# Patient Record
Sex: FEMALE | Race: WHITE | NOT HISPANIC OR LATINO
[De-identification: names, ages, dates, MRNs, and addresses within clinical notes are randomized per-mention and may not be internally consistent; named-entity substitution may affect disease eponyms.]

---

## 2017-04-03 ENCOUNTER — APPOINTMENT (OUTPATIENT)
Dept: DERMATOLOGY | Facility: CLINIC | Age: 72
End: 2017-04-03

## 2017-04-18 ENCOUNTER — TRANSCRIPTION ENCOUNTER (OUTPATIENT)
Age: 72
End: 2017-04-18

## 2017-05-05 ENCOUNTER — APPOINTMENT (OUTPATIENT)
Age: 72
End: 2017-05-05

## 2017-05-05 VITALS
HEART RATE: 62 BPM | WEIGHT: 120 LBS | OXYGEN SATURATION: 98 % | HEIGHT: 65 IN | DIASTOLIC BLOOD PRESSURE: 88 MMHG | BODY MASS INDEX: 19.99 KG/M2 | SYSTOLIC BLOOD PRESSURE: 167 MMHG

## 2017-05-05 DIAGNOSIS — Z81.1 FAMILY HISTORY OF ALCOHOL ABUSE AND DEPENDENCE: ICD-10-CM

## 2017-05-19 ENCOUNTER — APPOINTMENT (OUTPATIENT)
Dept: INTERNAL MEDICINE | Facility: CLINIC | Age: 72
End: 2017-05-19

## 2017-05-19 VITALS
WEIGHT: 120 LBS | BODY MASS INDEX: 19.99 KG/M2 | HEIGHT: 65 IN | DIASTOLIC BLOOD PRESSURE: 80 MMHG | HEART RATE: 60 BPM | SYSTOLIC BLOOD PRESSURE: 130 MMHG

## 2017-05-19 DIAGNOSIS — Z23 ENCOUNTER FOR IMMUNIZATION: ICD-10-CM

## 2017-05-19 DIAGNOSIS — M25.50 PAIN IN UNSPECIFIED JOINT: ICD-10-CM

## 2017-05-19 LAB
ALBUMIN SERPL ELPH-MCNC: 4.4 G/DL
ALP BLD-CCNC: 124 U/L
ALT SERPL-CCNC: 31 U/L
ANION GAP SERPL CALC-SCNC: 15 MMOL/L
AST SERPL-CCNC: 46 U/L
BASOPHILS # BLD AUTO: 0.03 K/UL
BASOPHILS NFR BLD AUTO: 0.5 %
BILIRUB SERPL-MCNC: 0.6 MG/DL
BUN SERPL-MCNC: 16 MG/DL
CALCIUM SERPL-MCNC: 9.5 MG/DL
CHLORIDE SERPL-SCNC: 103 MMOL/L
CHOLEST SERPL-MCNC: 205 MG/DL
CHOLEST/HDLC SERPL: 2.1 RATIO
CO2 SERPL-SCNC: 27 MMOL/L
CREAT SERPL-MCNC: 1.02 MG/DL
DEPRECATED KAPPA LC FREE/LAMBDA SER: 1.75 RATIO
EOSINOPHIL # BLD AUTO: 0.14 K/UL
EOSINOPHIL NFR BLD AUTO: 2.4 %
ESTIMATED AVERAGE GLUCOSE: 111
FERRITIN SERPL-MCNC: 67 NG/ML
GGT SERPL-CCNC: 83 U/L
GLUCOSE SERPL-MCNC: 91 MG/DL
HAV IGG+IGM SER QL: REACTIVE
HBA1C MFR BLD HPLC: 5.5 %
HBV SURFACE AB SER QL: REACTIVE
HBV SURFACE AG SER QL: NONREACTIVE
HCT VFR BLD CALC: 43.1 %
HCV AB SER QL: NONREACTIVE
HCV S/CO RATIO: 0.13 S/CO
HDLC SERPL-MCNC: 99 MG/DL
HGB BLD-MCNC: 13.4 G/DL
IGA SER QL IEP: 235 MG/DL
IGG SER QL IEP: 748 MG/DL
IGM SER QL IEP: 126 MG/DL
IMM GRANULOCYTES NFR BLD AUTO: 0 %
INR PPP: 0.96 RATIO
IRON SATN MFR SERPL: 22 %
IRON SERPL-MCNC: 65 UG/DL
KAPPA LC CSF-MCNC: 1.69 MG/DL
KAPPA LC SERPL-MCNC: 2.95 MG/DL
LDLC SERPL CALC-MCNC: 93 MG/DL
LYMPHOCYTES # BLD AUTO: 1.15 K/UL
LYMPHOCYTES NFR BLD AUTO: 19.9 %
MAN DIFF?: NORMAL
MCHC RBC-ENTMCNC: 29.6 PG
MCHC RBC-ENTMCNC: 31.1 GM/DL
MCV RBC AUTO: 95.4 FL
MONOCYTES # BLD AUTO: 0.29 K/UL
MONOCYTES NFR BLD AUTO: 5 %
NEUTROPHILS # BLD AUTO: 4.16 K/UL
NEUTROPHILS NFR BLD AUTO: 72.2 %
PLATELET # BLD AUTO: 223 K/UL
POTASSIUM SERPL-SCNC: 4.9 MMOL/L
PROT SERPL-MCNC: 6.7 G/DL
PT BLD: 10.8 SEC
RBC # BLD: 4.52 M/UL
RBC # FLD: 13.7 %
SODIUM SERPL-SCNC: 145 MMOL/L
TIBC SERPL-MCNC: 295 UG/DL
TRIGL SERPL-MCNC: 67 MG/DL
TTG IGA SER IA-ACNC: 6.5 UNITS
TTG IGA SER-ACNC: NEGATIVE
TTG IGG SER IA-ACNC: <5 UNITS
TTG IGG SER IA-ACNC: NEGATIVE
UIBC SERPL-MCNC: 230 UG/DL
WBC # FLD AUTO: 5.77 K/UL

## 2017-05-21 ENCOUNTER — FORM ENCOUNTER (OUTPATIENT)
Age: 72
End: 2017-05-21

## 2017-05-22 ENCOUNTER — APPOINTMENT (OUTPATIENT)
Dept: ULTRASOUND IMAGING | Facility: CLINIC | Age: 72
End: 2017-05-22

## 2017-05-22 ENCOUNTER — OUTPATIENT (OUTPATIENT)
Dept: OUTPATIENT SERVICES | Facility: HOSPITAL | Age: 72
LOS: 1 days | End: 2017-05-22
Payer: MEDICARE

## 2017-05-22 DIAGNOSIS — Z00.8 ENCOUNTER FOR OTHER GENERAL EXAMINATION: ICD-10-CM

## 2017-05-22 LAB
ANA SER IF-ACNC: NEGATIVE
MITOCHONDRIA AB SER IF-ACNC: NORMAL
SMOOTH MUSCLE AB SER QL IF: NORMAL

## 2017-05-22 PROCEDURE — 76700 US EXAM ABDOM COMPLETE: CPT | Mod: 26

## 2017-05-25 ENCOUNTER — APPOINTMENT (OUTPATIENT)
Age: 72
End: 2017-05-25

## 2017-06-09 ENCOUNTER — APPOINTMENT (OUTPATIENT)
Dept: ORTHOPEDIC SURGERY | Facility: CLINIC | Age: 72
End: 2017-06-09

## 2017-06-09 VITALS
WEIGHT: 118 LBS | DIASTOLIC BLOOD PRESSURE: 77 MMHG | SYSTOLIC BLOOD PRESSURE: 148 MMHG | HEART RATE: 59 BPM | BODY MASS INDEX: 19.66 KG/M2 | HEIGHT: 65 IN

## 2017-06-09 DIAGNOSIS — Z98.890 OTHER SPECIFIED POSTPROCEDURAL STATES: ICD-10-CM

## 2017-06-09 DIAGNOSIS — M20.60 ACQUIRED DEFORMITIES OF TOE(S), UNSPECIFIED, UNSPECIFIED FOOT: ICD-10-CM

## 2017-07-21 ENCOUNTER — APPOINTMENT (OUTPATIENT)
Dept: ORTHOPEDIC SURGERY | Facility: CLINIC | Age: 72
End: 2017-07-21

## 2017-07-21 VITALS
HEART RATE: 54 BPM | SYSTOLIC BLOOD PRESSURE: 127 MMHG | HEIGHT: 65 IN | DIASTOLIC BLOOD PRESSURE: 67 MMHG | WEIGHT: 118 LBS | BODY MASS INDEX: 19.66 KG/M2

## 2017-07-21 DIAGNOSIS — M21.962 UNSPECIFIED ACQUIRED DEFORMITY OF LEFT LOWER LEG: ICD-10-CM

## 2017-08-25 ENCOUNTER — APPOINTMENT (OUTPATIENT)
Age: 72
End: 2017-08-25
Payer: MEDICARE

## 2017-08-25 VITALS
WEIGHT: 118 LBS | DIASTOLIC BLOOD PRESSURE: 83 MMHG | OXYGEN SATURATION: 100 % | BODY MASS INDEX: 19.66 KG/M2 | HEIGHT: 65 IN | SYSTOLIC BLOOD PRESSURE: 153 MMHG | HEART RATE: 58 BPM

## 2017-08-25 PROCEDURE — 99214 OFFICE O/P EST MOD 30 MIN: CPT

## 2017-08-26 LAB
CK SERPL-CCNC: 165 U/L
LDH SERPL-CCNC: 293 U/L

## 2017-08-29 LAB
ALDOLASE SERPL-CCNC: 6.7 U/L
ALP BLD-CCNC: 116 U/L
ALP BONE CFR SERPL: 29 %
ALP INTEST CFR SERPL: 9 %
ALP LIVER CFR SERPL: 61 %
ALP MACRO CFR SERPL: 0 %
ALP PLAC CFR SERPL: 0 %

## 2017-09-07 ENCOUNTER — FORM ENCOUNTER (OUTPATIENT)
Age: 72
End: 2017-09-07

## 2017-09-08 ENCOUNTER — APPOINTMENT (OUTPATIENT)
Dept: MRI IMAGING | Facility: CLINIC | Age: 72
End: 2017-09-08
Payer: MEDICARE

## 2017-09-08 ENCOUNTER — OUTPATIENT (OUTPATIENT)
Dept: OUTPATIENT SERVICES | Facility: HOSPITAL | Age: 72
LOS: 1 days | End: 2017-09-08
Payer: MEDICARE

## 2017-09-08 DIAGNOSIS — R74.8 ABNORMAL LEVELS OF OTHER SERUM ENZYMES: ICD-10-CM

## 2017-09-08 PROCEDURE — 82565 ASSAY OF CREATININE: CPT

## 2017-09-08 PROCEDURE — 74183 MRI ABD W/O CNTR FLWD CNTR: CPT

## 2017-09-08 PROCEDURE — A9585: CPT

## 2017-09-08 PROCEDURE — 74183 MRI ABD W/O CNTR FLWD CNTR: CPT | Mod: 26

## 2017-10-04 ENCOUNTER — APPOINTMENT (OUTPATIENT)
Dept: DERMATOLOGY | Facility: CLINIC | Age: 72
End: 2017-10-04

## 2017-11-02 ENCOUNTER — RESULT REVIEW (OUTPATIENT)
Age: 72
End: 2017-11-02

## 2017-11-03 ENCOUNTER — APPOINTMENT (OUTPATIENT)
Dept: DERMATOLOGY | Facility: CLINIC | Age: 72
End: 2017-11-03
Payer: MEDICARE

## 2017-11-03 PROCEDURE — 99214 OFFICE O/P EST MOD 30 MIN: CPT | Mod: 25

## 2017-11-03 PROCEDURE — 17003 DESTRUCT PREMALG LES 2-14: CPT

## 2017-11-03 PROCEDURE — 17000 DESTRUCT PREMALG LESION: CPT

## 2017-11-03 PROCEDURE — 11100 BX SKIN SUBCUTANEOUS&/MUCOUS MEMBRANE 1 LESION: CPT | Mod: 59

## 2018-01-16 ENCOUNTER — APPOINTMENT (OUTPATIENT)
Dept: INTERNAL MEDICINE | Facility: CLINIC | Age: 73
End: 2018-01-16
Payer: MEDICARE

## 2018-01-16 ENCOUNTER — NON-APPOINTMENT (OUTPATIENT)
Age: 73
End: 2018-01-16

## 2018-01-16 VITALS
HEART RATE: 64 BPM | WEIGHT: 118 LBS | DIASTOLIC BLOOD PRESSURE: 80 MMHG | OXYGEN SATURATION: 99 % | RESPIRATION RATE: 10 BRPM | SYSTOLIC BLOOD PRESSURE: 120 MMHG | BODY MASS INDEX: 19.66 KG/M2 | HEIGHT: 65 IN

## 2018-01-16 DIAGNOSIS — I34.1 NONRHEUMATIC MITRAL (VALVE) PROLAPSE: ICD-10-CM

## 2018-01-16 PROCEDURE — G0439: CPT

## 2018-01-16 PROCEDURE — 93000 ELECTROCARDIOGRAM COMPLETE: CPT

## 2018-01-16 PROCEDURE — 36415 COLL VENOUS BLD VENIPUNCTURE: CPT

## 2018-01-17 ENCOUNTER — RESULT REVIEW (OUTPATIENT)
Age: 73
End: 2018-01-17

## 2018-01-17 LAB
ALBUMIN SERPL ELPH-MCNC: 4.3 G/DL
ALP BLD-CCNC: 104 U/L
ALT SERPL-CCNC: 32 U/L
ANION GAP SERPL CALC-SCNC: 15 MMOL/L
APPEARANCE: CLEAR
AST SERPL-CCNC: 48 U/L
BACTERIA: NEGATIVE
BASOPHILS # BLD AUTO: 0.03 K/UL
BASOPHILS NFR BLD AUTO: 0.6 %
BILIRUB SERPL-MCNC: 0.5 MG/DL
BILIRUBIN URINE: NEGATIVE
BLOOD URINE: NEGATIVE
BUN SERPL-MCNC: 14 MG/DL
CALCIUM SERPL-MCNC: 9.9 MG/DL
CHLORIDE SERPL-SCNC: 100 MMOL/L
CHOLEST SERPL-MCNC: 248 MG/DL
CHOLEST/HDLC SERPL: 2.3 RATIO
CO2 SERPL-SCNC: 26 MMOL/L
COLOR: YELLOW
CREAT SERPL-MCNC: 0.97 MG/DL
EOSINOPHIL # BLD AUTO: 0.15 K/UL
EOSINOPHIL NFR BLD AUTO: 2.8 %
GLUCOSE QUALITATIVE U: NEGATIVE MG/DL
GLUCOSE SERPL-MCNC: 91 MG/DL
HCT VFR BLD CALC: 43.3 %
HDLC SERPL-MCNC: 107 MG/DL
HGB BLD-MCNC: 13.5 G/DL
HYALINE CASTS: 4 /LPF
IMM GRANULOCYTES NFR BLD AUTO: 0 %
KETONES URINE: NEGATIVE
LDLC SERPL CALC-MCNC: 128 MG/DL
LEUKOCYTE ESTERASE URINE: NEGATIVE
LYMPHOCYTES # BLD AUTO: 1.04 K/UL
LYMPHOCYTES NFR BLD AUTO: 19.2 %
MAN DIFF?: NORMAL
MCHC RBC-ENTMCNC: 29.7 PG
MCHC RBC-ENTMCNC: 31.2 GM/DL
MCV RBC AUTO: 95.4 FL
MICROSCOPIC-UA: NORMAL
MONOCYTES # BLD AUTO: 0.25 K/UL
MONOCYTES NFR BLD AUTO: 4.6 %
NEUTROPHILS # BLD AUTO: 3.96 K/UL
NEUTROPHILS NFR BLD AUTO: 72.8 %
NITRITE URINE: NEGATIVE
PH URINE: 6
PLATELET # BLD AUTO: 199 K/UL
POTASSIUM SERPL-SCNC: 4.3 MMOL/L
PROT SERPL-MCNC: 7.1 G/DL
PROTEIN URINE: NEGATIVE MG/DL
RBC # BLD: 4.54 M/UL
RBC # FLD: 13.3 %
RED BLOOD CELLS URINE: 3 /HPF
SODIUM SERPL-SCNC: 141 MMOL/L
SPECIFIC GRAVITY URINE: 1.01
SQUAMOUS EPITHELIAL CELLS: 2 /HPF
TRIGL SERPL-MCNC: 67 MG/DL
UROBILINOGEN URINE: NEGATIVE MG/DL
WBC # FLD AUTO: 5.43 K/UL
WHITE BLOOD CELLS URINE: 1 /HPF

## 2018-01-18 ENCOUNTER — APPOINTMENT (OUTPATIENT)
Dept: OBGYN | Facility: CLINIC | Age: 73
End: 2018-01-18
Payer: MEDICARE

## 2018-01-18 ENCOUNTER — RX RENEWAL (OUTPATIENT)
Age: 73
End: 2018-01-18

## 2018-01-18 VITALS
WEIGHT: 118 LBS | SYSTOLIC BLOOD PRESSURE: 110 MMHG | DIASTOLIC BLOOD PRESSURE: 80 MMHG | HEIGHT: 65 IN | BODY MASS INDEX: 19.66 KG/M2

## 2018-01-18 DIAGNOSIS — N63.10 UNSPECIFIED LUMP IN THE RIGHT BREAST, UNSPECIFIED QUADRANT: ICD-10-CM

## 2018-01-18 PROCEDURE — 99214 OFFICE O/P EST MOD 30 MIN: CPT

## 2018-01-21 ENCOUNTER — FORM ENCOUNTER (OUTPATIENT)
Age: 73
End: 2018-01-21

## 2018-01-22 ENCOUNTER — OUTPATIENT (OUTPATIENT)
Dept: OUTPATIENT SERVICES | Facility: HOSPITAL | Age: 73
LOS: 1 days | End: 2018-01-22
Payer: MEDICARE

## 2018-01-22 ENCOUNTER — APPOINTMENT (OUTPATIENT)
Dept: ULTRASOUND IMAGING | Facility: CLINIC | Age: 73
End: 2018-01-22
Payer: MEDICARE

## 2018-01-22 DIAGNOSIS — Z00.8 ENCOUNTER FOR OTHER GENERAL EXAMINATION: ICD-10-CM

## 2018-01-22 PROCEDURE — 93880 EXTRACRANIAL BILAT STUDY: CPT

## 2018-01-22 PROCEDURE — 93880 EXTRACRANIAL BILAT STUDY: CPT | Mod: 26

## 2018-01-23 ENCOUNTER — RESULT REVIEW (OUTPATIENT)
Age: 73
End: 2018-01-23

## 2018-01-26 ENCOUNTER — NON-APPOINTMENT (OUTPATIENT)
Age: 73
End: 2018-01-26

## 2018-01-26 ENCOUNTER — APPOINTMENT (OUTPATIENT)
Dept: CARDIOLOGY | Facility: CLINIC | Age: 73
End: 2018-01-26
Payer: MEDICARE

## 2018-01-26 VITALS
SYSTOLIC BLOOD PRESSURE: 120 MMHG | WEIGHT: 118 LBS | BODY MASS INDEX: 19.64 KG/M2 | OXYGEN SATURATION: 98 % | DIASTOLIC BLOOD PRESSURE: 80 MMHG | HEART RATE: 63 BPM

## 2018-01-26 PROCEDURE — 99214 OFFICE O/P EST MOD 30 MIN: CPT

## 2018-01-26 PROCEDURE — 93000 ELECTROCARDIOGRAM COMPLETE: CPT

## 2018-04-06 ENCOUNTER — APPOINTMENT (OUTPATIENT)
Dept: INTERNAL MEDICINE | Facility: CLINIC | Age: 73
End: 2018-04-06
Payer: MEDICARE

## 2018-04-06 VITALS
HEIGHT: 65 IN | SYSTOLIC BLOOD PRESSURE: 115 MMHG | HEART RATE: 59 BPM | WEIGHT: 118 LBS | DIASTOLIC BLOOD PRESSURE: 75 MMHG | BODY MASS INDEX: 19.66 KG/M2

## 2018-04-06 PROCEDURE — 36415 COLL VENOUS BLD VENIPUNCTURE: CPT

## 2018-04-06 PROCEDURE — 99214 OFFICE O/P EST MOD 30 MIN: CPT | Mod: 25

## 2018-04-09 ENCOUNTER — RESULT REVIEW (OUTPATIENT)
Age: 73
End: 2018-04-09

## 2018-04-09 LAB
ALBUMIN SERPL ELPH-MCNC: 4.4 G/DL
ALP BLD-CCNC: 106 U/L
ALT SERPL-CCNC: 30 U/L
ANION GAP SERPL CALC-SCNC: 15 MMOL/L
AST SERPL-CCNC: 48 U/L
BASOPHILS # BLD AUTO: 0.03 K/UL
BASOPHILS NFR BLD AUTO: 0.4 %
BILIRUB DIRECT SERPL-MCNC: 0.1 MG/DL
BILIRUB INDIRECT SERPL-MCNC: 0.4 MG/DL
BILIRUB SERPL-MCNC: 0.5 MG/DL
BUN SERPL-MCNC: 18 MG/DL
CALCIUM SERPL-MCNC: 10 MG/DL
CHLORIDE SERPL-SCNC: 100 MMOL/L
CHOLEST SERPL-MCNC: 192 MG/DL
CHOLEST/HDLC SERPL: 2 RATIO
CO2 SERPL-SCNC: 28 MMOL/L
CREAT SERPL-MCNC: 1.03 MG/DL
EOSINOPHIL # BLD AUTO: 0.11 K/UL
EOSINOPHIL NFR BLD AUTO: 1.5 %
GLUCOSE SERPL-MCNC: 87 MG/DL
HCT VFR BLD CALC: 46.1 %
HDLC SERPL-MCNC: 98 MG/DL
HGB BLD-MCNC: 14.5 G/DL
IMM GRANULOCYTES NFR BLD AUTO: 0.1 %
LDLC SERPL CALC-MCNC: 82 MG/DL
LYMPHOCYTES # BLD AUTO: 1.34 K/UL
LYMPHOCYTES NFR BLD AUTO: 18 %
MAN DIFF?: NORMAL
MCHC RBC-ENTMCNC: 31.3 PG
MCHC RBC-ENTMCNC: 31.5 GM/DL
MCV RBC AUTO: 99.6 FL
MONOCYTES # BLD AUTO: 0.35 K/UL
MONOCYTES NFR BLD AUTO: 4.7 %
NEUTROPHILS # BLD AUTO: 5.6 K/UL
NEUTROPHILS NFR BLD AUTO: 75.3 %
PLATELET # BLD AUTO: 214 K/UL
POTASSIUM SERPL-SCNC: 5.1 MMOL/L
PROT SERPL-MCNC: 6.8 G/DL
RBC # BLD: 4.63 M/UL
RBC # FLD: 13.2 %
SODIUM SERPL-SCNC: 143 MMOL/L
TRIGL SERPL-MCNC: 61 MG/DL
WBC # FLD AUTO: 7.44 K/UL

## 2018-04-10 ENCOUNTER — APPOINTMENT (OUTPATIENT)
Dept: OBGYN | Facility: CLINIC | Age: 73
End: 2018-04-10
Payer: MEDICARE

## 2018-04-10 VITALS
SYSTOLIC BLOOD PRESSURE: 122 MMHG | DIASTOLIC BLOOD PRESSURE: 72 MMHG | HEIGHT: 65 IN | WEIGHT: 122 LBS | BODY MASS INDEX: 20.33 KG/M2

## 2018-04-10 PROCEDURE — G0101: CPT

## 2018-04-16 LAB — CYTOLOGY CVX/VAG DOC THIN PREP: NORMAL

## 2018-04-24 ENCOUNTER — RX RENEWAL (OUTPATIENT)
Age: 73
End: 2018-04-24

## 2018-05-11 ENCOUNTER — RESULT REVIEW (OUTPATIENT)
Age: 73
End: 2018-05-11

## 2018-05-11 ENCOUNTER — APPOINTMENT (OUTPATIENT)
Dept: DERMATOLOGY | Facility: CLINIC | Age: 73
End: 2018-05-11
Payer: MEDICARE

## 2018-05-11 PROCEDURE — 99214 OFFICE O/P EST MOD 30 MIN: CPT | Mod: 25

## 2018-05-11 PROCEDURE — 11100 BX SKIN SUBCUTANEOUS&/MUCOUS MEMBRANE 1 LESION: CPT | Mod: 59

## 2018-05-11 PROCEDURE — 17261 DSTRJ MAL LES T/A/L .6-1.0CM: CPT | Mod: 59

## 2018-05-11 PROCEDURE — 17262 DSTRJ MAL LES T/A/L 1.1-2.0: CPT | Mod: 59

## 2018-05-11 PROCEDURE — 17000 DESTRUCT PREMALG LESION: CPT

## 2018-05-11 PROCEDURE — 17003 DESTRUCT PREMALG LES 2-14: CPT

## 2018-06-18 ENCOUNTER — APPOINTMENT (OUTPATIENT)
Dept: DERMATOLOGY | Facility: CLINIC | Age: 73
End: 2018-06-18
Payer: MEDICARE

## 2018-06-18 PROCEDURE — 17261 DSTRJ MAL LES T/A/L .6-1.0CM: CPT

## 2018-06-18 PROCEDURE — 10120 INC&RMVL FB SUBQ TISS SMPL: CPT

## 2018-06-18 PROCEDURE — 99213 OFFICE O/P EST LOW 20 MIN: CPT | Mod: 25

## 2018-07-31 ENCOUNTER — APPOINTMENT (OUTPATIENT)
Dept: CARDIOLOGY | Facility: CLINIC | Age: 73
End: 2018-07-31

## 2018-11-12 ENCOUNTER — APPOINTMENT (OUTPATIENT)
Dept: DERMATOLOGY | Facility: CLINIC | Age: 73
End: 2018-11-12
Payer: MEDICARE

## 2018-11-12 PROCEDURE — 99214 OFFICE O/P EST MOD 30 MIN: CPT | Mod: 25

## 2018-11-12 PROCEDURE — 17003 DESTRUCT PREMALG LES 2-14: CPT

## 2018-11-12 PROCEDURE — 17000 DESTRUCT PREMALG LESION: CPT

## 2018-11-13 ENCOUNTER — APPOINTMENT (OUTPATIENT)
Dept: CARDIOLOGY | Facility: CLINIC | Age: 73
End: 2018-11-13
Payer: MEDICARE

## 2018-11-13 PROCEDURE — 93306 TTE W/DOPPLER COMPLETE: CPT

## 2019-01-23 ENCOUNTER — APPOINTMENT (OUTPATIENT)
Dept: INTERNAL MEDICINE | Facility: CLINIC | Age: 74
End: 2019-01-23
Payer: MEDICARE

## 2019-01-23 VITALS
WEIGHT: 118 LBS | RESPIRATION RATE: 11 BRPM | HEIGHT: 65 IN | DIASTOLIC BLOOD PRESSURE: 70 MMHG | HEART RATE: 65 BPM | SYSTOLIC BLOOD PRESSURE: 110 MMHG | BODY MASS INDEX: 19.66 KG/M2

## 2019-01-23 DIAGNOSIS — M51.36 OTHER INTERVERTEBRAL DISC DEGENERATION, LUMBAR REGION: ICD-10-CM

## 2019-01-23 DIAGNOSIS — I73.00 RAYNAUD'S SYNDROME W/OUT GANGRENE: ICD-10-CM

## 2019-01-23 PROCEDURE — 36415 COLL VENOUS BLD VENIPUNCTURE: CPT

## 2019-01-23 PROCEDURE — G0439: CPT

## 2019-01-23 NOTE — ADDENDUM
[FreeTextEntry1] : Cholesterol continues elevated and patient agrees to retry a statin therefore start Crestor 5 mg every other day with coenzyme Q 10.

## 2019-01-23 NOTE — REVIEW OF SYSTEMS
[Negative] : Heme/Lymph [As Noted in HPI] : as noted in HPI [Joint Pain] : joint pain [Joint Stiffness] : joint stiffness

## 2019-01-23 NOTE — ASSESSMENT
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 Followup echo scheduled.\par \par History of carotid stenosis with carotid duplex one year ago showing plaque with no stenosis.\par \par Hyperlipidemia being treated with every other day Crestor\par Patient is to continue diet and exercise.\par \par Patient with increased liver functions status post evaluation with hepatologist with no etiology.\par Therefore to monitor.\par \par Influenza vaccine declined\par All other vaccines up to date.\par Shingrix discussed\par \par Colonoscopy May 2013 with followup in 5 years. Therefore referral again given\par GYN yearly with Dr. Kolb\par Mammography January 2018 and to schedule followup\par Bone density July 2016\par \par Followup in 6 months

## 2019-01-23 NOTE — COUNSELING
[Good understanding] : Patient has a good understanding of lifestyle changes and the steps needed to achieve self management goals [de-identified] : Patient to continue diet and exercise

## 2019-01-23 NOTE — HEALTH RISK ASSESSMENT
[No falls in past year] : Patient reported no falls in the past year [0] : 2) Feeling down, depressed, or hopeless: Not at all (0) [None] : None [With Significant Other] : lives with significant other [# of Members in Household ___] :  household currently consist of [unfilled] member(s) [Retired] : retired [High School] : high school [] :  [# Of Children ___] : has [unfilled] children [Sexually Active] : sexually active [Feels Safe at Home] : Feels safe at home [Fully functional (bathing, dressing, toileting, transferring, walking, feeding)] : Fully functional (bathing, dressing, toileting, transferring, walking, feeding) [Fully functional (using the telephone, shopping, preparing meals, housekeeping, doing laundry, using] : Fully functional and needs no help or supervision to perform IADLs (using the telephone, shopping, preparing meals, housekeeping, doing laundry, using transportation, managing medications and managing finances) [Smoke Detector] : smoke detector [Carbon Monoxide Detector] : carbon monoxide detector [Guns at Home] : guns at home [Seat Belt] :  uses seat belt [Sunscreen] : uses sunscreen [Discussed at today's visit] : Advance Directives Discussed at today's visit [Designated Healthcare Proxy] : Designated healthcare proxy [Name: ___] : Health Care Proxy's Name: [unfilled]  [Very Good] : ~his/her~  mood as very good [] : No [de-identified] : occ [[id number]] : 0 [Change in mental status noted] : No change in mental status noted [Reports changes in hearing] : Reports no changes in hearing [Reports changes in vision] : Reports no changes in vision [Reports changes in dental health] : Reports no changes in dental health [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

## 2019-01-23 NOTE — PHYSICAL EXAM
[General Appearance - Alert] : alert [General Appearance - In No Acute Distress] : in no acute distress [Sclera] : the sclera and conjunctiva were normal [PERRL With Normal Accommodation] : pupils were equal in size, round, and reactive to light [Extraocular Movements] : extraocular movements were intact [Outer Ear] : the ears and nose were normal in appearance [Oropharynx] : the oropharynx was normal [Neck Appearance] : the appearance of the neck was normal [Neck Cervical Mass (___cm)] : no neck mass was observed [Jugular Venous Distention Increased] : there was no jugular-venous distention [Thyroid Diffuse Enlargement] : the thyroid was not enlarged [Thyroid Nodule] : there were no palpable thyroid nodules [Respiration, Rhythm And Depth] : normal respiratory rhythm and effort [Exaggerated Use Of Accessory Muscles For Inspiration] : no accessory muscle use [Auscultation Breath Sounds / Voice Sounds] : lungs were clear to auscultation bilaterally [Chest Palpation] : palpation of the chest revealed no abnormalities [Heart Rate And Rhythm] : heart rate was normal and rhythm regular [Heart Sounds] : normal S1 and S2 [Heart Sounds Gallop] : no gallops [Heart Sounds Pericardial Friction Rub] : no pericardial rub [Systolic grade ___/6] : A grade [unfilled]/6 systolic murmur was heard. [Abdominal Aorta] : the abdominal aorta was normal [Arterial Pulses Femoral] : femoral pulses were normal without bruits [Full Pulse] : the pedal pulses are present [Edema] : there was no peripheral edema [Veins - Varicosity Changes] : there were no varicosital changes [Bowel Sounds] : normal bowel sounds [Abdomen Soft] : soft [Abdomen Tenderness] : non-tender [Abdomen Mass (___ Cm)] : no abdominal mass palpated [Cervical Lymph Nodes Enlarged Posterior Bilaterally] : posterior cervical [Cervical Lymph Nodes Enlarged Anterior Bilaterally] : anterior cervical [Supraclavicular Lymph Nodes Enlarged Bilaterally] : supraclavicular [Axillary Lymph Nodes Enlarged Bilaterally] : axillary [Femoral Lymph Nodes Enlarged Bilaterally] : femoral [Inguinal Lymph Nodes Enlarged Bilaterally] : inguinal [No Spinal Tenderness] : no spinal tenderness [Abnormal Walk] : normal gait [Nail Clubbing] : no clubbing  or cyanosis of the fingernails [Musculoskeletal - Swelling] : no joint swelling seen [Motor Tone] : muscle strength and tone were normal [Skin Color & Pigmentation] : normal skin color and pigmentation [Skin Turgor] : normal skin turgor [] : no rash [Cranial Nerves] : cranial nerves 2-12 were intact [No Focal Deficits] : no focal deficits [Oriented To Time, Place, And Person] : oriented to person, place, and time [Impaired Insight] : insight and judgment were intact [Affect] : the affect was normal [FreeTextEntry1] : Feet with arthritic changes and high arches. Arthritic changes base of both him

## 2019-01-24 ENCOUNTER — RX RENEWAL (OUTPATIENT)
Age: 74
End: 2019-01-24

## 2019-01-24 ENCOUNTER — TRANSCRIPTION ENCOUNTER (OUTPATIENT)
Age: 74
End: 2019-01-24

## 2019-01-24 LAB
25(OH)D3 SERPL-MCNC: 60.2 NG/ML
ALBUMIN SERPL ELPH-MCNC: 4.4 G/DL
ALP BLD-CCNC: 112 U/L
ALT SERPL-CCNC: 25 U/L
ANION GAP SERPL CALC-SCNC: 13 MMOL/L
APPEARANCE: CLEAR
AST SERPL-CCNC: 40 U/L
BACTERIA: NEGATIVE
BASOPHILS # BLD AUTO: 0.03 K/UL
BASOPHILS NFR BLD AUTO: 0.5 %
BILIRUB SERPL-MCNC: 0.6 MG/DL
BILIRUBIN URINE: NEGATIVE
BLOOD URINE: NEGATIVE
BUN SERPL-MCNC: 14 MG/DL
CALCIUM SERPL-MCNC: 9.8 MG/DL
CHLORIDE SERPL-SCNC: 101 MMOL/L
CHOLEST SERPL-MCNC: 196 MG/DL
CHOLEST/HDLC SERPL: 2.1 RATIO
CO2 SERPL-SCNC: 29 MMOL/L
COLOR: YELLOW
CREAT SERPL-MCNC: 0.88 MG/DL
EOSINOPHIL # BLD AUTO: 0.22 K/UL
EOSINOPHIL NFR BLD AUTO: 3.6 %
GLUCOSE QUALITATIVE U: NEGATIVE MG/DL
GLUCOSE SERPL-MCNC: 86 MG/DL
HCT VFR BLD CALC: 47.2 %
HDLC SERPL-MCNC: 95 MG/DL
HGB BLD-MCNC: 14.5 G/DL
HYALINE CASTS: 4 /LPF
IMM GRANULOCYTES NFR BLD AUTO: 0.2 %
KETONES URINE: ABNORMAL
LDLC SERPL CALC-MCNC: 89 MG/DL
LEUKOCYTE ESTERASE URINE: NEGATIVE
LYMPHOCYTES # BLD AUTO: 1.19 K/UL
LYMPHOCYTES NFR BLD AUTO: 19.3 %
MAN DIFF?: NORMAL
MCHC RBC-ENTMCNC: 30.1 PG
MCHC RBC-ENTMCNC: 30.7 GM/DL
MCV RBC AUTO: 97.9 FL
MICROSCOPIC-UA: NORMAL
MONOCYTES # BLD AUTO: 0.37 K/UL
MONOCYTES NFR BLD AUTO: 6 %
NEUTROPHILS # BLD AUTO: 4.34 K/UL
NEUTROPHILS NFR BLD AUTO: 70.4 %
NITRITE URINE: NEGATIVE
PH URINE: 8
PLATELET # BLD AUTO: 238 K/UL
POTASSIUM SERPL-SCNC: 4.7 MMOL/L
PROT SERPL-MCNC: 7.2 G/DL
PROTEIN URINE: NEGATIVE MG/DL
RBC # BLD: 4.82 M/UL
RBC # FLD: 13 %
RED BLOOD CELLS URINE: 4 /HPF
SODIUM SERPL-SCNC: 143 MMOL/L
SPECIFIC GRAVITY URINE: 1.02
SQUAMOUS EPITHELIAL CELLS: 2 /HPF
TRIGL SERPL-MCNC: 62 MG/DL
UROBILINOGEN URINE: NEGATIVE MG/DL
WBC # FLD AUTO: 6.16 K/UL
WHITE BLOOD CELLS URINE: 2 /HPF

## 2019-02-15 ENCOUNTER — APPOINTMENT (OUTPATIENT)
Dept: CARDIOLOGY | Facility: CLINIC | Age: 74
End: 2019-02-15

## 2019-03-01 ENCOUNTER — LABORATORY RESULT (OUTPATIENT)
Age: 74
End: 2019-03-01

## 2019-03-01 ENCOUNTER — APPOINTMENT (OUTPATIENT)
Dept: INTERNAL MEDICINE | Facility: CLINIC | Age: 74
End: 2019-03-01
Payer: MEDICARE

## 2019-03-01 ENCOUNTER — RESULT CHARGE (OUTPATIENT)
Age: 74
End: 2019-03-01

## 2019-03-01 VITALS
TEMPERATURE: 98.5 F | DIASTOLIC BLOOD PRESSURE: 75 MMHG | HEIGHT: 65 IN | HEART RATE: 69 BPM | WEIGHT: 118 LBS | BODY MASS INDEX: 19.66 KG/M2 | SYSTOLIC BLOOD PRESSURE: 120 MMHG

## 2019-03-01 DIAGNOSIS — Z92.29 PERSONAL HISTORY OF OTHER DRUG THERAPY: ICD-10-CM

## 2019-03-01 LAB
FLUAV SPEC QL CULT: NEGATIVE
FLUBV AG SPEC QL IA: NEGATIVE

## 2019-03-01 PROCEDURE — 36415 COLL VENOUS BLD VENIPUNCTURE: CPT

## 2019-03-01 PROCEDURE — 99214 OFFICE O/P EST MOD 30 MIN: CPT | Mod: 25

## 2019-03-01 PROCEDURE — 87804 INFLUENZA ASSAY W/OPTIC: CPT | Mod: QW

## 2019-03-01 NOTE — PHYSICAL EXAM
[No Acute Distress] : no acute distress [Normal Outer Ear/Nose] : the outer ears and nose were normal in appearance [Normal Oropharynx] : the oropharynx was normal [Normal TMs] : both tympanic membranes were normal [Normal Nasal Mucosa] : the nasal mucosa was normal [Supple] : supple [No Respiratory Distress] : no respiratory distress  [Clear to Auscultation] : lungs were clear to auscultation bilaterally [Normal Rate] : normal rate  [Regular Rhythm] : with a regular rhythm [Normal Affect] : the affect was normal [Normal Mood] : the mood was normal [Soft] : abdomen soft [Non Tender] : non-tender [Non-distended] : non-distended [No Rash] : no rash [No Focal Deficits] : no focal deficits

## 2019-03-06 ENCOUNTER — TRANSCRIPTION ENCOUNTER (OUTPATIENT)
Age: 74
End: 2019-03-06

## 2019-03-11 LAB
ANAPLASMA PHAGOCYTO IGM COMENT: NORMAL
ANAPLASMA PHAGOCYTO IGM COMMENT: NORMAL
ANAPLASMA PHAGOCYTOPHILIA IGG ANTIBODIES: NORMAL
ANAPLASMA PHAGOCYTOPHILIA IGM ANTIBODIES: NORMAL
APPEARANCE: CLEAR
B BURGDOR IGG+IGM SER QL IB: NORMAL
B MICROTI AB SER QL: NORMAL
BABESIA ANTIBODIES, IGG: NORMAL
BABESIA ANTIBODIES, IGM: NORMAL
BACTERIA BLD CULT: NORMAL
BACTERIA BLD CULT: NORMAL
BACTERIA UR CULT: NORMAL
BACTERIA: NEGATIVE
BASOPHILS # BLD AUTO: 0.06 K/UL
BASOPHILS NFR BLD AUTO: 0.8 %
BILIRUBIN URINE: NEGATIVE
BLOOD URINE: NORMAL
CALCIUM OXALATE CRYSTALS: ABNORMAL
COLOR: NORMAL
EHRLICIA CHAFFEENIS IGG ANTIBODIES: NORMAL
EHRLICIA CHAFFEENIS IGG COMMENT: NORMAL
EHRLICIA CHAFFEENIS IGG INTERP: NORMAL
EHRLICIA CHAFFEENIS IGM ANTIBODIES: NORMAL
EOSINOPHIL # BLD AUTO: 0.17 K/UL
EOSINOPHIL NFR BLD AUTO: 2.3 %
GLUCOSE QUALITATIVE U: NEGATIVE
HCT VFR BLD CALC: 42.3 %
HGB BLD-MCNC: 13.2 G/DL
HYALINE CASTS: 0 /LPF
IMM GRANULOCYTES NFR BLD AUTO: 0.1 %
KETONES URINE: NEGATIVE
LEUKOCYTE ESTERASE URINE: NEGATIVE
LYMPHOCYTES # BLD AUTO: 1.65 K/UL
LYMPHOCYTES NFR BLD AUTO: 22.6 %
MAN DIFF?: NORMAL
MCHC RBC-ENTMCNC: 29.9 PG
MCHC RBC-ENTMCNC: 31.2 GM/DL
MCV RBC AUTO: 95.9 FL
MICROSCOPIC-UA: NORMAL
MONOCYTES # BLD AUTO: 0.48 K/UL
MONOCYTES NFR BLD AUTO: 6.6 %
NEUTROPHILS # BLD AUTO: 4.94 K/UL
NEUTROPHILS NFR BLD AUTO: 67.6 %
NITRITE URINE: NEGATIVE
PH URINE: 5.5
PLATELET # BLD AUTO: 258 K/UL
PROTEIN URINE: NEGATIVE
R RICKETTSI IGG CSF-ACNC: NEGATIVE
R RICKETTSI IGM CSF-ACNC: 0.75 INDEX
RBC # BLD: 4.41 M/UL
RBC # FLD: 12.2 %
RED BLOOD CELLS URINE: 1 /HPF
SPECIFIC GRAVITY URINE: 1.01
SQUAMOUS EPITHELIAL CELLS: 1 /HPF
UROBILINOGEN URINE: NORMAL
WBC # FLD AUTO: 7.31 K/UL
WHITE BLOOD CELLS URINE: 2 /HPF

## 2019-03-11 NOTE — ASSESSMENT
[FreeTextEntry1] : Patient with episodes of questionable etiology therefore to pursue workup including labs, urine, blood cultures.May warrant images, infectious disease evaluation etc. pending progress and preliminary workup.Patient will report any new issues and return to the office as scheduled\par \par Dr. Alcantar was in office building while I evaluated this pt

## 2019-03-11 NOTE — ADDENDUM
[FreeTextEntry1] : Labs all good but CMP/TSH were unable to be performed therefore patient advised to come in for redraw is still symptomatic\par \par \par Patient called on 3/11 stating she feels fine and does not feel she needs to repeat blood work, symptoms resolved

## 2019-03-11 NOTE — HISTORY OF PRESENT ILLNESS
[FreeTextEntry8] : Patient presents complaining of 2 occasions of not feeling well. Patient states her first instance was 2/14 where she had a frontal headache, exhaustion and woke up sweating, did not check her temperature. Patient states after a couple of days she felt fine and did not think much of it, assumed it was viral. Patient states 2 days ago she started to not feel well again with sweating and exhaustion and checked her temperature and states it was 100.9, no significant headache this time. Patient states currently she feels okay other than residual exhaustion. Patient denies nausea/vomiting/urinary symptoms/sore throat/cough/chest pain/palpitations/dyspnea/abd pain, no rashes, no recent travel. Patient has had no sick contacts.

## 2019-03-21 ENCOUNTER — FORM ENCOUNTER (OUTPATIENT)
Age: 74
End: 2019-03-21

## 2019-03-22 ENCOUNTER — OUTPATIENT (OUTPATIENT)
Dept: OUTPATIENT SERVICES | Facility: HOSPITAL | Age: 74
LOS: 1 days | End: 2019-03-22
Payer: MEDICARE

## 2019-03-22 ENCOUNTER — APPOINTMENT (OUTPATIENT)
Dept: CT IMAGING | Facility: CLINIC | Age: 74
End: 2019-03-22
Payer: MEDICARE

## 2019-03-22 ENCOUNTER — NON-APPOINTMENT (OUTPATIENT)
Age: 74
End: 2019-03-22

## 2019-03-22 ENCOUNTER — APPOINTMENT (OUTPATIENT)
Dept: INTERNAL MEDICINE | Facility: CLINIC | Age: 74
End: 2019-03-22
Payer: MEDICARE

## 2019-03-22 VITALS
HEART RATE: 79 BPM | HEIGHT: 65 IN | WEIGHT: 120 LBS | DIASTOLIC BLOOD PRESSURE: 70 MMHG | SYSTOLIC BLOOD PRESSURE: 120 MMHG | TEMPERATURE: 97.2 F | BODY MASS INDEX: 19.99 KG/M2

## 2019-03-22 DIAGNOSIS — R50.9 FEVER, UNSPECIFIED: ICD-10-CM

## 2019-03-22 DIAGNOSIS — I49.8 OTHER SPECIFIED CARDIAC ARRHYTHMIAS: ICD-10-CM

## 2019-03-22 PROCEDURE — 71260 CT THORAX DX C+: CPT | Mod: 26

## 2019-03-22 PROCEDURE — 93000 ELECTROCARDIOGRAM COMPLETE: CPT

## 2019-03-22 PROCEDURE — 74177 CT ABD & PELVIS W/CONTRAST: CPT

## 2019-03-22 PROCEDURE — 74177 CT ABD & PELVIS W/CONTRAST: CPT | Mod: 26

## 2019-03-22 PROCEDURE — 36415 COLL VENOUS BLD VENIPUNCTURE: CPT

## 2019-03-22 PROCEDURE — 99214 OFFICE O/P EST MOD 30 MIN: CPT | Mod: 25

## 2019-03-22 PROCEDURE — 71260 CT THORAX DX C+: CPT

## 2019-03-25 PROBLEM — I49.8 VENTRICULAR TRIGEMINY: Status: ACTIVE | Noted: 2019-03-25

## 2019-03-26 ENCOUNTER — TRANSCRIPTION ENCOUNTER (OUTPATIENT)
Age: 74
End: 2019-03-26

## 2019-03-26 ENCOUNTER — OUTPATIENT (OUTPATIENT)
Dept: OUTPATIENT SERVICES | Facility: HOSPITAL | Age: 74
LOS: 1 days | End: 2019-03-26

## 2019-03-26 ENCOUNTER — APPOINTMENT (OUTPATIENT)
Dept: MRI IMAGING | Facility: CLINIC | Age: 74
End: 2019-03-26
Payer: MEDICARE

## 2019-03-26 DIAGNOSIS — R51 HEADACHE: ICD-10-CM

## 2019-03-26 LAB
ALBUMIN MFR SERPL ELPH: 57.5 %
ALBUMIN SERPL ELPH-MCNC: 4.2 G/DL
ALBUMIN SERPL-MCNC: 3.9 G/DL
ALBUMIN/GLOB SERPL: 1.4 RATIO
ALP BLD-CCNC: 132 U/L
ALPHA1 GLOB MFR SERPL ELPH: 5.7 %
ALPHA1 GLOB SERPL ELPH-MCNC: 0.4 G/DL
ALPHA2 GLOB MFR SERPL ELPH: 12.5 %
ALPHA2 GLOB SERPL ELPH-MCNC: 0.8 G/DL
ALT SERPL-CCNC: 24 U/L
ANION GAP SERPL CALC-SCNC: 14 MMOL/L
APPEARANCE: CLEAR
AST SERPL-CCNC: 36 U/L
B-GLOBULIN MFR SERPL ELPH: 11.5 %
B-GLOBULIN SERPL ELPH-MCNC: 0.8 G/DL
BACTERIA UR CULT: NORMAL
BACTERIA: NEGATIVE
BASOPHILS # BLD AUTO: 0.04 K/UL
BASOPHILS NFR BLD AUTO: 0.4 %
BILIRUB SERPL-MCNC: 0.9 MG/DL
BILIRUBIN URINE: NEGATIVE
BLOOD URINE: ABNORMAL
BUN SERPL-MCNC: 15 MG/DL
CALCIUM SERPL-MCNC: 9.6 MG/DL
CHLORIDE SERPL-SCNC: 99 MMOL/L
CO2 SERPL-SCNC: 24 MMOL/L
COLOR: YELLOW
CREAT SERPL-MCNC: 0.96 MG/DL
EOSINOPHIL # BLD AUTO: 0.08 K/UL
EOSINOPHIL NFR BLD AUTO: 0.7 %
GAMMA GLOB FLD ELPH-MCNC: 0.9 G/DL
GAMMA GLOB MFR SERPL ELPH: 12.8 %
GLUCOSE QUALITATIVE U: NEGATIVE
GLUCOSE SERPL-MCNC: 88 MG/DL
HCT VFR BLD CALC: 43.7 %
HGB BLD-MCNC: 13.7 G/DL
HYALINE CASTS: 0 /LPF
IMM GRANULOCYTES NFR BLD AUTO: 0.3 %
INTERPRETATION SERPL IEP-IMP: NORMAL
KETONES URINE: ABNORMAL
LEUKOCYTE ESTERASE URINE: ABNORMAL
LYMPHOCYTES # BLD AUTO: 1.44 K/UL
LYMPHOCYTES NFR BLD AUTO: 12.9 %
MAN DIFF?: NORMAL
MCHC RBC-ENTMCNC: 30.1 PG
MCHC RBC-ENTMCNC: 31.4 GM/DL
MCV RBC AUTO: 96 FL
MICROSCOPIC-UA: NORMAL
MONOCYTES # BLD AUTO: 0.65 K/UL
MONOCYTES NFR BLD AUTO: 5.8 %
NEUTROPHILS # BLD AUTO: 8.93 K/UL
NEUTROPHILS NFR BLD AUTO: 79.9 %
NITRITE URINE: NEGATIVE
PH URINE: 5.5
PLATELET # BLD AUTO: 205 K/UL
POTASSIUM SERPL-SCNC: 4.2 MMOL/L
PROT SERPL-MCNC: 6.7 G/DL
PROTEIN URINE: NEGATIVE
RBC # BLD: 4.55 M/UL
RBC # FLD: 12.9 %
RED BLOOD CELLS URINE: 10 /HPF
SODIUM SERPL-SCNC: 137 MMOL/L
SPECIFIC GRAVITY URINE: 1.02
SQUAMOUS EPITHELIAL CELLS: 4 /HPF
TSH SERPL-ACNC: 2.51 UIU/ML
UROBILINOGEN URINE: NORMAL
WBC # FLD AUTO: 11.17 K/UL
WHITE BLOOD CELLS URINE: 4 /HPF

## 2019-03-26 PROCEDURE — 70553 MRI BRAIN STEM W/O & W/DYE: CPT | Mod: 26

## 2019-03-27 ENCOUNTER — RESULT REVIEW (OUTPATIENT)
Age: 74
End: 2019-03-27

## 2019-03-27 NOTE — ASSESSMENT
[FreeTextEntry1] : Labs sent out. CT of the chest, abdomen, pelvis ordered to rule out malignancy, brain MRI also ordered. Patient referred to infectious disease and cardiology. Further management will be based on imaging/labs/ specialist evaluation. Patient will report any new issues and go to the ED if needed. Patient will return to the office as scheduled for regular followup\par \par \par Dr. Alcantar Was present in office building while I examined patient

## 2019-03-27 NOTE — ADDENDUM
[FreeTextEntry1] : Labs show\par -UA with rbc status repeat UA\par -WBC 11.1 with alkaline phosphatase of 132 in the setting of FUO-imaging with infectious disease evaluation as planned\par \par CT of the chest/abdomen/pelvis shows\par -3 mm left lower lobe lung nodule for which CT in one year is recommended\par -Atherosclerosis in the aorta and coronary arteries\par -Small pericardial effusion\par -Small amount of fluid in the pelvis\par \par MRI of the brain shows\par -Stable minor microvascular ischemic change\par \par \par Plan\par -CT chest in one year\par -Cardiology evaluation\par -Infectious disease followup as planned

## 2019-03-27 NOTE — HISTORY OF PRESENT ILLNESS
[FreeTextEntry8] : Patient was seen on 3/1 complaining of..\par \par Patient presents complaining of 2 occasions of not feeling well. Patient states her first instance was 2/14 where she had a frontal headache, exhaustion and woke up sweating, did not check her temperature. Patient states after a couple of days she felt fine and did not think much of it, assumed it was viral. Patient states 2 days ago she started to not feel well again with sweating and exhaustion and checked her temperature and states it was 100.9, no significant headache this time. Patient states currently she feels okay other than residual exhaustion. Patient denies nausea/vomiting/urinary symptoms/sore throat/cough/chest pain/palpitations/dyspnea/abd pain, no rashes, no recent travel. Patient has had no sick contacts.\par \par **Patient states her fever did resolve but now she is having issues again. Patient had flu test done along with blood work including blood cultures/tickborne illnesses which was normal, CMP/TSH were unable to be done but patient did not feel the need to come in for redraw as she was feeling well. Patient presents with now third episode of fever, last night was 101. Patient has associated headache/weakness. Patient also reports 2 days ago she got a bad cramp in her leg and jumped out of bed due to discomfort and she woke up on the floor therefore had syncopal event which she feels was secondary to the pain. Patient denies cough/vision changes/sinus congestion/urine issues/fever/chest pain/palpitations/dyspnea, no travel.Patient feels as though hers gave her fevers come in cycles, gets a fever last about a week then resolves for one week and then recurs. Patient has noticed no exacerbating or causative issue

## 2019-03-27 NOTE — PHYSICAL EXAM
[No Acute Distress] : no acute distress [Normal Sclera/Conjunctiva] : normal sclera/conjunctiva [PERRL] : pupils equal round and reactive to light [EOMI] : extraocular movements intact [Normal Outer Ear/Nose] : the outer ears and nose were normal in appearance [Normal Oropharynx] : the oropharynx was normal [Normal TMs] : both tympanic membranes were normal [Normal Nasal Mucosa] : the nasal mucosa was normal [Supple] : supple [No Respiratory Distress] : no respiratory distress  [Clear to Auscultation] : lungs were clear to auscultation bilaterally [Normal Rate] : normal rate  [Regular Rhythm] : with a regular rhythm [Soft] : abdomen soft [Non Tender] : non-tender [Non-distended] : non-distended [Normal Bowel Sounds] : normal bowel sounds [No Rash] : no rash [Normal Gait] : normal gait [No Focal Deficits] : no focal deficits [Normal Affect] : the affect was normal [Normal Mood] : the mood was normal [de-identified] : no meningeal signs

## 2019-03-28 LAB — BACTERIA BLD CULT: NORMAL

## 2019-04-02 ENCOUNTER — APPOINTMENT (OUTPATIENT)
Dept: INTERNAL MEDICINE | Facility: CLINIC | Age: 74
End: 2019-04-02
Payer: MEDICARE

## 2019-04-02 VITALS
TEMPERATURE: 97.3 F | SYSTOLIC BLOOD PRESSURE: 131 MMHG | OXYGEN SATURATION: 97 % | HEIGHT: 65 IN | DIASTOLIC BLOOD PRESSURE: 76 MMHG | HEART RATE: 70 BPM | BODY MASS INDEX: 19.99 KG/M2 | WEIGHT: 120 LBS | RESPIRATION RATE: 71 BRPM

## 2019-04-02 PROCEDURE — 99204 OFFICE O/P NEW MOD 45 MIN: CPT

## 2019-04-02 NOTE — CONSULT LETTER
[Dear  ___] : Dear  [unfilled], [Consult Letter:] : I had the pleasure of evaluating your patient, [unfilled]. [( Thank you for referring [unfilled] for consultation for _____ )] : Thank you for referring [unfilled] for consultation for [unfilled]

## 2019-04-02 NOTE — PHYSICAL EXAM
[General Appearance - Alert] : alert [General Appearance - In No Acute Distress] : in no acute distress [General Appearance - Well Nourished] : well nourished [General Appearance - Well-Appearing] : healthy appearing [Sclera] : the sclera and conjunctiva were normal [PERRL With Normal Accommodation] : pupils were equal in size, round, reactive to light [Extraocular Movements] : extraocular movements were intact [Outer Ear] : the ears and nose were normal in appearance [Hearing Threshold Finger Rub Not [place]] : hearing was normal [Examination Of The Oral Cavity] : the lips and gums were normal [Oropharynx] : the oropharynx was normal with no thrush [Neck Appearance] : the appearance of the neck was normal [Respiration, Rhythm And Depth] : normal respiratory rhythm and effort [Exaggerated Use Of Accessory Muscles For Inspiration] : no accessory muscle use [Auscultation Breath Sounds / Voice Sounds] : lungs were clear to auscultation bilaterally [Heart Rate And Rhythm] : heart rate was normal and rhythm regular [Heart Sounds] : normal S1 and S2 [Full Pulse] : the pedal pulses are present [Edema] : there was no peripheral edema [Bowel Sounds] : normal bowel sounds [Abdomen Soft] : soft [Costovertebral Angle Tenderness] : no CVA tenderness [Cervical Lymph Nodes Enlarged Posterior Bilaterally] : posterior cervical [Cervical Lymph Nodes Enlarged Anterior Bilaterally] : anterior cervical [Supraclavicular Lymph Nodes Enlarged Bilaterally] : supraclavicular [Musculoskeletal - Swelling] : no joint swelling [Range of Motion to Joints] : range of motion to joints [Motor Tone] : muscle strength and tone were normal [Skin Color & Pigmentation] : normal skin color and pigmentation [] : no rash [Skin Lesions] : no skin lesions [Motor Exam] : the motor exam was normal [No Focal Deficits] : no focal deficits [Oriented To Time, Place, And Person] : oriented to person, place, and time [Affect] : the affect was normal

## 2019-04-02 NOTE — HISTORY OF PRESENT ILLNESS
[FreeTextEntry1] : 73-year-old female here for infectious diseases evaluation of fever of unknown origin. He started having fever ranging from 100.4-102 Fahrenheit since February 14, 2019. Patient reports she was also feeling associated symptoms of fatigue. She reports no sick contacts. No recent travel. She does not have any pets. Her children do have dogs which she is not usually around. She has seen her primary care physician and has had multiple laboratory exams and a CT scan of the chest and pelvis which is unrevealing. She has known elevation in alkaline phosphatase and has been worked up by hepatology in 2017. She is here for further evaluation of fever of unknown origin.\par She reports the past one week she has had no episodes of fatigue or fevers. She is hopeful that this stretch of fevers has resolved. [Sexually Active] : The patient is not sexually active

## 2019-04-02 NOTE — ASSESSMENT
[FreeTextEntry1] : 23-year-old female here for evaluation of fever of unknown origin.\par \par Fever of unknown origin\par Leukocytosis\par \par Recommendations:\par We will check CBC, CMP, CPK, HIV, percussed level, QuantiFERON, ESR, syphilis screen\par She we have blood cultures which are negative on multiple occasions\par Hepatitis serology is also negative \par Already had CT scan of the chest abdomen and pelvis which is negative for infection.\par Next radiology exam will be nuclear medicine WBC tagged cell scan if she has persistent symptoms\par \par Follow up in 6 weeks\par \par Counseling included lab results, differential diagnosis, treatment options, risks and benefits, lifestyle changes, current condition, medications and dose adjustments.\par The patient was interactive attentive and asked questions and verbalized understanding.\par

## 2019-04-04 LAB
BASOPHILS # BLD AUTO: 0.05 K/UL
BASOPHILS NFR BLD AUTO: 0.7 %
CK SERPL-CCNC: 127 U/L
CRP SERPL-MCNC: 0.28 MG/DL
EOSINOPHIL # BLD AUTO: 0.1 K/UL
EOSINOPHIL NFR BLD AUTO: 1.5 %
ERYTHROCYTE [SEDIMENTATION RATE] IN BLOOD BY WESTERGREN METHOD: 25 MM/HR
HCT VFR BLD CALC: 40.8 %
HGB BLD-MCNC: 12.8 G/DL
HIV1+2 AB SPEC QL IA.RAPID: NONREACTIVE
IMM GRANULOCYTES NFR BLD AUTO: 0.1 %
LYMPHOCYTES # BLD AUTO: 1.31 K/UL
LYMPHOCYTES NFR BLD AUTO: 19.6 %
MAN DIFF?: NORMAL
MCHC RBC-ENTMCNC: 30 PG
MCHC RBC-ENTMCNC: 31.4 GM/DL
MCV RBC AUTO: 95.8 FL
MONOCYTES # BLD AUTO: 0.33 K/UL
MONOCYTES NFR BLD AUTO: 4.9 %
NEUTROPHILS # BLD AUTO: 4.89 K/UL
NEUTROPHILS NFR BLD AUTO: 73.2 %
PLATELET # BLD AUTO: 238 K/UL
PROCALCITONIN SERPL-MCNC: 0.02 NG/ML
RBC # BLD: 4.26 M/UL
RBC # FLD: 12.7 %
T PALLIDUM AB SER QL IA: NEGATIVE
WBC # FLD AUTO: 6.69 K/UL

## 2019-04-08 ENCOUNTER — RESULT REVIEW (OUTPATIENT)
Age: 74
End: 2019-04-08

## 2019-04-08 ENCOUNTER — APPOINTMENT (OUTPATIENT)
Dept: DERMATOLOGY | Facility: CLINIC | Age: 74
End: 2019-04-08
Payer: MEDICARE

## 2019-04-08 LAB
M TB IFN-G BLD-IMP: NEGATIVE
QUANTIFERON TB PLUS MITOGEN MINUS NIL: 8.64 IU/ML
QUANTIFERON TB PLUS NIL: 0.01 IU/ML
QUANTIFERON TB PLUS TB1 MINUS NIL: 0.01 IU/ML
QUANTIFERON TB PLUS TB2 MINUS NIL: 0 IU/ML

## 2019-04-08 PROCEDURE — 17110 DESTRUCTION B9 LES UP TO 14: CPT

## 2019-04-08 PROCEDURE — 17000 DESTRUCT PREMALG LESION: CPT | Mod: 59

## 2019-04-08 PROCEDURE — 99213 OFFICE O/P EST LOW 20 MIN: CPT | Mod: 25

## 2019-05-14 ENCOUNTER — APPOINTMENT (OUTPATIENT)
Dept: INTERNAL MEDICINE | Facility: CLINIC | Age: 74
End: 2019-05-14

## 2019-07-01 ENCOUNTER — RX RENEWAL (OUTPATIENT)
Age: 74
End: 2019-07-01

## 2019-07-08 ENCOUNTER — APPOINTMENT (OUTPATIENT)
Dept: INTERNAL MEDICINE | Facility: CLINIC | Age: 74
End: 2019-07-08
Payer: MEDICARE

## 2019-07-08 VITALS
HEIGHT: 65 IN | DIASTOLIC BLOOD PRESSURE: 80 MMHG | OXYGEN SATURATION: 97 % | SYSTOLIC BLOOD PRESSURE: 120 MMHG | BODY MASS INDEX: 19.49 KG/M2 | HEART RATE: 70 BPM | WEIGHT: 117 LBS

## 2019-07-08 DIAGNOSIS — Z87.898 PERSONAL HISTORY OF OTHER SPECIFIED CONDITIONS: ICD-10-CM

## 2019-07-08 PROCEDURE — 36415 COLL VENOUS BLD VENIPUNCTURE: CPT

## 2019-07-08 PROCEDURE — 99213 OFFICE O/P EST LOW 20 MIN: CPT | Mod: 25

## 2019-07-09 ENCOUNTER — TRANSCRIPTION ENCOUNTER (OUTPATIENT)
Age: 74
End: 2019-07-09

## 2019-07-09 ENCOUNTER — RESULT REVIEW (OUTPATIENT)
Age: 74
End: 2019-07-09

## 2019-07-09 LAB
ALBUMIN SERPL ELPH-MCNC: 4.5 G/DL
ALP BLD-CCNC: 118 U/L
ALT SERPL-CCNC: 30 U/L
ANION GAP SERPL CALC-SCNC: 13 MMOL/L
AST SERPL-CCNC: 47 U/L
BASOPHILS # BLD AUTO: 0.06 K/UL
BASOPHILS NFR BLD AUTO: 0.9 %
BILIRUB SERPL-MCNC: 0.6 MG/DL
BUN SERPL-MCNC: 16 MG/DL
CALCIUM SERPL-MCNC: 9.4 MG/DL
CHLORIDE SERPL-SCNC: 98 MMOL/L
CHOLEST SERPL-MCNC: 186 MG/DL
CHOLEST/HDLC SERPL: 1.9 RATIO
CO2 SERPL-SCNC: 25 MMOL/L
CREAT SERPL-MCNC: 0.89 MG/DL
EOSINOPHIL # BLD AUTO: 0.2 K/UL
EOSINOPHIL NFR BLD AUTO: 2.9 %
GLUCOSE SERPL-MCNC: 90 MG/DL
HCT VFR BLD CALC: 44 %
HDLC SERPL-MCNC: 96 MG/DL
HGB BLD-MCNC: 13.6 G/DL
IMM GRANULOCYTES NFR BLD AUTO: 0.1 %
INSECT SPEC: NORMAL
LDLC SERPL CALC-MCNC: 78 MG/DL
LYMPHOCYTES # BLD AUTO: 1.1 K/UL
LYMPHOCYTES NFR BLD AUTO: 16.2 %
MAN DIFF?: NORMAL
MCHC RBC-ENTMCNC: 30.2 PG
MCHC RBC-ENTMCNC: 30.9 GM/DL
MCV RBC AUTO: 97.8 FL
MONOCYTES # BLD AUTO: 0.42 K/UL
MONOCYTES NFR BLD AUTO: 6.2 %
NEUTROPHILS # BLD AUTO: 4.99 K/UL
NEUTROPHILS NFR BLD AUTO: 73.7 %
PLATELET # BLD AUTO: 207 K/UL
POTASSIUM SERPL-SCNC: 4.2 MMOL/L
PROT SERPL-MCNC: 6.8 G/DL
RBC # BLD: 4.5 M/UL
RBC # FLD: 12.9 %
SODIUM SERPL-SCNC: 136 MMOL/L
TRIGL SERPL-MCNC: 61 MG/DL
WBC # FLD AUTO: 6.78 K/UL

## 2019-07-09 NOTE — PHYSICAL EXAM
[No Acute Distress] : no acute distress [No Respiratory Distress] : no respiratory distress  [Normal Rate] : normal rate  [Clear to Auscultation] : lungs were clear to auscultation bilaterally [Regular Rhythm] : with a regular rhythm [Normal Affect] : the affect was normal [Normal Mood] : the mood was normal [de-identified] : +tick bite site left mid abd, NO ECM (pin sized)

## 2019-07-09 NOTE — ASSESSMENT
[FreeTextEntry1] : Labs sent out and further recommendations will be made based on lab results. Patient advised to continue present medications with diet/exercise and specialists followup. Patient  will return to the office for CPE in jan\par \par Dr. Alcantar was present in office building while I examined pt\par \par \par Shingrix d/w pt/declines Pneumovax booster\par Colonoscopy was 3/13 with follow up in 5 years-Dr. Dhillon-declines so FIT test given\par Mammogram is 2/2019\par Bone density was 7/16-Rx given for followup\par Carotid sonogram done on 1/18 showed no stenosis\par Specialists include\par 1. Hepatologist-Dr. Catherine prn\par 2. GYN-Dr. Perez\par 3.Derm-Dr. Padron\par 4. Orthopedic prn-Dr. López\par 5.cardio-Dr. Banerjee\par 6.ID-Dr. Su-no need for followup\par CT lung screen=N/A\par CT chest 3/2019-followup in one year\par Declines HIV/hepatitis C screening\par

## 2019-07-09 NOTE — HISTORY OF PRESENT ILLNESS
[de-identified] : Patient presents for followup on carotid atherosclerosis/hyperlipidemia and medication check. Patient is currently on aspirin for carotid atherosclerosis, on Crestor hyperlipidemia and feels well , currently fasting, NO SE with crestor\par -Patient reports tick bite 2 weeks ago from abdomen area-would like to send out for tick ID, no rash\par -Patient was seen previously for FUO status post ID evaluation without diagnosis-issue resolved without recurrence or sequelae [FreeTextEntry1] : On carotid atherosclerosis/hyperlipidemia

## 2019-07-15 ENCOUNTER — APPOINTMENT (OUTPATIENT)
Dept: INTERNAL MEDICINE | Facility: CLINIC | Age: 74
End: 2019-07-15
Payer: MEDICARE

## 2019-07-15 VITALS
TEMPERATURE: 98.3 F | BODY MASS INDEX: 19.49 KG/M2 | HEIGHT: 65 IN | SYSTOLIC BLOOD PRESSURE: 120 MMHG | DIASTOLIC BLOOD PRESSURE: 80 MMHG | WEIGHT: 117 LBS | HEART RATE: 69 BPM

## 2019-07-15 PROCEDURE — 99213 OFFICE O/P EST LOW 20 MIN: CPT

## 2019-07-16 ENCOUNTER — APPOINTMENT (OUTPATIENT)
Dept: INTERNAL MEDICINE | Facility: CLINIC | Age: 74
End: 2019-07-16
Payer: MEDICARE

## 2019-07-16 VITALS
HEART RATE: 68 BPM | SYSTOLIC BLOOD PRESSURE: 116 MMHG | DIASTOLIC BLOOD PRESSURE: 71 MMHG | WEIGHT: 117 LBS | BODY MASS INDEX: 19.49 KG/M2 | TEMPERATURE: 97.6 F | RESPIRATION RATE: 16 BRPM | HEIGHT: 65 IN

## 2019-07-16 PROCEDURE — 36415 COLL VENOUS BLD VENIPUNCTURE: CPT

## 2019-07-16 PROCEDURE — 99214 OFFICE O/P EST MOD 30 MIN: CPT | Mod: 25

## 2019-07-18 LAB
ALBUMIN SERPL ELPH-MCNC: 4.6 G/DL
ALP BLD-CCNC: 221 U/L
ALT SERPL-CCNC: 39 U/L
APPEARANCE: CLEAR
AST SERPL-CCNC: 57 U/L
BACTERIA UR CULT: NORMAL
BACTERIA: NEGATIVE
BASOPHILS # BLD AUTO: 0.04 K/UL
BASOPHILS NFR BLD AUTO: 0.9 %
BILIRUB DIRECT SERPL-MCNC: 0.1 MG/DL
BILIRUB INDIRECT SERPL-MCNC: 0.2 MG/DL
BILIRUB SERPL-MCNC: 0.3 MG/DL
BILIRUBIN URINE: NEGATIVE
BLOOD URINE: NORMAL
COLOR: YELLOW
CRP SERPL-MCNC: 0.64 MG/DL
EOSINOPHIL # BLD AUTO: 0.07 K/UL
EOSINOPHIL NFR BLD AUTO: 1.6 %
ERYTHROCYTE [SEDIMENTATION RATE] IN BLOOD BY WESTERGREN METHOD: 20 MM/HR
GLUCOSE QUALITATIVE U: NEGATIVE
HCT VFR BLD CALC: 44.5 %
HGB BLD-MCNC: 14.1 G/DL
HYALINE CASTS: 1 /LPF
IMM GRANULOCYTES NFR BLD AUTO: 0.4 %
KETONES URINE: NEGATIVE
LEUKOCYTE ESTERASE URINE: NEGATIVE
LYMPHOCYTES # BLD AUTO: 1.86 K/UL
LYMPHOCYTES NFR BLD AUTO: 41.6 %
MAN DIFF?: NORMAL
MCHC RBC-ENTMCNC: 30.2 PG
MCHC RBC-ENTMCNC: 31.7 GM/DL
MCV RBC AUTO: 95.3 FL
MICROSCOPIC-UA: NORMAL
MONOCYTES # BLD AUTO: 0.44 K/UL
MONOCYTES NFR BLD AUTO: 9.8 %
NEUTROPHILS # BLD AUTO: 2.04 K/UL
NEUTROPHILS NFR BLD AUTO: 45.7 %
NITRITE URINE: NEGATIVE
PH URINE: 5.5
PLATELET # BLD AUTO: 171 K/UL
PROCALCITONIN SERPL-MCNC: 0.13 NG/ML
PROT SERPL-MCNC: 7.1 G/DL
PROTEIN URINE: NEGATIVE
RBC # BLD: 4.67 M/UL
RBC # FLD: 12.8 %
RED BLOOD CELLS URINE: 2 /HPF
SPECIFIC GRAVITY URINE: 1.02
SQUAMOUS EPITHELIAL CELLS: 1 /HPF
UROBILINOGEN URINE: NORMAL
WBC # FLD AUTO: 4.47 K/UL
WHITE BLOOD CELLS URINE: 1 /HPF

## 2019-07-19 ENCOUNTER — APPOINTMENT (OUTPATIENT)
Dept: CARDIOLOGY | Facility: CLINIC | Age: 74
End: 2019-07-19
Payer: MEDICARE

## 2019-07-19 ENCOUNTER — NON-APPOINTMENT (OUTPATIENT)
Age: 74
End: 2019-07-19

## 2019-07-19 VITALS
DIASTOLIC BLOOD PRESSURE: 80 MMHG | WEIGHT: 116 LBS | SYSTOLIC BLOOD PRESSURE: 131 MMHG | BODY MASS INDEX: 19.33 KG/M2 | HEIGHT: 65 IN | HEART RATE: 69 BPM | OXYGEN SATURATION: 97 %

## 2019-07-19 PROCEDURE — 99214 OFFICE O/P EST MOD 30 MIN: CPT

## 2019-07-19 PROCEDURE — 93000 ELECTROCARDIOGRAM COMPLETE: CPT

## 2019-07-24 LAB — BACTERIA BLD CULT: NORMAL

## 2019-07-25 ENCOUNTER — FORM ENCOUNTER (OUTPATIENT)
Age: 74
End: 2019-07-25

## 2019-07-26 ENCOUNTER — TRANSCRIPTION ENCOUNTER (OUTPATIENT)
Age: 74
End: 2019-07-26

## 2019-07-26 ENCOUNTER — OUTPATIENT (OUTPATIENT)
Dept: OUTPATIENT SERVICES | Facility: HOSPITAL | Age: 74
LOS: 1 days | End: 2019-07-26
Payer: MEDICARE

## 2019-07-26 VITALS
WEIGHT: 113.98 LBS | HEART RATE: 52 BPM | DIASTOLIC BLOOD PRESSURE: 86 MMHG | RESPIRATION RATE: 16 BRPM | OXYGEN SATURATION: 98 % | SYSTOLIC BLOOD PRESSURE: 176 MMHG | HEIGHT: 62 IN | TEMPERATURE: 98 F

## 2019-07-26 DIAGNOSIS — R50.9 FEVER, UNSPECIFIED: ICD-10-CM

## 2019-07-26 LAB
INR BLD: 1.02 RATIO — SIGNIFICANT CHANGE UP (ref 0.88–1.16)
PROTHROM AB SERPL-ACNC: 11.7 SEC — SIGNIFICANT CHANGE UP (ref 10–12.9)

## 2019-07-26 PROCEDURE — 93320 DOPPLER ECHO COMPLETE: CPT

## 2019-07-26 PROCEDURE — 93320 DOPPLER ECHO COMPLETE: CPT | Mod: 26

## 2019-07-26 PROCEDURE — 93010 ELECTROCARDIOGRAM REPORT: CPT

## 2019-07-26 PROCEDURE — 93325 DOPPLER ECHO COLOR FLOW MAPG: CPT | Mod: 26

## 2019-07-26 PROCEDURE — 93325 DOPPLER ECHO COLOR FLOW MAPG: CPT

## 2019-07-26 PROCEDURE — 93005 ELECTROCARDIOGRAM TRACING: CPT

## 2019-07-26 PROCEDURE — 85610 PROTHROMBIN TIME: CPT

## 2019-07-26 PROCEDURE — 93312 ECHO TRANSESOPHAGEAL: CPT

## 2019-07-26 PROCEDURE — 93312 ECHO TRANSESOPHAGEAL: CPT | Mod: 26

## 2019-07-26 PROCEDURE — 36415 COLL VENOUS BLD VENIPUNCTURE: CPT

## 2019-07-26 NOTE — H&P PST ADULT - ASSESSMENT
Female with FUO for AVEL This is a 75 yo female  with FUO for AVEL     Plan  NPO after midnight  attain consent   iv placement

## 2019-07-26 NOTE — H&P PST ADULT - HISTORY OF PRESENT ILLNESS
This is a female who presents with  FUO since 3/19 . She has had an extensive work up with ID and lab tests have been negative thus far . Today she presents for a AVEL with Dr Townsend. This is a 74 year old  female who presents with  FUO since 3/19 . She has had an extensive work up with ID and lab tests have been negative thus far . Today she presents for a AVEL with Dr Townsend.   She has no complaints of dyspnea, palpitation and has been fever free since late June .

## 2019-07-26 NOTE — DISCHARGE NOTE PROVIDER - CARE PROVIDER_API CALL
Bhavesh Townsend)  Cardiovascular Disease  [address] [facility] [address], [place] [address]  Phone: [phone]  Fax: [phone]  Follow Up Time:

## 2019-07-26 NOTE — DISCHARGE NOTE PROVIDER - HOSPITAL COURSE
for elective AVEL which was performed without acute complication. The patient was observed per post procedure protocol and was discharged home once all criteria were met, with a plan for outpatient follow up. No vegetation noted for FUO source.

## 2019-07-26 NOTE — DISCHARGE NOTE PROVIDER - NSDCCPTREATMENT_GEN_ALL_CORE_FT
PRINCIPAL PROCEDURE  Procedure: Transesophageal echocardiography with contrast  Findings and Treatment:

## 2019-07-26 NOTE — DISCHARGE NOTE NURSING/CASE MANAGEMENT/SOCIAL WORK - NSDCDPATPORTLINK_GEN_ALL_CORE
You can access the [facility] Patient Portal, offered by [facility], by registering with the following website: [email or web address]

## 2019-07-29 ENCOUNTER — FORM ENCOUNTER (OUTPATIENT)
Age: 74
End: 2019-07-29

## 2019-07-29 ENCOUNTER — OUTPATIENT (OUTPATIENT)
Dept: OUTPATIENT SERVICES | Facility: HOSPITAL | Age: 74
LOS: 1 days | End: 2019-07-29
Payer: MEDICARE

## 2019-07-29 DIAGNOSIS — R50.9 FEVER, UNSPECIFIED: ICD-10-CM

## 2019-07-29 NOTE — PHYSICAL EXAM
[No Acute Distress] : no acute distress [Normal Sclera/Conjunctiva] : normal sclera/conjunctiva [PERRL] : pupils equal round and reactive to light [EOMI] : extraocular movements intact [Normal Outer Ear/Nose] : the outer ears and nose were normal in appearance [Normal Oropharynx] : the oropharynx was normal [Normal TMs] : both tympanic membranes were normal [Normal Nasal Mucosa] : the nasal mucosa was normal [No Lymphadenopathy] : no lymphadenopathy [No Respiratory Distress] : no respiratory distress  [Clear to Auscultation] : lungs were clear to auscultation bilaterally [Normal Rate] : normal rate  [Regular Rhythm] : with a regular rhythm [No Edema] : there was no peripheral edema [Normal Appearance] : normal in appearance [No Masses] : no palpable masses [No Nipple Discharge] : no nipple discharge [Soft] : abdomen soft [Non Tender] : non-tender [Normal Bowel Sounds] : normal bowel sounds [No CVA Tenderness] : no CVA  tenderness [No Focal Deficits] : no focal deficits [Normal Gait] : normal gait [Normal Affect] : the affect was normal [Normal Mood] : the mood was normal [de-identified] : +Palpable lymph node left axilla, soft and mobile, size of gumball, no erythema/warmth or discharge

## 2019-07-29 NOTE — ADDENDUM
[FreeTextEntry1] : Patient called on 7/29 stating she is doing workup with infectious disease, now afebrile and lymph node resolved

## 2019-07-29 NOTE — ASSESSMENT
[FreeTextEntry1] : Clinical exam done and as above,previous workup including ID evaluation has been normal. Discussed biopsy of left axillary lymph node but we will get the opinion of infectious disease tomorrow and further recommendations will be made after they evaluate patient. Patient will call with any issues and return here has scheduled for regular followup\par \par Dr. Alcantar was present in office building while I examined pt

## 2019-07-29 NOTE — HISTORY OF PRESENT ILLNESS
[FreeTextEntry8] : See prior chart notes as patient has had ongoing issue of questionable significance. The patient was initially seen on 3/1 secondary to nighttime fever with associated fatigue and dull headache. Patient had lots of blood work done including blood cultures and CT of the chest/abdomen and pelvis showing no etiology. Patient also saw infectious disease on 4/2 who also did further workup and found no etiology. Patient was doing better up until 4 days ago where she started with 103 fever at night, range has been from 102-103 but only occurs at night. Patient states she has no other associated symptoms other than again fatigue and dull headache. Patient states the only new thing is that she could feel a lymph node in the left axilla that is slightly sore. Patient called infectious disease and she is scheduled to see them tomorrow but figured she would come in for evaluation today.

## 2019-07-30 ENCOUNTER — RESULT REVIEW (OUTPATIENT)
Age: 74
End: 2019-07-30

## 2019-07-30 ENCOUNTER — TRANSCRIPTION ENCOUNTER (OUTPATIENT)
Age: 74
End: 2019-07-30

## 2019-07-30 LAB — HEMOCCULT STL QL IA: NEGATIVE

## 2019-07-30 PROCEDURE — 78802 RP LOCLZJ TUM WHBDY 1 D IMG: CPT

## 2019-07-30 PROCEDURE — 78103 BONE MARROW IMAGING MULT: CPT | Mod: 26

## 2019-07-30 PROCEDURE — A9570: CPT

## 2019-07-30 PROCEDURE — 78806: CPT | Mod: 26

## 2019-07-30 PROCEDURE — 78103 BONE MARROW IMAGING MULT: CPT

## 2019-07-30 PROCEDURE — A9541: CPT

## 2019-10-22 ENCOUNTER — RESULT REVIEW (OUTPATIENT)
Age: 74
End: 2019-10-22

## 2019-10-23 ENCOUNTER — APPOINTMENT (OUTPATIENT)
Dept: DERMATOLOGY | Facility: CLINIC | Age: 74
End: 2019-10-23
Payer: MEDICARE

## 2019-10-23 PROBLEM — E78.5 HYPERLIPIDEMIA, UNSPECIFIED: Chronic | Status: ACTIVE | Noted: 2019-07-26

## 2019-10-23 PROBLEM — M19.90 UNSPECIFIED OSTEOARTHRITIS, UNSPECIFIED SITE: Chronic | Status: ACTIVE | Noted: 2019-07-26

## 2019-10-23 PROBLEM — R50.9 FEVER, UNSPECIFIED: Chronic | Status: ACTIVE | Noted: 2019-07-26

## 2019-10-23 PROCEDURE — 17262 DSTRJ MAL LES T/A/L 1.1-2.0: CPT

## 2019-10-23 PROCEDURE — 17003 DESTRUCT PREMALG LES 2-14: CPT

## 2019-10-23 PROCEDURE — 17000 DESTRUCT PREMALG LESION: CPT | Mod: 59

## 2019-10-23 PROCEDURE — 99214 OFFICE O/P EST MOD 30 MIN: CPT | Mod: 25

## 2020-04-02 ENCOUNTER — APPOINTMENT (OUTPATIENT)
Dept: INTERNAL MEDICINE | Facility: CLINIC | Age: 75
End: 2020-04-02

## 2020-04-22 ENCOUNTER — APPOINTMENT (OUTPATIENT)
Dept: DERMATOLOGY | Facility: CLINIC | Age: 75
End: 2020-04-22

## 2020-05-05 ENCOUNTER — RESULT CHARGE (OUTPATIENT)
Age: 75
End: 2020-05-05

## 2020-05-05 ENCOUNTER — APPOINTMENT (OUTPATIENT)
Dept: INTERNAL MEDICINE | Facility: CLINIC | Age: 75
End: 2020-05-05
Payer: MEDICARE

## 2020-05-05 VITALS — HEART RATE: 66 BPM | DIASTOLIC BLOOD PRESSURE: 80 MMHG | SYSTOLIC BLOOD PRESSURE: 125 MMHG

## 2020-05-05 DIAGNOSIS — R91.1 SOLITARY PULMONARY NODULE: ICD-10-CM

## 2020-05-05 LAB
BILIRUB UR QL STRIP: NEGATIVE
CLARITY UR: NORMAL
COLLECTION METHOD: NORMAL
GLUCOSE UR-MCNC: NEGATIVE
HCG UR QL: 0.2 EU/DL
HGB UR QL STRIP.AUTO: NORMAL
KETONES UR-MCNC: NEGATIVE
LEUKOCYTE ESTERASE UR QL STRIP: NORMAL
NITRITE UR QL STRIP: NEGATIVE
PH UR STRIP: 5.5
PROT UR STRIP-MCNC: NORMAL
SP GR UR STRIP: 1010

## 2020-05-05 PROCEDURE — 36415 COLL VENOUS BLD VENIPUNCTURE: CPT

## 2020-05-05 PROCEDURE — 99214 OFFICE O/P EST MOD 30 MIN: CPT | Mod: 25

## 2020-05-05 PROCEDURE — 81002 URINALYSIS NONAUTO W/O SCOPE: CPT

## 2020-05-07 ENCOUNTER — APPOINTMENT (OUTPATIENT)
Dept: INTERNAL MEDICINE | Facility: CLINIC | Age: 75
End: 2020-05-07

## 2020-05-09 LAB
ALBUMIN SERPL ELPH-MCNC: 4.4 G/DL
ALP BLD-CCNC: 120 U/L
ALT SERPL-CCNC: 38 U/L
ANION GAP SERPL CALC-SCNC: 15 MMOL/L
AST SERPL-CCNC: 52 U/L
BACTERIA UR CULT: ABNORMAL
BASOPHILS # BLD AUTO: 0.06 K/UL
BASOPHILS NFR BLD AUTO: 0.6 %
BILIRUB SERPL-MCNC: 0.5 MG/DL
BUN SERPL-MCNC: 20 MG/DL
CALCIUM SERPL-MCNC: 9.6 MG/DL
CHLORIDE SERPL-SCNC: 101 MMOL/L
CHOLEST SERPL-MCNC: 193 MG/DL
CHOLEST/HDLC SERPL: 2.1 RATIO
CO2 SERPL-SCNC: 25 MMOL/L
CREAT SERPL-MCNC: 0.94 MG/DL
EOSINOPHIL # BLD AUTO: 0.12 K/UL
EOSINOPHIL NFR BLD AUTO: 1.3 %
GLUCOSE SERPL-MCNC: 91 MG/DL
HCT VFR BLD CALC: 42.2 %
HDLC SERPL-MCNC: 93 MG/DL
HGB BLD-MCNC: 13.2 G/DL
IMM GRANULOCYTES NFR BLD AUTO: 0.7 %
LDLC SERPL CALC-MCNC: 80 MG/DL
LYMPHOCYTES # BLD AUTO: 1.42 K/UL
LYMPHOCYTES NFR BLD AUTO: 15.2 %
MAN DIFF?: NORMAL
MCHC RBC-ENTMCNC: 30.7 PG
MCHC RBC-ENTMCNC: 31.3 GM/DL
MCV RBC AUTO: 98.1 FL
MONOCYTES # BLD AUTO: 0.51 K/UL
MONOCYTES NFR BLD AUTO: 5.4 %
NEUTROPHILS # BLD AUTO: 7.19 K/UL
NEUTROPHILS NFR BLD AUTO: 76.8 %
PLATELET # BLD AUTO: 205 K/UL
POTASSIUM SERPL-SCNC: 4.4 MMOL/L
PROT SERPL-MCNC: 6.6 G/DL
RBC # BLD: 4.3 M/UL
RBC # FLD: 13.3 %
SODIUM SERPL-SCNC: 141 MMOL/L
TRIGL SERPL-MCNC: 101 MG/DL
WBC # FLD AUTO: 9.37 K/UL

## 2020-05-09 NOTE — PHYSICAL EXAM
[No Acute Distress] : no acute distress [No Respiratory Distress] : no respiratory distress  [Normal Rate] : normal rate  [Clear to Auscultation] : lungs were clear to auscultation bilaterally [Regular Rhythm] : with a regular rhythm [Soft] : abdomen soft [No CVA Tenderness] : no CVA  tenderness [Non Tender] : non-tender [Normal Affect] : the affect was normal [Normal Mood] : the mood was normal [de-identified] : +irregular skin lesion top of right hand

## 2020-05-09 NOTE — HISTORY OF PRESENT ILLNESS
[FreeTextEntry1] : On carotid atherosclerosis/hyperlipidemia [de-identified] : Patient presents for followup on carotid atherosclerosis/hyperlipidemia and medication check. Patient is currently on aspirin for carotid atherosclerosis, on Crestor hyperlipidemia and feels well , currently NOT fasting, NO SE with crestor\par -Patient complaining of dysuria/hematuria with bladder pressure that started today. Patient denies back pain/fever/vaginal discharge. Patient has history of UTI and states it feels the same\par -Patient also complaining of right hand lesion that cropped up one month ago, history of skin cancer

## 2020-05-09 NOTE — ASSESSMENT
[FreeTextEntry1] : Labs/urine sent out and further recommendations will be made based on lab results.Patient started on Macrobid 100 mg one b.i.d. #14. Dermatology referral given. Patient advised to continue present medications with diet/exercise and specialists followup. Patient  will return to the office for CPE in 3-4months\par \par Dr. Alcantar was present in office building while I examined pt\par \par \par Shingrix d/w pt/declines Pneumovax booster\par Colonoscopy was 3/13 with follow up in 5 years-Dr. Dhillon-declines so FIT test 7/2019=negative\par Mammogram is 2/2019-Rx given for followup\par Bone density was 7/16-Rx given for followup\par Carotid sonogram done on 1/18 showed no stenosis\par Specialists include\par 1. Hepatologist-Dr. Catherine prn\par 2. GYN-Dr. Perez\par 3.Derm-Dr. Padron\par 4. Orthopedic prn-Dr. López\par 5.cardio-Dr. Banerjee\par 6.ID-Dr. Su-no need for followup\par CT lung screen=N/A\par CT chest 3/2019-followup in one year-Rx given for followup\par Declines HIV/hepatitis C screening\par

## 2020-05-18 ENCOUNTER — APPOINTMENT (OUTPATIENT)
Dept: CT IMAGING | Facility: CLINIC | Age: 75
End: 2020-05-18

## 2020-05-27 ENCOUNTER — TRANSCRIPTION ENCOUNTER (OUTPATIENT)
Age: 75
End: 2020-05-27

## 2020-05-27 LAB
APPEARANCE: CLEAR
BACTERIA: NEGATIVE
BILIRUBIN URINE: NEGATIVE
BLOOD URINE: NEGATIVE
COLOR: YELLOW
GLUCOSE QUALITATIVE U: NEGATIVE
HYALINE CASTS: 0 /LPF
KETONES URINE: NEGATIVE
LEUKOCYTE ESTERASE URINE: NEGATIVE
MICROSCOPIC-UA: NORMAL
NITRITE URINE: NEGATIVE
PH URINE: 7
PROTEIN URINE: NORMAL
RED BLOOD CELLS URINE: 3 /HPF
SPECIFIC GRAVITY URINE: 1.02
SQUAMOUS EPITHELIAL CELLS: 1 /HPF
UROBILINOGEN URINE: NORMAL
WHITE BLOOD CELLS URINE: 1 /HPF

## 2020-05-28 LAB — BACTERIA UR CULT: NORMAL

## 2020-06-17 ENCOUNTER — APPOINTMENT (OUTPATIENT)
Dept: DERMATOLOGY | Facility: CLINIC | Age: 75
End: 2020-06-17

## 2020-07-16 ENCOUNTER — TRANSCRIPTION ENCOUNTER (OUTPATIENT)
Age: 75
End: 2020-07-16

## 2020-08-05 ENCOUNTER — EMERGENCY (EMERGENCY)
Facility: HOSPITAL | Age: 75
LOS: 1 days | Discharge: HOME | End: 2020-08-05
Attending: EMERGENCY MEDICINE
Payer: MEDICARE

## 2020-08-05 VITALS
SYSTOLIC BLOOD PRESSURE: 94 MMHG | DIASTOLIC BLOOD PRESSURE: 63 MMHG | HEART RATE: 67 BPM | RESPIRATION RATE: 18 BRPM | HEIGHT: 65 IN | OXYGEN SATURATION: 97 % | WEIGHT: 115.08 LBS | TEMPERATURE: 98 F

## 2020-08-05 LAB
ALBUMIN SERPL ELPH-MCNC: 4.4 G/DL — SIGNIFICANT CHANGE UP (ref 3.3–5.2)
ALP SERPL-CCNC: 114 U/L — SIGNIFICANT CHANGE UP (ref 40–120)
ALT FLD-CCNC: 34 U/L — HIGH
ANION GAP SERPL CALC-SCNC: 15 MMOL/L — SIGNIFICANT CHANGE UP (ref 5–17)
APTT BLD: 26.8 SEC — LOW (ref 27.5–35.5)
AST SERPL-CCNC: 50 U/L — HIGH
BASOPHILS # BLD AUTO: 0.05 K/UL — SIGNIFICANT CHANGE UP (ref 0–0.2)
BASOPHILS NFR BLD AUTO: 0.6 % — SIGNIFICANT CHANGE UP (ref 0–2)
BILIRUB SERPL-MCNC: 0.6 MG/DL — SIGNIFICANT CHANGE UP (ref 0.4–2)
BUN SERPL-MCNC: 23 MG/DL — HIGH (ref 8–20)
CALCIUM SERPL-MCNC: 9.7 MG/DL — SIGNIFICANT CHANGE UP (ref 8.6–10.2)
CHLORIDE SERPL-SCNC: 96 MMOL/L — LOW (ref 98–107)
CO2 SERPL-SCNC: 26 MMOL/L — SIGNIFICANT CHANGE UP (ref 22–29)
CREAT SERPL-MCNC: 1.01 MG/DL — SIGNIFICANT CHANGE UP (ref 0.5–1.3)
D DIMER BLD IA.RAPID-MCNC: 738 NG/ML DDU — HIGH
EOSINOPHIL # BLD AUTO: 0.1 K/UL — SIGNIFICANT CHANGE UP (ref 0–0.5)
EOSINOPHIL NFR BLD AUTO: 1.2 % — SIGNIFICANT CHANGE UP (ref 0–6)
GLUCOSE SERPL-MCNC: 110 MG/DL — HIGH (ref 70–99)
HCT VFR BLD CALC: 42.1 % — SIGNIFICANT CHANGE UP (ref 34.5–45)
HGB BLD-MCNC: 13.7 G/DL — SIGNIFICANT CHANGE UP (ref 11.5–15.5)
IMM GRANULOCYTES NFR BLD AUTO: 0.2 % — SIGNIFICANT CHANGE UP (ref 0–1.5)
INR BLD: 1.05 RATIO — SIGNIFICANT CHANGE UP (ref 0.88–1.16)
LYMPHOCYTES # BLD AUTO: 1.36 K/UL — SIGNIFICANT CHANGE UP (ref 1–3.3)
LYMPHOCYTES # BLD AUTO: 16 % — SIGNIFICANT CHANGE UP (ref 13–44)
MAGNESIUM SERPL-MCNC: 2 MG/DL — SIGNIFICANT CHANGE UP (ref 1.6–2.6)
MCHC RBC-ENTMCNC: 31.1 PG — SIGNIFICANT CHANGE UP (ref 27–34)
MCHC RBC-ENTMCNC: 32.5 GM/DL — SIGNIFICANT CHANGE UP (ref 32–36)
MCV RBC AUTO: 95.5 FL — SIGNIFICANT CHANGE UP (ref 80–100)
MONOCYTES # BLD AUTO: 0.52 K/UL — SIGNIFICANT CHANGE UP (ref 0–0.9)
MONOCYTES NFR BLD AUTO: 6.1 % — SIGNIFICANT CHANGE UP (ref 2–14)
NEUTROPHILS # BLD AUTO: 6.45 K/UL — SIGNIFICANT CHANGE UP (ref 1.8–7.4)
NEUTROPHILS NFR BLD AUTO: 75.9 % — SIGNIFICANT CHANGE UP (ref 43–77)
PHOSPHATE SERPL-MCNC: 3.1 MG/DL — SIGNIFICANT CHANGE UP (ref 2.4–4.7)
PLATELET # BLD AUTO: 189 K/UL — SIGNIFICANT CHANGE UP (ref 150–400)
POTASSIUM SERPL-MCNC: 3.9 MMOL/L — SIGNIFICANT CHANGE UP (ref 3.5–5.3)
POTASSIUM SERPL-SCNC: 3.9 MMOL/L — SIGNIFICANT CHANGE UP (ref 3.5–5.3)
PROT SERPL-MCNC: 6.9 G/DL — SIGNIFICANT CHANGE UP (ref 6.6–8.7)
PROTHROM AB SERPL-ACNC: 12.1 SEC — SIGNIFICANT CHANGE UP (ref 10.6–13.6)
RBC # BLD: 4.41 M/UL — SIGNIFICANT CHANGE UP (ref 3.8–5.2)
RBC # FLD: 12.4 % — SIGNIFICANT CHANGE UP (ref 10.3–14.5)
SODIUM SERPL-SCNC: 137 MMOL/L — SIGNIFICANT CHANGE UP (ref 135–145)
TROPONIN T SERPL-MCNC: <0.01 NG/ML — SIGNIFICANT CHANGE UP (ref 0–0.06)
WBC # BLD: 8.5 K/UL — SIGNIFICANT CHANGE UP (ref 3.8–10.5)
WBC # FLD AUTO: 8.5 K/UL — SIGNIFICANT CHANGE UP (ref 3.8–10.5)

## 2020-08-05 PROCEDURE — 99284 EMERGENCY DEPT VISIT MOD MDM: CPT | Mod: 25

## 2020-08-05 PROCEDURE — 71045 X-RAY EXAM CHEST 1 VIEW: CPT

## 2020-08-05 PROCEDURE — 73080 X-RAY EXAM OF ELBOW: CPT | Mod: 26,RT

## 2020-08-05 PROCEDURE — 73030 X-RAY EXAM OF SHOULDER: CPT | Mod: 26,RT

## 2020-08-05 PROCEDURE — 85027 COMPLETE CBC AUTOMATED: CPT

## 2020-08-05 PROCEDURE — 73080 X-RAY EXAM OF ELBOW: CPT

## 2020-08-05 PROCEDURE — 73110 X-RAY EXAM OF WRIST: CPT | Mod: 26,RT

## 2020-08-05 PROCEDURE — 84484 ASSAY OF TROPONIN QUANT: CPT

## 2020-08-05 PROCEDURE — 84100 ASSAY OF PHOSPHORUS: CPT

## 2020-08-05 PROCEDURE — 73110 X-RAY EXAM OF WRIST: CPT

## 2020-08-05 PROCEDURE — 36415 COLL VENOUS BLD VENIPUNCTURE: CPT

## 2020-08-05 PROCEDURE — 85379 FIBRIN DEGRADATION QUANT: CPT

## 2020-08-05 PROCEDURE — 80053 COMPREHEN METABOLIC PANEL: CPT

## 2020-08-05 PROCEDURE — 73030 X-RAY EXAM OF SHOULDER: CPT

## 2020-08-05 PROCEDURE — 71045 X-RAY EXAM CHEST 1 VIEW: CPT | Mod: 26

## 2020-08-05 PROCEDURE — 71275 CT ANGIOGRAPHY CHEST: CPT

## 2020-08-05 PROCEDURE — 85730 THROMBOPLASTIN TIME PARTIAL: CPT

## 2020-08-05 PROCEDURE — 99285 EMERGENCY DEPT VISIT HI MDM: CPT | Mod: GC

## 2020-08-05 PROCEDURE — 99283 EMERGENCY DEPT VISIT LOW MDM: CPT

## 2020-08-05 PROCEDURE — 85610 PROTHROMBIN TIME: CPT

## 2020-08-05 PROCEDURE — 93010 ELECTROCARDIOGRAM REPORT: CPT

## 2020-08-05 PROCEDURE — 71275 CT ANGIOGRAPHY CHEST: CPT | Mod: 26

## 2020-08-05 PROCEDURE — 83735 ASSAY OF MAGNESIUM: CPT

## 2020-08-05 PROCEDURE — 93005 ELECTROCARDIOGRAM TRACING: CPT

## 2020-08-05 RX ORDER — TETANUS TOXOID, REDUCED DIPHTHERIA TOXOID AND ACELLULAR PERTUSSIS VACCINE, ADSORBED 5; 2.5; 8; 8; 2.5 [IU]/.5ML; [IU]/.5ML; UG/.5ML; UG/.5ML; UG/.5ML
0.5 SUSPENSION INTRAMUSCULAR ONCE
Refills: 0 | Status: DISCONTINUED | OUTPATIENT
Start: 2020-08-05 | End: 2020-08-10

## 2020-08-05 RX ORDER — ACETAMINOPHEN 500 MG
650 TABLET ORAL ONCE
Refills: 0 | Status: COMPLETED | OUTPATIENT
Start: 2020-08-05 | End: 2020-08-05

## 2020-08-05 RX ORDER — OXYCODONE HYDROCHLORIDE 5 MG/1
5 TABLET ORAL ONCE
Refills: 0 | Status: DISCONTINUED | OUTPATIENT
Start: 2020-08-05 | End: 2020-08-05

## 2020-08-05 RX ADMIN — Medication 650 MILLIGRAM(S): at 11:35

## 2020-08-05 RX ADMIN — OXYCODONE HYDROCHLORIDE 5 MILLIGRAM(S): 5 TABLET ORAL at 11:35

## 2020-08-05 NOTE — ED PROVIDER NOTE - ATTENDING CONTRIBUTION TO CARE
Ayse: I performed a face to face bedside interview with patient regarding history of present illness, review of symptoms and past medical history. I completed an independent physical exam.  I have discussed patient's plan of care with resident.   I agree with note as stated above including HISTORY OF PRESENT ILLNESS, HIV, PAST MEDICAL/SURGICAL/FAMILY/SOCIAL HISTORY, ALLERGIES AND HOME MEDICATIONS, REVIEW OF SYSTEMS, PHYSICAL EXAM, MEDICAL DECISION MAKING and any PROGRESS NOTES during the time I functioned as the attending physician for this patient unless otherwise noted. My brief assessment is as follows: 75F /o HLD presents s/p fall. Pain to right shoulder and elbow. ALso with difficulty taking deep breaths. Denies CP, palpitations, SOB, head trauma, vomiting, LOC. Noted to have frequent PACs and PVCs on monitor.   Gen: Well appearing in NAD  Head: NC/AT  Neck: trachea midline  Resp:  No distress, CTAB  CV: no chest wall ttp. Regular rhythm with intermittent ectopic beats  Ext: no deformities, small skin tear R elbow. FROM elbow. Normal pulse. +ttp R elbow.   Neuro:  A&O appears non focal  Skin:  Warm and dry as visualized  Psych:  Normal affect and mood  Pt seen by [facility], has close follow up scheduled. D-dimer positive but negative CTPA. Felt better after pain meds, ambulating well, ready for dc.

## 2020-08-05 NOTE — ED ADULT TRIAGE NOTE - CHIEF COMPLAINT QUOTE
pt states she was walking and tripped over a wire, falling down on right shoulder & elbow,  A&Ox3, resp wnl c/o pain on ROM, + abrasion to elbow

## 2020-08-05 NOTE — ED PROVIDER NOTE - CROS ED ROS STATEMENT
Obstetrical Discharge Summary     Name: Barbara Lao MRN: [id number]  SSN: [id number]    YOB: 1995  Age: 24 y.o. Sex: female      Allergies: Review of patient's allergies indicates no known allergies. Admit Date: 2018    Discharge Date: 2018     Admitting Physician: Conor Cnodon MD     Attending Physician:  Elo Gonzalez MD     * Admission Diagnoses: labor; Abdominal pain during pregnancy in third trimester;Pr*    * Discharge Diagnoses:   Information for the patient's :  Carlos Palomo [[id number]]   Delivery of a 8 lb 8.2 oz (3.86 kg) male infant via Vaginal, Spontaneous Delivery on 2018 at 4:30 PM  by . Apgars were 9 and 9.        Additional Diagnoses:   Hospital Problems as of 2018  Date Reviewed: 2018          Codes Class Noted - Resolved POA    Abdominal pain during pregnancy in third trimester ICD-10-CM: O26.893, R10.9  ICD-9-CM: 646.83, 789.00  2018 - Present Unknown        Normal labor ICD-10-CM: O80, Z37.9  ICD-9-CM: 835  2018 - Present Unknown         PROM w/onset labor within 24 hours rupture in 3rd trimester ICD-10-CM: O42.013  ICD-9-CM: 658.13  2018 - Present Unknown             Lab Results   Component Value Date/Time    ABO/Rh(D) A NEGATIVE 2018 09:18 PM    GrBStrep, External negative 2018      Immunization History   Administered Date(s) Administered    Rho(D) Immune Globulin - IM 2018    Tdap 2018       * Procedures: vag delivery        [place]  Depression Scale  I have been able to laugh and see the funny side of things: As much as I always could  I have looked forward with enjoyment to things: As much as I ever did  I have blamed myself unnecessarily when things went wrong: No, never  I have been anxious or worried for no good reason: Hardly ever  I have felt scared or panicky for no very good reason: No, not at all  Things have been getting on top of me: No, I have been coping as well as ever  I have been so unhappy that I have had difficulty sleeping: No, not at all  I have felt sad or miserable: No, not at all  I have been so unhappy that I have been crying: No, never  The thought of harming myself has occurred to me: Never  Total Score: 1    * Discharge Condition: good    * Hospital Course: Normal hospital course following the delivery. * Disposition: Home    Discharge Medications:   Current Discharge Medication List      START taking these medications    Details   ibuprofen (MOTRIN) 800 mg tablet Take 1 Tab by mouth every eight (8) hours. Qty: 90 Tab, Refills: 0         CONTINUE these medications which have NOT CHANGED    Details   levothyroxine (SYNTHROID) 150 mcg tablet Take 1 tablet daily. Be sure to take 4 hours away from any other medication and on an empty stomach. Qty: 30 Tab, Refills: 6      PNV#16-Iron Fum & PS-FA-OM-3 35-1-200 mg cap Take  by mouth. fluconazole (DIFLUCAN) 150 mg tablet Take 1 Tab by mouth daily. Qty: 30 Tab, Refills: 2             * Follow-up Care/Patient Instructions:   Activity: No sex for 6 weeks and No driving while on analgesics  Diet: Regular Diet  Wound Care: As directed    Follow-up Information     Follow up With Details Comments Contact Info    None   None ([phone]) Patient stated that they have no PCP             Signed By:  Bernhard Lesch, MD     August 4, 2018 all other ROS negative except as per HPI

## 2020-08-05 NOTE — CONSULT NOTE ADULT - SUBJECTIVE AND OBJECTIVE BOX
[facility]                                                               Office:  [address] [address]                                                              Telephone: [phone]. 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 Gomez, presents to the ER today after mechanical fall to R-shoulder while jogging/running, EKG found with sinus rhythm and telemetry with frequent ectopic beats (PACs/PVCs), cardiology consulted for evaluation.    Patient denies palpitations or chest discomfort, denies any dizziness or syncope. She wears a Apple Watch and noted alert about her fall, but her friend was with her so did not need to activate emergency services. She denies any rhythm alert from watch. Drinks 1 cup of coffee daily, she jogs/power walk for 6 miles daily, but today did not realize a down wire hidden below the leaves led to her fall. She stopped taking ASA after were told without carotid stenosis, currently on low dose rosuvastatin every 3 days, denies any recurrence of fever since last year.       REVIEW OF SYMPTOMS:     CONSTITUTIONAL: No fever, weight loss, or fatigue  ENMT:  No difficulty hearing, tinnitus, vertigo; No sinus or throat pain  NECK: No pain or stiffness  CARDIOVASCULAR: as per HPI  RESPIRATORY: No Dyspnea on exertion, Shortness of breath, cough, wheezing  : No dysuria, no hematuria   GI: No dark color stool, no melena, no diarrhea, no constipation, no abdominal pain   NEURO: No headache, no dizziness, no slurred speech   MUSCULOSKELETAL: R-shoulder pain  PSYCH: No agitation, no anxiety.    ALL OTHER REVIEW OF SYSTEMS ARE NEGATIVE.      PREVIOUS DIAGNOSTIC TESTING    ECHO FINDINGS:  < from: AVEL Echo Doppler (19 @ 11:04) >  Summary:   1. Technically good study.   2. Normal global left ventricular systolic function.   3. Left ventricular ejection fraction,by visual estimation, is 55 to   60%.   4. Normal right ventricular size and function.   5. The left atrium is normal in size.   6. Mild mitral valve regurgitation.   7. The aortic valve is trileaflet. There is 0.8 cm subaortic membrane   present without significant hemodynamic compromise to LVOT flow. Mild   central jet aortic valve regurgitation is present.   8. Small patent foramen ovale, with predominantly left to right shunting   across the atrial septum.   9. No left atrial appendage thrombus.  10. There is no evidence of pericardial effusion.  11. There is no identifiable cardiac cause for patient's FUO.    < end of copied text >      STRESS FINDINGS:    CATHETERIZATION FINDINGS:         ALLERGIES: Allergies    No Known Allergies      PAST MEDICAL HISTORY  HLD  Carotid atherosclerosis  Subaortic membrane with mild AI      PAST SURGICAL HISTORY  Hand and bunion surgery       FAMILY HISTORY:  Family history of  : Mother, Father  Family history of congestive heart failure (V17.49) (Z82.49) : Father  Family history of diabetes mellitus (V18.0) (Z83.3) : Mother  Family history of gout (V18.19) (Z82.69) : Brother  Family history of hypertension (V17.49) (Z82.49) : Mother, Father  Family history of myocardial infarction (V17.3) (Z82.49) : Mother  Family history of supraventricular tachycardia (V17.49) (Z82.49) : Brother  Family history of Leaky heart valve : Brother  No pertinent family history : Child      SOCIAL HISTORY:    CIGARETTES:   former smoker  ALCOHOL: social alcohol  DRUGS: none      CURRENT MEDICATIONS:  MEDICATIONS  (STANDING):  diphtheria/tetanus/pertussis (acellular) Vaccine (ADAcel) 0.5 milliLiter(s) IntraMuscular once    MEDICATIONS  (PRN):         HOME MEDICATIONS:  reviewed in outpatient EMR      Vital Signs Last 24 Hrs  T(C): 36.4 (20 @ 09:57), Max: 36.4 (20 @ 09:57)  T(F): 97.6 (20 @ 09:57), Max: 97.6 (20 @ 09:57)  HR: 67 (20 @ 09:57) (67 - 67)  BP: 94/63 (20 @ 09:57) (94/63 - 94/63)  BP(mean): --  RR: 18 (20 @ 09:57) (18 - 18)  SpO2: 97% (08-05-20 @ 09:57) (97% - 97%)  I&O's Summary    PHYSICAL EXAM:    GENERAL: NAD, well-groomed, well-developed, tan, thin  HEAD:  NC/AT  EYES: EOMI, PERRLA, no scleral icterus  HEENT: Moist mucous membranes  NECK: Supple, No JVD  CNS:  Alert & Oriented X3, no focal deficit  LUNG: Clear to auscultation bilaterally; No rales, rhonchi, wheezing, or rubs  HEART: S1, S2, RRR; No murmurs, rubs, or gallops  ABDOMEN: +BS, ST/ND/NT  GENITOURINARY- Voiding, Bladder not distended  EXTREMITIES:  2+ Peripheral Pulses, No clubbing, cyanosis, or edema  MUSCULOSKELTAL- limited ROM due to pain R-hand, +skin laceration over R-elbow       Labs:                         13.7   8.50  )-----------( 189      ( 05 Aug 2020 11:32 )             42.1     08-    137  |  96<L>  |  23.0<H>  ----------------------------<  110<H>  3.9   |  26.0  |  1.01    Ca    9.7      05 Aug 2020 11:32  Phos  3.1     08-05  Mg     2.0     08-    TPro  6.9  /  Alb  4.4  /  TBili  0.6  /  DBili  x   /  AST  50<H>  /  ALT  34<H>  /  AlkPhos  114  08-05     05 Aug 2020 11:32 Troponin <0.01 ng/mL / Creatine Kinase x     /  CKMB x     / CPK Mass Assay % x          TELEMETRY: Sinus with frequent PACs, few PVCs   ECG:  Sinus rhythm with PACs, normal axis, no ST-T changes    < from: Xray Chest 1 View-PORTABLE IMMEDIATE (20 @ 12:01) >  Heart is magnified by technique.    Increased lung volume is consistent with COPD.    No infiltrates or effusions are evident.    Only structures are grossly intact.    Chest is similar to 2012.    IMPRESSION: COPD again noted.    < end of copied text >    < from: Xray Elbow AP + Lateral + Oblique, Right (20 @ 11:58) >  Right shoulder. 3 views.    There is a mild right mid thoracic curve. There is mild degeneration around the greater tuberosity. Bones are demineralized. No fractures are seen.    Right elbow. 4 views.    There is no sense of elbow effusion. There are slight degenerative findings. No fractures are seen.    Right wrist. 3 views.    There is significant degeneration around the base of the first metacarpal.    No fractures are seen.    IMPRESSION: No acute finding.    < end of copied text >

## 2020-08-05 NOTE — ED PROVIDER NOTE - OBJECTIVE STATEMENT
Pt is a 76 y/o F w/PMHx HLD presents c/p fall.  Pt states she was out for a 6 mile run, and at the end she tripped over a wire and fell to her right shoulder and elbow.  She denies hitting head, denies LOC.  States sine the fall she has had difficulty with inspiration.  Pt denies headache, fever, chest pain, rib pain, abdominal pain.  Endorses shortness of breath, right shoulder and right elbow pain.  Cardiologist: Dr. Gomez, [place]

## 2020-08-05 NOTE — ED STATDOCS - OBJECTIVE STATEMENT
75 year old female  PMHx HTN, dyslipidemia, prediabetes c/o shoulder and elbow pain s/p mechanical fall one hour PTA; patient admits to having shortness of breath since this morning and since the fall having trouble talking due to shortness of breath; will send to main ED for further management

## 2020-08-05 NOTE — ED PROVIDER NOTE - PATIENT PORTAL LINK FT
You can access the FollowMyHealth Patient Portal offered by [facility] by registering at the following website: [email or web address]. By joining [employer]’s FollowMyHealth portal, you will also be able to view your health information using other applications (apps) compatible with our system. You can access the FollowMyHealth Patient Portal offered by [facility] by registering at the following website: [email or web address]. By joining [employer]’s FollowMyHealth portal, you will also be able to view your health information using other applications (apps) compatible with our system.

## 2020-08-05 NOTE — ED PROVIDER NOTE - PROGRESS NOTE DETAILS
Pt reassessed, pt feeling better at this time, vss, pt able to walk, talk and vocalized plan of action. Discussed in depth and explained to pt in depth the next steps that need to be taking including proper follow up with PCP or specialists. All incidental findings were discussed with pt as well. Pt verbalized their concerns and all questions were answered. Pt understands dispo and wants discharge. Given good instructions when to return to ED and importance of f/u. Discussed pt w/cardiology, will f/u in office at scheduled appointment

## 2020-08-05 NOTE — CONSULT NOTE ADULT - ASSESSMENT
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 Gomez, presents to the ER today after mechanical fall to R-shoulder while jogging/running, EKG found with sinus rhythm and telemetry with frequent ectopic beats (PACs/PVCs), cardiology consulted for evaluation.    Asymptomatic PACs and few PVCs, electrolytes within normal, EKG and telemetry without Afib, outpatient EKG also without ecotropic beats, suspect incidental findings.  D-dimer was somehow checked and mildly elevated, she's now pending for CTPA.     1). Asymptomatic premature beats- no inpatient cardiac testing required for now; can continue to follow up outpatient with Dr. Gomez, can consider elective Holter/Event monitor to assess for burden.     2). Mechanical fall, R-shoulder/elbow pain- no fracture on CXR, pain control.     Stable from cardiac standpoint for discharge with outpatient cardiology follow up.       Forest Erickson DO, [facility]  Faculty Non-Invasive Cardiologist  [phone]

## 2020-08-05 NOTE — ED PROVIDER NOTE - NSFOLLOWUPINSTRUCTIONS_ED_ALL_ED_FT
-- Please follow up with your doctor(s) within the next 3 days, but seek medical attention sooner if your symptoms persist or worsen.  Please call tomorrow for an appointment.  If you cannot follow-up with your primary doctor please return to the ED for any urgent issues.    -- You were given a copy of your labs and imaging.  Please go over these with your doctor(s).     -- If you have any worsening of symptoms or any other concerns please see your doctor or return to the nearest emergency room immediately.    -- Please continue taking your home medications as directed.  Do not use alcohol when taking any medication (especially antibiotics, tylenol or other pain medication) unless you check with the doctor or pharmacist.    **************************************************************************************************************

## 2020-08-05 NOTE — ED ADULT NURSE NOTE - OBJECTIVE STATEMENT
pt stated she was outside walking and tripped over a down wire, and landed on her right shoulder and right elbow and has an 8/10 pain.  she denied chest pain breaths are even and  unlabored skin is warm and dry

## 2020-08-10 ENCOUNTER — APPOINTMENT (OUTPATIENT)
Dept: INTERNAL MEDICINE | Facility: CLINIC | Age: 75
End: 2020-08-10
Payer: MEDICARE

## 2020-08-10 VITALS
HEIGHT: 65 IN | TEMPERATURE: 97.8 F | HEART RATE: 82 BPM | WEIGHT: 114 LBS | BODY MASS INDEX: 18.99 KG/M2 | OXYGEN SATURATION: 98 % | DIASTOLIC BLOOD PRESSURE: 80 MMHG | SYSTOLIC BLOOD PRESSURE: 120 MMHG | RESPIRATION RATE: 12 BRPM

## 2020-08-10 DIAGNOSIS — G45.3 AMAUROSIS FUGAX: ICD-10-CM

## 2020-08-10 PROCEDURE — 99214 OFFICE O/P EST MOD 30 MIN: CPT

## 2020-08-10 RX ORDER — NITROFURANTOIN (MONOHYDRATE/MACROCRYSTALS) 25; 75 MG/1; MG/1
100 CAPSULE ORAL TWICE DAILY
Qty: 14 | Refills: 0 | Status: DISCONTINUED | COMMUNITY
Start: 2020-05-05 | End: 2020-08-10

## 2020-08-10 NOTE — ASSESSMENT
[FreeTextEntry1] : Labs done in ED so labs not sent today.MRI of the brain, carotid sonogram ordered and ophthalmology referral given. MRI of the right shoulder ordered and the patient is scheduled to see orthopedics tomorrow. Patient to followup with cardiology post ED as scheduled. Further management pending imaging and specialist evaluation. Patient will call with any new issues and return to the office as scheduled for regular followup\par \par Dr. Alcantar was present in office building while I examined patient\par

## 2020-08-10 NOTE — HISTORY OF PRESENT ILLNESS
[FreeTextEntry8] : Pt presents complaining of\par \par #1. Status post [facility] ER on 8/5/20 secondary to mechanical fall with injury to right shoulder. Patient had x-ray of the right shoulder, wrist and elbow showing no fracture. Patient states she continues to have right shoulder discomfort and is limited with movement especially washing hair and putting on her bra. Patient is scheduled to see orthopedics tomorrow. In ED patient  had EKG showing normal sinus rhythm with frequent ectopic beats which has been an issue for patient in the past, asymptomatic. Cardio consult was gotten and CTA  showed no PE, it was done for elevated d-dimer. Patient has followup outpatient appointment with cardiology scheduled\par \par #2. Patient also states that in the middle of June she was going crazy packing to move from her home and a saw fell on her head. Patient states that afternoon her left eye felt weird and she felt like she could not see right and then a week later felt as though a curtain was coming down and she was looking through a spider web, episode lasted 45 minutes and resolved. Patient states now her vision is fine and has had no issue and feels she is seeing normally but continues with on and off headache. Patient has no nausea/vomiting. Patient continues on Crestor, aspirin was stopped by cardiology

## 2020-08-10 NOTE — PHYSICAL EXAM
[No Acute Distress] : no acute distress [No Respiratory Distress] : no respiratory distress  [Clear to Auscultation] : lungs were clear to auscultation bilaterally [Normal Rate] : normal rate  [Normal Affect] : the affect was normal [Regular Rhythm] : with a regular rhythm [Normal Mood] : the mood was normal [Normal Sclera/Conjunctiva] : normal sclera/conjunctiva [EOMI] : extraocular movements intact [PERRL] : pupils equal round and reactive to light [Normal Outer Ear/Nose] : the outer ears and nose were normal in appearance [Normal TMs] : both tympanic membranes were normal [Normal Oropharynx] : the oropharynx was normal [Normal Nasal Mucosa] : the nasal mucosa was normal [Supple] : supple [No Focal Deficits] : no focal deficits [de-identified] : Right shoulder with decreased range of motion, limited ability to lift overhead,+ impingement noted

## 2020-08-22 ENCOUNTER — APPOINTMENT (OUTPATIENT)
Dept: MRI IMAGING | Facility: CLINIC | Age: 75
End: 2020-08-22

## 2020-08-22 ENCOUNTER — APPOINTMENT (OUTPATIENT)
Dept: ULTRASOUND IMAGING | Facility: CLINIC | Age: 75
End: 2020-08-22

## 2020-09-02 ENCOUNTER — APPOINTMENT (OUTPATIENT)
Dept: ULTRASOUND IMAGING | Facility: CLINIC | Age: 75
End: 2020-09-02
Payer: MEDICARE

## 2020-09-02 ENCOUNTER — APPOINTMENT (OUTPATIENT)
Dept: MRI IMAGING | Facility: CLINIC | Age: 75
End: 2020-09-02
Payer: MEDICARE

## 2020-09-02 ENCOUNTER — OUTPATIENT (OUTPATIENT)
Dept: OUTPATIENT SERVICES | Facility: HOSPITAL | Age: 75
LOS: 1 days | End: 2020-09-02
Payer: MEDICARE

## 2020-09-02 DIAGNOSIS — G45.3 AMAUROSIS FUGAX: ICD-10-CM

## 2020-09-02 DIAGNOSIS — S09.90XA UNSPECIFIED INJURY OF HEAD, INITIAL ENCOUNTER: ICD-10-CM

## 2020-09-02 DIAGNOSIS — Z00.00 ENCOUNTER FOR GENERAL ADULT MEDICAL EXAMINATION WITHOUT ABNORMAL FINDINGS: ICD-10-CM

## 2020-09-02 DIAGNOSIS — H53.9 UNSPECIFIED VISUAL DISTURBANCE: ICD-10-CM

## 2020-09-02 DIAGNOSIS — R51 HEADACHE: ICD-10-CM

## 2020-09-02 PROCEDURE — 70553 MRI BRAIN STEM W/O & W/DYE: CPT | Mod: 26

## 2020-09-02 PROCEDURE — 93880 EXTRACRANIAL BILAT STUDY: CPT | Mod: 26

## 2020-09-02 PROCEDURE — 93880 EXTRACRANIAL BILAT STUDY: CPT

## 2020-09-02 PROCEDURE — 70553 MRI BRAIN STEM W/O & W/DYE: CPT

## 2020-09-02 PROCEDURE — A9585: CPT

## 2020-09-03 DIAGNOSIS — R90.89 OTHER ABNORMAL FINDINGS ON DIAGNOSTIC IMAGING OF CENTRAL NERVOUS SYSTEM: ICD-10-CM

## 2020-09-15 ENCOUNTER — OUTPATIENT (OUTPATIENT)
Dept: OUTPATIENT SERVICES | Facility: HOSPITAL | Age: 75
LOS: 1 days | End: 2020-09-15
Payer: MEDICARE

## 2020-09-15 ENCOUNTER — APPOINTMENT (OUTPATIENT)
Dept: MRI IMAGING | Facility: CLINIC | Age: 75
End: 2020-09-15
Payer: MEDICARE

## 2020-09-15 DIAGNOSIS — R51 HEADACHE: ICD-10-CM

## 2020-09-15 DIAGNOSIS — G45.3 AMAUROSIS FUGAX: ICD-10-CM

## 2020-09-15 DIAGNOSIS — S09.90XA UNSPECIFIED INJURY OF HEAD, INITIAL ENCOUNTER: ICD-10-CM

## 2020-09-15 DIAGNOSIS — H53.9 UNSPECIFIED VISUAL DISTURBANCE: ICD-10-CM

## 2020-09-15 DIAGNOSIS — M25.511 PAIN IN RIGHT SHOULDER: ICD-10-CM

## 2020-09-15 PROCEDURE — A9585: CPT

## 2020-09-15 PROCEDURE — 70553 MRI BRAIN STEM W/O & W/DYE: CPT | Mod: 26

## 2020-09-15 PROCEDURE — 70553 MRI BRAIN STEM W/O & W/DYE: CPT

## 2020-09-16 ENCOUNTER — TRANSCRIPTION ENCOUNTER (OUTPATIENT)
Age: 75
End: 2020-09-16

## 2020-09-16 ENCOUNTER — OUTPATIENT (OUTPATIENT)
Dept: OUTPATIENT SERVICES | Facility: HOSPITAL | Age: 75
LOS: 1 days | End: 2020-09-16
Payer: MEDICARE

## 2020-09-16 ENCOUNTER — APPOINTMENT (OUTPATIENT)
Dept: MRI IMAGING | Facility: CLINIC | Age: 75
End: 2020-09-16
Payer: MEDICARE

## 2020-09-16 DIAGNOSIS — M25.511 PAIN IN RIGHT SHOULDER: ICD-10-CM

## 2020-09-16 DIAGNOSIS — Z00.00 ENCOUNTER FOR GENERAL ADULT MEDICAL EXAMINATION WITHOUT ABNORMAL FINDINGS: ICD-10-CM

## 2020-09-16 PROCEDURE — 73221 MRI JOINT UPR EXTREM W/O DYE: CPT

## 2020-09-16 PROCEDURE — 73221 MRI JOINT UPR EXTREM W/O DYE: CPT | Mod: 26,RT

## 2020-09-17 DIAGNOSIS — Z91.81 HISTORY OF FALLING: ICD-10-CM

## 2020-09-17 DIAGNOSIS — Z86.39 PERSONAL HISTORY OF OTHER ENDOCRINE, NUTRITIONAL AND METABOLIC DISEASE: ICD-10-CM

## 2020-09-17 DIAGNOSIS — Z87.39 PERSONAL HISTORY OF OTHER DISEASES OF THE MUSCULOSKELETAL SYSTEM AND CONNECTIVE TISSUE: ICD-10-CM

## 2020-09-18 ENCOUNTER — APPOINTMENT (OUTPATIENT)
Dept: CARDIOLOGY | Facility: CLINIC | Age: 75
End: 2020-09-18
Payer: MEDICARE

## 2020-09-18 ENCOUNTER — NON-APPOINTMENT (OUTPATIENT)
Age: 75
End: 2020-09-18

## 2020-09-18 VITALS
BODY MASS INDEX: 19.16 KG/M2 | TEMPERATURE: 98 F | DIASTOLIC BLOOD PRESSURE: 64 MMHG | OXYGEN SATURATION: 98 % | SYSTOLIC BLOOD PRESSURE: 128 MMHG | WEIGHT: 115 LBS | HEART RATE: 70 BPM | HEIGHT: 65 IN

## 2020-09-18 PROCEDURE — 99214 OFFICE O/P EST MOD 30 MIN: CPT

## 2020-09-18 PROCEDURE — 93000 ELECTROCARDIOGRAM COMPLETE: CPT

## 2020-09-18 RX ORDER — KRILL/OM-3/DHA/EPA/PHOSPHO/AST 1000-230MG
81 CAPSULE ORAL
Refills: 0 | Status: DISCONTINUED | COMMUNITY
End: 2020-09-18

## 2020-10-22 NOTE — PHYSICAL EXAM
[No Acute Distress] : no acute distress [Well-Appearing] : well-appearing [Normal Sclera/Conjunctiva] : normal sclera/conjunctiva [No JVD] : no jugular venous distention [No Respiratory Distress] : no respiratory distress  [No Accessory Muscle Use] : no accessory muscle use [Clear to Auscultation] : lungs were clear to auscultation bilaterally [Normal Rate] : normal rate  [Regular Rhythm] : with a regular rhythm [No Carotid Bruits] : no carotid bruits [No Edema] : there was no peripheral edema [Soft] : abdomen soft [Non Tender] : non-tender [Non-distended] : non-distended [No Masses] : no abdominal mass palpated [No HSM] : no HSM [Normal Gait] : normal gait [Normal Affect] : the affect was normal

## 2020-10-23 ENCOUNTER — APPOINTMENT (OUTPATIENT)
Dept: INTERNAL MEDICINE | Facility: CLINIC | Age: 75
End: 2020-10-23
Payer: MEDICARE

## 2020-10-23 VITALS
DIASTOLIC BLOOD PRESSURE: 70 MMHG | SYSTOLIC BLOOD PRESSURE: 118 MMHG | HEIGHT: 65 IN | WEIGHT: 113 LBS | TEMPERATURE: 97.3 F | BODY MASS INDEX: 18.83 KG/M2 | HEART RATE: 72 BPM | RESPIRATION RATE: 12 BRPM

## 2020-10-23 VITALS
DIASTOLIC BLOOD PRESSURE: 70 MMHG | OXYGEN SATURATION: 98 % | HEIGHT: 65 IN | TEMPERATURE: 97.3 F | BODY MASS INDEX: 18.83 KG/M2 | HEART RATE: 71 BPM | WEIGHT: 113 LBS | SYSTOLIC BLOOD PRESSURE: 118 MMHG

## 2020-10-23 DIAGNOSIS — Z87.898 PERSONAL HISTORY OF OTHER SPECIFIED CONDITIONS: ICD-10-CM

## 2020-10-23 DIAGNOSIS — H53.9 UNSPECIFIED VISUAL DISTURBANCE: ICD-10-CM

## 2020-10-23 DIAGNOSIS — Z87.828 PERSONAL HISTORY OF OTHER (HEALED) PHYSICAL INJURY AND TRAUMA: ICD-10-CM

## 2020-10-23 DIAGNOSIS — L98.9 DISORDER OF THE SKIN AND SUBCUTANEOUS TISSUE, UNSPECIFIED: ICD-10-CM
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
## 2020-10-23 DIAGNOSIS — I49.1 ATRIAL PREMATURE DEPOLARIZATION: ICD-10-CM

## 2020-10-23 DIAGNOSIS — L84 CORNS AND CALLOSITIES: ICD-10-CM

## 2020-10-23 DIAGNOSIS — I34.0 NONRHEUMATIC MITRAL (VALVE) INSUFFICIENCY: ICD-10-CM

## 2020-10-23 PROCEDURE — 99214 OFFICE O/P EST MOD 30 MIN: CPT

## 2020-10-23 NOTE — RESULTS/DATA
[] : results reviewed [de-identified] : WNL [de-identified] : WNL [de-identified] : WNL [de-identified] : NSR, PACs, PVC

## 2020-10-23 NOTE — HISTORY OF PRESENT ILLNESS
[No Pertinent Cardiac History] : no history of aortic stenosis, atrial fibrillation, coronary artery disease, recent myocardial infarction, or implantable device/pacemaker [No Pertinent Pulmonary History] : no history of asthma, COPD, sleep apnea, or smoking [No Adverse Anesthesia Reaction] : no adverse anesthesia reaction in self or family member [Chronic Anticoagulation] : no chronic anticoagulation [Chronic Kidney Disease] : no chronic kidney disease [Diabetes] : no diabetes [(Patient denies any chest pain, claudication, dyspnea on exertion, orthopnea, palpitations or syncope)] : Patient denies any chest pain, claudication, dyspnea on exertion, orthopnea, palpitations or syncope [Good (7-10 METs)] : Good (7-10 METs) [FreeTextEntry1] : Right shoulder reverse replacement [FreeTextEntry2] : November 4, 2020 [FreeTextEntry3] : Dr Bergman [FreeTextEntry4] : 75-year-old female presents for medical evaluation prior to right shoulder reverse replacement.\par \par Medical history includes cardiac arrhythmia with many APCs, mild mitral regurgitation and moderate aortic insufficiency, left carotid stenosis to a moderate degree and hyperlipidemia.\par \par No CAD, COPD, hepatorenal, hematological or diabetes history.\par \par The patient generally feels well without chest pain, palpitations, shortness of breath or edema. [FreeTextEntry7] : CARDIOLOGY 10/2/2020:  Holter monitor results discussed with Suzi including >01092 APCs.  She denies palpitations or an awareness of skipped beats so will not use AV zaheer blockers at this time.  She was advised to stay hydrated and reduce caffeine intake.  In addition, she was evaluated by Dr. Lux Bergman and is scheduled for shoulder surgery on 1/4/2020 at [facility].  Suzi had a cardiac CTA in 2016 with LAD calcification but no obstructing disease.  She walks 6 miles daily with no ischemic symptoms including chest pain, dyspnea or palpitations.  Thus, there is no cardiac contraindication to surgery as planned.

## 2020-11-15 ENCOUNTER — RESULT REVIEW (OUTPATIENT)
Age: 75
End: 2020-11-15

## 2020-11-16 ENCOUNTER — APPOINTMENT (OUTPATIENT)
Dept: DERMATOLOGY | Facility: CLINIC | Age: 75
End: 2020-11-16
Payer: MEDICARE

## 2020-11-16 PROCEDURE — 11102 TANGNTL BX SKIN SINGLE LES: CPT

## 2020-11-16 PROCEDURE — 17000 DESTRUCT PREMALG LESION: CPT | Mod: 59

## 2020-11-16 PROCEDURE — 99214 OFFICE O/P EST MOD 30 MIN: CPT | Mod: 25

## 2021-03-02 ENCOUNTER — OUTPATIENT (OUTPATIENT)
Dept: OUTPATIENT SERVICES | Facility: HOSPITAL | Age: 76
LOS: 1 days | End: 2021-03-02
Payer: MEDICARE

## 2021-03-02 ENCOUNTER — RESULT REVIEW (OUTPATIENT)
Age: 76
End: 2021-03-02

## 2021-03-02 ENCOUNTER — APPOINTMENT (OUTPATIENT)
Dept: RADIOLOGY | Facility: CLINIC | Age: 76
End: 2021-03-02
Payer: MEDICARE

## 2021-03-02 DIAGNOSIS — Z00.8 ENCOUNTER FOR OTHER GENERAL EXAMINATION: ICD-10-CM

## 2021-03-02 PROCEDURE — 77080 DXA BONE DENSITY AXIAL: CPT | Mod: 26

## 2021-03-02 PROCEDURE — 77080 DXA BONE DENSITY AXIAL: CPT

## 2021-03-22 ENCOUNTER — APPOINTMENT (OUTPATIENT)
Dept: OBGYN | Facility: CLINIC | Age: 76
End: 2021-03-22
Payer: MEDICARE

## 2021-03-22 VITALS
DIASTOLIC BLOOD PRESSURE: 80 MMHG | HEIGHT: 65 IN | SYSTOLIC BLOOD PRESSURE: 114 MMHG | WEIGHT: 116 LBS | BODY MASS INDEX: 19.33 KG/M2

## 2021-03-22 PROCEDURE — 99397 PER PM REEVAL EST PAT 65+ YR: CPT | Mod: GY

## 2021-03-22 PROCEDURE — G0101: CPT

## 2021-03-22 NOTE — DISCUSSION/SUMMARY
[FreeTextEntry1] : annual exam. pap done\par ref mammo, dexa. reviewed dexa w patient, osteopenic, dw pt kirk, vit d, exercise.
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
## 2021-03-22 NOTE — PHYSICAL EXAM
[Appropriately responsive] : appropriately responsive [Alert] : alert [No Acute Distress] : no acute distress [No Lymphadenopathy] : no lymphadenopathy [Regular Rate Rhythm] : regular rate rhythm [No Murmurs] : no murmurs [Clear to Auscultation B/L] : clear to auscultation bilaterally [Soft] : soft [Non-tender] : non-tender [Non-distended] : non-distended [No HSM] : No HSM [No Lesions] : no lesions [No Mass] : no mass [Oriented x3] : oriented x3 [Examination Of The Breasts] : a normal appearance [No Masses] : no breast masses were palpable [Labia Majora] : normal [Labia Minora] : normal [Normal] : normal [Uterine Adnexae] : normal [No Tenderness] : no tenderness [Nl Sphincter Tone] : normal sphincter tone [FreeTextEntry9] : guaiac neg

## 2021-04-05 LAB — CYTOLOGY CVX/VAG DOC THIN PREP: ABNORMAL

## 2021-05-10 ENCOUNTER — RESULT REVIEW (OUTPATIENT)
Age: 76
End: 2021-05-10

## 2021-05-10 ENCOUNTER — APPOINTMENT (OUTPATIENT)
Dept: DERMATOLOGY | Facility: CLINIC | Age: 76
End: 2021-05-10
Payer: MEDICARE

## 2021-05-10 PROCEDURE — 99213 OFFICE O/P EST LOW 20 MIN: CPT | Mod: 25

## 2021-05-10 PROCEDURE — 17000 DESTRUCT PREMALG LESION: CPT | Mod: 59

## 2021-05-10 PROCEDURE — 11102 TANGNTL BX SKIN SINGLE LES: CPT

## 2021-06-23 ENCOUNTER — EMERGENCY (EMERGENCY)
Facility: HOSPITAL | Age: 76
LOS: 1 days | Discharge: HOME | End: 2021-06-23
Attending: EMERGENCY MEDICINE
Payer: MEDICARE

## 2021-06-23 ENCOUNTER — TRANSCRIPTION ENCOUNTER (OUTPATIENT)
Age: 76
End: 2021-06-23

## 2021-06-23 VITALS
TEMPERATURE: 98 F | RESPIRATION RATE: 16 BRPM | SYSTOLIC BLOOD PRESSURE: 132 MMHG | OXYGEN SATURATION: 98 % | HEART RATE: 78 BPM | DIASTOLIC BLOOD PRESSURE: 65 MMHG

## 2021-06-23 VITALS
WEIGHT: 111.99 LBS | DIASTOLIC BLOOD PRESSURE: 85 MMHG | OXYGEN SATURATION: 94 % | HEIGHT: 65 IN | HEART RATE: 94 BPM | SYSTOLIC BLOOD PRESSURE: 167 MMHG | RESPIRATION RATE: 20 BRPM

## 2021-06-23 LAB
ALBUMIN SERPL ELPH-MCNC: 3.9 G/DL — SIGNIFICANT CHANGE UP (ref 3.3–5.2)
ALP SERPL-CCNC: 140 U/L — HIGH (ref 40–120)
ALT FLD-CCNC: 51 U/L — HIGH
ANION GAP SERPL CALC-SCNC: 12 MMOL/L — SIGNIFICANT CHANGE UP (ref 5–17)
APPEARANCE UR: CLEAR — SIGNIFICANT CHANGE UP
AST SERPL-CCNC: 78 U/L — HIGH
BACTERIA # UR AUTO: ABNORMAL
BASOPHILS # BLD AUTO: 0 K/UL — SIGNIFICANT CHANGE UP (ref 0–0.2)
BASOPHILS NFR BLD AUTO: 0 % — SIGNIFICANT CHANGE UP (ref 0–2)
BILIRUB SERPL-MCNC: 0.7 MG/DL — SIGNIFICANT CHANGE UP (ref 0.4–2)
BILIRUB UR-MCNC: NEGATIVE — SIGNIFICANT CHANGE UP
BUN SERPL-MCNC: 16.7 MG/DL — SIGNIFICANT CHANGE UP (ref 8–20)
CALCIUM SERPL-MCNC: 9.1 MG/DL — SIGNIFICANT CHANGE UP (ref 8.6–10.2)
CHLORIDE SERPL-SCNC: 101 MMOL/L — SIGNIFICANT CHANGE UP (ref 98–107)
CO2 SERPL-SCNC: 25 MMOL/L — SIGNIFICANT CHANGE UP (ref 22–29)
COLOR SPEC: YELLOW — SIGNIFICANT CHANGE UP
CREAT SERPL-MCNC: 0.68 MG/DL — SIGNIFICANT CHANGE UP (ref 0.5–1.3)
CRP SERPL-MCNC: 66 MG/L — HIGH
D DIMER BLD IA.RAPID-MCNC: 219 NG/ML DDU — SIGNIFICANT CHANGE UP
DIFF PNL FLD: ABNORMAL
EOSINOPHIL # BLD AUTO: 0 K/UL — SIGNIFICANT CHANGE UP (ref 0–0.5)
EOSINOPHIL NFR BLD AUTO: 0 % — SIGNIFICANT CHANGE UP (ref 0–6)
EPI CELLS # UR: SIGNIFICANT CHANGE UP
ERYTHROCYTE [SEDIMENTATION RATE] IN BLOOD: 40 MM/HR — HIGH (ref 0–20)
GLUCOSE SERPL-MCNC: 119 MG/DL — HIGH (ref 70–99)
GLUCOSE UR QL: NEGATIVE MG/DL — SIGNIFICANT CHANGE UP
HCT VFR BLD CALC: 40.4 % — SIGNIFICANT CHANGE UP (ref 34.5–45)
HGB BLD-MCNC: 13.2 G/DL — SIGNIFICANT CHANGE UP (ref 11.5–15.5)
KETONES UR-MCNC: ABNORMAL
LEUKOCYTE ESTERASE UR-ACNC: ABNORMAL
LIDOCAIN IGE QN: 23 U/L — SIGNIFICANT CHANGE UP (ref 22–51)
LYMPHOCYTES # BLD AUTO: 0.12 K/UL — LOW (ref 1–3.3)
LYMPHOCYTES # BLD AUTO: 0.9 % — LOW (ref 13–44)
MANUAL SMEAR VERIFICATION: SIGNIFICANT CHANGE UP
MCHC RBC-ENTMCNC: 30.4 PG — SIGNIFICANT CHANGE UP (ref 27–34)
MCHC RBC-ENTMCNC: 32.7 GM/DL — SIGNIFICANT CHANGE UP (ref 32–36)
MCV RBC AUTO: 93.1 FL — SIGNIFICANT CHANGE UP (ref 80–100)
MONOCYTES # BLD AUTO: 0.9 K/UL — SIGNIFICANT CHANGE UP (ref 0–0.9)
MONOCYTES NFR BLD AUTO: 6.9 % — SIGNIFICANT CHANGE UP (ref 2–14)
NEUTROPHILS # BLD AUTO: 12 K/UL — HIGH (ref 1.8–7.4)
NEUTROPHILS NFR BLD AUTO: 92.2 % — HIGH (ref 43–77)
NITRITE UR-MCNC: NEGATIVE — SIGNIFICANT CHANGE UP
NT-PROBNP SERPL-SCNC: 1506 PG/ML — HIGH (ref 0–300)
PH UR: 6 — SIGNIFICANT CHANGE UP (ref 5–8)
PLAT MORPH BLD: NORMAL — SIGNIFICANT CHANGE UP
PLATELET # BLD AUTO: 197 K/UL — SIGNIFICANT CHANGE UP (ref 150–400)
POTASSIUM SERPL-MCNC: 3.6 MMOL/L — SIGNIFICANT CHANGE UP (ref 3.5–5.3)
POTASSIUM SERPL-SCNC: 3.6 MMOL/L — SIGNIFICANT CHANGE UP (ref 3.5–5.3)
PROT SERPL-MCNC: 6.9 G/DL — SIGNIFICANT CHANGE UP (ref 6.6–8.7)
PROT UR-MCNC: 30 MG/DL
RBC # BLD: 4.34 M/UL — SIGNIFICANT CHANGE UP (ref 3.8–5.2)
RBC # FLD: 12.2 % — SIGNIFICANT CHANGE UP (ref 10.3–14.5)
RBC BLD AUTO: NORMAL — SIGNIFICANT CHANGE UP
RBC CASTS # UR COMP ASSIST: ABNORMAL /HPF (ref 0–4)
SODIUM SERPL-SCNC: 138 MMOL/L — SIGNIFICANT CHANGE UP (ref 135–145)
SP GR SPEC: 1.02 — SIGNIFICANT CHANGE UP (ref 1.01–1.02)
TROPONIN T SERPL-MCNC: <0.01 NG/ML — SIGNIFICANT CHANGE UP (ref 0–0.06)
UROBILINOGEN FLD QL: NEGATIVE MG/DL — SIGNIFICANT CHANGE UP
WBC # BLD: 13.01 K/UL — HIGH (ref 3.8–10.5)
WBC # FLD AUTO: 13.01 K/UL — HIGH (ref 3.8–10.5)
WBC UR QL: SIGNIFICANT CHANGE UP

## 2021-06-23 PROCEDURE — 87040 BLOOD CULTURE FOR BACTERIA: CPT

## 2021-06-23 PROCEDURE — 36415 COLL VENOUS BLD VENIPUNCTURE: CPT

## 2021-06-23 PROCEDURE — 87086 URINE CULTURE/COLONY COUNT: CPT

## 2021-06-23 PROCEDURE — 83880 ASSAY OF NATRIURETIC PEPTIDE: CPT

## 2021-06-23 PROCEDURE — 85652 RBC SED RATE AUTOMATED: CPT

## 2021-06-23 PROCEDURE — 84484 ASSAY OF TROPONIN QUANT: CPT

## 2021-06-23 PROCEDURE — 81001 URINALYSIS AUTO W/SCOPE: CPT

## 2021-06-23 PROCEDURE — 85379 FIBRIN DEGRADATION QUANT: CPT

## 2021-06-23 PROCEDURE — 85025 COMPLETE CBC W/AUTO DIFF WBC: CPT

## 2021-06-23 PROCEDURE — C8929: CPT

## 2021-06-23 PROCEDURE — 71250 CT THORAX DX C-: CPT | Mod: 26,MG

## 2021-06-23 PROCEDURE — 86140 C-REACTIVE PROTEIN: CPT

## 2021-06-23 PROCEDURE — 93005 ELECTROCARDIOGRAM TRACING: CPT

## 2021-06-23 PROCEDURE — 71045 X-RAY EXAM CHEST 1 VIEW: CPT | Mod: 26

## 2021-06-23 PROCEDURE — 71045 X-RAY EXAM CHEST 1 VIEW: CPT

## 2021-06-23 PROCEDURE — 99285 EMERGENCY DEPT VISIT HI MDM: CPT

## 2021-06-23 PROCEDURE — 99284 EMERGENCY DEPT VISIT MOD MDM: CPT | Mod: 25

## 2021-06-23 PROCEDURE — 83690 ASSAY OF LIPASE: CPT

## 2021-06-23 PROCEDURE — 99284 EMERGENCY DEPT VISIT MOD MDM: CPT

## 2021-06-23 PROCEDURE — 80053 COMPREHEN METABOLIC PANEL: CPT

## 2021-06-23 PROCEDURE — 93010 ELECTROCARDIOGRAM REPORT: CPT

## 2021-06-23 PROCEDURE — G1004: CPT

## 2021-06-23 PROCEDURE — 93306 TTE W/DOPPLER COMPLETE: CPT | Mod: 26

## 2021-06-23 PROCEDURE — 71250 CT THORAX DX C-: CPT

## 2021-06-23 RX ORDER — ACETAMINOPHEN 500 MG
650 TABLET ORAL ONCE
Refills: 0 | Status: DISCONTINUED | OUTPATIENT
Start: 2021-06-23 | End: 2021-06-23

## 2021-06-23 RX ORDER — ASPIRIN/CALCIUM CARB/MAGNESIUM 324 MG
650 TABLET ORAL ONCE
Refills: 0 | Status: COMPLETED | OUTPATIENT
Start: 2021-06-23 | End: 2021-06-23

## 2021-06-23 RX ORDER — ASPIRIN/CALCIUM CARB/MAGNESIUM 324 MG
650 TABLET ORAL EVERY 8 HOURS
Refills: 0 | Status: DISCONTINUED | OUTPATIENT
Start: 2021-06-23 | End: 2021-06-27

## 2021-06-23 RX ORDER — COLCHICINE 0.6 MG
0.6 TABLET ORAL
Refills: 0 | Status: DISCONTINUED | OUTPATIENT
Start: 2021-06-23 | End: 2021-06-27

## 2021-06-23 RX ORDER — COLCHICINE 0.6 MG
0.6 TABLET ORAL ONCE
Refills: 0 | Status: COMPLETED | OUTPATIENT
Start: 2021-06-23 | End: 2021-06-23

## 2021-06-23 RX ORDER — OMEPRAZOLE 10 MG/1
1 CAPSULE, DELAYED RELEASE ORAL
Qty: 30 | Refills: 0
Start: 2021-06-23 | End: 2021-07-22

## 2021-06-23 RX ORDER — COLCHICINE 0.6 MG
1 TABLET ORAL
Qty: 28 | Refills: 0
Start: 2021-06-23 | End: 2021-07-06

## 2021-06-23 RX ORDER — IBUPROFEN 200 MG
600 TABLET ORAL ONCE
Refills: 0 | Status: DISCONTINUED | OUTPATIENT
Start: 2021-06-23 | End: 2021-06-23

## 2021-06-23 RX ORDER — ASPIRIN/CALCIUM CARB/MAGNESIUM 324 MG
1 TABLET ORAL
Qty: 21 | Refills: 0
Start: 2021-06-23 | End: 2021-06-29

## 2021-06-23 RX ORDER — IBUPROFEN 200 MG
400 TABLET ORAL ONCE
Refills: 0 | Status: DISCONTINUED | OUTPATIENT
Start: 2021-06-23 | End: 2021-06-23

## 2021-06-23 RX ORDER — PANTOPRAZOLE SODIUM 20 MG/1
40 TABLET, DELAYED RELEASE ORAL
Refills: 0 | Status: DISCONTINUED | OUTPATIENT
Start: 2021-06-23 | End: 2021-06-27

## 2021-06-23 RX ADMIN — Medication 650 MILLIGRAM(S): at 14:07

## 2021-06-23 RX ADMIN — Medication 0.6 MILLIGRAM(S): at 14:07

## 2021-06-23 NOTE — ED PROVIDER NOTE - CLINICAL SUMMARY MEDICAL DECISION MAKING FREE TEXT BOX
2 days positional/pleuritic pain, reports fever to 101 last night. hx fever unkonwn origin. possible shae/myocarditis, vs pna, low risk PE. labs, dimer, xr, declining pain meds, reassess.

## 2021-06-23 NOTE — CONSULT NOTE ADULT - ASSESSMENT
A/P: 75 y/o F with a PMHx of subaortic membrane with mild AI, HLD, and carotid stenosis who presented to the ER with complaints of chest pain. Patient states that for the last 2 days she has been experiencing severe chest pain, worse at night when laying down, worse with deep breaths, radiated to her left arm, and improved with sitting up. Patient also noted difficulty breathing with the symptoms. Patient states that last night she also had a fever of 101. Patient states that the chest pain was better during the day, and she was able to walk 6 miles yesterday without any symptoms. Patient also notes some abdominal discomfort and GI issues since receiving the Moderna vaccine in March. Patient denies any syncope, near syncope, headache, dizziness, chills, weakness, or vision changes.   D-dimer 219  Troponin negative x 1   BNP 1506    Chest Pain  - Symptoms concerning for pericarditis.   - Check ESR and CRP.   - Continue to trend troponin.   - Echo with no acute changes.   - CT chest to rule out any underlying pathology.   - Repeat EKG in the AM.   - Will trial a dose of colchicine and aspirin, re-evaluate symptoms.   - Monitor on telemetry overnight.   - If CT with no acute findings will start colchicine 0.6mg PO BID and aspirin 650mg PO TID.   - Start Protonix 40mg PO qday.    Subaortic Membrane  - Stable.   - No significant LVOT obstruction.   - Continue to monitor.     HLD  - Continue rosuvastatin.     Assessment and recommendations are final when note is signed by the attending.

## 2021-06-23 NOTE — ED PROVIDER NOTE - OBJECTIVE STATEMENT
77 y/o female hx hld c/o 2 days chest pain and pressure. Left side/epigastric sometimes radiates to left arm, worse when laying flat/at night, improved when sitting up. pleuritic.  No sob. Had fever to 101 last night that quickly broke per pt. Has hx of fever of unknown origin x3 ~1-2 years ago. No hx dvt/pe. No recent illness prior to this. States has felt a little off since moderna 2nd shot in march. No known cardiac disease though seen for frequent pvc in 8/2020. Took 4 baby aspirin PTA.    ROS: No fever/chills. No eye pain/changes in vision, No ear pain/sore throat/dysphagia,  No SOB/cough/. No abdominal pain, N/V/D, no black/bloody bm. No dysuria/frequency/discharge, No headache. No Dizziness.    No rashes or breaks in skin. No numbness/tingling/weakness.

## 2021-06-23 NOTE — ED PROVIDER NOTE - PROGRESS NOTE DETAILS
Dimitrios: pt with elevated inflammatory markers, dimer neg, with unchanged echo and small pleural/pericardial effusion (reported as unchanged on echo). HD stable. cards recommending obs and treatment, explained that to pt, pt does not want to stay. understands risks of leaving including worsening condition and potential death. Pt states she feels ok and wants to f/u as outpt. will f/u with cards. will take meds and return precautions. med instrucitons and precuations given.

## 2021-06-23 NOTE — ED PROVIDER NOTE - PATIENT PORTAL LINK FT
You can access the FollowMyHealth Patient Portal offered by [facility] by registering at the following website: [email or web address]. By joining [facility]’s FollowMyHealth portal, you will also be able to view your health information using other applications (apps) compatible with our system.

## 2021-06-23 NOTE — ED ADULT NURSE NOTE - NSIMPLEMENTINTERV_GEN_ALL_ED
Implemented All Universal Safety Interventions:  [place] to call system. Call bell, personal items and telephone within reach. Instruct patient to call for assistance. Room bathroom lighting operational. Non-slip footwear when patient is off stretcher. Physically safe environment: no spills, clutter or unnecessary equipment. Stretcher in lowest position, wheels locked, appropriate side rails in place.

## 2021-06-23 NOTE — ED ADULT NURSE NOTE - OBJECTIVE STATEMENT
subjective fevers, chest pain and shortness of breath that started back in February after receiving first covid vaccine. pt reports that symptoms never resolved and have only increased in severity. pt reports her pmd sent her in to r/o myocarditis. a and o x3. breathing even and unlabored. nsr on cm. pt has no other complaints. at this time. will continue to monitor.

## 2021-06-23 NOTE — ED ADULT TRIAGE NOTE - CHIEF COMPLAINT QUOTE
pt c/o chest pressure x 2 nights. pt reports pain subsides when pt sits up, and is worse when laying down.

## 2021-06-23 NOTE — CONSULT NOTE ADULT - ATTENDING COMMENTS
76F history fever of unknown origin with workup incidentally found with subaortic membrane and mild aortic regurgitation and known PACs/PVCs now p/w fever 101 last night and midsternal chest pressure x2 days at rest, still ambulating >5 miles daily, EKG no evidence of pericarditis but symptoms worsen with laying down and improved sitting forward, questionable clinical pericarditis, TTE with small pericardial effusion, will treat empirically for pericarditis with colchicine 0.6mg BID and ASA 650mg TID, follow up ESR/CRP, place in observation unit overnight if recurrent fever, blood/urine culture given leukocytosis  -elevated pBNP but no clinical HF  -check CT chest noncontrast to rule out chest wall etiology          Forest Erickson DO, [facility]  Faculty Non-Invasive Cardiologist  [phone]

## 2021-06-23 NOTE — CONSULT NOTE ADULT - SUBJECTIVE AND OBJECTIVE BOX
[facility]                                                               Office:  [address] [place] [address]                                                              Telephone: [phone]. Fax:[phone]                                                                        CARDIOLOGY CONSULTATION NOTE                                                                                             Consult requested by:  Dr. Plunkett  Reason for Consultation: Chest Pain  Primary Cardiologist: Dr. Gomez  PCP: Dr. Alcantar  History obtained by: Patient and medical record   obtained: No    Covid Status: Unknown    Chief complaint:    Patient is a 76y old  Female who presents with a chief complaint of chest pain.      HPI: 75 y/o F with a PMHx of subaortic membrane with mild AI, HLD, and carotid stenosis who presented to the ER with complaints of chest pain. Patient states that for the last 2 days she has been experiencing severe chest pain, worse at night when laying down, worse with deep breaths, radiated to her left arm, and improved with sitting up. Patient also noted difficulty breathing with the symptoms. Patient states that last night she also had a fever of 101. Patient states that the chest pain was better during the day, and she was able to walk 6 miles yesterday without any symptoms. Patient also notes some abdominal discomfort and GI issues since receiving the Moderna vaccine in March. Patient denies any syncope, near syncope, headache, dizziness, chills, weakness, or vision changes.     REVIEW OF SYMPTOMS:     CONSTITUTIONAL: No fever, weight loss, or fatigue  ENMT:  No difficulty hearing, tinnitus, vertigo; No sinus or throat pain  NECK: No pain or stiffness  CARDIOVASCULAR: AS PER HPI  RESPIRATORY: AS PER HPI  : No dysuria, no hematuria   GI: No dark color stool, no melena, no diarrhea, no constipation, no abdominal pain   NEURO: No headache, no dizziness, no slurred speech   MUSCULOSKELETAL: No joint pain or swelling; No muscle, back, or extremity pain  PSYCH: No agitation, no anxiety.    ALL OTHER REVIEW OF SYSTEMS ARE NEGATIVE.      PREVIOUS DIAGNOSTIC TESTING  ECHO FINDINGS:  < from: TTE Echo Complete w/ Contrast w/ Doppler (21 @ 12:32) >  PHYSICIAN INTERPRETATION:  Left Ventricle: The left ventricular internal cavity size is normal.  Global LV systolic function was normal. Left ventricular ejection fraction, by visual estimation, is 55 to 60%. Spectral Doppler shows impaired relaxation pattern of left ventricular myocardial filling (Grade I diastolic dysfunction).  Right Ventricle: Normalright ventricular size and function.  Left Atrium: Mildly enlarged left atrium.  Right Atrium: Normal right atrial size.  Pericardium: A small pericardial effusion is present. The pericardial effusion is globally located around the entire heart.  Mitral Valve: Mild thickening of the anterior and posterior mitral valve leaflets. There is mild mitral annular calcification. Mild mitral valve regurgitation is seen.  Tricuspid Valve: Trivial tricuspid regurgitation is visualized.  Aortic Valve: The aortic valve is trileaflet. Sclerotic aortic valve with normal opening. Mild to moderate aortic valve regurgitation is seen.  Pulmonic Valve: Structurally normal pulmonic valve, with normal leaflet excursion. Trace pulmonic valve regurgitation.  Aorta: The aortic root and ascending aorta are structurally normal, with no evidence of dilitation.  Pulmonary Artery: The main pulmonary artery is normal in size.  Venous: The inferior vena cava was normal sized, with respiratory size variation greater than50%.  In comparison to the previous echocardiogram(s): Prior examinations are available and were reviewed for comparison purposes. Compared to AVEL study dated 19, no significant changes are seen.      Summary:   1. Left ventricular ejection fraction, by visual estimation, is 55 to 60%.   2. Normal global left ventricular systolic function.   3. Spectral Doppler shows impaired relaxation pattern of left ventricular myocardial filling (Grade I diastolic dysfunction).   4. Mildly enlarged left atrium.   5. Normal right atrial size.   6. Mild mitral annular calcification.   7. Mild mitral valve regurgitation.   8. Mild thickening of the anterior and posterior mitral valve leaflets.   9. Sclerotic aortic valve with normal opening.  10. Partial subaortic membran measuring 0.6 cm is visualized. No evidence of LVOT obstruction or compromised flow.  11. Mild to moderate aortic regurgitation.  12. Small pericardial effusion.  13. Compared to AVEL study dated 19, no significant changes are seen.    JACQUI So Electronically signed on 2021 at 1:25:20 PM    < end of copied text >      ALLERGIES: Allergies    sulfa drugs (Hives)    Intolerances      PAST MEDICAL HISTORY  HLD (hyperlipidemia)    Osteoarthritis    FUO (fever of unknown origin)        PAST SURGICAL HISTORY  No significant past surgical history        FAMILY HISTORY:  Family history of  : Mother, Father  Family history of congestive heart failure (V17.49) (Z82.49) : Father  Family history of diabetes mellitus (V18.0) (Z83.3) : Mother  Family history of gout (V18.19) (Z82.69) : Brother  Family history of hypertension (V17.49) (Z82.49) : Mother, Father  Family history of myocardial infarction (V17.3) (Z82.49) : Mother  Family history of supraventricular tachycardia (V17.49) (Z82.49) : Brother  Family history of Leaky heart valve : Brother  No pertinent family history : Child      SOCIAL HISTORY:    CIGARETTES:   former smoker  ALCOHOL: social alcohol  DRUGS: none        CURRENT MEDICATIONS:     aspirin  colchicine        HOME MEDICATIONS:      Vital Signs Last 24 Hrs  T(C): 37.2 (2021 11:27), Max: 37.4 (2021 09:58)  T(F): 99 (2021 11:27), Max: 99.3 (2021 09:58)  HR: 81 (2021 11:27) (81 - 94)  BP: 136/60 (2021 11:27) (136/60 - 167/85)  RR: 18 (2021 11:27) (18 - 20)  SpO2: 96% (2021 11:27) (94% - 96%)      PHYSICAL EXAM:  Constitutional: Comfortable . No acute distress.   HEENT: Atraumatic and normocephalic , neck is supple . no JVD. No carotid bruit. PEERL   CNS: A&Ox3. No focal deficits. EOMI. Cranial nerves II-IX are intact.   Lymph Nodes: Cervical : Not palpable.  Respiratory: CTAB  Cardiovascular: S1S2 RRR. No rubs or gallop. II/VI diastolic murmur RUSB  Gastrointestinal: Soft non-tender and non distended . +Bowel sounds. negative Conner's sign.  Extremities: No edema.   Psychiatric: Calm . no agitation.  Skin: No skin rash/ulcers visualized to face, hands or feet.    Intake and output:     LABS:                        13.2   13.01 )-----------( 197      ( 2021 10:45 )             40.4     06    138  |  101  |  16.7  ----------------------------<  119<H>  3.6   |  25.0  |  0.68    Ca    9.1      2021 10:45    TPro  6.9  /  Alb  3.9  /  TBili  0.7  /  DBili  x   /  AST  78<H>  /  ALT  51<H>  /  AlkPhos  140<H>      CARDIAC MARKERS ( 2021 10:45 )  x     / <0.01 ng/mL / x     / x     / x        ;p-BNP=Serum Pro-Brain Natriuretic Peptide: 1506 pg/mL ( @ 10:45)          INTERPRETATION OF TELEMETRY: Reviewed by me. SR  ECG: Reviewed by me. NSR, PVCs, NSST/T wave abnormalities     RADIOLOGY & ADDITIONAL STUDIES:    X-ray:  reviewed by me.   < from: Xray Chest 1 View- PORTABLE-Urgent (21 @ 10:47) >   EXAM:  XR CHEST PORTABLE URGENT 1V                          PROCEDURE DATE:  2021          INTERPRETATION:  Clinical history: 76-year-old female, chest pain.    Two views of the chest are compared to 2012 and demonstrate a normal cardiacsilhouette and normal pulmonary vasculature with no consolidation, effusion, gross adenopathy, pneumothorax or acute osseous finding.    Mild, balanced thoracic scoliosis is noted.    Interval right total shoulder arthroplasty.    IMPRESSION:  No acute parenchymal findings and no change    < end of copied text >    CT scan:     MRI:

## 2021-06-24 ENCOUNTER — NON-APPOINTMENT (OUTPATIENT)
Age: 76
End: 2021-06-24

## 2021-06-25 LAB
CULTURE RESULTS: SIGNIFICANT CHANGE UP
SPECIMEN SOURCE: SIGNIFICANT CHANGE UP

## 2021-06-28 ENCOUNTER — APPOINTMENT (OUTPATIENT)
Dept: CARDIOLOGY | Facility: CLINIC | Age: 76
End: 2021-06-28
Payer: MEDICARE

## 2021-06-28 ENCOUNTER — NON-APPOINTMENT (OUTPATIENT)
Age: 76
End: 2021-06-28

## 2021-06-28 VITALS
HEART RATE: 78 BPM | TEMPERATURE: 97.8 F | SYSTOLIC BLOOD PRESSURE: 154 MMHG | DIASTOLIC BLOOD PRESSURE: 84 MMHG | WEIGHT: 114 LBS | BODY MASS INDEX: 18.97 KG/M2 | OXYGEN SATURATION: 97 %

## 2021-06-28 VITALS — SYSTOLIC BLOOD PRESSURE: 154 MMHG | DIASTOLIC BLOOD PRESSURE: 78 MMHG

## 2021-06-28 LAB
CULTURE RESULTS: SIGNIFICANT CHANGE UP
CULTURE RESULTS: SIGNIFICANT CHANGE UP
SPECIMEN SOURCE: SIGNIFICANT CHANGE UP
SPECIMEN SOURCE: SIGNIFICANT CHANGE UP

## 2021-06-28 PROCEDURE — 93000 ELECTROCARDIOGRAM COMPLETE: CPT

## 2021-06-28 PROCEDURE — 99214 OFFICE O/P EST MOD 30 MIN: CPT | Mod: 25

## 2021-06-28 RX ORDER — CLOTRIMAZOLE 10 MG/G
1 CREAM TOPICAL
Qty: 1 | Refills: 1 | Status: DISCONTINUED | COMMUNITY
Start: 2021-05-24 | End: 2021-06-28

## 2021-07-06 ENCOUNTER — INPATIENT (INPATIENT)
Facility: HOSPITAL | Age: 76
LOS: 2 days | Discharge: HOME | DRG: 315 | End: 2021-07-09
Attending: FAMILY MEDICINE | Admitting: HOSPITALIST
Payer: MEDICARE

## 2021-07-06 ENCOUNTER — APPOINTMENT (OUTPATIENT)
Dept: INTERNAL MEDICINE | Facility: CLINIC | Age: 76
End: 2021-07-06
Payer: MEDICARE

## 2021-07-06 ENCOUNTER — NON-APPOINTMENT (OUTPATIENT)
Age: 76
End: 2021-07-06

## 2021-07-06 VITALS
HEART RATE: 125 BPM | DIASTOLIC BLOOD PRESSURE: 70 MMHG | TEMPERATURE: 98.2 F | RESPIRATION RATE: 13 BRPM | SYSTOLIC BLOOD PRESSURE: 110 MMHG

## 2021-07-06 VITALS
DIASTOLIC BLOOD PRESSURE: 72 MMHG | OXYGEN SATURATION: 97 % | WEIGHT: 114.42 LBS | HEIGHT: 65 IN | RESPIRATION RATE: 20 BRPM | SYSTOLIC BLOOD PRESSURE: 128 MMHG | TEMPERATURE: 99 F | HEART RATE: 115 BPM

## 2021-07-06 DIAGNOSIS — Z98.890 OTHER SPECIFIED POSTPROCEDURAL STATES: Chronic | ICD-10-CM

## 2021-07-06 DIAGNOSIS — I48.91 UNSPECIFIED ATRIAL FIBRILLATION: ICD-10-CM

## 2021-07-06 LAB
ALBUMIN SERPL ELPH-MCNC: 3.6 G/DL — SIGNIFICANT CHANGE UP (ref 3.3–5.2)
ALP SERPL-CCNC: 491 U/L — HIGH (ref 40–120)
ALT FLD-CCNC: 48 U/L — HIGH
ANION GAP SERPL CALC-SCNC: 14 MMOL/L — SIGNIFICANT CHANGE UP (ref 5–17)
APTT BLD: 32.6 SEC — SIGNIFICANT CHANGE UP (ref 27.5–35.5)
AST SERPL-CCNC: 64 U/L — HIGH
BASOPHILS # BLD AUTO: 0.02 K/UL — SIGNIFICANT CHANGE UP (ref 0–0.2)
BASOPHILS NFR BLD AUTO: 0.2 % — SIGNIFICANT CHANGE UP (ref 0–2)
BILIRUB SERPL-MCNC: 0.4 MG/DL — SIGNIFICANT CHANGE UP (ref 0.4–2)
BUN SERPL-MCNC: 19.1 MG/DL — SIGNIFICANT CHANGE UP (ref 8–20)
CALCIUM SERPL-MCNC: 9.2 MG/DL — SIGNIFICANT CHANGE UP (ref 8.6–10.2)
CHLORIDE SERPL-SCNC: 97 MMOL/L — LOW (ref 98–107)
CO2 SERPL-SCNC: 25 MMOL/L — SIGNIFICANT CHANGE UP (ref 22–29)
CREAT SERPL-MCNC: 0.86 MG/DL — SIGNIFICANT CHANGE UP (ref 0.5–1.3)
CRP SERPL-MCNC: 85 MG/L — HIGH
EOSINOPHIL # BLD AUTO: 0.02 K/UL — SIGNIFICANT CHANGE UP (ref 0–0.5)
EOSINOPHIL NFR BLD AUTO: 0.2 % — SIGNIFICANT CHANGE UP (ref 0–6)
ERYTHROCYTE [SEDIMENTATION RATE] IN BLOOD: 50 MM/HR — HIGH (ref 0–20)
GLUCOSE SERPL-MCNC: 91 MG/DL — SIGNIFICANT CHANGE UP (ref 70–99)
HCT VFR BLD CALC: 41.3 % — SIGNIFICANT CHANGE UP (ref 34.5–45)
HGB BLD-MCNC: 13.2 G/DL — SIGNIFICANT CHANGE UP (ref 11.5–15.5)
IMM GRANULOCYTES NFR BLD AUTO: 0.3 % — SIGNIFICANT CHANGE UP (ref 0–1.5)
INR BLD: 1.25 RATIO — HIGH (ref 0.88–1.16)
LYMPHOCYTES # BLD AUTO: 0.99 K/UL — LOW (ref 1–3.3)
LYMPHOCYTES # BLD AUTO: 10.3 % — LOW (ref 13–44)
MAGNESIUM SERPL-MCNC: 1.9 MG/DL — SIGNIFICANT CHANGE UP (ref 1.6–2.6)
MCHC RBC-ENTMCNC: 29.9 PG — SIGNIFICANT CHANGE UP (ref 27–34)
MCHC RBC-ENTMCNC: 32 GM/DL — SIGNIFICANT CHANGE UP (ref 32–36)
MCV RBC AUTO: 93.4 FL — SIGNIFICANT CHANGE UP (ref 80–100)
MONOCYTES # BLD AUTO: 0.55 K/UL — SIGNIFICANT CHANGE UP (ref 0–0.9)
MONOCYTES NFR BLD AUTO: 5.7 % — SIGNIFICANT CHANGE UP (ref 2–14)
NEUTROPHILS # BLD AUTO: 8.02 K/UL — HIGH (ref 1.8–7.4)
NEUTROPHILS NFR BLD AUTO: 83.3 % — HIGH (ref 43–77)
NT-PROBNP SERPL-SCNC: 1665 PG/ML — HIGH (ref 0–300)
PHOSPHATE SERPL-MCNC: 3.1 MG/DL — SIGNIFICANT CHANGE UP (ref 2.4–4.7)
PLATELET # BLD AUTO: 387 K/UL — SIGNIFICANT CHANGE UP (ref 150–400)
POTASSIUM SERPL-MCNC: 3.8 MMOL/L — SIGNIFICANT CHANGE UP (ref 3.5–5.3)
POTASSIUM SERPL-SCNC: 3.8 MMOL/L — SIGNIFICANT CHANGE UP (ref 3.5–5.3)
PROT SERPL-MCNC: 7.3 G/DL — SIGNIFICANT CHANGE UP (ref 6.6–8.7)
PROTHROM AB SERPL-ACNC: 14.4 SEC — HIGH (ref 10.6–13.6)
RBC # BLD: 4.42 M/UL — SIGNIFICANT CHANGE UP (ref 3.8–5.2)
RBC # FLD: 12 % — SIGNIFICANT CHANGE UP (ref 10.3–14.5)
SARS-COV-2 RNA SPEC QL NAA+PROBE: SIGNIFICANT CHANGE UP
SODIUM SERPL-SCNC: 136 MMOL/L — SIGNIFICANT CHANGE UP (ref 135–145)
TROPONIN T SERPL-MCNC: <0.01 NG/ML — SIGNIFICANT CHANGE UP (ref 0–0.06)
TSH SERPL-MCNC: 2.75 UIU/ML — SIGNIFICANT CHANGE UP (ref 0.27–4.2)
WBC # BLD: 9.63 K/UL — SIGNIFICANT CHANGE UP (ref 3.8–10.5)
WBC # FLD AUTO: 9.63 K/UL — SIGNIFICANT CHANGE UP (ref 3.8–10.5)

## 2021-07-06 PROCEDURE — 99222 1ST HOSP IP/OBS MODERATE 55: CPT

## 2021-07-06 PROCEDURE — 93000 ELECTROCARDIOGRAM COMPLETE: CPT

## 2021-07-06 PROCEDURE — 99223 1ST HOSP IP/OBS HIGH 75: CPT

## 2021-07-06 PROCEDURE — 99214 OFFICE O/P EST MOD 30 MIN: CPT | Mod: 25

## 2021-07-06 PROCEDURE — 99291 CRITICAL CARE FIRST HOUR: CPT

## 2021-07-06 PROCEDURE — 99497 ADVNCD CARE PLAN 30 MIN: CPT | Mod: 25

## 2021-07-06 PROCEDURE — 71045 X-RAY EXAM CHEST 1 VIEW: CPT | Mod: 26

## 2021-07-06 RX ORDER — ATORVASTATIN CALCIUM 80 MG/1
20 TABLET, FILM COATED ORAL
Refills: 0 | Status: DISCONTINUED | OUTPATIENT
Start: 2021-07-06 | End: 2021-07-09

## 2021-07-06 RX ORDER — ASPIRIN/CALCIUM CARB/MAGNESIUM 324 MG
650 TABLET ORAL EVERY 8 HOURS
Refills: 0 | Status: DISCONTINUED | OUTPATIENT
Start: 2021-07-06 | End: 2021-07-07

## 2021-07-06 RX ORDER — METOPROLOL TARTRATE 50 MG
25 TABLET ORAL THREE TIMES A DAY
Refills: 0 | Status: DISCONTINUED | OUTPATIENT
Start: 2021-07-06 | End: 2021-07-07

## 2021-07-06 RX ORDER — METOPROLOL TARTRATE 50 MG
50 TABLET ORAL ONCE
Refills: 0 | Status: COMPLETED | OUTPATIENT
Start: 2021-07-06 | End: 2021-07-06

## 2021-07-06 RX ORDER — MAGNESIUM SULFATE 500 MG/ML
2 VIAL (ML) INJECTION ONCE
Refills: 0 | Status: COMPLETED | OUTPATIENT
Start: 2021-07-06 | End: 2021-07-06

## 2021-07-06 RX ORDER — ENOXAPARIN SODIUM 100 MG/ML
50 INJECTION SUBCUTANEOUS ONCE
Refills: 0 | Status: COMPLETED | OUTPATIENT
Start: 2021-07-06 | End: 2021-07-06

## 2021-07-06 RX ORDER — ROSUVASTATIN CALCIUM 5 MG/1
1 TABLET ORAL
Qty: 0 | Refills: 0 | DISCHARGE

## 2021-07-06 RX ORDER — PANTOPRAZOLE SODIUM 20 MG/1
40 TABLET, DELAYED RELEASE ORAL
Refills: 0 | Status: DISCONTINUED | OUTPATIENT
Start: 2021-07-06 | End: 2021-07-09

## 2021-07-06 RX ORDER — ASPIRIN/CALCIUM CARB/MAGNESIUM 324 MG
0 TABLET ORAL
Qty: 0 | Refills: 0 | DISCHARGE

## 2021-07-06 RX ORDER — LACTOBACILLUS ACIDOPHILUS 100MM CELL
1 CAPSULE ORAL DAILY
Refills: 0 | Status: DISCONTINUED | OUTPATIENT
Start: 2021-07-06 | End: 2021-07-09

## 2021-07-06 RX ORDER — METOPROLOL TARTRATE 50 MG
5 TABLET ORAL ONCE
Refills: 0 | Status: DISCONTINUED | OUTPATIENT
Start: 2021-07-06 | End: 2021-07-06

## 2021-07-06 RX ORDER — METOPROLOL TARTRATE 50 MG
5 TABLET ORAL ONCE
Refills: 0 | Status: COMPLETED | OUTPATIENT
Start: 2021-07-06 | End: 2021-07-06

## 2021-07-06 RX ORDER — COLCHICINE 0.6 MG
0.6 TABLET ORAL
Refills: 0 | Status: DISCONTINUED | OUTPATIENT
Start: 2021-07-06 | End: 2021-07-09

## 2021-07-06 RX ORDER — METOPROLOL TARTRATE 50 MG
50 TABLET ORAL DAILY
Refills: 0 | Status: DISCONTINUED | OUTPATIENT
Start: 2021-07-06 | End: 2021-07-06

## 2021-07-06 RX ADMIN — Medication 0.6 MILLIGRAM(S): at 22:36

## 2021-07-06 RX ADMIN — Medication 25 MILLIGRAM(S): at 21:34

## 2021-07-06 RX ADMIN — Medication 50 MILLIGRAM(S): at 16:09

## 2021-07-06 RX ADMIN — Medication 650 MILLIGRAM(S): at 21:34

## 2021-07-06 RX ADMIN — ENOXAPARIN SODIUM 50 MILLIGRAM(S): 100 INJECTION SUBCUTANEOUS at 21:30

## 2021-07-06 RX ADMIN — Medication 5 MILLIGRAM(S): at 14:40

## 2021-07-06 RX ADMIN — Medication 50 GRAM(S): at 21:34

## 2021-07-06 NOTE — H&P ADULT - NSICDXFAMILYHX_GEN_ALL_CORE_FT
FAMILY HISTORY:  Father  Still living? Unknown  FH: heart disease, Age at diagnosis: Age Unknown    Mother  Still living? No  FH: heart disease, Age at diagnosis: Age Unknown

## 2021-07-06 NOTE — ED PROVIDER NOTE - CLINICAL SUMMARY MEDICAL DECISION MAKING FREE TEXT BOX
75 yo with recent pericarditis diagnosis, in AFIB with RVR. Will obtain cardiac labs, start w 5 mg IV Metoprolol. Send tick disease panel. Monitor and reassess. pt with new onset rapid afib   no sob , no chest pain   b blocker   admit to medicine

## 2021-07-06 NOTE — ED PROVIDER NOTE - OBJECTIVE STATEMENT
75 yo female history HLD, recent diagnosis of viral pericarditis presents in AFIB with RVR. The patient states that she has experienced viral symptoms over the past several weeks. The patient was diagnosed with pericarditis during her previous visit on 6/23/21. The patient states that today, she went to her PCP where she was found to be in AFIB with RVR. Patient denies chest pain and SOB at this time. Reports some mild pleuritic chest pain w deep inspiration. Last fever was yesterday pm. Reports dry cough, denies sputum production. She denies sore throat, abd pain, n/v/d, dysuria, hematuria, LOC, focal weakness/numbness/tingling in extremities.

## 2021-07-06 NOTE — ASSESSMENT
[FreeTextEntry1] : Pt send directly to ED to eval,  to drive her, declined ambulance. F/U post hospital, referral, EKG copy given and ED aware pt is being sent to eval.\par \par \par Dr. Alcantar was present in office building while I examined patient\par

## 2021-07-06 NOTE — CONSULT NOTE ADULT - SUBJECTIVE AND OBJECTIVE BOX
[place] [facility]                                                               Office:  [address] [address]                                                              Telephone: [phone]. Fax:[phone]                                                                        CARDIOLOGY CONSULTATION NOTE                                                                                             Consult requested by:    Reason for Consultation:   History obtained by: Patient and medical record   obtained: No  Cardiologist:    Chief complaint:    Patient is a 76y old  Female who presents with a chief complaint of       HPI:        REVIEW OF SYMPTOMS:     CONSTITUTIONAL: No fever, no weight loss, no fatigue, no weight gain   ENMT:  No difficulty hearing, tinnitus, vertigo; No sinus or throat pain  NECK: No pain or stiffness  CARDIOVASCULAR: No chest pain, no dyspnea, no syncope, no palpitations, no dizziness, no Orthopnea, no Paroxsymal nocturnal dyspnea  RESPIRATORY: No Dyspnea on exertion, no Shortness of breath, no cough, no wheezing  : No dysuria, no hematuria   GI: No dark color stool, no melena, no diarrhea, no constipation, no abdominal pain   NEURO: No headache, no dizziness, no slurred speech   MUSCULOSKELETAL: No joint pain or swelling; No muscle, back, or extremity pain  PSYCH: No agitation, no anxiety.    ALL OTHER REVIEW OF SYSTEMS ARE NEGATIVE.      PREVIOUS DIAGNOSTIC TESTING  ECHO FINDINGS:      STRESS FINDINGS:      CATHETERIZATION FINDINGS:         ALLERGIES: Allergies    sulfa drugs (Hives)    Intolerances          PAST MEDICAL HISTORY  HLD (hyperlipidemia)    Osteoarthritis    FUO (fever of unknown origin)        PAST SURGICAL HISTORY  No significant past surgical history        FAMILY HISTORY:      SOCIAL HISTORY:  Denies smoking/alcohol/drugs  CIGARETTES:     ALCOHOL:  DRUGS:      CURRENT MEDICATIONS:  metoprolol tartrate 50 milliGRAM(s) Oral Once           HOME MEDICATIONS:      Vital Signs Last 24 Hrs  T(C): 37.1 (06 Jul 2021 13:45), Max: 37.1 (06 Jul 2021 13:45)  T(F): 98.8 (06 Jul 2021 13:45), Max: 98.8 (06 Jul 2021 13:45)  HR: 98 (06 Jul 2021 14:49) (98 - 115)  BP: 139/65 (06 Jul 2021 14:49) (128/72 - 139/65)  BP(mean): --  RR: 22 (06 Jul 2021 14:49) (20 - 22)  SpO2: 99% (06 Jul 2021 14:49) (97% - 99%)      PHYSICAL EXAM:  Constitutional: Comfortable . No acute distress.   HEENT: Atraumatic and normocephalic , neck is supple . no JVD. No carotid bruit. PEERL   CNS: A&Ox3. No focal deficits. EOMI. Cranial nerves II-IX are intact.   Respiratory: CTAB, ulabored   Cardiovascular: S1S2 RRR. No murmur/rubs or gallop.  Gastrointestinal: Soft non-tender and non distended . +Bowel sounds. negative Conner's sign.  Extremities: No edema.   Psychiatric: Calm . no agitation.  Skin: No skin rash/ulcers visualized to face, hands or feet.    Intake and output:     LABS:                        13.2   9.63  )-----------( 387      ( 06 Jul 2021 14:39 )             41.3     07-06    136  |  97<L>  |  19.1  ----------------------------<  91  3.8   |  25.0  |  0.86    Ca    9.2      06 Jul 2021 14:39  Phos  3.1     07-06  Mg     1.9     07-06    TPro  7.3  /  Alb  3.6  /  TBili  0.4  /  DBili  x   /  AST  64<H>  /  ALT  48<H>  /  AlkPhos  491<H>  07-06    CARDIAC MARKERS ( 06 Jul 2021 14:39 )  x     / <0.01 ng/mL / x     / x     / x        ;p-BNP=Serum Pro-Brain Natriuretic Peptide: 1665 pg/mL (07-06 @ 14:39)    PT/INR - ( 06 Jul 2021 14:39 )   PT: 14.4 sec;   INR: 1.25 ratio         PTT - ( 06 Jul 2021 14:39 )  PTT:32.6 sec      INTERPRETATION OF TELEMETRY: Reviewed by me.   ECG: Reviewed by me.     RADIOLOGY & ADDITIONAL STUDIES:    X-ray:  reviewed by me.   CT scan:   MRI:                                                                          [place] [facility]                                                               Office:  [address] [address]                                                              Telephone: [phone]. Fax:[phone]                                                                        CARDIOLOGY CONSULTATION NOTE                                                                                             Reason for Consultation: Afib  History obtained by: Patient and medical record   obtained: No  Cardiologist: Zahida    Chief complaint:    Patient is a 76y old  Female who presents with a chief complaint of Afib      HPI: 75 y/o F with PMH Transient global amnesia, Aortic insufficiency, HLD, pericarditis (6/23/21) presents to ED with c/o Afib. Pt states feeling weakness, fatigue past few days with associated fevers last two days, saw PCP today, noted to be in Afib, no known history. Denies chest pains, shortness of breath. Hospitalized end of june for pericarditis, small pericardial effusion tx with colchicine and asa 650mg. Tele Afib/flutter RVR, high [id number], variable flutter waves. Given lopressor 5mg IVP and 50mg PO.       REVIEW OF SYMPTOMS:   CONSTITUTIONAL: + fever, no weight loss, + fatigue, no weight gain   ENMT:  No difficulty hearing, tinnitus, vertigo; No sinus or throat pain  NECK: No pain or stiffness  CARDIOVASCULAR: No chest pain, no dyspnea, no syncope, no palpitations, no dizziness, no Orthopnea, no Paroxsymal nocturnal dyspnea  RESPIRATORY: No Dyspnea on exertion, no Shortness of breath, no cough, no wheezing  : No dysuria, no hematuria   GI: No dark color stool, no melena, no diarrhea, no constipation, no abdominal pain   NEURO: No headache, no dizziness, no slurred speech   MUSCULOSKELETAL: No joint pain or swelling; No muscle, back, or extremity pain  PSYCH: No agitation, no anxiety.    ALL OTHER REVIEW OF SYSTEMS ARE NEGATIVE.      PREVIOUS DIAGNOSTIC TESTING  ECHO FINDINGS:  < from: TTE Echo Complete w/ Contrast w/ Doppler (06.23.21 @ 12:32) >  Summary:   1. Left ventricular ejection fraction, by visual estimation, is 55 to 60%.   2. Normal global left ventricular systolic function.   3. Spectral Doppler shows impaired relaxation pattern of left ventricular myocardial filling (Grade I diastolic dysfunction).   4. Mildly enlarged left atrium.   5. Normal right atrial size.   6. Mild mitral annular calcification.   7. Mild mitral valve regurgitation.   8. Mild thickening of the anterior and posterior mitral valve leaflets.   9. Sclerotic aortic valve with normal opening.  10. Partial subaortic membran measuring 0.6 cm is visualized. No evidence of LVOT obstruction or compromised flow.  11. Mild to moderate aortic regurgitation.  12. Small pericardial effusion.  13. Compared to AVEL study dated 7/26/19, no significant changes are seen.    JACQUI So Electronically signed on 6/23/2021 at 1:25:20 PM    < end of copied text >    < from: AVEL Echo Doppler (07.26.19 @ 11:04) >    Summary:   1. Technically good study.   2. Normal global left ventricular systolic function.   3. Left ventricular ejection fraction,by visual estimation, is 55 to   60%.   4. Normal right ventricular size and function.   5. The left atrium is normal in size.   6. Mild mitral valve regurgitation.   7. The aortic valve is trileaflet. There is 0.8 cm subaortic membrane   present without significant hemodynamic compromise to LVOT flow. Mild   central jet aortic valve regurgitation is present.   8. Small patent foramen ovale, with predominantly left to right shunting   across the atrial septum.   9. No left atrial appendage thrombus.  10. There is no evidence of pericardial effusion.  11. There is no identifiable cardiac cause for patient's FUO.    MD Lauri Electronically signed on 7/26/2019 at 12:09:22 PM    < end of copied text >      ALLERGIES: Allergies  sulfa drugs (Hives)  Intolerances      PAST MEDICAL HISTORY  HLD (hyperlipidemia)  Osteoarthritis  FUO (fever of unknown origin)      PAST SURGICAL HISTORY  No significant past surgical history      FAMILY HISTORY:    SOCIAL HISTORY:  Denies smoking/alcohol/drugs    CURRENT MEDICATIONS:  metoprolol tartrate 50 milliGRAM(s) Oral Onc      HOME MEDICATIONS:    omprezle  colchcine     Vital Signs Last 24 Hrs  T(C): 37.1 (06 Jul 2021 13:45), Max: 37.1 (06 Jul 2021 13:45)  T(F): 98.8 (06 Jul 2021 13:45), Max: 98.8 (06 Jul 2021 13:45)  HR: 98 (06 Jul 2021 14:49) (98 - 115)  BP: 139/65 (06 Jul 2021 14:49) (128/72 - 139/65)  RR: 22 (06 Jul 2021 14:49) (20 - 22)  SpO2: 99% (06 Jul 2021 14:49) (97% - 99%)      PHYSICAL EXAM:  Constitutional: Comfortable . No acute distress.   HEENT: Atraumatic and normocephalic , neck is supple . no JVD.   CNS: A&Ox3. No focal deficits. EOMI.  Respiratory: CTAB, unlabored   Cardiovascular: S1S2 RRR. No murmur/rubs or gallop.  Gastrointestinal: Soft non-tender and non distended . +Bowel sounds.   Extremities: No edema.   Psychiatric: Calm . no agitation.  Skin: No skin rash/ulcers visualized to face, hands or feet.      LABS:                        13.2   9.63  )-----------( 387      ( 06 Jul 2021 14:39 )             41.3     07-06    136  |  97<L>  |  19.1  ----------------------------<  91  3.8   |  25.0  |  0.86    Ca    9.2      06 Jul 2021 14:39  Phos  3.1     07-06  Mg     1.9     07-06    TPro  7.3  /  Alb  3.6  /  TBili  0.4  /  DBili  x   /  AST  64<H>  /  ALT  48<H>  /  AlkPhos  491<H>  07-06    CARDIAC MARKERS ( 06 Jul 2021 14:39 )  x     / <0.01 ng/mL / x     / x     / x        ;p-BNP=Serum Pro-Brain Natriuretic Peptide: 1665 pg/mL (07-06 @ 14:39)    PT/INR - ( 06 Jul 2021 14:39 )   PT: 14.4 sec;   INR: 1.25 ratio         PTT - ( 06 Jul 2021 14:39 )  PTT:32.6 sec      INTERPRETATION OF TELEMETRY: Reviewed by me.   ECG: Reviewed by me.     RADIOLOGY & ADDITIONAL STUDIES:    X-ray:  reviewed by me.   CT scan:   MRI:

## 2021-07-06 NOTE — CONSULT NOTE ADULT - ASSESSMENT
full note to follow    77 y/o F with PMH Transient global amnesia, Aortic insufficiency, HLD, pericarditis (6/23/21) presents to ED with c/o Afib. Pt states feeling weakness, fatigue past few days with associated fevers last two days, saw PCP today, noted to be in Afib, no known history. Denies chest pains, shortness of breath. Hospitalized end of june for pericarditis, small pericardial effusion tx with colchicine and asa 650mg. Tele Afib/flutter RVR, high [id number], variable flutter waves. Given lopressor 5mg IVP and 50mg PO.     Newly diagnosed Atrial fibrillation  - EKG 6/28- SR poor R wave progression, neg T v 1 v2  - EKG today, Afib RVR NSST T abnl, PVC  - Lopressor 5mg IV and 50mg pO given in ED  - CHAVAS (age, F) 2 - ?TIA in past 3  - weight based lovenox for now will transition to DOAC prior to DC  - AVEL 2019- small PFO with L to R shunting   - TTE 6/23 EF 50-55%, DDI, mod AR, small pericardial effusion   - cont BB for rate control, if ineffective can try Cardizem IVP  - limited TTE to evulate pericardial effusion/pericarditis  - send ESR, CRP  - will plan for AVEL/DCCV tomorrow if pt doesn't self convert  - cont telemetry monitoring    Percarditis  - blood cultures sent for fever  - cont asa, colchicine, omeprazole         75 y/o F with PMH Transient global amnesia, Aortic insufficiency, HLD, pericarditis (6/23/21) presents to ED with c/o Afib. Pt states feeling weakness, fatigue past few days with associated fevers last two days, saw PCP today, noted to be in Afib, no known history. Denies chest pains, shortness of breath. Hospitalized end of june for pericarditis, small pericardial effusion tx with colchicine and asa 650mg. Tele Afib/flutter RVR, high [id number], variable flutter waves. Given lopressor 5mg IVP and 50mg PO.     Newly diagnosed Atrial fibrillation  - EKG 6/28- SR poor R wave progression, neg T v 1 v2  - EKG today, Afib RVR NSST T abnl, PVC  - Lopressor 5mg IV and 50mg pO given in ED  - [facility] ?[facility] in past 3  - weight based lovenox for now will transition to DOAC prior to DC  - AVEL 2019- small PFO with L to R shunting   - TTE 6/23 EF 50-55%, DDI, mod AR, small pericardial effusion   - cont lopressor 25 mg PO q6 hours  - limited TTE to evulate pericardial effusion/pericarditis  - send ESR, CRP, TSH  - NPO after MN  - will plan for AVEL/DCCV tomorrow if pt doesn't self convert  - cont telemetry monitoring    Percarditis  - blood cultures sent for fever  - cont asa, colchicine, omeprazole  - no steroids at this time         75 y/o F with PMH Transient global amnesia, Aortic insufficiency, HLD, pericarditis (6/23/21) presents to ED with c/o Afib. Pt states feeling weakness, fatigue past few days with associated fevers last two days, saw PCP today, noted to be in Afib, no known history. Denies chest pains, shortness of breath. Hospitalized end of june for pericarditis, small pericardial effusion tx with colchicine and asa 650mg. Tele Afib/flutter RVR, high [id number], variable flutter waves. Given lopressor 5mg IVP and 50mg PO.     Newly diagnosed Atrial fibrillation  - EKG 6/28- SR poor R wave progression, neg T v 1 v2  - EKG today, Afib RVR NSST T abnl, PVC  - Lopressor 5mg IV and 50mg pO given in ED  - CHAVAS (age, F) 2 - ?TIA in past 3  - weight based lovenox for now will transition to DOAC prior to DC  - AVEL 2019- small PFO with L to R shunting   - TTE 6/23 EF 50-55%, DDI, mod AR, small pericardial effusion   - cont lopressor 25 mg PO q6 hours  - limited TTE to evulate pericardial effusion/pericarditis  - send ESR, CRP, TSH  - NPO after MN  - will plan for AVEL/DCCV tomorrow if pt doesn't self convert  - cont telemetry monitoring    Percarditis  - blood cultures sent for fever  - cont asa, colchicine, omeprazole  - no steroids

## 2021-07-06 NOTE — ED PROVIDER NOTE - PHYSICAL EXAMINATION
Const: Awake, alert and oriented. In no acute distress. Well appearing.  HEENT: NC/AT. Moist mucous membranes.  Eyes: No scleral icterus. EOMI.  Neck:. Soft and supple. Full ROM without pain.  Cardiac: Irregularly irregular. +S1/S2. Peripheral pulses 2+ and symmetric. No LE edema.  Resp: Speaking in full sentences. No evidence of respiratory distress. No wheezes, rales or rhonchi.  Abd: Soft, non-tender, non-distended. Normal bowel sounds in all 4 quadrants. No guarding or rebound.  Back: Spine midline and non-tender. No CVAT.  Skin: No rashes, abrasions or lacerations.  Lymph: No cervical lymphadenopathy.  Neuro: Awake, alert & oriented x 3. Moves all extremities symmetrically.

## 2021-07-06 NOTE — ED PROVIDER NOTE - ATTENDING CONTRIBUTION TO CARE
rapid afib no sob , c/o pericarditis   respond to metoprolol    admit to medicine pt with new onset rapid afib   no sob , no chest pain   b blocker   admit to medicine

## 2021-07-06 NOTE — H&P ADULT - CONVERSATION DETAILS
advanced care planning discussed with patient and daughter at bedside. pt does have a DNR at home however after discussing prognosis and goals of care, pt and daughter would like her to stay FULL CODE for now with  and family to act as surrogate should she become unable to make own decisions.

## 2021-07-06 NOTE — H&P ADULT - NSICDXPASTMEDICALHX_GEN_ALL_CORE_FT
PAST MEDICAL HISTORY:  FUO (fever of unknown origin)     H/O pericarditis     HLD (hyperlipidemia)     Osteoarthritis     TIA (transient ischemic attack)

## 2021-07-06 NOTE — H&P ADULT - NEGATIVE NEUROLOGICAL SYMPTOMS
no transient paralysis/no weakness/no syncope/no vertigo/no loss of sensation/no difficulty walking/no headache/no confusion

## 2021-07-06 NOTE — HISTORY OF PRESENT ILLNESS
[FreeTextEntry8] : Patient presents complaining of fevers since the weekend. Patient states temperature has ranged from 100-101. Patient has not taken any OTC medication. Patient had fever of unknown origin 7/2019 with negative ID workup and no recurrence of issue till recently. Patient states she is eating and drinking okay but her appetite is decreased. Patient has weakness. Patient continues with chest pain that is better compared to when she was in the hospital. Patient has noticed no rash/urinary symptoms. Patient states her stomach feels off but she has no localized pain. Pt was just in [facility] ED on 6/23/21 secondary to chest pain with associated fever. Labs/EKG/echo/CT chest was done and cardiology evaluation was gotten. Patient was diagnosed with pericarditis and treated with colchicine/aspirin. Patient has since seen cardiology on 6/28/21 and patient was advised to continue aspirin/colchicine and followup in 2 weeks with CRP/ESR to be done prior to followup appointment.

## 2021-07-06 NOTE — H&P ADULT - RS GEN PE MLT RESP DETAILS PC
respirations non-labored/no rhonchi/no wheezes/diminished breath sounds, L/diminished breath sounds, R

## 2021-07-06 NOTE — H&P ADULT - HISTORY OF PRESENT ILLNESS
Pt is a 77 yo female with a pmh/o rotator cuff repair with fracture of right shoulder, HLD, pericarditis, TIA 9 yrs ago, who presents to ED after being on treatment with ASA & colchicine for two weeks, sent from PMD office, due to fatigue, lightheadedness, and return of fevers over the weekend. Pt states her fevers had stopped on the treatment for pericarditis however had fever of just under 101 last night. Pt today noted to be in Afib in office and arrived with HR to 144's to ED. Pt treated with IV metoprolol and given PO. Pt evaluated by cardiology and being kept NPO in case of AVEL/DCCV in AM. Pt given one dose lovenox today for full AC and treatment for pericarditis continued per cardio recs.

## 2021-07-06 NOTE — PHYSICAL EXAM
[Normal Outer Ear/Nose] : the outer ears and nose were normal in appearance [Normal Oropharynx] : the oropharynx was normal [Normal TMs] : both tympanic membranes were normal [Normal Nasal Mucosa] : the nasal mucosa was normal [Supple] : supple [No Respiratory Distress] : no respiratory distress  [Clear to Auscultation] : lungs were clear to auscultation bilaterally [Normal Affect] : the affect was normal [Normal Mood] : the mood was normal [Normal Sclera/Conjunctiva] : normal sclera/conjunctiva [PERRL] : pupils equal round and reactive to light [EOMI] : extraocular movements intact [No Edema] : there was no peripheral edema [Soft] : abdomen soft [Non Tender] : non-tender [Normal Bowel Sounds] : normal bowel sounds [No Rash] : no rash [No Focal Deficits] : no focal deficits [Ill-Appearing] : ill-appearing [de-identified] : +irreg with increased rate

## 2021-07-06 NOTE — H&P ADULT - ASSESSMENT
Pt is a 75 yo female with pmh/o TIA, HLD, pericarditis currently on treatment, admitted due to :    #New onset afib RVR  -admit to telemetry  -cardiology consult appreciated, recs implemented-> one dose lovenox for full AC today, NPO after midnight  -cont metoprolol bid  -on ASA 650mg TID, continued per recs for pericarditis, monitor closely for s/s bleeding  -for AVEL in AM, pending clinical course may have DCCV in AM  -bed rest, advance when able   -lopressor 25mg TID per recs, pt also with noted elevated bp while in ED  -f/u ESR, CRP, TSH  -Trend troponins    #Pericarditis with new onset effusion  #Elevated BnP  -no s/s respiratory distress  -no chest pain  -no indication for diuresis at this time however monitor effusion, consider repeat CXR  -TTE performed in ED however limited per cardio note  -cont ASA, colchicine, PPI per recs  -holding steroids per recs    #Elevated AlkPhos  #Transaminitis  -chronic  -pt to continue f/u with hepatologist  -pt told no acute issues or liver disease present  -monitor coags    #HLD  -cont statin  -DASH/TLC diet

## 2021-07-07 LAB
ALBUMIN SERPL ELPH-MCNC: 3 G/DL — LOW (ref 3.3–5.2)
ALP SERPL-CCNC: 474 U/L — HIGH (ref 40–120)
ALT FLD-CCNC: 40 U/L — HIGH
ANION GAP SERPL CALC-SCNC: 13 MMOL/L — SIGNIFICANT CHANGE UP (ref 5–17)
APTT BLD: 33.2 SEC — SIGNIFICANT CHANGE UP (ref 27.5–35.5)
AST SERPL-CCNC: 47 U/L — HIGH
BILIRUB SERPL-MCNC: 0.3 MG/DL — LOW (ref 0.4–2)
BUN SERPL-MCNC: 18.4 MG/DL — SIGNIFICANT CHANGE UP (ref 8–20)
CALCIUM SERPL-MCNC: 8.7 MG/DL — SIGNIFICANT CHANGE UP (ref 8.6–10.2)
CHLORIDE SERPL-SCNC: 100 MMOL/L — SIGNIFICANT CHANGE UP (ref 98–107)
CO2 SERPL-SCNC: 23 MMOL/L — SIGNIFICANT CHANGE UP (ref 22–29)
COVID-19 SPIKE DOMAIN AB INTERP: POSITIVE
COVID-19 SPIKE DOMAIN ANTIBODY RESULT: >250 U/ML — HIGH
CREAT SERPL-MCNC: 0.71 MG/DL — SIGNIFICANT CHANGE UP (ref 0.5–1.3)
GLUCOSE SERPL-MCNC: 85 MG/DL — SIGNIFICANT CHANGE UP (ref 70–99)
HCT VFR BLD CALC: 36.5 % — SIGNIFICANT CHANGE UP (ref 34.5–45)
HGB BLD-MCNC: 11.9 G/DL — SIGNIFICANT CHANGE UP (ref 11.5–15.5)
INR BLD: 1.29 RATIO — HIGH (ref 0.88–1.16)
MAGNESIUM SERPL-MCNC: 2.3 MG/DL — SIGNIFICANT CHANGE UP (ref 1.6–2.6)
MCHC RBC-ENTMCNC: 30.3 PG — SIGNIFICANT CHANGE UP (ref 27–34)
MCHC RBC-ENTMCNC: 32.6 GM/DL — SIGNIFICANT CHANGE UP (ref 32–36)
MCV RBC AUTO: 92.9 FL — SIGNIFICANT CHANGE UP (ref 80–100)
PHOSPHATE SERPL-MCNC: 2.9 MG/DL — SIGNIFICANT CHANGE UP (ref 2.4–4.7)
PLATELET # BLD AUTO: 363 K/UL — SIGNIFICANT CHANGE UP (ref 150–400)
POTASSIUM SERPL-MCNC: 4.1 MMOL/L — SIGNIFICANT CHANGE UP (ref 3.5–5.3)
POTASSIUM SERPL-SCNC: 4.1 MMOL/L — SIGNIFICANT CHANGE UP (ref 3.5–5.3)
PROT SERPL-MCNC: 6.6 G/DL — SIGNIFICANT CHANGE UP (ref 6.6–8.7)
PROTHROM AB SERPL-ACNC: 14.8 SEC — HIGH (ref 10.6–13.6)
RBC # BLD: 3.93 M/UL — SIGNIFICANT CHANGE UP (ref 3.8–5.2)
RBC # FLD: 12 % — SIGNIFICANT CHANGE UP (ref 10.3–14.5)
SARS-COV-2 IGG+IGM SERPL QL IA: >250 U/ML — HIGH
SARS-COV-2 IGG+IGM SERPL QL IA: POSITIVE
SODIUM SERPL-SCNC: 136 MMOL/L — SIGNIFICANT CHANGE UP (ref 135–145)
TSH SERPL-MCNC: 3.38 UIU/ML — SIGNIFICANT CHANGE UP (ref 0.27–4.2)
WBC # BLD: 8.28 K/UL — SIGNIFICANT CHANGE UP (ref 3.8–10.5)
WBC # FLD AUTO: 8.28 K/UL — SIGNIFICANT CHANGE UP (ref 3.8–10.5)

## 2021-07-07 PROCEDURE — 99223 1ST HOSP IP/OBS HIGH 75: CPT

## 2021-07-07 PROCEDURE — 99232 SBSQ HOSP IP/OBS MODERATE 35: CPT

## 2021-07-07 PROCEDURE — 99233 SBSQ HOSP IP/OBS HIGH 50: CPT

## 2021-07-07 RX ORDER — METOPROLOL TARTRATE 50 MG
50 TABLET ORAL
Refills: 0 | Status: DISCONTINUED | OUTPATIENT
Start: 2021-07-07 | End: 2021-07-07

## 2021-07-07 RX ORDER — APIXABAN 2.5 MG/1
5 TABLET, FILM COATED ORAL
Refills: 0 | Status: DISCONTINUED | OUTPATIENT
Start: 2021-07-07 | End: 2021-07-07

## 2021-07-07 RX ORDER — METOPROLOL TARTRATE 50 MG
25 TABLET ORAL
Refills: 0 | Status: DISCONTINUED | OUTPATIENT
Start: 2021-07-07 | End: 2021-07-08

## 2021-07-07 RX ORDER — ASPIRIN/CALCIUM CARB/MAGNESIUM 324 MG
650 TABLET ORAL
Refills: 0 | Status: DISCONTINUED | OUTPATIENT
Start: 2021-07-07 | End: 2021-07-09

## 2021-07-07 RX ADMIN — Medication 650 MILLIGRAM(S): at 17:21

## 2021-07-07 RX ADMIN — PANTOPRAZOLE SODIUM 40 MILLIGRAM(S): 20 TABLET, DELAYED RELEASE ORAL at 05:43

## 2021-07-07 RX ADMIN — Medication 0.6 MILLIGRAM(S): at 17:21

## 2021-07-07 RX ADMIN — Medication 650 MILLIGRAM(S): at 22:04

## 2021-07-07 RX ADMIN — Medication 0.6 MILLIGRAM(S): at 05:41

## 2021-07-07 RX ADMIN — Medication 25 MILLIGRAM(S): at 05:41

## 2021-07-07 RX ADMIN — Medication 25 MILLIGRAM(S): at 17:21

## 2021-07-07 RX ADMIN — Medication 650 MILLIGRAM(S): at 05:41

## 2021-07-07 NOTE — PROGRESS NOTE ADULT - SUBJECTIVE AND OBJECTIVE BOX
[place] [facility]                                                               Office: [address] [address]                                                              Telephone: [phone]. Fax:[phone]                                                                             PROGRESS NOTE  Reason for follow up: New onset Afib RVR  Overnight: No new events.   Update:      Subjective:     	  Vitals:  T(C): 36.4 (07-07-21 @ 11:44), Max: 37.5 (07-06-21 @ 19:20)  HR: 94 (07-07-21 @ 11:44) (76 - 115)  BP: 105/70 (07-07-21 @ 11:44) (103/71 - 161/90)  RR: 18 (07-07-21 @ 11:44) (16 - 22)  SpO2: 95% (07-07-21 @ 11:44) (95% - 99%)    I&O's Summary    Weight (kg): 51.9 (07-06 @ 13:45)      PHYSICAL EXAM:  Appearance: Comfortable. No acute distress  HEENT:  Head and neck: Atraumatic. Normocephalic.  Normal oral mucosa, PERRL, Neck is supple. No JVD, No carotid bruit.   Neurologic: A & O x 3, no focal deficits. EOMI.  Lymphatic: No cervical lymphadenopathy  Cardiovascular: Normal S1 S2, No murmur, rubs/gallops. No JVD, No edema  Respiratory: Lungs clear to auscultation  Gastrointestinal:  Soft, Non-tender, + BS  Lower Extremities: No edema  Psychiatry: Patient is calm. No agitation. Mood & affect appropriate  Skin: No rashes/ ecchymoses/cyanosis/ulcers visualized on the face, hands or feet.      CURRENT MEDICATIONS:  metoprolol tartrate 25 milliGRAM(s) Oral three times a day  aspirin  pantoprazole    Tablet  atorvastatin  colchicine  calcium carbonate 1250 mG  + Vitamin D (OsCal 500 + D)      DIAGNOSTIC TESTING:    [ ] Echocardiogram: < from: TTE Echo Limited or F/U (07.06.21 @ 17:08) >  Summary:   1. Left ventricular ejection fraction, by visual estimation,is 55 to 60%.   2. Technically adequate study.   3. Normal global left ventricular systolic function.   4. The mitral in-flow pattern reveals no discernable A-wave, therefore no comment on diastolic function can be made.   5. Small to moderate-sized pericardial effusion.   6. Mild mitral annular calcification.   7. Mild mitral valve regurgitation.   8. Mild thickening of the anterior and posterior mitral valve leaflets.   9. Mild tricuspid regurgitation.  10. Mild to moderate aortic regurgitation.  11. Sclerotic aortic valve with normal opening.    OTHER: 	      LABS:	 	  CARDIAC MARKERS ( 06 Jul 2021 21:01 )  x     / <0.01 ng/mL / x     / x     / x      p-BNP 06 Jul 2021 21:01: x    , CARDIAC MARKERS ( 06 Jul 2021 18:51 )  x     / <0.01 ng/mL / x     / x     / x      p-BNP 06 Jul 2021 18:51: x    , CARDIAC MARKERS ( 06 Jul 2021 14:39 )  x     / <0.01 ng/mL / x     / x     / x      p-BNP 06 Jul 2021 14:39: 1665 pg/mL                          11.9   8.28  )-----------( 363      ( 07 Jul 2021 07:16 )             36.5     07-07    136  |  100  |  18.4  ----------------------------<  85  4.1   |  23.0  |  0.71    Ca    8.7      07 Jul 2021 07:16  Phos  2.9     07-07  Mg     2.3     07-07    TPro  6.6  /  Alb  3.0<L>  /  TBili  0.3<L>  /  DBili  x   /  AST  47<H>  /  ALT  40<H>  /  AlkPhos  474<H>  07-07    proBNP: Serum Pro-Brain Natriuretic Peptide: 1665 pg/mL (07-06 @ 14:39)       TSH: Thyroid Stimulating Hormone, Serum: 3.38 uIU/mL  Thyroid Stimulating Hormone, Serum: 2.75 uIU/mL        TELEMETRY:     	                                                                      [place] [facility]                                                       [facility]                                                               Office: [address] [address]                                                              Telephone: [phone]. Fax:[phone]                                                                             PROGRESS NOTE  Reason for follow up: New onset Afib RVR  Overnight: No new events.   Update:  7/7: Pt denies chest pain, palpitations, SOB. Tele- A-fib HR @ 80-100s, minimal episodes of non-sustained episodes of RVR. TSH-WNL,ESR/CRP-50 (from 40 on 6/23)/ 85 (from 66 on 6/23), Start Lopressor 50mg BID.  AVEL/DCCV cancelled because of concern for unsuccessful conversion of arrythmia in presence of pericarditis. EP consult requested for recommendations. Will discuss with attending plan for AC.         Subjective: No new events    	  Vitals:  T(C): 36.4 (07-07-21 @ 11:44), Max: 37.5 (07-06-21 @ 19:20)  HR: 94 (07-07-21 @ 11:44) (76 - 115)  BP: 105/70 (07-07-21 @ 11:44) (103/71 - 161/90)  RR: 18 (07-07-21 @ 11:44) (16 - 22)  SpO2: 95% (07-07-21 @ 11:44) (95% - 99%)    I&O's Summary    Weight (kg): 51.9 (07-06 @ 13:45)      PHYSICAL EXAM:  Appearance: Comfortable. No acute distress  HEENT:  Head and neck: Atraumatic. Normocephalic.  Normal oral mucosa, PERRL, Neck is supple. No JVD, No carotid bruit.   Neurologic: A & O x 3, no focal deficits. EOMI.  Lymphatic: No cervical lymphadenopathy  Cardiovascular: Normal S1 S2, No murmur, rubs/gallops. No JVD, No edema, tender on palpation   Respiratory: Lungs clear to auscultation  Gastrointestinal:  Soft, Non-tender, + BS  Lower Extremities: No edema  Psychiatry: Patient is calm. No agitation. Mood & affect appropriate  Skin: No rashes/ ecchymoses/cyanosis/ulcers visualized on the face, hands or feet      CURRENT MEDICATIONS:  metoprolol tartrate 25 milliGRAM(s) Oral three times a day  aspirin  pantoprazole    Tablet  atorvastatin  colchicine  calcium carbonate 1250 mG  + Vitamin D (OsCal 500 + D)      DIAGNOSTIC TESTING:    [ ] Echocardiogram: < from: TTE Echo Limited or F/U (07.06.21 @ 17:08) >  Summary:   1. Left ventricular ejection fraction, by visual estimation,is 55 to 60%.   2. Technically adequate study.   3. Normal global left ventricular systolic function.   4. The mitral in-flow pattern reveals no discernable A-wave, therefore no comment on diastolic function can be made.   5. Small to moderate-sized pericardial effusion.   6. Mild mitral annular calcification.   7. Mild mitral valve regurgitation.   8. Mild thickening of the anterior and posterior mitral valve leaflets.   9. Mild tricuspid regurgitation.  10. Mild to moderate aortic regurgitation.  11. Sclerotic aortic valve with normal opening.    OTHER:     Xray:< from: Xray Chest 1 View-PORTABLE IMMEDIATE (Xray Chest 1 View-PORTABLE IMMEDIATE .) (07.06.21 @ 15:24) >  IMPRESSION: There is a new mild left base effusion.    LABS:	 	  CARDIAC MARKERS ( 06 Jul 2021 21:01 )  x     / <0.01 ng/mL / x     / x     / x      p-BNP 06 Jul 2021 21:01: x    , CARDIAC MARKERS ( 06 Jul 2021 18:51 )  x     / <0.01 ng/mL / x     / x     / x      p-BNP 06 Jul 2021 18:51: x    , CARDIAC MARKERS ( 06 Jul 2021 14:39 )  x     / <0.01 ng/mL / x     / x     / x      p-BNP 06 Jul 2021 14:39: 1665 pg/mL                          11.9   8.28  )-----------( 363      ( 07 Jul 2021 07:16 )             36.5     07-07    136  |  100  |  18.4  ----------------------------<  85  4.1   |  23.0  |  0.71    Ca    8.7      07 Jul 2021 07:16  Phos  2.9     07-07  Mg     2.3     07-07    TPro  6.6  /  Alb  3.0<L>  /  TBili  0.3<L>  /  DBili  x   /  AST  47<H>  /  ALT  40<H>  /  AlkPhos  474<H>  07-07    proBNP: Serum Pro-Brain Natriuretic Peptide: 1665 pg/mL (07-06 @ 14:39)       TSH: Thyroid Stimulating Hormone, Serum: 3.38 uIU/mL  Thyroid Stimulating Hormone, Serum: 2.75 uIU/mL        TELEMETRY:  A-fib HR @ 80-100s, minimal episodes of non-sustained episodes of RVR     	                                                                      [place] [facility]                                                       [facility]                                                               Office: [address] [address]                                                              Telephone: [phone]. Fax:[phone]                                                                             PROGRESS NOTE  Reason for follow up: New onset Afib RVR  Overnight: No new events.   Update:  7/7: Pt denies chest pain, palpitations, SOB. Tele- A-fib HR @ 80-100s, minimal episodes of non-sustained episodes of RVR. TSH-WNL,ESR/CRP-50 (from 40 on 6/23)/ 85 (from 66 on 6/23), Start Lopressor 50mg BID.  AVEL/DCCV cancelled because of concern for unsuccessful conversion of arrythmia in presence of pericarditis. EP consult requested for recommendations. Start Eliquis 5mg BID. DC ASA to prevent possible bleeding.         Subjective: No new events    	  Vitals:  T(C): 36.4 (07-07-21 @ 11:44), Max: 37.5 (07-06-21 @ 19:20)  HR: 94 (07-07-21 @ 11:44) (76 - 115)  BP: 105/70 (07-07-21 @ 11:44) (103/71 - 161/90)  RR: 18 (07-07-21 @ 11:44) (16 - 22)  SpO2: 95% (07-07-21 @ 11:44) (95% - 99%)    I&O's Summary    Weight (kg): 51.9 (07-06 @ 13:45)      PHYSICAL EXAM:  Appearance: Comfortable. No acute distress  HEENT:  Head and neck: Atraumatic. Normocephalic.  Normal oral mucosa, PERRL, Neck is supple. No JVD, No carotid bruit.   Neurologic: A & O x 3, no focal deficits. EOMI.  Lymphatic: No cervical lymphadenopathy  Cardiovascular: Normal S1 S2, No murmur, rubs/gallops. No JVD, No edema, tender on palpation   Respiratory: Lungs clear to auscultation  Gastrointestinal:  Soft, Non-tender, + BS  Lower Extremities: No edema  Psychiatry: Patient is calm. No agitation. Mood & affect appropriate  Skin: No rashes/ ecchymoses/cyanosis/ulcers visualized on the face, hands or feet      CURRENT MEDICATIONS:  metoprolol tartrate 25 milliGRAM(s) Oral three times a day  aspirin  pantoprazole    Tablet  atorvastatin  colchicine  calcium carbonate 1250 mG  + Vitamin D (OsCal 500 + D)      DIAGNOSTIC TESTING:    [ ] Echocardiogram: < from: TTE Echo Limited or F/U (07.06.21 @ 17:08) >  Summary:   1. Left ventricular ejection fraction, by visual estimation,is 55 to 60%.   2. Technically adequate study.   3. Normal global left ventricular systolic function.   4. The mitral in-flow pattern reveals no discernable A-wave, therefore no comment on diastolic function can be made.   5. Small to moderate-sized pericardial effusion.   6. Mild mitral annular calcification.   7. Mild mitral valve regurgitation.   8. Mild thickening of the anterior and posterior mitral valve leaflets.   9. Mild tricuspid regurgitation.  10. Mild to moderate aortic regurgitation.  11. Sclerotic aortic valve with normal opening.    OTHER:     Xray:< from: Xray Chest 1 View-PORTABLE IMMEDIATE (Xray Chest 1 View-PORTABLE IMMEDIATE .) (07.06.21 @ 15:24) >  IMPRESSION: There is a new mild left base effusion.    LABS:	 	  CARDIAC MARKERS ( 06 Jul 2021 21:01 )  x     / <0.01 ng/mL / x     / x     / x      p-BNP 06 Jul 2021 21:01: x    , CARDIAC MARKERS ( 06 Jul 2021 18:51 )  x     / <0.01 ng/mL / x     / x     / x      p-BNP 06 Jul 2021 18:51: x    , CARDIAC MARKERS ( 06 Jul 2021 14:39 )  x     / <0.01 ng/mL / x     / x     / x      p-BNP 06 Jul 2021 14:39: 1665 pg/mL                          11.9   8.28  )-----------( 363      ( 07 Jul 2021 07:16 )             36.5     07-07    136  |  100  |  18.4  ----------------------------<  85  4.1   |  23.0  |  0.71    Ca    8.7      07 Jul 2021 07:16  Phos  2.9     07-07  Mg     2.3     07-07    TPro  6.6  /  Alb  3.0<L>  /  TBili  0.3<L>  /  DBili  x   /  AST  47<H>  /  ALT  40<H>  /  AlkPhos  474<H>  07-07    proBNP: Serum Pro-Brain Natriuretic Peptide: 1665 pg/mL (07-06 @ 14:39)       TSH: Thyroid Stimulating Hormone, Serum: 3.38 uIU/mL  Thyroid Stimulating Hormone, Serum: 2.75 uIU/mL        TELEMETRY:  A-fib HR @ 80-100s, minimal episodes of non-sustained episodes of RVR

## 2021-07-07 NOTE — PATIENT PROFILE ADULT - NSASFALLATTEMPTOOB_GEN_A_NUR
Patient is scheduled for consult apt 6. 3.2019 and a hold spot on the schedule for possible aflutter ablation on July 1, 2019
Patient previously had maze procedure by Dr Carmen Jha.  Now pt complains of a flutter, patient has seen Dr Brittnee Villalba in past and wants an appt to see him again to see about ablation
no

## 2021-07-07 NOTE — PROGRESS NOTE ADULT - SUBJECTIVE AND OBJECTIVE BOX
Patient is a 76y old  Female who presents with a chief complaint of New onset Afib RVR (06 Jul 2021 18:02)    Pt seen and exam.chart reviewed. pt is new to me. Pt denies any cp,sob,palpitation . Afib on tele rvr 100-103.      REVIEW OF SYSTEMS: All systems are reviewed and found to be negative except above    MEDICATIONS  (STANDING):  aspirin 650 milliGRAM(s) Oral every 8 hours  atorvastatin 20 milliGRAM(s) Oral <User Schedule>  calcium carbonate 1250 mG  + Vitamin D (OsCal 500 + D) 1 Tablet(s) Oral daily  colchicine 0.6 milliGRAM(s) Oral two times a day  lactobacillus acidophilus 1 Tablet(s) Oral daily  metoprolol tartrate 25 milliGRAM(s) Oral three times a day  pantoprazole    Tablet 40 milliGRAM(s) Oral before breakfast    MEDICATIONS  (PRN):      CAPILLARY BLOOD GLUCOSE        I&O's Summary      PHYSICAL EXAM:  Vital Signs Last 24 Hrs  T(C): 36.6 (07 Jul 2021 08:16), Max: 37.5 (06 Jul 2021 19:20)  T(F): 97.9 (07 Jul 2021 08:16), Max: 99.5 (06 Jul 2021 19:20)  HR: 102 (07 Jul 2021 08:16) (76 - 115)  BP: 103/71 (07 Jul 2021 08:16) (103/71 - 161/90)  BP(mean): --  RR: 18 (07 Jul 2021 08:16) (16 - 22)  SpO2: 95% (07 Jul 2021 08:16) (95% - 99%)    CONSTITUTIONAL: NAD,  EYES: PERRLA; conjunctiva and sclera clear  ENMT: Moist oral mucosa,   RESPIRATORY: Normal respiratory effort; lungs are clear to auscultation bilaterally  CARDIOVASCULAR:IRR normal S1 and S2, no murmur   EXTS: No lower extremity edema; Peripheral pulses are 2+ bilaterally  ABDOMEN: Nontender to palpation, normoactive bowel sounds, no rebound/guarding;   MUSCLOSKELETAL:    no joint swelling or tenderness to palpation  PSYCH: affect appropriate  NEUROLOGY: A+O to person, place, and time;  no gross  deficits;       LABS:                        11.9   8.28  )-----------( 363      ( 07 Jul 2021 07:16 )             36.5     07-07    136  |  100  |  18.4  ----------------------------<  85  4.1   |  23.0  |  0.71    Ca    8.7      07 Jul 2021 07:16  Phos  2.9     07-07  Mg     2.3     07-07    TPro  6.6  /  Alb  3.0<L>  /  TBili  0.3<L>  /  DBili  x   /  AST  47<H>  /  ALT  40<H>  /  AlkPhos  474<H>  07-07    PT/INR - ( 07 Jul 2021 07:16 )   PT: 14.8 sec;   INR: 1.29 ratio         PTT - ( 07 Jul 2021 07:16 )  PTT:33.2 sec  CARDIAC MARKERS ( 06 Jul 2021 21:01 )  x     / <0.01 ng/mL / x     / x     / x      CARDIAC MARKERS ( 06 Jul 2021 18:51 )  x     / <0.01 ng/mL / x     / x     / x      CARDIAC MARKERS ( 06 Jul 2021 14:39 )  x     / <0.01 ng/mL / x     / x     / x                RADIOLOGY & ADDITIONAL TESTS:  Results Reviewed:   Imaging Personally Reviewed:  Electrocardiogram Personally Reviewed:    COORDINATION OF CARE:  Care Discussed with Consultants/Other Providers [Y/N]:  Prior or Outpatient Records Reviewed [Y/N]:

## 2021-07-07 NOTE — CONSULT NOTE ADULT - SUBJECTIVE AND OBJECTIVE BOX
[facility]   Deptartment of Electrophysiology  [address] [place] [address]    (p) [phone]     (f) [phone]                     Consult Note                                                                                                                                          Consult requested by: Dr. Lora   Primary Cardiologist: Dr. Gomez  PCP: Dr. Alcantar   Reason for Consultation: new onset AF   History obtained by: patient and medical record    obtained: n/a     HPI:  76 year old female with history of hyperlipidemia, carotid artherosclerosis and remote history of transient global amnesia who presented to [facility] on 6/23/21 with c/o fever and chest pain for 2 days, found to have acute viral pericarditis. She was started on high dose aspirin and colchicine and was discharged home with close outpatient follow up. She states initially she felt better and fever subsided but over the past two days developed overwhelming fatigue and near syncope with recurrent fever. She was seen by her PMD Dr. Alcantar on 7/6/21 and found to have new onset atrial fibrillation with RVR, referred to [facility] ED for further evaluation. Repeat TTE today revealed small pericardial effusion, increased in size from prior TTE on 6/23. EP consult requested for management of atrial fibrillation.     PAST MEDICAL & SURGICAL HISTORY:  HLD (hyperlipidemia)  Osteoarthritis  FUO (fever of unknown origin)  H/O pericarditis  H/O rotator cuff surgery    REVIEW OF SYSTEMS:  CONSTITUTIONAL: + fever, + recent weight loss, + fatigue   EYES: No eye pain, visual disturbances, or discharge  ENMT:  No difficulty hearing, tinnitus, vertigo; No sinus or throat pain  NECK: No pain or stiffness  RESPIRATORY: No cough, wheezing, chills or hemoptysis; No shortness of breath  CARDIOVASCULAR: No chest pain, palpitations, dizziness, or leg swelling  GASTROINTESTINAL: + abdominal discomfort denies n/v, blood in stool   GENITOURINARY: No dysuria, frequency, hematuria, or incontinence  NEUROLOGICAL: No headaches, memory loss, loss of strength, numbness, or tremors  SKIN: No itching, burning, rashes, or lesions   MUSCULOSKELETAL: No joint pain or swelling; No muscle, back, or extremity pain  PSYCHIATRIC: No depression, anxiety, mood swings, or difficulty sleeping  HEME/LYMPH: No easy bruising, or bleeding gums  ALLERGY AND IMMUNOLOGIC: No hives or eczema    MEDICATIONS  (STANDING):  aspirin 650 milliGRAM(s) Oral <User Schedule>  atorvastatin 20 milliGRAM(s) Oral <User Schedule>  calcium carbonate 1250 mG  + Vitamin D (OsCal 500 + D) 1 Tablet(s) Oral daily  colchicine 0.6 milliGRAM(s) Oral two times a day  lactobacillus acidophilus 1 Tablet(s) Oral daily  metoprolol tartrate 25 milliGRAM(s) Oral two times a day  pantoprazole    Tablet 40 milliGRAM(s) Oral before breakfast    Allergies:  sulfa drugs (Hives)    SOCIAL HISTORY: lives w/spouse, active lifestyle, former smoker, social ETOH     FAMILY HISTORY:  FH: heart disease (Father, Mother)  both had CAD, HTN, mother had MI, father had CHF    Vital Signs Last 24 Hrs  T(C): 37.2 (07 Jul 2021 15:44), Max: 37.5 (06 Jul 2021 19:20)  T(F): 99 (07 Jul 2021 15:44), Max: 99.5 (06 Jul 2021 19:20)  HR: 108 (07 Jul 2021 15:44) (76 - 108)  BP: 128/84 (07 Jul 2021 15:44) (103/71 - 128/84)  RR: 18 (07 Jul 2021 15:44) (18 - 20)  SpO2: 96% (07 Jul 2021 15:44) (95% - 98%)    Physical Exam:  Constitutional: AAOx3, NAD  Neck: supple, No JVD  Cardiovascular: +S1S2 RRR, no murmurs, rubs, gallops   Pulmonary: CTA b/l, unlabored, no wheezes, rales, rhonchi  Abdomen: +BS, soft NTND  Extremities: no edema b/l, +distal pulses b/l  Neuro: non focal, speech clear, LAWSON x 4    LABS:                        11.9   8.28  )-----------( 363      ( 07 Jul 2021 07:16 )             36.5   07-07    136  |  100  |  18.4  ----------------------------<  85  4.1   |  23.0  |  0.71    Ca    8.7      07 Jul 2021 07:16  Phos  2.9     07-07  Mg     2.3     07-07    TPro  6.6  /  Alb  3.0<L>  /  TBili  0.3<L>  /  DBili  x   /  AST  47<H>  /  ALT  40<H>  /  AlkPhos  474<H>  07-07  LIVER FUNCTIONS - ( 07 Jul 2021 07:16 )  Alb: 3.0 g/dL / Pro: 6.6 g/dL / ALK PHOS: 474 U/L / ALT: 40 U/L / AST: 47 U/L / GGT: x           PT/INR - ( 07 Jul 2021 07:16 )   PT: 14.8 sec;   INR: 1.29 ratio       PTT - ( 07 Jul 2021 07:16 )  PTT:33.2 secCARDIAC MARKERS ( 06 Jul 2021 21:01 )  x     / <0.01 ng/mL / x     / x     / x      CARDIAC MARKERS ( 06 Jul 2021 18:51 )  x     / <0.01 ng/mL / x     / x     / x      CARDIAC MARKERS ( 06 Jul 2021 14:39 )  x     / <0.01 ng/mL / x     / x     / x        TTE: 7/6/21: Summary:   1. Left ventricular ejection fraction, by visual estimation,is 55 to 60%.   2. Technically adequate study.   3. Normal global left ventricular systolic function.   4. The mitral in-flow pattern reveals no discernable A-wave, therefore no comment on diastolic function can be made.   5. Small to moderate-sized pericardial effusion.   6. Mild mitral annular calcification.   7. Mild mitral valve regurgitation.   8. Mild thickening of the anterior and posterior mitral valve leaflets.   9. Mild tricuspid regurgitation.  10. Mild to moderate aortic regurgitation.  11. Sclerotic aortic valve with normal opening.  Huseyin Feliciano MD Electronically signed on 7/7/2021 at 8:22:20 AM    TTE:6/23/21: Summary:   1. Left ventricular ejection fraction, by visual estimation, is 55 to 60%.   2. Normal global left ventricular systolic function.   3. Spectral Doppler shows impaired relaxation pattern of left ventricular myocardial filling (Grade I diastolic dysfunction).   4. Mildly enlarged left atrium.   5. Normal right atrial size.   6. Mild mitral annular calcification.   7. Mild mitral valve regurgitation.   8. Mild thickening of the anterior and posterior mitral valve leaflets.   9. Sclerotic aortic valve with normal opening.  10. Partial subaortic membran measuring 0.6 cm is visualized. No evidence of LVOT obstruction or compromised flow.  11. Mild to moderate aortic regurgitation.  12. Small pericardial effusion.  13. Compared to AVEL study dated 7/26/19, no significant changes are seen.  Shena Souza M.D. Electronically signed on 6/23/2021 at 1:25:20 PM    TELE: atrial fibrillation, ventricular rates 90s-low 100's, occasional PVCs   EKG 7/6/21: AF w/ bpm     Assessment/Recommendations:   76 year old female with history of hyperlipidemia, carotid atherosclerosis and remote history of transient global amnesia who presented to [facility] on 6/23/21 with c/o fever and chest pain for 2 days, found to have acute viral pericarditis. She was started on high dose aspirin and colchicine and was discharged home with close outpatient follow up. She states initially she felt better and fever subsided but over the past two days developed overwhelming fatigue and near syncope with recurrent fever. She was seen by her PMD Dr. Alcantar on 7/6/21 and found to have new onset atrial fibrillation with RVR, referred to [facility] ED for further evaluation. Repeat TTE today revealed small pericardial effusion, increased in size from prior TTE on 6/23. EP consult requested for management of atrial fibrillation.     # new onset atrial fibrillation in the setting of pericarditis   - CHADSVASc = 3 (age, gender)   - rate control for now as pt has a risk of worsening pericardial effusion w/use of oral anticoagulation   - lopressor 25mg BID, will monitor rates on telemetry and titrate up slowly as needed     # viral pericarditis w/pericardial effusion   - CRP increased to 85 (up from 66 on 6/23)   - repeat TTE 7/8 to reassess effusion, especially in light of patient receiving one dose of lovenox 7/6 PM   - increase aspirin 650mg to Q 6 hrs   - continue colchicine 0.6mg BID, as tolerated. May decrease dose to 0.3mg BID if GI upset/diarrhea  - continue Protonix 40mg daily for PPI     Seen and discussed w/Dr. Garcia.                [facility] [place]   Deptartment of Electrophysiology  [address] [place] [address]    (p) [phone]     (f) [phone]                     Consult Note                                                                                                                                          Consult requested by: Dr. Lora   Primary Cardiologist: Dr. Gomez  PCP: Dr. Alcantar   Reason for Consultation: new onset AF   History obtained by: patient and medical record    obtained: n/a     HPI:  76 year old female with history of hyperlipidemia, carotid artherosclerosis and remote history of transient global amnesia who presented to [facility] on 6/23/21 with c/o fever and chest pain for 2 days, found to have acute viral pericarditis. She was started on high dose aspirin and colchicine and was discharged home with close outpatient follow up. She states initially she felt better and fever subsided but over the past two days developed overwhelming fatigue and near syncope with recurrent fever. She was seen by her PMD Dr. Alcantar on 7/6/21 and found to have new onset atrial fibrillation with RVR, referred to [facility] ED for further evaluation. Repeat TTE today revealed small pericardial effusion, increased in size from prior TTE on 6/23. EP consult requested for management of atrial fibrillation.     PAST MEDICAL & SURGICAL HISTORY:  HLD (hyperlipidemia)  Osteoarthritis  FUO (fever of unknown origin)  H/O pericarditis   6/2021  H/O rotator cuff surgery    REVIEW OF SYSTEMS:  CONSTITUTIONAL: + fever, + recent weight loss, + fatigue   EYES: No eye pain, visual disturbances, or discharge  ENMT:  No difficulty hearing, tinnitus, vertigo; No sinus or throat pain  NECK: No pain or stiffness  RESPIRATORY: No cough, wheezing, chills or hemoptysis; No shortness of breath  CARDIOVASCULAR: No chest pain, palpitations, dizziness, or leg swelling  GASTROINTESTINAL: + abdominal discomfort denies n/v, blood in stool   GENITOURINARY: No dysuria, frequency, hematuria, or incontinence  NEUROLOGICAL: No headaches, memory loss, loss of strength, numbness, or tremors  SKIN: No itching, burning, rashes, or lesions   MUSCULOSKELETAL: No joint pain or swelling; No muscle, back, or extremity pain  PSYCHIATRIC: No depression, anxiety, mood swings, or difficulty sleeping  HEME/LYMPH: No easy bruising, or bleeding gums  ALLERGY AND IMMUNOLOGIC: No hives or eczema    MEDICATIONS  (STANDING):  aspirin 650 milliGRAM(s) Oral <User Schedule>  atorvastatin 20 milliGRAM(s) Oral <User Schedule>  calcium carbonate 1250 mG  + Vitamin D (OsCal 500 + D) 1 Tablet(s) Oral daily  colchicine 0.6 milliGRAM(s) Oral two times a day  lactobacillus acidophilus 1 Tablet(s) Oral daily  metoprolol tartrate 25 milliGRAM(s) Oral two times a day  pantoprazole    Tablet 40 milliGRAM(s) Oral before breakfast    Allergies:  sulfa drugs (Hives)    SOCIAL HISTORY: lives w/spouse, active lifestyle, former smoker, social ETOH     FAMILY HISTORY:  FH: heart disease (Father, Mother)  both had CAD, HTN, mother had MI, father had CHF    Vital Signs Last 24 Hrs  T(C): 37.2 (07 Jul 2021 15:44), Max: 37.5 (06 Jul 2021 19:20)  T(F): 99 (07 Jul 2021 15:44), Max: 99.5 (06 Jul 2021 19:20)  HR: 108 (07 Jul 2021 15:44) (76 - 108)  BP: 128/84 (07 Jul 2021 15:44) (103/71 - 128/84)  RR: 18 (07 Jul 2021 15:44) (18 - 20)  SpO2: 96% (07 Jul 2021 15:44) (95% - 98%)    Physical Exam:  Constitutional: AAOx3, NAD  Neck: supple, No JVD  Cardiovascular: +S1S2 RRR, no murmurs, rubs, gallops   Pulmonary: CTA b/l, unlabored, no wheezes, rales, rhonchi  Abdomen: +BS, soft NTND  Extremities: no edema b/l, +distal pulses b/l  Neuro: non focal, speech clear, LAWSON x 4    LABS:                        11.9   8.28  )-----------( 363      ( 07 Jul 2021 07:16 )             36.5   07-07    136  |  100  |  18.4  ----------------------------<  85  4.1   |  23.0  |  0.71    Ca    8.7      07 Jul 2021 07:16  Phos  2.9     07-07  Mg     2.3     07-07    TPro  6.6  /  Alb  3.0<L>  /  TBili  0.3<L>  /  DBili  x   /  AST  47<H>  /  ALT  40<H>  /  AlkPhos  474<H>  07-07  LIVER FUNCTIONS - ( 07 Jul 2021 07:16 )  Alb: 3.0 g/dL / Pro: 6.6 g/dL / ALK PHOS: 474 U/L / ALT: 40 U/L / AST: 47 U/L / GGT: x           PT/INR - ( 07 Jul 2021 07:16 )   PT: 14.8 sec;   INR: 1.29 ratio       PTT - ( 07 Jul 2021 07:16 )  PTT:33.2 secCARDIAC MARKERS ( 06 Jul 2021 21:01 )  x     / <0.01 ng/mL / x     / x     / x      CARDIAC MARKERS ( 06 Jul 2021 18:51 )  x     / <0.01 ng/mL / x     / x     / x      CARDIAC MARKERS ( 06 Jul 2021 14:39 )  x     / <0.01 ng/mL / x     / x     / x        TTE: 7/6/21: Summary:   1. Left ventricular ejection fraction, by visual estimation,is 55 to 60%.   2. Technically adequate study.   3. Normal global left ventricular systolic function.   4. The mitral in-flow pattern reveals no discernable A-wave, therefore no comment on diastolic function can be made.   5. Small to moderate-sized pericardial effusion.   6. Mild mitral annular calcification.   7. Mild mitral valve regurgitation.   8. Mild thickening of the anterior and posterior mitral valve leaflets.   9. Mild tricuspid regurgitation.  10. Mild to moderate aortic regurgitation.  11. Sclerotic aortic valve with normal opening.  Huseyin Feliciano MD Electronically signed on 7/7/2021 at 8:22:20 AM    TTE:6/23/21: Summary:   1. Left ventricular ejection fraction, by visual estimation, is 55 to 60%.   2. Normal global left ventricular systolic function.   3. Spectral Doppler shows impaired relaxation pattern of left ventricular myocardial filling (Grade I diastolic dysfunction).   4. Mildly enlarged left atrium.   5. Normal right atrial size.   6. Mild mitral annular calcification.   7. Mild mitral valve regurgitation.   8. Mild thickening of the anterior and posterior mitral valve leaflets.   9. Sclerotic aortic valve with normal opening.  10. Partial subaortic membran measuring 0.6 cm is visualized. No evidence of LVOT obstruction or compromised flow.  11. Mild to moderate aortic regurgitation.  12. Small pericardial effusion.  13. Compared to AVEL study dated 7/26/19, no significant changes are seen.  Shena Souza M.D. Electronically signed on 6/23/2021 at 1:25:20 PM    TELE: atrial fibrillation, ventricular rates 90s-low 100's, occasional PVCs   EKG 7/6/21: AF w/ bpm     Assessment/Recommendations:   76 year old female with history of hyperlipidemia, carotid atherosclerosis and remote history of transient global amnesia who presented to [facility] on 6/23/21 with c/o fever and chest pain for 2 days, found to have acute viral pericarditis. She was started on high dose aspirin and colchicine and was discharged home with close outpatient follow up. She states initially she felt better and fever subsided but over the past two days developed overwhelming fatigue and near syncope with recurrent fever. She was seen by her PMD Dr. Alcantar on 7/6/21 and found to have new onset atrial fibrillation with RVR, referred to [facility] ED for further evaluation. Repeat TTE today revealed small pericardial effusion, increased in size from prior TTE on 6/23. EP consult requested for management of atrial fibrillation.     # new onset atrial fibrillation in the setting of pericarditis   - CHADSVASc = 3 (age, gender)   - rate control for now as pt has a risk of worsening pericardial effusion w/use of oral anticoagulation   - lopressor 25mg BID, will monitor rates on telemetry and titrate up slowly as needed     # viral pericarditis w/pericardial effusion   - CRP increased to 85 (up from 66 on 6/23)   - repeat TTE 7/8 to reassess effusion, especially in light of patient receiving one dose of lovenox 7/6 PM   - increase aspirin 650mg to Q 6 hrs   - continue colchicine 0.6mg BID, as tolerated. May decrease dose to 0.3mg BID if GI upset/diarrhea  - continue Protonix 40mg daily for PPI     Seen and discussed w/Dr. Garcia.

## 2021-07-07 NOTE — PROGRESS NOTE ADULT - ASSESSMENT
Pt is a 77 yo female with pmh/o TIA, HLD, pericarditis currently on treatment, admitted due to :    #New onset afib RVR  - telemetry  -cardiology consult appreciated, recs implemented-> one dose lovenox for full AC ed, further defer to cardiology.  -cont bb bid  -on ASA 650mg TID, continued per recs for pericarditis, monitor closely for s/s bleeding  -plan to  AVEL /Cardioversion today  -mild eveleavted  ESR, CRP, nl TSH  -    #Pericarditis with new onset effusion  #Elevated BnP  -no s/s respiratory distress  -no chest pain  -no indication for diuresis at this time however monitor effusion, consider repeat CXR  -TTE performed in ED however limited per cardio note  -cont ASA, colchicine, PPI per recs  -holding steroids per recs    #Elevated AlkPhos  #Transaminitis  -chronic  -pt to continue f/u with hepatologist  -pt told no acute issues or liver disease present  -monitor coags    #HLD  -cont statin  -DASH/TLC diet     care of plan dw pt in detailed.  scd's for now

## 2021-07-07 NOTE — PROGRESS NOTE ADULT - ASSESSMENT
77 y/o F with PMH Transient global amnesia, Aortic insufficiency, HLD, pericarditis (6/23/21) presents to ED with c/o Afib. Pt states feeling weakness, fatigue past few days with associated fevers last two days, saw PCP today, noted to be in Afib, no known history. Denies chest pains, shortness of breath. Hospitalized end of june for pericarditis, small pericardial effusion tx with colchicine and asa 650mg. Tele Afib/flutter RVR, high [id number], variable flutter waves. Given lopressor 5mg IVP and 50mg PO.     7/7: Pt denies chest pain, palpitations, SOB. Tele- A-fib HR @ 80-100s, minimal episodes of non-sustained episodes of RVR. TSH-WNL,ESR/CRP-50 (from 40 on 6/23)/ 85 (from 66 on 6/23), Start Lopressor 50mg BID.  AVEL/DCCV cancelled because of concern for unsuccessful conversion of arrythmia in presence of pericarditis. EP consult requested for recommendations. Will discuss with attending plan for AC.           Newly diagnosed Atrial fibrillation  -Tele- A-fib HR @ 80-100s, minimal episodes of non-sustained episodes of RVR   -CHADSVASC score 2 (age +1, gender-Female +1)   -Hx ?TIA in past 3  -AVEL 2019- small PFO with L to R shunting   -TTE 6/23 EF 50-55%, DDI, mod AR, small pericardial effusion   -TTE 7/6- EF 55-60%, normal global LV sys. fx., small to moderate sized pericardial effusion, mild mitral annular calcification, mild mitral valve regurgitation, mild to moderate AR   -Recent diagnosis of pericarditis    -TSH-WNL  -ESR/CRP-50 (from 40 on 6/23)/ 85 (from 66 on 6/23)   -Start Lopressor 50mg BID   -AVEL/DCCV cancelled because of concern for unsuccessful conversion of arrythmia in presence of pericarditis   -EP consult requested for recommendations  -Will discuss with attending plan for AC      Percarditis  - Blood cultures sent for fever  - Cont asa, colchicine, omeprazole           77 y/o F with PMH Transient global amnesia, Aortic insufficiency, HLD, pericarditis (6/23/21) presents to ED with c/o Afib. Pt states feeling weakness, fatigue past few days with associated fevers last two days, saw PCP today, noted to be in Afib, no known history. Denies chest pains, shortness of breath. Hospitalized end of june for pericarditis, small pericardial effusion tx with colchicine and asa 650mg. Tele Afib/flutter RVR, high [id number], variable flutter waves. Given lopressor 5mg IVP and 50mg PO.     7/7: Pt denies chest pain, palpitations, SOB. Tele- A-fib HR @ 80-100s, minimal episodes of non-sustained episodes of RVR. TSH-WNL,ESR/CRP-50 (from 40 on 6/23)/ 85 (from 66 on 6/23), Start Lopressor 50mg BID.  AVEL/DCCV cancelled because of concern for unsuccessful conversion of arrythmia in presence of pericarditis. EP consult requested for recommendations. Start Eliquis 5mg BID. DC ASA to prevent possible bleeding.           Newly diagnosed Atrial fibrillation  -Tele- A-fib HR @ 80-100s, minimal episodes of non-sustained episodes of RVR   -CHADSVASC score 2 (age +1, gender-Female +1)   -Hx ?TIA in past 3  -AVEL 2019- small PFO with L to R shunting   -TTE 6/23 EF 50-55%, DDI, mod AR, small pericardial effusion   -TTE 7/6- EF 55-60%, normal global LV sys. fx., small to moderate sized pericardial effusion, mild mitral annular calcification, mild mitral valve regurgitation, mild to moderate AR   -Recent diagnosis of pericarditis    -TSH-WNL  -ESR/CRP-50 (from 40 on 6/23)/ 85 (from 66 on 6/23)   -Start Lopressor 50mg BID   -AVEL/DCCV cancelled because of concern for unsuccessful conversion of arrythmia in presence of pericarditis   -EP consult requested for recommendations  -Start Eliquis 5mg BID  -DC ASA to prevent possible bleeding      Percarditis  - Blood cultures sent for fever  - Cont colchicine, omeprazole  -DC ASA to prevent possible bleeding           77 y/o F with PMH Transient global amnesia, Aortic insufficiency, HLD, pericarditis (6/23/21) presents to ED with c/o Afib. Pt states feeling weakness, fatigue past few days with associated fevers last two days, saw PCP today, noted to be in Afib, no known history. Denies chest pains, shortness of breath. Hospitalized end of june for pericarditis, small pericardial effusion tx with colchicine and asa 650mg. Tele Afib/flutter RVR, high [id number], variable flutter waves. Given lopressor 5mg IVP and 50mg PO.     7/7: Pt denies chest pain, palpitations, SOB. Tele- A-fib HR @ 80-100s, minimal episodes of non-sustained episodes of RVR. TSH-WNL,ESR/CRP-50 (from 40 on 6/23)/ 85 (from 66 on 6/23), Start Lopressor 50mg BID.  AVEL/DCCV cancelled because of concern for unsuccessful conversion of arrythmia in presence of pericarditis. EP consult requested for recommendations. Start Eliquis 5mg BID. DC ASA to prevent possible bleeding.           Newly diagnosed Atrial fibrillation  -Tele- A-fib HR @ 80-100s, minimal episodes of non-sustained episodes of RVR   -CHADSVASC score 2 (age +1, gender-Female +1)   -Hx ?TIA in past 3  -AVEL 2019- small PFO with L to R shunting   -TTE 6/23 EF 50-55%, DDI, mod AR, small pericardial effusion   -TTE 7/6- EF 55-60%, normal global LV sys. fx., small to moderate sized pericardial effusion, mild mitral annular calcification, mild mitral valve regurgitation, mild to moderate AR   -Recent diagnosis of pericarditis    -TSH-WNL  -ESR/CRP-50 (from 40 on 6/23)/ 85 (from 66 on 6/23)   -Start Lopressor 50mg BID   -AVEL/DCCV cancelled because of concern for unsuccessful conversion of arrythmia in presence of pericarditis   -EP consult requested for recommendations      Percarditis  - Blood cultures sent for fever  - Cont colchicine, omeprazole  -Continue ASA

## 2021-07-07 NOTE — CONSULT NOTE ADULT - ATTENDING COMMENTS
seen and examined, agree with above   Pt reported recurrent fever and some CP as well as rising ESR/CRP despite treatment for recent pericarditis. She also reported presyncope few days ago at home. Her TTE showed small, though enlarging pericardial effusion 9with dilated and non-collapsing IVC), likely due to pericarditis. She now has asymptomatic AFIB with RVR as well.    Given one dose of full dose Levonox and started on metoprolol (BB naive) WHICH was increased to 50 mg bid now.   Discussed rate and rhythm control as well as stroke prevention. In setting of acute and active pericarditis it will be hard to have sustainable rhythm control for the coming few weeks. Rhythm control will aslo require full dose AC (either PO or IV) which maybe risky given the active pericarditis and enlarging pericardial effusion. After discussing all the options, I recommend to go with the above plan    Keep her admitted on tele and repeat limited TTE tomorrow afternoon to r/a effusion  Will plan for rate control and follow up with me as an outpatient in a couple of weeks with [facility]  Once pericarditis is in remission and a repeat TTE shows small/stable effusion can plan AVEL- DCCV as an outpatient. Based on her status at that time, may even consider admission for IV heparin challenge to confirm her effusion does not worsen before DCCV    Above d/w pt, primary team and cardiology  will follow up
Patient seen and examined by me.  I have discussed my recommendation with the PA which are outlined above.  Will follow.

## 2021-07-08 ENCOUNTER — APPOINTMENT (OUTPATIENT)
Dept: DERMATOLOGY | Facility: CLINIC | Age: 76
End: 2021-07-08

## 2021-07-08 LAB
ANION GAP SERPL CALC-SCNC: 14 MMOL/L — SIGNIFICANT CHANGE UP (ref 5–17)
BUN SERPL-MCNC: 18.1 MG/DL — SIGNIFICANT CHANGE UP (ref 8–20)
CALCIUM SERPL-MCNC: 8.8 MG/DL — SIGNIFICANT CHANGE UP (ref 8.6–10.2)
CHLORIDE SERPL-SCNC: 102 MMOL/L — SIGNIFICANT CHANGE UP (ref 98–107)
CO2 SERPL-SCNC: 22 MMOL/L — SIGNIFICANT CHANGE UP (ref 22–29)
CREAT SERPL-MCNC: 0.74 MG/DL — SIGNIFICANT CHANGE UP (ref 0.5–1.3)
GLUCOSE SERPL-MCNC: 106 MG/DL — HIGH (ref 70–99)
MAGNESIUM SERPL-MCNC: 2.1 MG/DL — SIGNIFICANT CHANGE UP (ref 1.6–2.6)
POTASSIUM SERPL-MCNC: 4.1 MMOL/L — SIGNIFICANT CHANGE UP (ref 3.5–5.3)
POTASSIUM SERPL-SCNC: 4.1 MMOL/L — SIGNIFICANT CHANGE UP (ref 3.5–5.3)
SODIUM SERPL-SCNC: 138 MMOL/L — SIGNIFICANT CHANGE UP (ref 135–145)

## 2021-07-08 PROCEDURE — 99233 SBSQ HOSP IP/OBS HIGH 50: CPT

## 2021-07-08 RX ORDER — METOPROLOL TARTRATE 50 MG
25 TABLET ORAL EVERY 8 HOURS
Refills: 0 | Status: DISCONTINUED | OUTPATIENT
Start: 2021-07-08 | End: 2021-07-09

## 2021-07-08 RX ORDER — METOPROLOL TARTRATE 50 MG
5 TABLET ORAL EVERY 6 HOURS
Refills: 0 | Status: DISCONTINUED | OUTPATIENT
Start: 2021-07-08 | End: 2021-07-09

## 2021-07-08 RX ORDER — LANOLIN ALCOHOL/MO/W.PET/CERES
3 CREAM (GRAM) TOPICAL ONCE
Refills: 0 | Status: COMPLETED | OUTPATIENT
Start: 2021-07-08 | End: 2021-07-08

## 2021-07-08 RX ADMIN — Medication 650 MILLIGRAM(S): at 05:24

## 2021-07-08 RX ADMIN — Medication 5 MILLIGRAM(S): at 09:55

## 2021-07-08 RX ADMIN — Medication 3 MILLIGRAM(S): at 01:38

## 2021-07-08 RX ADMIN — Medication 0.6 MILLIGRAM(S): at 05:24

## 2021-07-08 RX ADMIN — Medication 3 MILLIGRAM(S): at 22:55

## 2021-07-08 RX ADMIN — Medication 0.6 MILLIGRAM(S): at 17:46

## 2021-07-08 RX ADMIN — Medication 1 TABLET(S): at 11:27

## 2021-07-08 RX ADMIN — Medication 25 MILLIGRAM(S): at 05:24

## 2021-07-08 RX ADMIN — Medication 650 MILLIGRAM(S): at 22:54

## 2021-07-08 RX ADMIN — Medication 650 MILLIGRAM(S): at 17:46

## 2021-07-08 RX ADMIN — Medication 650 MILLIGRAM(S): at 11:28

## 2021-07-08 RX ADMIN — Medication 1 TABLET(S): at 11:28

## 2021-07-08 RX ADMIN — PANTOPRAZOLE SODIUM 40 MILLIGRAM(S): 20 TABLET, DELAYED RELEASE ORAL at 05:24

## 2021-07-08 RX ADMIN — Medication 25 MILLIGRAM(S): at 20:17

## 2021-07-08 RX ADMIN — Medication 25 MILLIGRAM(S): at 11:28

## 2021-07-08 NOTE — PROGRESS NOTE ADULT - ASSESSMENT
Pt is a 75 yo female with pmh/o TIA, HLD, pericarditis currently on treatment, admitted due to :    #New onset afib RVR  - telemetry  -cardiology consult appreciated, recs implemented-> one dose lovenox for full AC ed, no AC per EP  -cont bb bid  -on ASA 650mg Q6HR , continued per recs for pericarditis, monitor closely for s/s bleeding  -AVEL/DCCV cancelled because of concern for unsuccessful conversion of arrythmia in presence of pericarditis.  - rate control for now as pt has a risk of worsening pericardial effusion w/use of oral anticoagulation   - lopressor 25mg BID, and titrate up slowly as needed     # viral pericarditis w/pericardial effusion   - CRP increased to 85 (up from 66 on 6/23)   - aS PER EP, repeat TTE 7/8 to reassess effusion, especially in light of patient receiving one dose of lovenox 7/6 PM   - increase aspirin 650mg to Q 6 hrs   - continue colchicine 0.6mg BID, as tolerated. May decrease dose to 0.3mg BID if GI upset/diarrhea  - continue Protonix 40mg daily for PPI       #Elevated AlkPhos  #Transaminitis  -chronic  -pt to continue f/u with hepatologist  -pt told no acute issues or liver disease present  -monitor coags    #HLD  -cont statin  -DASH/TLC diet     care of plan dw pt in detailed.  scd's for now

## 2021-07-08 NOTE — PROGRESS NOTE ADULT - ASSESSMENT
75 y/o F with PMH Transient global amnesia, Aortic insufficiency, HLD, pericarditis (6/23/21) presents to ED with c/o new onset Afib. Pt states feeling weakness, fatigue past few days with associated fevers last two days, saw PCP today, noted to be in Afib, no known history. Denies chest pains, shortness of breath. Hospitalized end of june for pericarditis, small pericardial effusion tx with colchicine and asa 650mg. Tele Afib/flutter RVR, high [id number], variable flutter waves. Given lopressor 5mg IVP and 50mg PO.     7/7: Pt denies chest pain, palpitations, SOB. Tele- A-fib HR @ 80-100s, minimal episodes of non-sustained episodes of RVR. TSH-WNL,ESR/CRP-50 (from 40 on 6/23)/ 85 (from 66 on 6/23), Start Lopressor 50mg BID.  AVEL/DCCV cancelled because of concern for unsuccessful conversion of arrythmia in presence of pericarditis. EP consult requested for recommendations. Start Eliquis 5mg BID. DC ASA to prevent possible bleeding.     7/8: Pt. reports feeling generally well with some weakness. Denies chest pain, palpitations, sob, dizziness/LH. Tele: Afib with RVR overnight HR upto 150s, PVCs. Current resting . Will increase BB to TID. Will remain off AC due to concern for worsening pericardial effusion. Will continue ASA only for now and follow-up outpatient to discuss eventual AVEL cardioversion per EP.     Pertinent Cardiac Studies:  -AVEL 2019- small PFO with L to R shunting   -TTE 6/23 EF 50-55%, DDI, mod AR, small pericardial effusion   -TTE 7/6- EF 55-60%, normal global LV sys. fx., small to moderate sized pericardial effusion, mild mitral annular calcification, mild mitral valve regurgitation, mild to moderate AR       Newly diagnosed Atrial fibrillation  -Tele- A-fib HR @ 112, minimal episodes of non-sustained RVR -150 overnight  -Increase Lopressor to 25mg TID  -EP following. AVEL/DCCV cancelled because of concern for unsuccessful conversion of arrythmia in presence of pericarditis. Will plan for outpatient follow-up.  -[id number] (age, gender). Continue ASA for now due to concern for worsening pericardial effusion with full AC.  -TSH-WNL  -ESR/CRP-50 (from 40 on 6/23)/ 85 (from 66 on 6/23)     Percarditis  - Blood cultures sent for fever  - Cont colchicine, omeprazole  - Continue ASA           77 y/o F with PMH Transient global amnesia, Aortic insufficiency, HLD, pericarditis (6/23/21) presents to ED with c/o new onset Afib. Pt states feeling weakness, fatigue past few days with associated fevers last two days, saw PCP today, noted to be in Afib, no known history. Denies chest pains, shortness of breath. Hospitalized end of june for pericarditis, small pericardial effusion tx with colchicine and asa 650mg. Tele Afib/flutter RVR, high [id number], variable flutter waves. Given lopressor 5mg IVP and 50mg PO.     7/7: Pt denies chest pain, palpitations, SOB. Tele- A-fib HR @ 80-100s, minimal episodes of non-sustained episodes of RVR. TSH-WNL,ESR/CRP-50 (from 40 on 6/23)/ 85 (from 66 on 6/23), Start Lopressor 50mg BID.  AVEL/DCCV cancelled because of concern for unsuccessful conversion of arrythmia in presence of pericarditis. EP consult requested for recommendations. Start Eliquis 5mg BID. DC ASA to prevent possible bleeding.     7/8: Pt. reports feeling generally well with some weakness. Denies chest pain, palpitations, sob, dizziness/LH. Tele: Afib with RVR overnight HR upto 150s, PVCs. Current resting . Will increase BB to TID. Will remain off AC due to concern for worsening pericardial effusion. Will continue ASA only for now and follow-up outpatient to discuss eventual AVEL cardioversion per EP.     Pertinent Cardiac Studies:  -AVEL 2019- small PFO with L to R shunting   -TTE 6/23 EF 50-55%, DDI, mod AR, small pericardial effusion   -TTE 7/6- EF 55-60%, normal global LV sys. fx., small to moderate sized pericardial effusion, mild mitral annular calcification, mild mitral valve regurgitation, mild to moderate AR       Newly diagnosed Atrial fibrillation in setting of Pericarditis  -Tele- A-fib HR @ 112, minimal episodes of non-sustained RVR -150 overnight  -Increase Lopressor to 25mg TID for HR control  -EP following. AVEL/DCCV cancelled due to concern for unsuccessful conversion in presence of pericarditis. Will plan for outpatient follow-up.  -[id number] 3 (age, gender). Continue ASA for now due to risk for worsening pericardial effusion with full AC.  -TSH-WNL  -ESR/CRP-50 (from 40 on 6/23)/ 85 (from 66 on 6/23)     ?Viral Percarditis/Effusion  - Blood cultures sent for h/o fever, negative to date with no recent fever documented  - Cont colchicine and ASA  - Echo today to reassess effusion

## 2021-07-08 NOTE — PROGRESS NOTE ADULT - SUBJECTIVE AND OBJECTIVE BOX
Patient is a 76y old  Female who presents with a chief complaint of New onset Afib RVR (07 Jul 2021 16:40)    C/O mild soreness of chest. Denies sob,palpitation,dizziness.    REVIEW OF SYSTEMS: All systems are reviewed and found to be negative except above    MEDICATIONS  (STANDING):  aspirin 650 milliGRAM(s) Oral <User Schedule>  atorvastatin 20 milliGRAM(s) Oral <User Schedule>  calcium carbonate 1250 mG  + Vitamin D (OsCal 500 + D) 1 Tablet(s) Oral daily  colchicine 0.6 milliGRAM(s) Oral two times a day  lactobacillus acidophilus 1 Tablet(s) Oral daily  metoprolol tartrate 25 milliGRAM(s) Oral every 8 hours  pantoprazole    Tablet 40 milliGRAM(s) Oral before breakfast    MEDICATIONS  (PRN):  metoprolol tartrate Injectable 5 milliGRAM(s) IV Push every 6 hours PRN for sustained AFib >120      CAPILLARY BLOOD GLUCOSE        I&O's Summary      PHYSICAL EXAM:  Vital Signs Last 24 Hrs  T(C): 37 (08 Jul 2021 05:11), Max: 37.2 (07 Jul 2021 15:44)  T(F): 98.6 (08 Jul 2021 05:11), Max: 99 (07 Jul 2021 15:44)  HR: 88 (08 Jul 2021 05:11) (84 - 108)  BP: 121/90 (08 Jul 2021 05:11) (105/70 - 128/84)  BP(mean): --  RR: 16 (08 Jul 2021 05:11) (16 - 18)  SpO2: 96% (08 Jul 2021 05:11) (95% - 98%)    CONSTITUTIONAL: NAD,  EYES: PERRLA; conjunctiva and sclera clear  ENMT: Moist oral mucosa,   RESPIRATORY: Normal respiratory effort; lungs are clear to auscultation bilaterally  CARDIOVASCULAR:IRR, normal S1 and S2, no murmur   EXTS: No lower extremity edema; Peripheral pulses are 2+ bilaterally  ABDOMEN: Nontender to palpation, normoactive bowel sounds, no rebound/guarding;   MUSCLOSKELETAL:  no joint swelling or tenderness to palpation  PSYCH: affect appropriate  NEUROLOGY: A+O to person, place, and time;  no gross  deficits;       LABS:                        11.9   8.28  )-----------( 363      ( 07 Jul 2021 07:16 )             36.5     07-08    138  |  102  |  18.1  ----------------------------<  106<H>  4.1   |  22.0  |  0.74    Ca    8.8      08 Jul 2021 08:47  Phos  2.9     07-07  Mg     2.1     07-08    TPro  6.6  /  Alb  3.0<L>  /  TBili  0.3<L>  /  DBili  x   /  AST  47<H>  /  ALT  40<H>  /  AlkPhos  474<H>  07-07    PT/INR - ( 07 Jul 2021 07:16 )   PT: 14.8 sec;   INR: 1.29 ratio         PTT - ( 07 Jul 2021 07:16 )  PTT:33.2 sec  CARDIAC MARKERS ( 06 Jul 2021 21:01 )  x     / <0.01 ng/mL / x     / x     / x      CARDIAC MARKERS ( 06 Jul 2021 18:51 )  x     / <0.01 ng/mL / x     / x     / x      CARDIAC MARKERS ( 06 Jul 2021 14:39 )  x     / <0.01 ng/mL / x     / x     / x                RADIOLOGY & ADDITIONAL TESTS:  Results Reviewed:   Imaging Personally Reviewed:  Electrocardiogram Personally Reviewed:    COORDINATION OF CARE:  Care Discussed with Consultants/Other Providers [Y/N]:  Prior or Outpatient Records Reviewed [Y/N]:

## 2021-07-08 NOTE — PROGRESS NOTE ADULT - SUBJECTIVE AND OBJECTIVE BOX
PROGRESS NOTE  Reason for follow up: Afib with RVR  COVID Status: Negative 7/6/21  Update: NAEO. Pt. denies CP, palpitations, sob, dizziness/LH.       Review of symptoms:   All review of systems negative unless indicated otherwise above.     Vitals:  T(C): 37 (07-08-21 @ 05:11), Max: 37.2 (07-07-21 @ 15:44)  HR: 88 (07-08-21 @ 05:11) (84 - 108)  BP: 121/90 (07-08-21 @ 05:11) (105/70 - 128/84)  RR: 16 (07-08-21 @ 05:11) (16 - 18)  SpO2: 96% (07-08-21 @ 05:11) (95% - 98%)  Wt(kg): --  I&O's Summary    Weight (kg): 51.9 (07-06 @ 13:45)    TELEMETRY: Atrial Fibrillation    PHYSICAL EXAM:  Appearance: Resting comfortably in bed, in no distress  Neurologic: A&Ox3, PERRL, Grossly non-focal with full strength in all four extremities  HEENT:  EOMs intact, sclera white	  Neck: Supple, no JVD  Cardiovascular: Irregularly irregular, Normal S1 S2, No murmur, No rub  Respiratory: Lungs clear to auscultation	  Psychiatry: Mood & affect appropriate  Gastrointestinal:  Soft, Non-tender, + BS	  Extremities: Normal range of motion, No clubbing, cyanosis or edema  Vascular: Peripheral pulses full and equal in all extremities, warm peripherally  Skin: No Erythema, No ecchymoses, No cyanosis, No rashes    CURRENT MEDICATIONS:  metoprolol tartrate 25 milliGRAM(s) Oral every 8 hours  metoprolol tartrate Injectable 5 milliGRAM(s) IV Push every 6 hours PRN    aspirin  pantoprazole    Tablet  atorvastatin  colchicine  calcium carbonate 1250 mG  + Vitamin D (OsCal 500 + D)      LABS:	 	  CARDIAC MARKERS ( 06 Jul 2021 21:01 )  x     / <0.01 ng/mL / x     / x     / x      p-BNP 06 Jul 2021 21:01: x    , CARDIAC MARKERS ( 06 Jul 2021 18:51 )  x     / <0.01 ng/mL / x     / x     / x      p-BNP 06 Jul 2021 18:51: x    , CARDIAC MARKERS ( 06 Jul 2021 14:39 )  x     / <0.01 ng/mL / x     / x     / x      p-BNP 06 Jul 2021 14:39: 1665 pg/mL                          11.9   8.28  )-----------( 363      ( 07 Jul 2021 07:16 )             36.5     07-08    138  |  102  |  18.1  ----------------------------<  106<H>  4.1   |  22.0  |  0.74    Ca    8.8      08 Jul 2021 08:47  Phos  2.9     07-07  Mg     2.1     07-08    TPro  6.6  /  Alb  3.0<L>  /  TBili  0.3<L>  /  DBili  x   /  AST  47<H>  /  ALT  40<H>  /  AlkPhos  474<H>  07-07    proBNP: Serum Pro-Brain Natriuretic Peptide: 1665 pg/mL (07-06 @ 14:39)    TSH: Thyroid Stimulating Hormone, Serum: 3.38 uIU/mL  Thyroid Stimulating Hormone, Serum: 2.75 uIU/mL    Culture - Blood (06.23.21 @ 14:00) Specimen Source: .Blood Blood-Peripheral   Culture Results:  No growth at 5 days.     < from: Xray Chest 1 View-PORTABLE IMMEDIATE (Xray Chest 1 View-PORTABLE IMMEDIATE .) (07.06.21 @ 15:24) >    INTERPRETATION:  AP chest on July 6, 2021 at 3:07 PM. Patient has chest pain.    Slight to and fro thoracic curve again noted.    Heart is magnified bytechnique.    Present film shows a mild left base effusion new since June 23.    Reverse right shoulder replacement remains.    IMPRESSION: There is a new mild left base effusion.    --- End of Report ---  PRISCILLA CANDELARIA MD; Attending Radiologist  This document has been electronically signed. Jul 6 2021  3:28PM      < from: TTE Echo Limited or F/U (07.06.21 @ 17:08) >  Summary:   1. Left ventricular ejection fraction, by visual estimation,is 55 to 60%.   2. Technically adequate study.   3. Normal global left ventricular systolic function.   4. The mitral in-flow pattern reveals no discernable A-wave, therefore no comment on diastolic function can be made.   5. Small to moderate-sized pericardial effusion.   6. Mild mitral annular calcification.   7. Mild mitral valve regurgitation.   8. Mild thickening of the anterior and posterior mitral valve leaflets.   9. Mild tricuspid regurgitation.  10. Mild to moderate aortic regurgitation.  11. Sclerotic aortic valve with normal opening.    MD Stef Electronically signed on 7/7/2021 at 8:22:20 AM  *** Final ***  < end of copied text >                                                                                                                         PROGRESS NOTE  Reason for follow up: Afib with RVR  COVID Status: Negative 7/6/21  Update: NAEO. Pt. denies CP, palpitations, sob, dizziness/LH.       Review of symptoms:   All review of systems negative unless indicated otherwise above.     Vitals:  T(C): 37 (07-08-21 @ 05:11), Max: 37.2 (07-07-21 @ 15:44)  HR: 88 (07-08-21 @ 05:11) (84 - 108)  BP: 121/90 (07-08-21 @ 05:11) (105/70 - 128/84)  RR: 16 (07-08-21 @ 05:11) (16 - 18)  SpO2: 96% (07-08-21 @ 05:11) (95% - 98%)  Wt(kg): --  I&O's Summary    Weight (kg): 51.9 (07-06 @ 13:45)    TELEMETRY: Atrial Fibrillation    PHYSICAL EXAM:  Appearance: Resting comfortably in bed, in no distress  Neurologic: A&Ox3, PERRL, Grossly non-focal with full strength in all four extremities  HEENT:  EOMs intact, sclera white	  Neck: Supple, no JVD  Cardiovascular: Irregularly irregular, Normal S1 S2, No murmur, No rub  Respiratory: Diminished at bases  Psychiatry: Mood & affect appropriate  Gastrointestinal:  Soft, Non-tender, + BS	  Extremities: Normal range of motion, No clubbing, cyanosis or edema  Vascular: Peripheral pulses full and equal in all extremities, warm peripherally  Skin: No Erythema, No ecchymoses, No cyanosis, No rashes    CURRENT MEDICATIONS:  metoprolol tartrate 25 milliGRAM(s) Oral every 8 hours  metoprolol tartrate Injectable 5 milliGRAM(s) IV Push every 6 hours PRN    aspirin  pantoprazole    Tablet  atorvastatin  colchicine  calcium carbonate 1250 mG  + Vitamin D (OsCal 500 + D)      LABS:	 	  CARDIAC MARKERS ( 06 Jul 2021 21:01 )  x     / <0.01 ng/mL / x     / x     / x      p-BNP 06 Jul 2021 21:01: x    , CARDIAC MARKERS ( 06 Jul 2021 18:51 )  x     / <0.01 ng/mL / x     / x     / x      p-BNP 06 Jul 2021 18:51: x    , CARDIAC MARKERS ( 06 Jul 2021 14:39 )  x     / <0.01 ng/mL / x     / x     / x      p-BNP 06 Jul 2021 14:39: 1665 pg/mL                          11.9   8.28  )-----------( 363      ( 07 Jul 2021 07:16 )             36.5     07-08    138  |  102  |  18.1  ----------------------------<  106<H>  4.1   |  22.0  |  0.74    Ca    8.8      08 Jul 2021 08:47  Phos  2.9     07-07  Mg     2.1     07-08    TPro  6.6  /  Alb  3.0<L>  /  TBili  0.3<L>  /  DBili  x   /  AST  47<H>  /  ALT  40<H>  /  AlkPhos  474<H>  07-07    proBNP: Serum Pro-Brain Natriuretic Peptide: 1665 pg/mL (07-06 @ 14:39)    TSH: Thyroid Stimulating Hormone, Serum: 3.38 uIU/mL  Thyroid Stimulating Hormone, Serum: 2.75 uIU/mL    Culture - Blood (06.23.21 @ 14:00) Specimen Source: .Blood Blood-Peripheral   Culture Results:  No growth at 5 days.     < from: Xray Chest 1 View-PORTABLE IMMEDIATE (Xray Chest 1 View-PORTABLE IMMEDIATE .) (07.06.21 @ 15:24) >    INTERPRETATION:  AP chest on July 6, 2021 at 3:07 PM. Patient has chest pain.    Slight to and fro thoracic curve again noted.    Heart is magnified bytechnique.    Present film shows a mild left base effusion new since June 23.    Reverse right shoulder replacement remains.    IMPRESSION: There is a new mild left base effusion.    --- End of Report ---  PRISCILLA CANDELARIA MD; Attending Radiologist  This document has been electronically signed. Jul 6 2021  3:28PM      < from: TTE Echo Limited or F/U (07.06.21 @ 17:08) >  Summary:   1. Left ventricular ejection fraction, by visual estimation,is 55 to 60%.   2. Technically adequate study.   3. Normal global left ventricular systolic function.   4. The mitral in-flow pattern reveals no discernable A-wave, therefore no comment on diastolic function can be made.   5. Small to moderate-sized pericardial effusion.   6. Mild mitral annular calcification.   7. Mild mitral valve regurgitation.   8. Mild thickening of the anterior and posterior mitral valve leaflets.   9. Mild tricuspid regurgitation.  10. Mild to moderate aortic regurgitation.  11. Sclerotic aortic valve with normal opening.    MD Stef Electronically signed on 7/7/2021 at 8:22:20 AM  *** Final ***  < end of copied text >

## 2021-07-08 NOTE — PROGRESS NOTE ADULT - SUBJECTIVE AND OBJECTIVE BOX
Pt feeling better this morning, still with chest heaviness on deep inspiration, but denies at rest.   She is hoping to get discharged by tomorrow morning to have dinner with her family for her grandsons college graduation.  One loose BM yesterday.    TELE x 24 hours: heart rates mildly elevated since 830 am ~100-120's, otherwise HR acceptable  <100bpm    MEDICATIONS  (STANDING):  aspirin 650 milliGRAM(s) Oral <User Schedule>  atorvastatin 20 milliGRAM(s) Oral <User Schedule>  calcium carbonate 1250 mG  + Vitamin D (OsCal 500 + D) 1 Tablet(s) Oral daily  colchicine 0.6 milliGRAM(s) Oral two times a day  lactobacillus acidophilus 1 Tablet(s) Oral daily  metoprolol tartrate 25 milliGRAM(s) Oral every 8 hours  pantoprazole    Tablet 40 milliGRAM(s) Oral before breakfast    MEDICATIONS  (PRN):  metoprolol tartrate Injectable 5 milliGRAM(s) IV Push every 6 hours PRN for sustained AFib >120    Vital Signs Last 24 Hrs  T(C): 37 (08 Jul 2021 05:11), Max: 37.2 (07 Jul 2021 15:44)  T(F): 98.6 (08 Jul 2021 05:11), Max: 99 (07 Jul 2021 15:44)  HR: 88 (08 Jul 2021 05:11) (84 - 108)  BP: 121/90 (08 Jul 2021 05:11) (105/70 - 128/84)  RR: 16 (08 Jul 2021 05:11) (16 - 18)  SpO2: 96% (08 Jul 2021 05:11) (95% - 98%)    Physical Exam:  Constitutional: NAD, AAOx3  Cardiovascular: +S1S2 irregularly irregular   Pulmonary: CTA b/l, unlabored  GI: soft NTND +BS  Extremities: no pedal edema, +distal pulses b/l  Neuro: non focal, LAWSON x4    LABS:                        11.9   8.28  )-----------( 363      ( 07 Jul 2021 07:16 )             36.5     07-08    138  |  102  |  18.1  ----------------------------<  106<H>  4.1   |  22.0  |  0.74    Ca    8.8      08 Jul 2021 08:47  Phos  2.9     07-07  Mg     2.1     07-08    TPro  6.6  /  Alb  3.0<L>  /  TBili  0.3<L>  /  DBili  x   /  AST  47<H>  /  ALT  40<H>  /  AlkPhos  474<H>  07-07    PT/INR - ( 07 Jul 2021 07:16 )   PT: 14.8 sec;   INR: 1.29 ratio  PTT - ( 07 Jul 2021 07:16 )  PTT:33.2 sec    Thyroid Stimulating Hormone, Serum (07.07.21 @ 07:16)    Thyroid Stimulating Hormone, Serum: 3.38 uIU/mL      RADIOLOGY & ADDITIONAL TESTS:  TTE 7/6/21: EF 55-60%, small- mod pericardial effusion, mild MAC, mild MR, mild TR,   TTE 6/23/21: EF 55-60%, grade I DD, mildly enlarged LA, mild MAC, mild MR, mild-mod AR, small pericardial effusion     A/P: 76 year old female with history of hyperlipidemia, carotid atherosclerosis and remote history of transient global amnesia who presented to [facility] on 6/23/21 with c/o fever and chest pain for 2 days, found to have acute viral pericarditis. She was started on high dose aspirin and colchicine and was discharged home with close outpatient follow up. She states initially she felt better and fever subsided but over the past two days developed overwhelming fatigue and near syncope with recurrent fever. She was seen by her PMD Dr. Alcantar on 7/6/21 and found to have new onset atrial fibrillation with RVR, referred to [facility] ED for further evaluation. Repeat TTE 7/6/21 revealed small but enlarging pericardial effusion, compared with prior TTE on 6/23/21. EP consult requested for management of atrial fibrillation.     1. new onset atrial fibrillation in the setting of pericarditis   - CHADSVASc = 3 (age, gender)   - rate control for now as pt has a risk of worsening pericardial effusion w/use of oral anticoagulation   - lopressor increased to 25mg po q 8hrs, titrate as tolerated  - repeat TTE today to r/o evolving pericardial effusion   - pending above will plan for close oupt follow in next 1-2 weeks, repeat TTE if no change in pericardial effusion, will plan for anticoagulation and AVEL/DCCV. Cardioversion deferred in setting of acute pericarditis with pericardial effusion, as unlikely to be successful.  Based on her status at that time, may even consider admission for IV heparin challenge to confirm her effusion does not worsen before DCCV    2. viral pericarditis w/pericardial effusion   - CRP increased to 85 (up from 66 on 6/23)   - repeat TTE 7/8 to reassess effusion, especially in light of patient receiving one dose of lovenox 7/6 PM   - increase aspirin 650mg to Q 6 hrs   - continue colchicine 0.6mg BID, as tolerated. May decrease dose to 0.3mg BID if GI upset/diarrhea  - continue Protonix 40mg daily for PPI     Seen and discussed w/Dr. Garcia.                                             A/P:                    Pt feeling better this morning, still with chest heaviness on deep inspiration, but denies at rest.   She is hoping to get discharged by tomorrow morning to have dinner with her family for her grandsons college graduation.  One loose BM yesterday.    TELE x 24 hours: heart rates mildly elevated since 830 am ~100-120's, otherwise HR acceptable  <100bpm    MEDICATIONS  (STANDING):  aspirin 650 milliGRAM(s) Oral <User Schedule>  atorvastatin 20 milliGRAM(s) Oral <User Schedule>  calcium carbonate 1250 mG  + Vitamin D (OsCal 500 + D) 1 Tablet(s) Oral daily  colchicine 0.6 milliGRAM(s) Oral two times a day  lactobacillus acidophilus 1 Tablet(s) Oral daily  metoprolol tartrate 25 milliGRAM(s) Oral every 8 hours  pantoprazole    Tablet 40 milliGRAM(s) Oral before breakfast    MEDICATIONS  (PRN):  metoprolol tartrate Injectable 5 milliGRAM(s) IV Push every 6 hours PRN for sustained AFib >120    Vital Signs Last 24 Hrs  T(C): 37 (08 Jul 2021 05:11), Max: 37.2 (07 Jul 2021 15:44)  T(F): 98.6 (08 Jul 2021 05:11), Max: 99 (07 Jul 2021 15:44)  HR: 88 (08 Jul 2021 05:11) (84 - 108)  BP: 121/90 (08 Jul 2021 05:11) (105/70 - 128/84)  RR: 16 (08 Jul 2021 05:11) (16 - 18)  SpO2: 96% (08 Jul 2021 05:11) (95% - 98%)    Physical Exam:  Constitutional: NAD, AAOx3  Cardiovascular: +S1S2 irregularly irregular   Pulmonary: CTA b/l, unlabored  GI: soft NTND +BS  Extremities: no pedal edema, +distal pulses b/l  Neuro: non focal, LAWSON x4    LABS:                        11.9   8.28  )-----------( 363      ( 07 Jul 2021 07:16 )             36.5     07-08    138  |  102  |  18.1  ----------------------------<  106<H>  4.1   |  22.0  |  0.74    Ca    8.8      08 Jul 2021 08:47  Phos  2.9     07-07  Mg     2.1     07-08    TPro  6.6  /  Alb  3.0<L>  /  TBili  0.3<L>  /  DBili  x   /  AST  47<H>  /  ALT  40<H>  /  AlkPhos  474<H>  07-07    PT/INR - ( 07 Jul 2021 07:16 )   PT: 14.8 sec;   INR: 1.29 ratio  PTT - ( 07 Jul 2021 07:16 )  PTT:33.2 sec    Thyroid Stimulating Hormone, Serum (07.07.21 @ 07:16)    Thyroid Stimulating Hormone, Serum: 3.38 uIU/mL      RADIOLOGY & ADDITIONAL TESTS:  TTE 7/6/21: EF 55-60%, small- mod pericardial effusion, mild MAC, mild MR, mild TR,   TTE 6/23/21: EF 55-60%, grade I DD, mildly enlarged LA, mild MAC, mild MR, mild-mod AR, small pericardial effusion     A/P: 76 year old female with history of hyperlipidemia, carotid atherosclerosis and remote history of transient global amnesia who presented to [facility] on 6/23/21 with c/o fever and chest pain for 2 days, found to have acute viral pericarditis. She was started on high dose aspirin and colchicine and was discharged home with close outpatient follow up. She states initially she felt better and fever subsided but over the past two days developed overwhelming fatigue and near syncope with recurrent fever. She was seen by her PMD Dr. Alcantar on 7/6/21 and found to have new onset atrial fibrillation with RVR, referred to [facility] ED for further evaluation. Repeat TTE 7/6/21 revealed small but enlarging pericardial effusion, compared with prior TTE on 6/23/21. EP consult requested for management of atrial fibrillation.     1. new onset atrial fibrillation in the setting of pericarditis   - CHADSVASc = 3 (age, gender)   - rate control for now as pt has a risk of worsening pericardial effusion w/use of oral anticoagulation   - lopressor increased to 25mg po q 8hrs, titrate as tolerated  - repeat TTE today to r/o evolving pericardial effusion   - pending above will plan for close oupt follow in next 1-2 weeks, repeat TTE if no change in pericardial effusion, will plan for anticoagulation and AVEL/DCCV. Cardioversion deferred in setting of acute pericarditis with pericardial effusion, as unlikely to be successful.  Based on her status at that time, may even consider admission for IV heparin challenge to confirm her effusion does not worsen before DCCV  - MCOT to be mailed to pt     2. viral pericarditis w/pericardial effusion   - CRP increased to 85 (up from 66 on 6/23)   - repeat TTE 7/8 to reassess effusion, especially in light of patient receiving one dose of lovenox 7/6 PM   - increase aspirin 650mg to Q 6 hrs   - continue colchicine 0.6mg BID, as tolerated. May decrease dose to 0.3mg BID if GI upset/diarrhea  - continue Protonix 40mg daily for PPI     Seen and discussed w/Dr. Garcia.

## 2021-07-09 ENCOUNTER — TRANSCRIPTION ENCOUNTER (OUTPATIENT)
Age: 76
End: 2021-07-09

## 2021-07-09 VITALS
TEMPERATURE: 98 F | RESPIRATION RATE: 18 BRPM | HEART RATE: 90 BPM | DIASTOLIC BLOOD PRESSURE: 74 MMHG | SYSTOLIC BLOOD PRESSURE: 106 MMHG | OXYGEN SATURATION: 95 %

## 2021-07-09 LAB
A PHAGOCYTOPH IGG TITR SER IF: SIGNIFICANT CHANGE UP TITER
ANION GAP SERPL CALC-SCNC: 12 MMOL/L — SIGNIFICANT CHANGE UP (ref 5–17)
B BURGDOR AB SER QL IA: NEGATIVE — SIGNIFICANT CHANGE UP
B MICROTI IGG TITR SER: SIGNIFICANT CHANGE UP TITER
BUN SERPL-MCNC: 18.3 MG/DL — SIGNIFICANT CHANGE UP (ref 8–20)
CALCIUM SERPL-MCNC: 8.7 MG/DL — SIGNIFICANT CHANGE UP (ref 8.6–10.2)
CHLORIDE SERPL-SCNC: 105 MMOL/L — SIGNIFICANT CHANGE UP (ref 98–107)
CO2 SERPL-SCNC: 24 MMOL/L — SIGNIFICANT CHANGE UP (ref 22–29)
CREAT SERPL-MCNC: 0.77 MG/DL — SIGNIFICANT CHANGE UP (ref 0.5–1.3)
CRP SERPL-MCNC: 49 MG/L — HIGH
E CHAFFEENSIS IGG TITR SER IF: SIGNIFICANT CHANGE UP TITER
ERYTHROCYTE [SEDIMENTATION RATE] IN BLOOD: 50 MM/HR — HIGH (ref 0–20)
GLUCOSE SERPL-MCNC: 97 MG/DL — SIGNIFICANT CHANGE UP (ref 70–99)
MAGNESIUM SERPL-MCNC: 2.1 MG/DL — SIGNIFICANT CHANGE UP (ref 1.6–2.6)
POTASSIUM SERPL-MCNC: 4.2 MMOL/L — SIGNIFICANT CHANGE UP (ref 3.5–5.3)
POTASSIUM SERPL-SCNC: 4.2 MMOL/L — SIGNIFICANT CHANGE UP (ref 3.5–5.3)
SODIUM SERPL-SCNC: 141 MMOL/L — SIGNIFICANT CHANGE UP (ref 135–145)

## 2021-07-09 PROCEDURE — 80048 BASIC METABOLIC PNL TOTAL CA: CPT

## 2021-07-09 PROCEDURE — 93005 ELECTROCARDIOGRAM TRACING: CPT

## 2021-07-09 PROCEDURE — 36415 COLL VENOUS BLD VENIPUNCTURE: CPT

## 2021-07-09 PROCEDURE — 86140 C-REACTIVE PROTEIN: CPT

## 2021-07-09 PROCEDURE — 71045 X-RAY EXAM CHEST 1 VIEW: CPT

## 2021-07-09 PROCEDURE — 83880 ASSAY OF NATRIURETIC PEPTIDE: CPT

## 2021-07-09 PROCEDURE — 85652 RBC SED RATE AUTOMATED: CPT

## 2021-07-09 PROCEDURE — 83735 ASSAY OF MAGNESIUM: CPT

## 2021-07-09 PROCEDURE — 99232 SBSQ HOSP IP/OBS MODERATE 35: CPT

## 2021-07-09 PROCEDURE — 84484 ASSAY OF TROPONIN QUANT: CPT

## 2021-07-09 PROCEDURE — 84100 ASSAY OF PHOSPHORUS: CPT

## 2021-07-09 PROCEDURE — 86618 LYME DISEASE ANTIBODY: CPT

## 2021-07-09 PROCEDURE — 99239 HOSP IP/OBS DSCHRG MGMT >30: CPT

## 2021-07-09 PROCEDURE — 84443 ASSAY THYROID STIM HORMONE: CPT

## 2021-07-09 PROCEDURE — 93308 TTE F-UP OR LMTD: CPT

## 2021-07-09 PROCEDURE — 85027 COMPLETE CBC AUTOMATED: CPT

## 2021-07-09 PROCEDURE — 85025 COMPLETE CBC W/AUTO DIFF WBC: CPT

## 2021-07-09 PROCEDURE — 85610 PROTHROMBIN TIME: CPT

## 2021-07-09 PROCEDURE — 86753 PROTOZOA ANTIBODY NOS: CPT

## 2021-07-09 PROCEDURE — 86769 SARS-COV-2 COVID-19 ANTIBODY: CPT

## 2021-07-09 PROCEDURE — 80053 COMPREHEN METABOLIC PANEL: CPT

## 2021-07-09 PROCEDURE — 87635 SARS-COV-2 COVID-19 AMP PRB: CPT

## 2021-07-09 PROCEDURE — 85730 THROMBOPLASTIN TIME PARTIAL: CPT

## 2021-07-09 PROCEDURE — 99285 EMERGENCY DEPT VISIT HI MDM: CPT

## 2021-07-09 PROCEDURE — 86666 EHRLICHIA ANTIBODY: CPT

## 2021-07-09 RX ORDER — ASPIRIN/CALCIUM CARB/MAGNESIUM 324 MG
1 TABLET ORAL
Qty: 56 | Refills: 0
Start: 2021-07-09 | End: 2021-07-22

## 2021-07-09 RX ORDER — ROSUVASTATIN CALCIUM 5 MG/1
1 TABLET ORAL
Qty: 0 | Refills: 0 | DISCHARGE

## 2021-07-09 RX ORDER — ROSUVASTATIN CALCIUM 5 MG/1
1 TABLET ORAL
Qty: 7 | Refills: 0
Start: 2021-07-09 | End: 2021-07-22

## 2021-07-09 RX ORDER — COLCHICINE 0.6 MG
1 TABLET ORAL
Qty: 28 | Refills: 0
Start: 2021-07-09 | End: 2021-07-22

## 2021-07-09 RX ORDER — OMEPRAZOLE 10 MG/1
1 CAPSULE, DELAYED RELEASE ORAL
Qty: 30 | Refills: 0
Start: 2021-07-09 | End: 2021-08-07

## 2021-07-09 RX ORDER — METOPROLOL TARTRATE 50 MG
3 TABLET ORAL
Qty: 90 | Refills: 0
Start: 2021-07-09 | End: 2021-08-07

## 2021-07-09 RX ADMIN — Medication 650 MILLIGRAM(S): at 05:26

## 2021-07-09 RX ADMIN — ATORVASTATIN CALCIUM 20 MILLIGRAM(S): 80 TABLET, FILM COATED ORAL at 11:44

## 2021-07-09 RX ADMIN — Medication 1 TABLET(S): at 11:44

## 2021-07-09 RX ADMIN — PANTOPRAZOLE SODIUM 40 MILLIGRAM(S): 20 TABLET, DELAYED RELEASE ORAL at 05:21

## 2021-07-09 RX ADMIN — Medication 25 MILLIGRAM(S): at 11:44

## 2021-07-09 RX ADMIN — Medication 0.6 MILLIGRAM(S): at 05:22

## 2021-07-09 RX ADMIN — Medication 650 MILLIGRAM(S): at 11:44

## 2021-07-09 RX ADMIN — Medication 25 MILLIGRAM(S): at 05:21

## 2021-07-09 NOTE — DISCHARGE NOTE PROVIDER - CARE PROVIDER_API CALL
Marcos Garcia)  Cardiac Electrophysiology; Cardiovascular Disease; Internal Medicine  [address] [place] [address]  Phone: [phone]  Fax: [phone]  Follow Up Time: 1 week    Sterling Alcantar)  Internal Medicine  [address] [facility] [place] [address]  Phone: [phone]  Fax: [phone]  Follow Up Time: 1 week

## 2021-07-09 NOTE — DISCHARGE NOTE PROVIDER - NSDCFUSCHEDAPPT_GEN_ALL_CORE_FT
LOUIS MEJIA ; 07/16/2021 ; [facility] [address] ; 10/01/2021 ; [facility] [address]

## 2021-07-09 NOTE — PROGRESS NOTE ADULT - ATTENDING COMMENTS
agree with above  d/w Dr. BLANKENSHIP, TTE showed no change in effusion size.  possibly home tomorrow if rate is better controlled and continued to feel well
seen and examined, agree with above   will consider AVEL-DCCV with inpatient heparin challenge and TTE in 2-4 weeks if pericarditis controlled and AF persisted
Patient seen by me today.  Planned AVEL and DCCV was cancelled given small to moderate size pericardial effusion.  Because of Pericardial effusion, - No anticoagulation for fear of making the effusion worse, discussed with EP, Eventually  when effusion resolves, then may consider Anticoagulation as outpatient.    Patient to continue ASA and Colchcine.    Patient to have a limited echo on 7/8/2021 to follow on pericardial effusion.    Will follow
-chest pain mostly resolved, denies dyspnea- on higher dose of ASA 650mg q6hrs and colchicine with PPI ppx- recheck ESR and CRP level.  -persistent Afib -130s this morning/overnight, uptitrated metoprolol 25mg q8rs and can use IVP 5mg PRN; not candidate for DCCV given will likely recur in setting of acute pericarditis  -no anticoagulation for now given risk for worsening pericardial effusion/tamponade, CHADSVasc 3 but daily stroke risk still relatively low, outpatient MCOT to determine need for long term AC once pericardititis resolve  -still small-moderate size pericardial effusion but also moderate size L-pleural effusion on repeat TTE  -continue telemetry monitoring, possible discharge home tomorrow if HR controlled, need repeat TTE in 1 week outpatient      Forest Erickson DO, [facility]  Faculty Non-Invasive Cardiologist  [phone]

## 2021-07-09 NOTE — DISCHARGE NOTE PROVIDER - HOSPITAL COURSE
Pt is a 75 yo female with a PMH of rotator cuff repair with fracture of right shoulder, HLD, pericarditis, TIA 9 yrs ago, who presents to ED after being on treatment with ASA & colchicine for two weeks, sent from PMD office, due to fatigue, lightheadedness, and return of fevers over the weekend. Pt noted to be in new onset Afib with RVR on arrival. TTE 07/08/2021 with LVEF 50-55%, G2DD, small pericardial effusion. Cardiology and EP on board. Pt was continued on tele monitoring, still in AF, however, rate-controlled. Pt initiated on metoprolol with benefit, will be DC'd on toprol XL. Course complicated by viral pericarditis with pericardial euffusion. Will be holding AC as it can worsen the effusion. AVEL/DCCV in patient was cancelled because of concern for unsuccessful conversion of arrythmia in presence of pericarditis. Pt will follow up with EP in 1 week for repeat TTE. DC on ASA and colchicine per cardiology.     All electrolyte abnormalities were monitored carefully and repleted as necessary during this hospitalization. At the time of discharge patient was hemodynamically stable and amenable to all terms of discharge. The patient has received verbal instructions from myself regarding discharge plans.     Length of Discharge: 45MIN    Vital Signs Last 24 Hrs  T(C): 36.7 (09 Jul 2021 10:26), Max: 36.8 (08 Jul 2021 11:07)  T(F): 98 (09 Jul 2021 10:26), Max: 98.2 (08 Jul 2021 11:07)  HR: 89 (09 Jul 2021 10:26) (89 - 92)  BP: 104/78 (09 Jul 2021 10:26) (95/67 - 119/74)  BP(mean): --  RR: 18 (09 Jul 2021 10:26) (18 - 18)  SpO2: 94% (09 Jul 2021 10:26) (92% - 97%)    PHYSICAL EXAM:  GENERAL: Pt lying in bed comfortably in NAD  HEAD:  Atraumatic   CHEST/LUNG: Clear to auscultation bilaterally; Unlabored respirations  HEART: Irregularly irregular   ABDOMEN: Bowel sounds present; Soft, Nontender, Nondistended  EXTREMITIES:  No LE edema   NERVOUS SYSTEM:  Alert & Oriented X3, speech clear. Answers questions appropriately  SKIN: Warm and dry      Pt is a 77 yo female with a PMH of rotator cuff repair with fracture of right shoulder, HLD, pericarditis, TIA 9 yrs ago, who presents to ED after being on treatment with ASA & colchicine for two weeks, sent from PMD office, due to fatigue, lightheadedness, and return of fevers over the weekend. Pt noted to be in new onset Afib with RVR on arrival. TTE 07/08/2021 with LVEF 50-55%, G2DD, small pericardial effusion. Cardiology and EP on board. Pt was continued on tele monitoring, still in AF, however, rate-controlled. Pt initiated on metoprolol with benefit, will be DC'd on toprol XL. Course complicated by viral pericarditis with pericardial euffusion. Will be holding AC as it can worsen the effusion. AVEL/DCCV in patient was cancelled because of concern for unsuccessful conversion of arrythmia in presence of pericarditis. Pt will follow up with EP in 1 week for repeat TTE. Will continue on ASA and colchicine per cardiology. Pt medically stable for DC at this time.     All electrolyte abnormalities were monitored carefully and repleted as necessary during this hospitalization. At the time of discharge patient was hemodynamically stable and amenable to all terms of discharge. The patient has received verbal instructions from myself regarding discharge plans.     Length of Discharge: 45MIN    Vital Signs Last 24 Hrs  T(C): 36.7 (09 Jul 2021 10:26), Max: 36.8 (08 Jul 2021 11:07)  T(F): 98 (09 Jul 2021 10:26), Max: 98.2 (08 Jul 2021 11:07)  HR: 89 (09 Jul 2021 10:26) (89 - 92)  BP: 104/78 (09 Jul 2021 10:26) (95/67 - 119/74)  BP(mean): --  RR: 18 (09 Jul 2021 10:26) (18 - 18)  SpO2: 94% (09 Jul 2021 10:26) (92% - 97%)    PHYSICAL EXAM:  GENERAL: Pt lying in bed comfortably in NAD  HEAD:  Atraumatic   CHEST/LUNG: Clear to auscultation bilaterally; Unlabored respirations  HEART: Irregularly irregular   ABDOMEN: Bowel sounds present; Soft, Nontender, Nondistended  EXTREMITIES:  No LE edema   NERVOUS SYSTEM:  Alert & Oriented X3, speech clear. Answers questions appropriately  SKIN: Warm and dry      Pt is a 77 yo female with a PMH of rotator cuff repair with fracture of right shoulder, HLD, pericarditis, TIA 9 yrs ago, who presents to ED after being on treatment with ASA & colchicine for two weeks, sent from PMD office, due to fatigue, lightheadedness, and return of fevers over the weekend. Pt noted to be in new onset Afib with RVR on arrival. TTE 07/08/2021 with LVEF 50-55%, G2DD, small pericardial effusion. Cardiology and EP on board. Pt was continued on tele monitoring, still in AF, however, rate-controlled. Pt initiated on metoprolol with benefit, will be DC'd on toprol XL. Course complicated by viral pericarditis with pericardial euffusion. Will be holding AC as it can worsen the effusion. AVEL/DCCV in patient was cancelled because of concern for unsuccessful conversion of arrythmia in presence of pericarditis. Pt will follow up with EP in 1 week for repeat TTE. Will continue on ASA and colchicine per cardiology. Pt medically stable for DC at this time.     All electrolyte abnormalities were monitored carefully and repleted as necessary during this hospitalization. At the time of discharge patient was hemodynamically stable and amenable to all terms of discharge. The patient has received verbal instructions from myself regarding discharge plans.     Length of Discharge: 45MIN    Vital Signs Last 24 Hrs  T(C): 36.7 (09 Jul 2021 10:26), Max: 36.8 (08 Jul 2021 11:07)  T(F): 98 (09 Jul 2021 10:26), Max: 98.2 (08 Jul 2021 11:07)  HR: 89 (09 Jul 2021 10:26) (89 - 92)  BP: 104/78 (09 Jul 2021 10:26) (95/67 - 119/74)  BP(mean): --  RR: 18 (09 Jul 2021 10:26) (18 - 18)  SpO2: 94% (09 Jul 2021 10:26) (92% - 97%)    PHYSICAL EXAM:  GENERAL: Pt lying in bed comfortably in NAD  HEAD:  Atraumatic   CHEST/LUNG: Clear to auscultation bilaterally; Unlabored respirations  HEART: Irregularly irregular, s1,s2 nl,no murmur.  ABDOMEN: Bowel sounds present; Soft, Nontender, Nondistended  EXTREMITIES:  No LE edema   NERVOUS SYSTEM:  Alert & Oriented X3, speech clear. Answers questions appropriately  SKIN: Warm and dry     addendum by   pt seen and exam.agreed with above  dw EP team and Dr Lora. Cleared for discharge. f/u cardiology out pt  care of plan dw pt. Agreed with above,  Time spent>35 mins

## 2021-07-09 NOTE — DISCHARGE NOTE PROVIDER - NSDCCPCAREPLAN_GEN_ALL_CORE_FT
PRINCIPAL DISCHARGE DIAGNOSIS  Diagnosis: Rapid atrial fibrillation  Assessment and Plan of Treatment: - Follow up with EP in 1 week   - Follow up with Cardiology in 1 week   - Start Toprol XL 75mg QD on discharge  - Continue with aspirin and colchicine as directed      SECONDARY DISCHARGE DIAGNOSES  Diagnosis: Pericarditis with effusion  Assessment and Plan of Treatment: - Follow up with EP in 1 week   - Follow up with Cardiology in 1 week  - Continue Protonix 40mg daily for PPI   - Repeat TTE outpatient in 1 week    Diagnosis: Transaminitis  Assessment and Plan of Treatment: - Follow up with hepatologist in 1 week  - Follow up with PCP in 1 week    Diagnosis: HLD (hyperlipidemia)  Assessment and Plan of Treatment: - Continue with statin  - Follow up with PCP in 1 week

## 2021-07-09 NOTE — DISCHARGE NOTE PROVIDER - PROVIDER TOKENS
PROVIDER:[TOKEN:[[id number]],FOLLOWUP:[1 week]],PROVIDER:[TOKEN:[[id number]],FOLLOWUP:[1 week]]

## 2021-07-09 NOTE — PROGRESS NOTE ADULT - REASON FOR ADMISSION
New onset Afib RVR
New onset Afib RVR/pericarditis
New onset Afib RVR
New onset Afib RVR

## 2021-07-09 NOTE — DISCHARGE NOTE PROVIDER - NSDCMRMEDTOKEN_GEN_ALL_CORE_FT
aspirin 650 mg oral tablet: 1 tab(s) orally every 6 hours   Calcium 600+D 600 mg-200 intl units oral tablet: orally once a day  colchicine 0.6 mg oral tablet: 1 tab(s) orally 2 times a day  omeprazole 40 mg oral delayed release capsule: 1 cap(s) orally once a day   Probiotic Formula oral capsule: 1 cap(s) orally once a day  rosuvastatin 5 mg oral tablet: 1 tab(s) orally every other day  Toprol-XL 25 mg oral tablet, extended release: 3 tab(s) orally once a day

## 2021-07-09 NOTE — PROGRESS NOTE ADULT - SUBJECTIVE AND OBJECTIVE BOX
No overnight events. Pt continues to feel better. Ambulating without difficulty. She denies chest pain, palpitation, lightheadedness/dizziness, near syncope or syncope.  Heart rates controlled at rest <100bpm, increased with ambulation to 120bpm. BP is tolerating increased lopressor.      TELE x last 24 hours: atrial fibrillation predominately 80-100bpm    MEDICATIONS  (STANDING):  aspirin 650 milliGRAM(s) Oral <User Schedule>  atorvastatin 20 milliGRAM(s) Oral <User Schedule>  calcium carbonate 1250 mG  + Vitamin D (OsCal 500 + D) 1 Tablet(s) Oral daily  colchicine 0.6 milliGRAM(s) Oral two times a day  lactobacillus acidophilus 1 Tablet(s) Oral daily  metoprolol tartrate 25 milliGRAM(s) Oral every 8 hours  pantoprazole    Tablet 40 milliGRAM(s) Oral before breakfast    MEDICATIONS  (PRN):  metoprolol tartrate Injectable 5 milliGRAM(s) IV Push every 6 hours PRN for sustained AFib >120    Vital Signs Last 24 Hrs  T(C): 36.7 (09 Jul 2021 10:26), Max: 36.8 (08 Jul 2021 11:07)  T(F): 98 (09 Jul 2021 10:26), Max: 98.2 (08 Jul 2021 11:07)  HR: 89 (09 Jul 2021 10:26) (89 - 92)  BP: 104/78 (09 Jul 2021 10:26) (95/67 - 119/74)  RR: 18 (09 Jul 2021 10:26) (18 - 18)  SpO2: 94% (09 Jul 2021 10:26) (92% - 97%)    Physical Exam:  Constitutional: NAD, AAOx3  Cardiovascular: +S1S2 irregularly irregular   Pulmonary: CTA b/l, unlabored  GI: soft NTND +BS  Extremities: no pedal edema, +distal pulses b/l  Neuro: non focal, LAWSON x4    LABS:    07-09    141  |  105  |  18.3  ----------------------------<  97  4.2   |  24.0  |  0.77    Ca    8.7      09 Jul 2021 06:52  Mg     2.1     07-09    Thyroid Stimulating Hormone, Serum (07.07.21 @ 07:16)    Thyroid Stimulating Hormone, Serum: 3.38 uIU/mL    RADIOLOGY & ADDITIONAL TESTS:  limited TTE 7/8/21: EF 50-55%, grade III DD, small pericardial effusion  TTE 7/6/21: EF 55-60%, small- mod pericardial effusion, mild MAC, mild MR, mild TR,   TTE 6/23/21: EF 55-60%, grade I DD, mildly enlarged LA, mild MAC, mild MR, mild-mod AR, small pericardial effusion       A/P: 76 year old female with history of hyperlipidemia, carotid atherosclerosis and remote history of transient global amnesia who presented to [facility] on 6/23/21 with c/o fever and chest pain for 2 days, found to have acute viral pericarditis. She was started on high dose aspirin and colchicine and was discharged home with close outpatient follow up. She states initially she felt better and fever subsided but over the past two days developed overwhelming fatigue and near syncope with recurrent fever. She was seen by her PMD Dr. Alcantar on 7/6/21 and found to have new onset atrial fibrillation with RVR, referred to [facility] ED for further evaluation. Repeat TTE 7/6/21 revealed small but enlarging pericardial effusion, compared with prior TTE on 6/23/21. EP consulted for management of atrial fibrillation.     1. new onset atrial fibrillation in the setting of pericarditis with small, hemodynamically stable pericardial effusion   - CHADSVASc = 3 (age, gender)   - rate control for now as pt has a risk of worsening pericardial effusion w/use of oral anticoagulation   - pt tolerating increased lopressor 25mg po q 8hrs >change to long acting Toprol 75mg on discharge   - pending above will plan for close oupt follow in next 1-2 weeks, repeat TTE if no change in pericardial effusion, will plan for anticoagulation and AVEL/DCCV. Cardioversion deferred in setting of acute pericarditis with pericardial effusion, as unlikely to be successful.  Based on her status at that time, may even consider admission for IV heparin challenge to confirm her effusion does not worsen before DCCV  - MCOT x 2 weeks to be mailed to pt from our office  - stable from EPS for discharge home today. Lengthy discussion with pt reviewing importance of medication compliance and follow up. Pt will call office with c/o SOB, ACOSTA, LE edema, lightheadedness/dizziness, syncope/near syncope. No driving until cleared by outpt cardiology. Increased hydration encouraged.    2. viral pericarditis w/ small, stable pericardial effusion   - CRP increased to 85 (up from 66 on 6/23)   - continue aspirin 650mg to Q 6 hrs   - continue colchicine 0.6mg BID, as tolerated. May decrease dose to 0.3mg BID if GI upset/diarrhea  - continue Protonix 40mg daily for PPI   - continued plan per General Cardiology team    Plan reviewed with pt and Medicine team  Seen and discussed w/Dr. Garcia.    No overnight events. Pt continues to feel better. Ambulating without difficulty. She denies chest pain, palpitation, lightheadedness/dizziness, near syncope or syncope.  Heart rates controlled at rest <100bpm, increased with ambulation to 120bpm. BP is tolerating increased lopressor.      TELE x last 24 hours: atrial fibrillation predominately 80-100bpm    MEDICATIONS  (STANDING):  aspirin 650 milliGRAM(s) Oral <User Schedule>  atorvastatin 20 milliGRAM(s) Oral <User Schedule>  calcium carbonate 1250 mG  + Vitamin D (OsCal 500 + D) 1 Tablet(s) Oral daily  colchicine 0.6 milliGRAM(s) Oral two times a day  lactobacillus acidophilus 1 Tablet(s) Oral daily  metoprolol tartrate 25 milliGRAM(s) Oral every 8 hours  pantoprazole    Tablet 40 milliGRAM(s) Oral before breakfast    MEDICATIONS  (PRN):  metoprolol tartrate Injectable 5 milliGRAM(s) IV Push every 6 hours PRN for sustained AFib >120    Vital Signs Last 24 Hrs  T(C): 36.7 (09 Jul 2021 10:26), Max: 36.8 (08 Jul 2021 11:07)  T(F): 98 (09 Jul 2021 10:26), Max: 98.2 (08 Jul 2021 11:07)  HR: 89 (09 Jul 2021 10:26) (89 - 92)  BP: 104/78 (09 Jul 2021 10:26) (95/67 - 119/74)  RR: 18 (09 Jul 2021 10:26) (18 - 18)  SpO2: 94% (09 Jul 2021 10:26) (92% - 97%)    Physical Exam:  Constitutional: NAD, AAOx3  Cardiovascular: +S1S2 irregularly irregular   Pulmonary: CTA b/l, unlabored  GI: soft NTND +BS  Extremities: no pedal edema, +distal pulses b/l  Neuro: non focal, LAWSON x4    LABS:    07-09    141  |  105  |  18.3  ----------------------------<  97  4.2   |  24.0  |  0.77    Ca    8.7      09 Jul 2021 06:52  Mg     2.1     07-09    Thyroid Stimulating Hormone, Serum (07.07.21 @ 07:16)    Thyroid Stimulating Hormone, Serum: 3.38 uIU/mL    RADIOLOGY & ADDITIONAL TESTS:  limited TTE 7/8/21: EF 50-55%, grade III DD, small pericardial effusion  TTE 7/6/21: EF 55-60%, small- mod pericardial effusion, mild MAC, mild MR, mild TR,   TTE 6/23/21: EF 55-60%, grade I DD, mildly enlarged LA, mild MAC, mild MR, mild-mod AR, small pericardial effusion       A/P: 76 year old female with history of hyperlipidemia, carotid atherosclerosis and remote history of transient global amnesia who presented to [facility] on 6/23/21 with c/o fever and chest pain for 2 days, found to have acute viral pericarditis. She was started on high dose aspirin and colchicine and was discharged home with close outpatient follow up. She states initially she felt better and fever subsided but over the past two days developed overwhelming fatigue and near syncope with recurrent fever. She was seen by her PMD Dr. Alcantar on 7/6/21 and found to have new onset atrial fibrillation with RVR, referred to [facility] ED for further evaluation. Repeat TTE 7/6/21 revealed small but enlarging pericardial effusion, compared with prior TTE on 6/23/21. EP consulted for management of atrial fibrillation.     1. new onset atrial fibrillation in the setting of pericarditis with small, hemodynamically stable pericardial effusion   - CHADSVASc = 3 (age, gender)   - rate control for now as pt has a risk of worsening pericardial effusion w/use of oral anticoagulation   - pt tolerating increased lopressor 25mg po q 8hrs >change to long acting Toprol 75mg on discharge   - pending above will plan for close oupt follow in next 1-2 weeks, repeat TTE if no change in pericardial effusion, will plan for anticoagulation and AVEL/DCCV. Cardioversion deferred in setting of acute pericarditis with pericardial effusion, as unlikely to be successful.  Based on her status at that time, may even consider admission for IV heparin challenge to confirm her effusion does not worsen before DCCV  - MCOT x 2 weeks to be mailed to pt from our office  - stable from EPS for discharge home today. Lengthy discussion with pt reviewing importance of medication compliance and follow up. Pt will call office with c/o SOB, ACOSTA, LE edema, lightheadedness/dizziness, syncope/near syncope. No driving until cleared by outpt cardiology. Increased hydration encouraged.    2. pericarditis w/ small, stable pericardial effusion   - CRP increased to 85 (up from 66 on 6/23)   - continue aspirin 650mg to Q 6 hrs   - continue colchicine 0.6mg BID, as tolerated. May decrease dose to 0.3mg BID if GI upset/diarrhea  - continue Protonix 40mg daily for PPI   - continued plan per General Cardiology team    Plan reviewed with pt and Medicine team  Seen and discussed w/Dr. Garcia.

## 2021-07-09 NOTE — DISCHARGE NOTE NURSING/CASE MANAGEMENT/SOCIAL WORK - PATIENT PORTAL LINK FT
You can access the FollowMyHealth Patient Portal offered by [facility] by registering at the following website: [email or web address]. By joining [facility]’s FollowMyHealth portal, you will also be able to view your health information using other applications (apps) compatible with our system.

## 2021-07-11 ENCOUNTER — NON-APPOINTMENT (OUTPATIENT)
Age: 76
End: 2021-07-11

## 2021-07-12 ENCOUNTER — NON-APPOINTMENT (OUTPATIENT)
Age: 76
End: 2021-07-12

## 2021-07-13 ENCOUNTER — NON-APPOINTMENT (OUTPATIENT)
Age: 76
End: 2021-07-13

## 2021-07-15 ENCOUNTER — NON-APPOINTMENT (OUTPATIENT)
Age: 76
End: 2021-07-15

## 2021-07-16 ENCOUNTER — APPOINTMENT (OUTPATIENT)
Dept: CARDIOLOGY | Facility: CLINIC | Age: 76
End: 2021-07-16
Payer: MEDICARE

## 2021-07-16 VITALS
BODY MASS INDEX: 18.64 KG/M2 | HEART RATE: 108 BPM | SYSTOLIC BLOOD PRESSURE: 98 MMHG | TEMPERATURE: 98.2 F | DIASTOLIC BLOOD PRESSURE: 68 MMHG | OXYGEN SATURATION: 97 % | WEIGHT: 112 LBS

## 2021-07-16 PROBLEM — G45.9 TRANSIENT CEREBRAL ISCHEMIC ATTACK, UNSPECIFIED: Chronic | Status: ACTIVE | Noted: 2021-07-06

## 2021-07-16 PROBLEM — Z86.79 PERSONAL HISTORY OF OTHER DISEASES OF THE CIRCULATORY SYSTEM: Chronic | Status: ACTIVE | Noted: 2021-07-06

## 2021-07-16 PROCEDURE — 93000 ELECTROCARDIOGRAM COMPLETE: CPT

## 2021-07-16 PROCEDURE — 99214 OFFICE O/P EST MOD 30 MIN: CPT

## 2021-07-16 RX ORDER — ASPIRIN 325 MG/1
325 TABLET, FILM COATED ORAL EVERY 6 HOURS
Refills: 0 | Status: DISCONTINUED | COMMUNITY
Start: 2021-06-28 | End: 2021-07-16

## 2021-07-16 RX ORDER — COLCHICINE 0.6 MG/1
0.6 CAPSULE ORAL TWICE DAILY
Refills: 0 | Status: DISCONTINUED | COMMUNITY
Start: 2021-06-28 | End: 2021-07-16

## 2021-07-19 ENCOUNTER — APPOINTMENT (OUTPATIENT)
Dept: CARDIOLOGY | Facility: CLINIC | Age: 76
End: 2021-07-19
Payer: MEDICARE

## 2021-07-19 PROCEDURE — 93308 TTE F-UP OR LMTD: CPT

## 2021-07-19 PROCEDURE — 93325 DOPPLER ECHO COLOR FLOW MAPG: CPT

## 2021-07-19 PROCEDURE — 93321 DOPPLER ECHO F-UP/LMTD STD: CPT

## 2021-07-21 ENCOUNTER — APPOINTMENT (OUTPATIENT)
Dept: DERMATOLOGY | Facility: CLINIC | Age: 76
End: 2021-07-21
Payer: MEDICARE

## 2021-07-21 PROCEDURE — 17261 DSTRJ MAL LES T/A/L .6-1.0CM: CPT

## 2021-07-23 ENCOUNTER — NON-APPOINTMENT (OUTPATIENT)
Age: 76
End: 2021-07-23

## 2021-07-23 LAB
ALBUMIN SERPL ELPH-MCNC: 3.7 G/DL
ALP BLD-CCNC: 401 U/L
ALT SERPL-CCNC: 30 U/L
ANION GAP SERPL CALC-SCNC: 12 MMOL/L
AST SERPL-CCNC: 32 U/L
BILIRUB SERPL-MCNC: 0.2 MG/DL
BUN SERPL-MCNC: 17 MG/DL
CALCIUM SERPL-MCNC: 9.8 MG/DL
CHLORIDE SERPL-SCNC: 96 MMOL/L
CO2 SERPL-SCNC: 28 MMOL/L
CREAT SERPL-MCNC: 0.9 MG/DL
GLUCOSE SERPL-MCNC: 114 MG/DL
POTASSIUM SERPL-SCNC: 5.3 MMOL/L
PROT SERPL-MCNC: 6.5 G/DL
SODIUM SERPL-SCNC: 136 MMOL/L

## 2021-07-26 ENCOUNTER — NON-APPOINTMENT (OUTPATIENT)
Age: 76
End: 2021-07-26

## 2021-07-26 ENCOUNTER — APPOINTMENT (OUTPATIENT)
Dept: ELECTROPHYSIOLOGY | Facility: CLINIC | Age: 76
End: 2021-07-26
Payer: MEDICARE

## 2021-07-26 VITALS
TEMPERATURE: 98 F | DIASTOLIC BLOOD PRESSURE: 74 MMHG | OXYGEN SATURATION: 95 % | HEIGHT: 65 IN | BODY MASS INDEX: 19.16 KG/M2 | SYSTOLIC BLOOD PRESSURE: 138 MMHG | WEIGHT: 115 LBS | HEART RATE: 82 BPM

## 2021-07-26 PROCEDURE — 93000 ELECTROCARDIOGRAM COMPLETE: CPT

## 2021-07-26 PROCEDURE — 99215 OFFICE O/P EST HI 40 MIN: CPT

## 2021-07-26 RX ORDER — AA/PROT/LYSINE/METHIO/VIT C/B6 50-12.5 MG
30 TABLET ORAL
Refills: 0 | Status: DISCONTINUED | COMMUNITY
Start: 2018-04-06 | End: 2021-07-26

## 2021-07-26 NOTE — PHYSICAL EXAM
[Well Developed] : well developed [No Acute Distress] : no acute distress [Normal S1, S2] : normal S1, S2 [No Murmur] : no murmur [Irregularly Irregular] : irregularly irregular [No Respiratory Distress] : no respiratory distress  [Normal Gait] : normal gait [No Edema] : no edema [Alert and Oriented] : alert and oriented

## 2021-07-28 NOTE — END OF VISIT
[Time Spent: ___ minutes] : I have spent [unfilled] minutes of time on the encounter. [FreeTextEntry3] : Seen and examined, agree with above. Case was discussed with Dr. Gomez over the phone.  All options were discussed with pt and  in the office. She wants some time to think about them and come back next week. She will be finishing the steroid treatment in 8 days so was hoping that she does the DCCV shortly after that. \par After the DCCV, will plan to perform a one week MCOT, and if no AF recurrence will consider an ILR after for long term monitoring as she remains at risk of AF recurrence given her age

## 2021-07-28 NOTE — DISCUSSION/SUMMARY
[FreeTextEntry1] : 76 year old female with history of hyperlipidemia, carotid atherosclerosis and remote history of transient global amnesia who presented to [facility] on 6/23/21 with c/o fever and chest pain for 2 days, found to have acute viral pericarditis. She was started on high dose aspirin and colchicine and was discharged home with close outpatient follow up. She states initially she felt better and fever subsided but over the past two days developed overwhelming fatigue and near syncope with recurrent fever. She was seen by her PMD Dr. Alcantar on 7/6/21 and found to have new onset atrial fibrillation with RVR, referred to [facility] ED for further evaluation. Repeat TTE today revealed small pericardial effusion, increased in size from prior TTE on 6/23. \par Seen by EP and started on rate control. Although [id number] 3, anticoagulation was held due to concern for worsening pericardial effusion if initiated. Was discharged on Toprol 75mg with plans for 2 week MCOT and outpatient follow-up. Was treated with colchicine and high dose ASA for pericarditis.  Remained in AF @ 112bpm at cardiology follow-up (7/16/21), with ongoing complaints of pleuritic chest discomfort and SOB.  Was intolerant to colchicine and had complaints of GI upset.  Dr. Gomez stopped colchicine and Aspirin.  Started 20 day Prednisone taper.  Also started Digoxin for increased rate control.  \par \par Significant symptomatic improvement since starting steroid regiment. Repeat TTE (7/19/21) showed decreased size of pericardial effusion, now trivial. Wearing Biotel MCOT.  Recent daily reports shows adequate HR control with average HR in the 70s since starting Digoxin. \par \par Given that pericarditis is now stabilized and improving, we discuss the role of AVEL/DCCV to restore sinus rhythm and decrease likelihood of developing chronic AF diagnosis.  Will need to be on a/c if CV preformed. We discussed several options including inpatient IV heparin challenge, vs outpatient Enoxaparin or Eliquis with close monitoring. The intravenous heparin option, while inconvenient, would be the safest course to ensure effusion does not worsen prior to DCCV.  If IV heparin is tolerated for 36 hours, would arrange CV and discharge after ~48 hours.\par If patient declines, outpatient Enoxaparin via SQ injection could be considered, however, she would need education on administering this medication.  Lastly, PO Eliquis, however this has a longest half life and not as easily reversible if bleeding develops.  Risks of each reviewed at length\par \par 1. New onset atrial fibrillation:  Currently appears triggered by acute pericarditis. Now improving with trivial pericardial effusion on steroids \par - CHADSVASc = 3 (age, gender)\par - Now rate controlled on Toprol 75mg and Digoxin. \par - Will consider options of a/c and CV as discussed above.  Patient needs time to consider all options.  \par - Will f/up next week to discuss further and arrange. \par \par 2. pericarditis w/ trivial pericardial effusion\par - Improving on steroids.  \par - Potential for symptomatic recurrence once steroids stopped was discussed\par - Monitor symptoms closely and continue cardiology f/up with Dr. Gomez\par \par Seen and d/w Dr. Garcia.\par Suzy RUTLEDGE\par

## 2021-07-28 NOTE — HISTORY OF PRESENT ILLNESS
[FreeTextEntry1] : 76 year old female with history of hyperlipidemia, carotid atherosclerosis and remote history of transient global amnesia who presented to [facility] on 6/23/21 with c/o fever and chest pain for 2 days, found to have acute viral pericarditis. She was started on high dose aspirin and colchicine and was discharged home with close outpatient follow up. She states initially she felt better and fever subsided but over the past two days developed overwhelming fatigue and near syncope with recurrent fever. She was seen by her PMD Dr. Alcantar on 7/6/21 and found to have new onset atrial fibrillation with RVR, referred to [facility] ED for further evaluation. Repeat TTE today revealed small pericardial effusion, increased in size from prior TTE on 6/23. \par Seen by EP and started on rate control. Although [id number] 3, anticoagulation was held due to concern for worsening pericardial effusion if initiated. Was discharged on Toprol 75mg with plans for 2 week MCOT and outpatient follow-up. Was treated with colchicine and high dose ASA for pericarditis.  Remained in AF @ 112bpm at cardiology follow-up (7/16/21), with ongoing complaints of pleuritic chest discomfort and SOB.  \par Was intolerant to colchicine and had complaints of GI upset.  Dr. Gomez stopped colchicine and Aspirin.  Started 20 day Prednisone taper.  Also started Digoxin for increased rate control.  \par \par Significant symptomatic improvement since started steroid regiment. Repeat TTE (7/19/21) showed decreased size of pericardial effusion, now trivial. Wearing Biotel MCOT.  Recently daily report shows adequate HR control with average HR in the 70s since starting Digoxin.

## 2021-07-30 ENCOUNTER — NON-APPOINTMENT (OUTPATIENT)
Age: 76
End: 2021-07-30

## 2021-07-30 LAB
CRP SERPL-MCNC: 14 MG/L
DIGOXIN SERPL-MCNC: 0.9 NG/ML
ERYTHROCYTE [SEDIMENTATION RATE] IN BLOOD BY WESTERGREN METHOD: 39 MM/HR
NT-PROBNP SERPL-MCNC: 2221 PG/ML

## 2021-08-02 ENCOUNTER — APPOINTMENT (OUTPATIENT)
Dept: ELECTROPHYSIOLOGY | Facility: CLINIC | Age: 76
End: 2021-08-02
Payer: MEDICARE

## 2021-08-02 ENCOUNTER — NON-APPOINTMENT (OUTPATIENT)
Age: 76
End: 2021-08-02

## 2021-08-02 VITALS
BODY MASS INDEX: 19.33 KG/M2 | DIASTOLIC BLOOD PRESSURE: 90 MMHG | SYSTOLIC BLOOD PRESSURE: 160 MMHG | TEMPERATURE: 98.4 F | OXYGEN SATURATION: 99 % | HEIGHT: 65 IN | WEIGHT: 116 LBS | HEART RATE: 97 BPM

## 2021-08-02 PROCEDURE — 93000 ELECTROCARDIOGRAM COMPLETE: CPT

## 2021-08-02 PROCEDURE — 99213 OFFICE O/P EST LOW 20 MIN: CPT

## 2021-08-02 NOTE — REASON FOR VISIT
[Symptom and Test Evaluation] : symptom and test evaluation [Other: ____] : [unfilled] [Family Member] : family member

## 2021-08-02 NOTE — REVIEW OF SYSTEMS
[Feeling Fatigued] : feeling fatigued [Weakness] : weakness [Fever] : no fever [Chills] : no chills [SOB] : no shortness of breath [Dyspnea on exertion] : not dyspnea during exertion [Chest Discomfort] : no chest discomfort [Leg Claudication] : no intermittent leg claudication [Palpitations] : no palpitations [Orthopnea] : no orthopnea [Syncope] : no syncope [Cough] : no cough [Nausea] : no nausea [Vomiting] : no vomiting [Joint Swelling] : no joint swelling [Joint Stiffness] : no joint stiffness [Rash] : no rash [Dizziness] : no dizziness [Easy Bleeding] : no tendency for easy bleeding [Easy Bruising] : no tendency for easy bruising

## 2021-08-02 NOTE — DISCUSSION/SUMMARY
[FreeTextEntry1] : 76 year old female with history of hyperlipidemia, carotid atherosclerosis and remote history of transient global amnesia who presented to [facility] on 6/23/21 with c/o fever and chest pain for 2 days, found to have acute viral pericarditis. She was started on high dose aspirin and colchicine and was discharged home with close outpatient follow up. She states initially she felt better and fever subsided but over the past two days developed overwhelming fatigue and near syncope with recurrent fever. She was seen by her PMD Dr. Alcantar on 7/6/21 and found to have new onset atrial fibrillation with RVR, referred to [facility] ED for further evaluation. Repeat TTE today revealed small pericardial effusion, increased in size from prior TTE on 6/23. \par Seen by EP and started on rate control. Although [id number] 3, anticoagulation was held due to concern for worsening pericardial effusion if initiated. Was discharged on Toprol 75mg with plans for 2 week MCOT and outpatient follow-up. Was treated with colchicine and high dose ASA for pericarditis.  Remained in AF @ 112bpm at cardiology follow-up (7/16/21), with ongoing complaints of pleuritic chest discomfort and SOB.  Was intolerant to colchicine and had complaints of GI upset.  Dr. Gomez stopped colchicine and Aspirin.  Started 20 day Prednisone taper.  Also started Digoxin for increased rate control.  \par \par Significant symptomatic improvement since starting steroid regiment. Repeat TTE (7/19/21) showed decreased size of pericardial effusion, now trivial. Wearing Biotel MCOT.  Recent daily reports shows adequate HR control with average HR in the 70s since starting Digoxin. \par \par Given that pericarditis is now stabilized and improving, we discussed the role of AVEL/DCCV to restore sinus rhythm and decrease likelihood of developing chronic AF diagnosis during the last visit. We discussed the different options of starting AC in preparation for AVEL-DCCV given her Hx of pericarditis and recent pericardial effusion (see my last note for details). She wanted to think about this and to come today for a follow up. Since then, she has been tapering the prednisone, last dose will be on 8/4/2021. She continues to c/o fatigue and low energy, but otherwise denies any CP, SOB or fever. Today, we discussed the options again and she was very clear that she would rather do the admission with inpatient IV heparin, TTE to r/o worsening pericardial effusion then AVEL-DCCV. I told her to anticipate a 48 hours admission. \par \par 1. New onset atrial fibrillation:  Currently appears triggered by acute pericarditis. Now improving with trivial pericardial effusion on steroid taper. Remains with unexplained fatigue and weakness which are likely due to AFib. \par - CHADSVASc = 3 (age, gender)\par - Now rate controlled on Toprol 75mg and Digoxin. \par - Will plan an admission for cardioversion. Will plan an lective admission in the morning with IV heparin for 36 hours then scheduled limited TTE to r/o increase in the pericardial effusion. If no change in the effusion size, can plan for a AVEL DCCV in the same day then to change her to [facility] and DC home later that day with expected hospital stay of 48- 72 hours\par - Advised to HOLD digoxin the day before the DCCV. If remained in sinus after DCCV then will stop digoxin and c/w metoprolol\par - Will do outpatient MCOT after the DCCV, then can consider ILR for longer term monitoring based on the short term results \par \par 2. pericarditis w/ trivial pericardial effusion\par - Improving on steroids.  \par - Potential for symptomatic recurrence once steroids stopped was discussed\par - Monitor symptoms closely and continue cardiology f/up with Dr. Gomez\par

## 2021-08-02 NOTE — HISTORY OF PRESENT ILLNESS
[FreeTextEntry1] : 76 year old female with history of hyperlipidemia, carotid atherosclerosis and remote history of transient global amnesia who presented to [facility] on 6/23/21 with c/o fever and chest pain for 2 days, found to have acute viral pericarditis. She was started on high dose aspirin and colchicine and was discharged home with close outpatient follow up. She states initially she felt better and fever subsided but over the past two days developed overwhelming fatigue and near syncope with recurrent fever. She was seen by her PMD Dr. Alcantar on 7/6/21 and found to have new onset atrial fibrillation with RVR, referred to [facility] ED for further evaluation. Repeat TTE today revealed small pericardial effusion, increased in size from prior TTE on 6/23. \par Seen by EP and started on rate control. Although [id number] 3, anticoagulation was held due to concern for worsening pericardial effusion if initiated. Was discharged on Toprol 75mg with plans for 2 week MCOT and outpatient follow-up. Was treated with colchicine and high dose ASA for pericarditis.  Remained in AF @ 112bpm at cardiology follow-up (7/16/21), with ongoing complaints of pleuritic chest discomfort and SOB.  \par Was intolerant to colchicine and had complaints of GI upset.  Dr. Gomez stopped colchicine and Aspirin.  Started 20 day Prednisone taper.  Also started Digoxin for increased rate control.  \par \par Significant symptomatic improvement since started steroid regiment. Repeat TTE (7/19/21) showed decreased size of pericardial effusion, now trivial. Wearing Biotel MCOT.  Recently daily report shows adequate HR control with average HR in the 70s since starting Digoxin. \par \par Above from last visit note on 7/26/21. At that time, we discussed the different options of starting AC in preparation for AVEL-DCCV given her Hx of pericarditis and recent pericardial effusion (see my last note for details). She wanted to think about this and to come today for a follow up. Since then, she has been tapering the prednisone, last dose will be on 8/4/2021. She continues to c/o fatigue and low energy, but otherwise denies any CP, SOB or fever. Today, we discussed the options again and she was very clear that she would rather do the admission with inpatient IV heparin, TTE to r/o worsening pericardial effusion then AVEL-DCCV. I told her to anticipate a 48 hours admission.

## 2021-08-02 NOTE — CARDIOLOGY SUMMARY
[de-identified] : EKG 8/2/2021: AFIB AT 88 BPM with PRWP and diffuse nonspecific T wave abnormality\par 7/26/21:  AF @ 66bpm, normal axis, nonspecific T wave abnormality

## 2021-08-04 ENCOUNTER — NON-APPOINTMENT (OUTPATIENT)
Age: 76
End: 2021-08-04

## 2021-08-04 ENCOUNTER — INPATIENT (INPATIENT)
Facility: HOSPITAL | Age: 76
LOS: 5 days | Discharge: HOME | DRG: 871 | End: 2021-08-10
Attending: INTERNAL MEDICINE | Admitting: INTERNAL MEDICINE
Payer: MEDICARE

## 2021-08-04 VITALS
OXYGEN SATURATION: 95 % | WEIGHT: 111.99 LBS | RESPIRATION RATE: 20 BRPM | HEIGHT: 65 IN | DIASTOLIC BLOOD PRESSURE: 83 MMHG | HEART RATE: 110 BPM | SYSTOLIC BLOOD PRESSURE: 148 MMHG | TEMPERATURE: 100 F

## 2021-08-04 DIAGNOSIS — Z98.890 OTHER SPECIFIED POSTPROCEDURAL STATES: Chronic | ICD-10-CM

## 2021-08-04 DIAGNOSIS — A41.9 SEPSIS, UNSPECIFIED ORGANISM: ICD-10-CM

## 2021-08-04 LAB
ALBUMIN SERPL ELPH-MCNC: 3.5 G/DL — SIGNIFICANT CHANGE UP (ref 3.3–5.2)
ALP SERPL-CCNC: 341 U/L — HIGH (ref 40–120)
ALT FLD-CCNC: 106 U/L — HIGH
ANION GAP SERPL CALC-SCNC: 12 MMOL/L — SIGNIFICANT CHANGE UP (ref 5–17)
APPEARANCE UR: CLEAR — SIGNIFICANT CHANGE UP
APTT BLD: 28.3 SEC — SIGNIFICANT CHANGE UP (ref 27.5–35.5)
AST SERPL-CCNC: 77 U/L — HIGH
BACTERIA # UR AUTO: ABNORMAL
BASOPHILS # BLD AUTO: 0.02 K/UL — SIGNIFICANT CHANGE UP (ref 0–0.2)
BASOPHILS NFR BLD AUTO: 0.1 % — SIGNIFICANT CHANGE UP (ref 0–2)
BILIRUB SERPL-MCNC: 1.1 MG/DL — SIGNIFICANT CHANGE UP (ref 0.4–2)
BILIRUB UR-MCNC: NEGATIVE — SIGNIFICANT CHANGE UP
BUN SERPL-MCNC: 14.5 MG/DL — SIGNIFICANT CHANGE UP (ref 8–20)
CALCIUM SERPL-MCNC: 9.1 MG/DL — SIGNIFICANT CHANGE UP (ref 8.6–10.2)
CHLORIDE SERPL-SCNC: 95 MMOL/L — LOW (ref 98–107)
CO2 SERPL-SCNC: 26 MMOL/L — SIGNIFICANT CHANGE UP (ref 22–29)
COLOR SPEC: YELLOW — SIGNIFICANT CHANGE UP
CREAT SERPL-MCNC: 0.77 MG/DL — SIGNIFICANT CHANGE UP (ref 0.5–1.3)
DIFF PNL FLD: ABNORMAL
EOSINOPHIL # BLD AUTO: 0.01 K/UL — SIGNIFICANT CHANGE UP (ref 0–0.5)
EOSINOPHIL NFR BLD AUTO: 0.1 % — SIGNIFICANT CHANGE UP (ref 0–6)
EPI CELLS # UR: SIGNIFICANT CHANGE UP
GLUCOSE SERPL-MCNC: 112 MG/DL — HIGH (ref 70–99)
GLUCOSE UR QL: NEGATIVE MG/DL — SIGNIFICANT CHANGE UP
HCT VFR BLD CALC: 40.3 % — SIGNIFICANT CHANGE UP (ref 34.5–45)
HGB BLD-MCNC: 12.8 G/DL — SIGNIFICANT CHANGE UP (ref 11.5–15.5)
IMM GRANULOCYTES NFR BLD AUTO: 0.5 % — SIGNIFICANT CHANGE UP (ref 0–1.5)
INR BLD: 1.22 RATIO — HIGH (ref 0.88–1.16)
KETONES UR-MCNC: NEGATIVE — SIGNIFICANT CHANGE UP
LACTATE BLDV-MCNC: 1.7 MMOL/L — SIGNIFICANT CHANGE UP (ref 0.5–2)
LEUKOCYTE ESTERASE UR-ACNC: ABNORMAL
LYMPHOCYTES # BLD AUTO: 1.11 K/UL — SIGNIFICANT CHANGE UP (ref 1–3.3)
LYMPHOCYTES # BLD AUTO: 6.3 % — LOW (ref 13–44)
MCHC RBC-ENTMCNC: 29.6 PG — SIGNIFICANT CHANGE UP (ref 27–34)
MCHC RBC-ENTMCNC: 31.8 GM/DL — LOW (ref 32–36)
MCV RBC AUTO: 93.1 FL — SIGNIFICANT CHANGE UP (ref 80–100)
MONOCYTES # BLD AUTO: 0.72 K/UL — SIGNIFICANT CHANGE UP (ref 0–0.9)
MONOCYTES NFR BLD AUTO: 4.1 % — SIGNIFICANT CHANGE UP (ref 2–14)
NEUTROPHILS # BLD AUTO: 15.56 K/UL — HIGH (ref 1.8–7.4)
NEUTROPHILS NFR BLD AUTO: 88.9 % — HIGH (ref 43–77)
NITRITE UR-MCNC: NEGATIVE — SIGNIFICANT CHANGE UP
PH UR: 7 — SIGNIFICANT CHANGE UP (ref 5–8)
PLATELET # BLD AUTO: 228 K/UL — SIGNIFICANT CHANGE UP (ref 150–400)
POTASSIUM SERPL-MCNC: 4.1 MMOL/L — SIGNIFICANT CHANGE UP (ref 3.5–5.3)
POTASSIUM SERPL-SCNC: 4.1 MMOL/L — SIGNIFICANT CHANGE UP (ref 3.5–5.3)
PROT SERPL-MCNC: 7 G/DL — SIGNIFICANT CHANGE UP (ref 6.6–8.7)
PROT UR-MCNC: 15 MG/DL
PROTHROM AB SERPL-ACNC: 14 SEC — HIGH (ref 10.6–13.6)
RAPID RVP RESULT: SIGNIFICANT CHANGE UP
RBC # BLD: 4.33 M/UL — SIGNIFICANT CHANGE UP (ref 3.8–5.2)
RBC # FLD: 14.6 % — HIGH (ref 10.3–14.5)
RBC CASTS # UR COMP ASSIST: ABNORMAL /HPF (ref 0–4)
SARS-COV-2 RNA SPEC QL NAA+PROBE: SIGNIFICANT CHANGE UP
SODIUM SERPL-SCNC: 132 MMOL/L — LOW (ref 135–145)
SP GR SPEC: 1 — LOW (ref 1.01–1.02)
TROPONIN T SERPL-MCNC: <0.01 NG/ML — SIGNIFICANT CHANGE UP (ref 0–0.06)
UROBILINOGEN FLD QL: NEGATIVE MG/DL — SIGNIFICANT CHANGE UP
WBC # BLD: 17.5 K/UL — HIGH (ref 3.8–10.5)
WBC # FLD AUTO: 17.5 K/UL — HIGH (ref 3.8–10.5)
WBC UR QL: SIGNIFICANT CHANGE UP

## 2021-08-04 PROCEDURE — 93010 ELECTROCARDIOGRAM REPORT: CPT

## 2021-08-04 PROCEDURE — 99223 1ST HOSP IP/OBS HIGH 75: CPT

## 2021-08-04 PROCEDURE — 99291 CRITICAL CARE FIRST HOUR: CPT | Mod: CS

## 2021-08-04 PROCEDURE — 71045 X-RAY EXAM CHEST 1 VIEW: CPT | Mod: 26

## 2021-08-04 PROCEDURE — 71250 CT THORAX DX C-: CPT | Mod: 26,MA

## 2021-08-04 RX ORDER — DIGOXIN 250 MCG
125 TABLET ORAL DAILY
Refills: 0 | Status: DISCONTINUED | OUTPATIENT
Start: 2021-08-04 | End: 2021-08-10

## 2021-08-04 RX ORDER — VANCOMYCIN HCL 1 G
750 VIAL (EA) INTRAVENOUS EVERY 12 HOURS
Refills: 0 | Status: DISCONTINUED | OUTPATIENT
Start: 2021-08-04 | End: 2021-08-05

## 2021-08-04 RX ORDER — PIPERACILLIN AND TAZOBACTAM 4; .5 G/20ML; G/20ML
3.38 INJECTION, POWDER, LYOPHILIZED, FOR SOLUTION INTRAVENOUS EVERY 8 HOURS
Refills: 0 | Status: DISCONTINUED | OUTPATIENT
Start: 2021-08-04 | End: 2021-08-10

## 2021-08-04 RX ORDER — SODIUM CHLORIDE 9 MG/ML
1700 INJECTION, SOLUTION INTRAVENOUS ONCE
Refills: 0 | Status: COMPLETED | OUTPATIENT
Start: 2021-08-04 | End: 2021-08-04

## 2021-08-04 RX ORDER — PANTOPRAZOLE SODIUM 20 MG/1
40 TABLET, DELAYED RELEASE ORAL
Refills: 0 | Status: DISCONTINUED | OUTPATIENT
Start: 2021-08-04 | End: 2021-08-10

## 2021-08-04 RX ORDER — METOPROLOL TARTRATE 50 MG
75 TABLET ORAL DAILY
Refills: 0 | Status: DISCONTINUED | OUTPATIENT
Start: 2021-08-04 | End: 2021-08-10

## 2021-08-04 RX ORDER — ACETAMINOPHEN 500 MG
975 TABLET ORAL ONCE
Refills: 0 | Status: COMPLETED | OUTPATIENT
Start: 2021-08-04 | End: 2021-08-04

## 2021-08-04 RX ORDER — PIPERACILLIN AND TAZOBACTAM 4; .5 G/20ML; G/20ML
3.38 INJECTION, POWDER, LYOPHILIZED, FOR SOLUTION INTRAVENOUS ONCE
Refills: 0 | Status: COMPLETED | OUTPATIENT
Start: 2021-08-04 | End: 2021-08-04

## 2021-08-04 RX ORDER — ATORVASTATIN CALCIUM 80 MG/1
20 TABLET, FILM COATED ORAL
Refills: 0 | Status: DISCONTINUED | OUTPATIENT
Start: 2021-08-04 | End: 2021-08-10

## 2021-08-04 RX ORDER — L.ACIDOPH/B.ANIMALIS/B.LONGUM 15B CELL
1 CAPSULE ORAL
Qty: 0 | Refills: 0 | DISCHARGE

## 2021-08-04 RX ORDER — IBUPROFEN 200 MG
600 TABLET ORAL ONCE
Refills: 0 | Status: COMPLETED | OUTPATIENT
Start: 2021-08-04 | End: 2021-08-04

## 2021-08-04 RX ORDER — VANCOMYCIN HCL 1 G
1000 VIAL (EA) INTRAVENOUS ONCE
Refills: 0 | Status: COMPLETED | OUTPATIENT
Start: 2021-08-04 | End: 2021-08-04

## 2021-08-04 RX ORDER — ACETAMINOPHEN 500 MG
650 TABLET ORAL EVERY 6 HOURS
Refills: 0 | Status: DISCONTINUED | OUTPATIENT
Start: 2021-08-04 | End: 2021-08-07

## 2021-08-04 RX ADMIN — Medication 600 MILLIGRAM(S): at 13:50

## 2021-08-04 RX ADMIN — SODIUM CHLORIDE 1700 MILLILITER(S): 9 INJECTION, SOLUTION INTRAVENOUS at 13:50

## 2021-08-04 RX ADMIN — ATORVASTATIN CALCIUM 20 MILLIGRAM(S): 80 TABLET, FILM COATED ORAL at 23:43

## 2021-08-04 RX ADMIN — PIPERACILLIN AND TAZOBACTAM 3.38 GRAM(S): 4; .5 INJECTION, POWDER, LYOPHILIZED, FOR SOLUTION INTRAVENOUS at 15:01

## 2021-08-04 RX ADMIN — Medication 975 MILLIGRAM(S): at 13:50

## 2021-08-04 RX ADMIN — Medication 250 MILLIGRAM(S): at 15:04

## 2021-08-04 RX ADMIN — PIPERACILLIN AND TAZOBACTAM 200 GRAM(S): 4; .5 INJECTION, POWDER, LYOPHILIZED, FOR SOLUTION INTRAVENOUS at 14:07

## 2021-08-04 NOTE — H&P ADULT - ASSESSMENT
76yoF hx remote hx of transient global amnesia, carotid artery atherosclerosis, HLD, recently diagnosed viral pericarditis (6/23/2021), complicated by new onset Afib with RVR requiring [facility] admission in 7/2021, not on AC due to known small pericardial effusion, who presents with fevers and cough    Sepsis due to suspected bacterial PNA with small parapneumonic effusion   -Febrile, tachycardia, leukocytosis, CT chest with small L-pleural effusion   -In ED received vanco and zosyn, will continue with vanco and zosyn given recent hospitalization and risk of MDR organisms  -Received 1.7L of LR in ED, lactate 1.7  -Will hold off on maintenance IVF given normotensive and normal lactate and recent TTE showing grade 3 diastolic dysfunction   -Blood cultures pending  -Tylenol PRN  -Attempt therapy with IV abx first, if worsening clinical status, recheck chest imaging and consider CT surgery evaluation given small partially loculated effusion     Hyponatremia  -Mild, likely hypovolemic hyponatremia, received IVF, repeat BMP in AM    Afib  -CHADsVASc 3, but AC not started due to concern for worsening pericardial effusion if initiated  -Metoprolol and digoxin resumed  -Check digoxin level     Hx viral pericarditis with small pericardial effusion  -Was previously on ASA, colchicine, both d/c’ed by cardiology due to GI upset  -S/p prednisone course, last dose on 8/4, completed dose prior to admission  -Pt persistent small pericardial effusion on CT    Hx HLD  -On rosuvastatin, interchange with atorvastatin     Hx GERD  -On omeprazole, interchange with pantoprazole    Prophylactic measure  -Lovenox

## 2021-08-04 NOTE — ED PROVIDER NOTE - PMH
FUO (fever of unknown origin)    H/O pericarditis    HLD (hyperlipidemia)    Osteoarthritis    TIA (transient ischemic attack)

## 2021-08-04 NOTE — H&P ADULT - TIME BILLING
chart review including outpatient records, patient interview.  Plan discussed with patient and ED RN.

## 2021-08-04 NOTE — H&P ADULT - NSICDXPASTMEDICALHX_GEN_ALL_CORE_FT
PAST MEDICAL HISTORY:  Afib     FUO (fever of unknown origin)     H/O pericarditis     HLD (hyperlipidemia)     Osteoarthritis     TIA (transient ischemic attack)

## 2021-08-04 NOTE — ED ADULT TRIAGE NOTE - BP NONINVASIVE DIASTOLIC (MM HG)
PATIENT HISTORY:  Maye Fernandez is a 20 year old female who presents to clinic with a chief complaint of a painful ball of the left foot.  The patient relates that the problem has been going on for several weeks and is getting worse.  The patient relates trying different shoes with little relief.   The patient was referred by Dr. Amezquita for consultation on the left foot.  Any previous notes and studies that pertain to the patient's condition were reviewed.    Pertinent medical, surgical and family history was reviewed in Epic chart.    Medications:   Current Outpatient Medications:      clindamycin (CLEOCIN T) 1 % external lotion, ADILENE EXT TO FACE BID, Disp: 60 mL, Rfl: 1     etonogestrel-ethinyl estradiol (NUVARING) 0.12-0.015 MG/24HR vaginal ring, , Disp: , Rfl:      fluticasone (FLONASE) 50 MCG/ACT nasal spray, , Disp: , Rfl:      nystatin (MYCOSTATIN) [id number] UNIT/ML suspension, Take 5 mLs (500,000 Units) by mouth 4 times daily Swish and spit OK, Disp: 60 mL, Rfl: 0     sulfacetamide sodium-sulfur 10-5 % EMUL, One application daily, Disp: , Rfl:      tretinoin (RETIN-A) 0.05 % external cream, , Disp: , Rfl:      VENTOLIN  (90 Base) MCG/ACT inhaler, INL 2 PFS PO Q 6 H PRF WHZ, Disp: , Rfl:      Allergies:  No Known Allergies    Vitals: LMP 02/21/2021 (Exact Date)   BMI= There is no height or weight on file to calculate BMI.    LOWER EXTREMITY PHYSICAL EXAM    Dermatologic: Skin is intact to left lower extremities without significant lesions, rash or abrasion.        Vascular: DP & PT pulses are intact & regular on the left.   CFT and skin temperature is normal to the left lower extremities.     Neurologic: Lower extremity sensation is intact to light touch.  No evidence of weakness in the left lower extremities.        Musculoskeletal: Patient is ambulatory without assistive device or brace.  No gross ankle deformity noted.  No foot or ankle joint effusion is noted.  Noted pain on palpation on the  plantar aspect of the metatarsophalangeal joint on the left foot.  No surrounding erythema noted.  No ecchymosis noted.    Diagnostics:  Radiographs were evaluated including weightbearing AP, lateral and medial oblique views of the left foot reveals  no cortical erosions or periosteal elevation.  All joint margins appear stable.  There is no apparent fracture or tumor formation noted.  There is no evidence of foreign body.  The images were reviewed with the patient explaining the findings.      ASSESSMENT / PLAN:     ICD-10-CM    1. Left foot pain  M79.672 XR Foot Left G/E 3 Views       I have explained to Maye about the conditions.  We discussed the underlying contributing factors of the condition as well as both conservative and surgical treatment options along with expected length of recovery.  At this time, the patient was instructed on icing, stretching, tissue massage and support.  The patient was fitted with a Dynaflex insert that will aid in offloading the tension forces to the soft tissues and prevent further inflammation.    The patient will return in four weeks for reevaluation if the symptoms do not resolve.      Maye verbalized agreement with and understanding of the rational for the diagnosis and treatment plan.  All questions were answered to best of my ability and the patient's satisfaction. The patient was advised to contact the clinic with any questions that may arise after the clinic visit.      Disclaimer: This note consists of symbols derived from keyboarding, dictation and/or voice recognition software. As a result, there may be errors in the script that have gone undetected. Please consider this when interpreting information found in this chart.       ADRIANA Amador D.P.M., [email or web address].     82

## 2021-08-04 NOTE — H&P ADULT - NSHPPHYSICALEXAM_GEN_ALL_CORE
Vital Signs Last 24 Hrs  T(C): 36.4 (04 Aug 2021 23:09), Max: 40.3 (04 Aug 2021 13:41)  T(F): 97.6 (04 Aug 2021 23:09), Max: 104.5 (04 Aug 2021 13:41)  HR: 80 (04 Aug 2021 23:09) (80 - 110)  BP: 125/77 (04 Aug 2021 23:09) (111/61 - 148/83)  BP(mean): --  RR: 17 (04 Aug 2021 23:09) (17 - 20)  SpO2: 100% (04 Aug 2021 23:09) (95% - 100%)    GENERAL:  Well-appearing, not in acute distress  EYES:  Clear conjunctiva, extraocular movement intact  ENT: Moist mucous membranes  RESP:  Non-labored breathing pattern, lungs clear to ausculation in anterior fields  CV: Regular rate and rhythm, no murmurs appreciated, no lower extremity edema  GI: Soft, non-tender, non-distended  NEURO: Awake, alert, conversant, upper and lower extremity strength 5/5, light touch sensation grossly intact  PSYCH: Calm, cooperative  SKIN: No rash or lesions, warm and dry

## 2021-08-04 NOTE — ED ADULT TRIAGE NOTE - CHIEF COMPLAINT QUOTE
Pt brought in by family c/o fever, elevated HR and chest pain, hx of pericarditis. complains of pain/discomfort

## 2021-08-04 NOTE — ED PROVIDER NOTE - OBJECTIVE STATEMENT
76 year old female with PMH 76 year old female with PMH pericarditis (suspected viral etiology), TIA, afib with planned cardioversion 8/6 presents with fever. pt states that her fever started yesterday to Tmax 104. She reports mild cough, but denies chest pain, SOB, abd pain, nausea, vomiting, diarrhea.

## 2021-08-04 NOTE — H&P ADULT - NSHPLABSRESULTS_GEN_ALL_CORE
12.8   17.50 )-----------( 228      ( 04 Aug 2021 14:24 )             40.3       08-04    132<L>  |  95<L>  |  14.5  ----------------------------<  112<H>  4.1   |  26.0  |  0.77    Ca    9.1      04 Aug 2021 14:24    TPro  7.0  /  Alb  3.5  /  TBili  1.1  /  DBili  x   /  AST  77<H>  /  ALT  106<H>  /  AlkPhos  341<H>  08-04      EKG: Afib,

## 2021-08-04 NOTE — H&P ADULT - HISTORY OF PRESENT ILLNESS
76yoF hx remote hx of transient global amnesia, carotid artery atherosclerosis, HLD, recently diagnosed viral pericarditis (6/23/2021), complicated by new onset Afib with RVR requiring [facility] admission in 7/2021, not on AC due to known small pericardial effusion, who presents with fevers and cough.  Pt reports generalized weakness and decreased exercise tolerance ever since her Afib diagnosis which is a change for her as she was previously very active.  For the past day, she reports chills, worsening fatigue and non-productive cough.  Pt also reports exertional dyspnea which she has felt previously and attributes this to the Afib.  During her last admission, pt could not undergo AVEL/DCCV due to concern of unsuccessful conversion of arrythmia in presence of pericarditis. She was last seen by outpatient cardiology on 8/2 where AVEL/DCCV was discussed again and that this procedure would likely be done electively in the hospital, however no official date had been determined.  Pt denies any chest pain, abdominal pain, nausea, vomiting, diarrhea, urinary symptoms or rash.    On admission, pt febrile with Tmax 104.5, tachycardic with HR in 110s.  Labs notable for leukocytosis.  CXR showed new moderate left pleural effusion/adjacent atelectasis.  CT chest showed Small left pleural effusion may be partially loculated and known small pericardial effusion (stated on new in report but seen on previous TTE).

## 2021-08-05 ENCOUNTER — RESULT CHARGE (OUTPATIENT)
Age: 76
End: 2021-08-05

## 2021-08-05 LAB
ANION GAP SERPL CALC-SCNC: 10 MMOL/L — SIGNIFICANT CHANGE UP (ref 5–17)
BASOPHILS # BLD AUTO: 0.02 K/UL — SIGNIFICANT CHANGE UP (ref 0–0.2)
BASOPHILS NFR BLD AUTO: 0.1 % — SIGNIFICANT CHANGE UP (ref 0–2)
BUN SERPL-MCNC: 12.5 MG/DL — SIGNIFICANT CHANGE UP (ref 8–20)
CALCIUM SERPL-MCNC: 8.7 MG/DL — SIGNIFICANT CHANGE UP (ref 8.6–10.2)
CHLORIDE SERPL-SCNC: 98 MMOL/L — SIGNIFICANT CHANGE UP (ref 98–107)
CO2 SERPL-SCNC: 26 MMOL/L — SIGNIFICANT CHANGE UP (ref 22–29)
CREAT SERPL-MCNC: 0.65 MG/DL — SIGNIFICANT CHANGE UP (ref 0.5–1.3)
CRP SERPL-MCNC: 110 MG/L — HIGH
CULTURE RESULTS: SIGNIFICANT CHANGE UP
DIGOXIN SERPL-MCNC: 0.5 NG/ML — LOW (ref 0.8–2)
EOSINOPHIL # BLD AUTO: 0.03 K/UL — SIGNIFICANT CHANGE UP (ref 0–0.5)
EOSINOPHIL NFR BLD AUTO: 0.1 % — SIGNIFICANT CHANGE UP (ref 0–6)
GLUCOSE SERPL-MCNC: 97 MG/DL — SIGNIFICANT CHANGE UP (ref 70–99)
HCT VFR BLD CALC: 37.9 % — SIGNIFICANT CHANGE UP (ref 34.5–45)
HGB BLD-MCNC: 12.3 G/DL — SIGNIFICANT CHANGE UP (ref 11.5–15.5)
IMM GRANULOCYTES NFR BLD AUTO: 0.4 % — SIGNIFICANT CHANGE UP (ref 0–1.5)
LYMPHOCYTES # BLD AUTO: 0.72 K/UL — LOW (ref 1–3.3)
LYMPHOCYTES # BLD AUTO: 3.6 % — LOW (ref 13–44)
MCHC RBC-ENTMCNC: 30 PG — SIGNIFICANT CHANGE UP (ref 27–34)
MCHC RBC-ENTMCNC: 32.5 GM/DL — SIGNIFICANT CHANGE UP (ref 32–36)
MCV RBC AUTO: 92.4 FL — SIGNIFICANT CHANGE UP (ref 80–100)
MONOCYTES # BLD AUTO: 0.68 K/UL — SIGNIFICANT CHANGE UP (ref 0–0.9)
MONOCYTES NFR BLD AUTO: 3.4 % — SIGNIFICANT CHANGE UP (ref 2–14)
NEUTROPHILS # BLD AUTO: 18.66 K/UL — HIGH (ref 1.8–7.4)
NEUTROPHILS NFR BLD AUTO: 92.4 % — HIGH (ref 43–77)
PLATELET # BLD AUTO: 194 K/UL — SIGNIFICANT CHANGE UP (ref 150–400)
POTASSIUM SERPL-MCNC: 3.7 MMOL/L — SIGNIFICANT CHANGE UP (ref 3.5–5.3)
POTASSIUM SERPL-SCNC: 3.7 MMOL/L — SIGNIFICANT CHANGE UP (ref 3.5–5.3)
PROCALCITONIN SERPL-MCNC: 0.2 NG/ML — HIGH (ref 0.02–0.1)
RBC # BLD: 4.1 M/UL — SIGNIFICANT CHANGE UP (ref 3.8–5.2)
RBC # FLD: 14.6 % — HIGH (ref 10.3–14.5)
SODIUM SERPL-SCNC: 134 MMOL/L — LOW (ref 135–145)
SPECIMEN SOURCE: SIGNIFICANT CHANGE UP
VANCOMYCIN TROUGH SERPL-MCNC: 7.3 UG/ML — LOW (ref 10–20)
WBC # BLD: 20.2 K/UL — HIGH (ref 3.8–10.5)
WBC # FLD AUTO: 20.2 K/UL — HIGH (ref 3.8–10.5)

## 2021-08-05 PROCEDURE — 99233 SBSQ HOSP IP/OBS HIGH 50: CPT

## 2021-08-05 PROCEDURE — 99223 1ST HOSP IP/OBS HIGH 75: CPT

## 2021-08-05 PROCEDURE — 99221 1ST HOSP IP/OBS SF/LOW 40: CPT

## 2021-08-05 PROCEDURE — 99222 1ST HOSP IP/OBS MODERATE 55: CPT

## 2021-08-05 PROCEDURE — 93308 TTE F-UP OR LMTD: CPT | Mod: 26

## 2021-08-05 RX ORDER — ENOXAPARIN SODIUM 100 MG/ML
40 INJECTION SUBCUTANEOUS DAILY
Refills: 0 | Status: DISCONTINUED | OUTPATIENT
Start: 2021-08-05 | End: 2021-08-10

## 2021-08-05 RX ORDER — SACCHAROMYCES BOULARDII 250 MG
250 POWDER IN PACKET (EA) ORAL
Refills: 0 | Status: DISCONTINUED | OUTPATIENT
Start: 2021-08-05 | End: 2021-08-10

## 2021-08-05 RX ORDER — VANCOMYCIN HCL 1 G
750 VIAL (EA) INTRAVENOUS
Refills: 0 | Status: DISCONTINUED | OUTPATIENT
Start: 2021-08-05 | End: 2021-08-07

## 2021-08-05 RX ADMIN — PIPERACILLIN AND TAZOBACTAM 25 GRAM(S): 4; .5 INJECTION, POWDER, LYOPHILIZED, FOR SOLUTION INTRAVENOUS at 13:59

## 2021-08-05 RX ADMIN — Medication 125 MICROGRAM(S): at 06:01

## 2021-08-05 RX ADMIN — Medication 250 MILLIGRAM(S): at 17:47

## 2021-08-05 RX ADMIN — PANTOPRAZOLE SODIUM 40 MILLIGRAM(S): 20 TABLET, DELAYED RELEASE ORAL at 06:01

## 2021-08-05 RX ADMIN — Medication 650 MILLIGRAM(S): at 22:57

## 2021-08-05 RX ADMIN — Medication 650 MILLIGRAM(S): at 02:28

## 2021-08-05 RX ADMIN — PIPERACILLIN AND TAZOBACTAM 25 GRAM(S): 4; .5 INJECTION, POWDER, LYOPHILIZED, FOR SOLUTION INTRAVENOUS at 05:58

## 2021-08-05 RX ADMIN — Medication 75 MILLIGRAM(S): at 06:01

## 2021-08-05 RX ADMIN — PIPERACILLIN AND TAZOBACTAM 25 GRAM(S): 4; .5 INJECTION, POWDER, LYOPHILIZED, FOR SOLUTION INTRAVENOUS at 22:42

## 2021-08-05 RX ADMIN — Medication 250 MILLIGRAM(S): at 00:50

## 2021-08-05 RX ADMIN — Medication 250 MILLIGRAM(S): at 12:56

## 2021-08-05 RX ADMIN — ENOXAPARIN SODIUM 40 MILLIGRAM(S): 100 INJECTION SUBCUTANEOUS at 11:38

## 2021-08-05 RX ADMIN — Medication 650 MILLIGRAM(S): at 02:58

## 2021-08-05 NOTE — CONSULT NOTE ADULT - SUBJECTIVE AND OBJECTIVE BOX
[facility]  [facility]   Office: [address] [place] [address]  Telephone: [phone] Fax:[phone]      HPI:  76 year old female currently admitted for pneumonia. She has a history acute viral pericarditis complicated by atrial fibrillation, and in addition a history of HLD, carotid atherosclerosis and transient global amnesia.    The patient was diagnosed with viral pericarditis in 2021 and later presented in 2021 with atrial fibrillation RVR. Given presence of a pericardial effusion she was felt not to be a good candidate for cardioversion since that would require use of anticoagulation carrying the risk of worsening the effusion. She was managed with rate control and saw Dr. Garcia as an outpatient. Most recent echo revealed only a trivial effusion (outpatient echo 21). She is now admitted with a possible pneumonia after presenting with fever and dry cough. Ventricular rates appear well controlled currently.       PAST MEDICAL & SURGICAL HISTORY:  HLD (hyperlipidemia)  Osteoarthritis  FUO (fever of unknown origin)  H/O pericarditis  TIA (transient ischemic attack)  Afib  H/O rotator cuff surgery    REVIEW OF SYSTEMS:  CONSTITUTIONAL: No fever, weight loss, or fatigue  EYES: No visual disturbances  NECK: No pain or stiffness  RESPIRATORY: No cough, wheezing, chills or hemoptysis; No shortness of breath  CARDIOVASCULAR: see HPI  GASTROINTESTINAL: No abdominal or epigastric pain. No nausea, vomiting, or hematemesis; No diarrhea or constipation. No melena or hematochezia.  NEUROLOGICAL: No headaches, memory loss, loss of strength, numbness, or tremors  SKIN: No itching, burning, rashes, or lesions   LYMPH NODES: No enlarged glands  ENDOCRINE: No heat or cold intolerance; No hair loss  PSYCHIATRIC: No depression, anxiety, mood swings, or difficulty sleeping  HEME/LYMPH: No easy bruising, or bleeding gums      MEDICATIONS  (STANDING):  atorvastatin 20 milliGRAM(s) Oral <User Schedule>  digoxin     Tablet 125 MICROGram(s) Oral daily  enoxaparin Injectable 40 milliGRAM(s) SubCutaneous daily  metoprolol succinate ER 75 milliGRAM(s) Oral daily  pantoprazole    Tablet 40 milliGRAM(s) Oral before breakfast  piperacillin/tazobactam IVPB.. 3.375 Gram(s) IV Intermittent every 8 hours  saccharomyces boulardii 250 milliGRAM(s) Oral two times a day  vancomycin  IVPB 750 milliGRAM(s) IV Intermittent <User Schedule>    MEDICATIONS  (PRN):  acetaminophen   Tablet .. 650 milliGRAM(s) Oral every 6 hours PRN Temp greater or equal to 38C (100.4F), Mild Pain (1 - 3), Moderate Pain (4 - 6)      Allergies    sulfa drugs (Hives)    Intolerances    Social History:  No alcohol or tobacco use    FAMILY HISTORY:  FH: heart disease (Father, Mother)  both had CAD, HTN, mother had MI, father had CHF      Vital Signs Last 24 Hrs  T(C): 37.8 (05 Aug 2021 10:06), Max: 40.3 (04 Aug 2021 13:41)  T(F): 100 (05 Aug 2021 10:06), Max: 104.5 (04 Aug 2021 13:41)  HR: 90 (05 Aug 2021 10:06) (80 - 110)  BP: 136/81 (05 Aug 2021 10:06) (111/61 - 148/83)  BP(mean): --  RR: 19 (05 Aug 2021 10:06) (17 - 20)  SpO2: 95% (05 Aug 2021 04:36) (95% - 100%)    Physical Exam:  Constitutional: AAOx3, NAD  Neck: supple, No JVD  Cardiovascular: +S1S2 irregular rhythm, no murmurs, rubs, gallops   Pulmonary: CTA b/l, unlabored, no wheezes, rales. rhonci  Abdomen: +BS, soft NTND  Extremities: no edema b/l, +distal pulses b/l  Neuro: non focal, speech clear, LAWSON x 4    LABS:                        12.3   20.20 )-----------( 194      ( 05 Aug 2021 05:24 )             37.9   08-05    134<L>  |  98  |  12.5  ----------------------------<  97  3.7   |  26.0  |  0.65    Ca    8.7      05 Aug 2021 05:24    TPro  7.0  /  Alb  3.5  /  TBili  1.1  /  DBili  x   /  AST  77<H>  /  ALT  106<H>  /  AlkPhos  341<H>  08  LIVER FUNCTIONS - ( 04 Aug 2021 14:24 )  Alb: 3.5 g/dL / Pro: 7.0 g/dL / ALK PHOS: 341 U/L / ALT: 106 U/L / AST: 77 U/L / GGT: x           PT/INR - ( 04 Aug 2021 14:24 )   PT: 14.0 sec;   INR: 1.22 ratio         PTT - ( 04 Aug 2021 14:24 )  PTT:28.3 secCARDIAC MARKERS ( 04 Aug 2021 14:24 )  x     / <0.01 ng/mL / x     / x     / x          Urinalysis Basic - ( 04 Aug 2021 18:27 )    Color: Yellow / Appearance: Clear / S.005 / pH: x  Gluc: x / Ketone: Negative  / Bili: Negative / Urobili: Negative mg/dL   Blood: x / Protein: 15 mg/dL / Nitrite: Negative   Leuk Esterase: Trace / RBC: 6-10 /HPF / WBC 3-5   Sq Epi: x / Non Sq Epi: Occasional / Bacteria: Occasional      Telemetry: Atrial fibrillation with ventricular rates largely less than 110 bpm

## 2021-08-05 NOTE — CONSULT NOTE ADULT - ASSESSMENT
76 year old female admitted for pneumonia. She was recently diagnosed with acute viral pericarditis complicated by a pericardial effusion and atrial fibrillation. Cardioversion was not performed at the time because that require anticoagulation with risk of worsening effusion. She now presents with fever and cough, admitted as pneumonia. She remains in atrial fibrillation with well controlled rates.     Recommendations:  - Although the effusion was trivial on last echo given ongoing pneumonia this is not the best time for a cardioversion and initiation of anticoagulation  - Recommend continued rate control for now with outpatient follow up with Dr. Garcia to determine timing of cardioversion and initiation of anticoagulation.     Thank you for this consult.

## 2021-08-05 NOTE — CONSULT NOTE ADULT - ASSESSMENT
76yoF hx remote hx of transient global amnesia, carotid artery atherosclerosis, HLD, recently diagnosed viral pericarditis (6/23/2021), complicated by new onset Afib with RVR requiring [facility] admission in 7/2021, not on AC due to known small pericardial effusion, who presents with fevers and cough. On admission, pt febrile with Tmax 104.5, tachycardic with HR in 110s.  Labs notable for leukocytosis.  CXR showed new moderate left pleural effusion/adjacent atelectasis.  CT chest showed Small left pleural effusion may be partially loculated and known small pericardial effusion      76yoF hx remote hx of transient global amnesia, carotid artery atherosclerosis, HLD, recently diagnosed viral pericarditis (6/23/2021), complicated by new onset Afib with RVR requiring [facility] admission in 7/2021, not on AC due to known small pericardial effusion, who presents with fevers and cough. On admission, pt febrile with Tmax 104.5, tachycardic with HR in 110s.  Labs notable for leukocytosis.  CXR showed new moderate left pleural effusion/adjacent atelectasis.  CT chest showed Small left pleural effusion may be partially loculated and known small pericardial effusion     Pericarditis  - just finished prednisone  - off asa, colchince 2/2 GI upset  - TTE 7/8- small pericardial effusion     Pleural Effusion  - Left side, moderate, partially loculated  - ?drainage    Afib   - cont metoprolol  - cont digoxin  - EP aware pt in hospital  - not on AC 2/2 small pericardial effusion   - likely no AVEL DCCV at this time, will confirm with EP

## 2021-08-05 NOTE — PROGRESS NOTE ADULT - ASSESSMENT
The patient is a 76 year old female with a history of TIA and hyperlipidemia   recently diagnosed viral pericarditis (6/23/2021), complicated by new onset Afib with RVR requiring [facility] admission in 7/2021, not on AC due to known small pericardial effusion, who presents with fevers and cough    Assessment/Plan:    1. Sepsis present on admission due to suspected bacterial PNA with small parapneumonic effusion   REcent hospitalization; continue IV vancomcyin and zosyn  ID consult  No evidence of hypoxia  Tylenol PRN  Attempt therapy with IV abx first, if worsening clinical status, recheck chest imaging and consider CT surgery evaluation given small partially loculated effusion     2. Hyponatremia: Improvedl likely hypovolemic hyponatremia    3. Afib CHADsVASc 3, but AC not started due to concern for worsening pericardial effusion if initiated  On metoprolol and digoxin    4. Hx viral pericarditis with small pericardial effusion  Was previously on ASA, colchicine, both d/c’ed by cardiology due to GI upset  S/p prednisone course, last dose on 8/4, completed dose prior to admission  FU echocardiogram  Cardiology following    5. HYperlipidemia on statin     6. GERD on Protonix    VTE_ Lovenox subcut

## 2021-08-05 NOTE — CONSULT NOTE ADULT - SUBJECTIVE AND OBJECTIVE BOX
[facility]  INFECTIOUS DISEASES AND INTERNAL MEDICINE at [place]  =======================================================  Shahriar Galeano MD  Diplomates American Board of Internal Medicine and Infectious Diseases  Tel: [phone]      Fax: [phone]  =======================================================      [id number]  LOUIS MEJIA    CC: Patient is a 76y old  Female who presents with a chief complaint of sepsis due to suspected bacterial PNA (05 Aug 2021 11:40)      76y  Female       Past Medical & Surgical Hx:  PAST MEDICAL & SURGICAL HISTORY:  HLD (hyperlipidemia)    Osteoarthritis    FUO (fever of unknown origin)    H/O pericarditis    TIA (transient ischemic attack)    Afib    H/O rotator cuff surgery            Social Hx:    FAMILY HISTORY:  FH: heart disease (Father, Mother)  both had CAD, HTN, mother had MI, father had CHF        Allergies    sulfa drugs (Hives)    Intolerances             REVIEW OF SYSTEMS:  CONSTITUTIONAL:  No Fever or chills  HEENT:  No diplopia or blurred vision.  No earache, sore throat or runny nose.  CARDIOVASCULAR:  No pressure, squeezing, strangling, tightness, heaviness or aching about the chest, neck, axilla or epigastrium.  RESPIRATORY:  No cough, shortness of breath  GASTROINTESTINAL:  No nausea, vomiting or diarrhea.  GENITOURINARY:  No dysuria, frequency or urgency. No Blood in urine  MUSCULOSKELETAL:  no joint aches, no muscle pain  SKIN:  No change in skin, hair or nails.  NEUROLOGIC:  No Headaches, seizures or weakness.  PSYCHIATRIC:  No disorder of thought or mood.  ENDOCRINE:  No heat or cold intolerance  HEMATOLOGICAL:  No easy bruising or bleeding.       Physical Exam:    GEN: NAD, pleasant  HEENT: normocephalic and atraumatic. EOMI. PERRL.  Anicteric  NECK: Supple.   LUNGS: Clear to auscultation.  HEART: Regular rate and rhythm without murmur.  ABDOMEN: Soft, nontender, and nondistended.  Positive bowel sounds.    : No CVA tenderness  EXTREMITIES: Without any edema.  MSK: No joint swelling  NEUROLOGIC: Cranial nerves II through XII are grossly intact. No Focal Deficits  PSYCHIATRIC: Appropriate affect .  SKIN: No Rash        Vitals:    T(F): 100 (05 Aug 2021 10:06), Max: 100 (05 Aug 2021 10:06)  HR: 90 (05 Aug 2021 10:06)  BP: 136/81 (05 Aug 2021 10:06)  RR: 19 (05 Aug 2021 10:06)  SpO2: 95% (05 Aug 2021 04:36) (95% - 100%)  temp max in last 48H T(F): , Max: 104.5 (08-04-21 @ 13:41)    Current Antibiotics:  piperacillin/tazobactam IVPB.. 3.375 Gram(s) IV Intermittent every 8 hours  vancomycin  IVPB 750 milliGRAM(s) IV Intermittent <User Schedule>    Other medications:  atorvastatin 20 milliGRAM(s) Oral <User Schedule>  digoxin     Tablet 125 MICROGram(s) Oral daily  enoxaparin Injectable 40 milliGRAM(s) SubCutaneous daily  metoprolol succinate ER 75 milliGRAM(s) Oral daily  pantoprazole    Tablet 40 milliGRAM(s) Oral before breakfast  saccharomyces boulardii 250 milliGRAM(s) Oral two times a day                            12.3   20.20 )-----------( 194      ( 05 Aug 2021 05:24 )             37.9     08-05    134<L>  |  98  |  12.5  ----------------------------<  97  3.7   |  26.0  |  0.65    Ca    8.7      05 Aug 2021 05:24    TPro  7.0  /  Alb  3.5  /  TBili  1.1  /  DBili  x   /  AST  77<H>  /  ALT  106<H>  /  AlkPhos  341<H>  08-04    RECENT CULTURES:  08-04 @ 14:28          NotDetec      WBC Count: 20.20 K/uL (08-05-21 @ 05:24)  WBC Count: 17.50 K/uL (08-04-21 @ 14:24)    Creatinine, Serum: 0.65 mg/dL (08-05-21 @ 05:24)  Creatinine, Serum: 0.77 mg/dL (08-04-21 @ 14:24)    C-Reactive Protein, Serum: 110 mg/L (08-05-21 @ 05:24)  C-Reactive Protein, Serum: <4 mg/L (08-04-21 @ 14:24)      Sedimentation Rate, Erythrocyte: 43 mm/hr (08-04-21 @ 14:24)    Procalcitonin, Serum: 0.20 ng/mL (08-05-21 @ 05:24)     SARS-CoV-2: NotDetec (08-04-21 @ 14:28)  Rapid RVP Result: NotDetec (08-04-21 @ 14:28)    COVID-19 PCR: NotDetec (07-06-21 @ 19:01)   [facility]  INFECTIOUS DISEASES AND INTERNAL MEDICINE at [place]  =======================================================  Shahriar Galeano MD  Diplomates American Board of Internal Medicine and Infectious Diseases  Tel: [phone]      Fax: [phone]  =======================================================      [id number]  LOUIS MEJIA    CC: Patient is a 76y old  Female who presents with a chief complaint of sepsis due to suspected bacterial PNA (05 Aug 2021 11:40)    76yoF hx remote hx of transient global amnesia, carotid artery atherosclerosis, HLD, recently diagnosed viral pericarditis (6/23/2021), complicated by new onset Afib with RVR requiring [facility] admission in 7/2021, not on AC due to known small pericardial effusion, who presents with fevers and cough.  Pt reports generalized weakness and decreased exercise tolerance ever since her Afib diagnosis which is a change for her as she was previously very active.  For the past day, she reports chills, worsening fatigue and non-productive cough.  Pt also reports exertional dyspnea which she has felt previously and attributes this to the Afib.  During her last admission, pt could not undergo AVEL/DCCV due to concern of unsuccessful conversion of arrythmia in presence of pericarditis. She was last seen by outpatient cardiology on 8/2 where AVEL/DCCV was discussed again and that this procedure would likely be done electively in the hospital, however no official date had been determined.  Pt denies any chest pain, abdominal pain, nausea, vomiting, diarrhea, urinary symptoms or rash.    On admission, pt febrile with Tmax 104.5, tachycardic with HR in 110s.  Labs notable for leukocytosis.  CXR showed new moderate left pleural effusion/adjacent atelectasis.  CT chest showed Small left pleural effusion may be partially loculated and known small pericardial effusion (stated on new in report but seen on previous TTE).        Past Medical & Surgical Hx:  PAST MEDICAL & SURGICAL HISTORY:  HLD (hyperlipidemia)    Osteoarthritis    FUO (fever of unknown origin)    H/O pericarditis    TIA (transient ischemic attack)    Afib    H/O rotator cuff surgery            Social Hx:    FAMILY HISTORY:  FH: heart disease (Father, Mother)  both had CAD, HTN, mother had MI, father had CHF        Allergies    sulfa drugs (Hives)    Intolerances             REVIEW OF SYSTEMS:  CONSTITUTIONAL:  No Fever or chills  HEENT:  No diplopia or blurred vision.  No earache, sore throat or runny nose.  CARDIOVASCULAR:  No pressure, squeezing, strangling, tightness, heaviness or aching about the chest, neck, axilla or epigastrium.  RESPIRATORY:  No cough, shortness of breath  GASTROINTESTINAL:  No nausea, vomiting or diarrhea.  GENITOURINARY:  No dysuria, frequency or urgency. No Blood in urine  MUSCULOSKELETAL:  no joint aches, no muscle pain  SKIN:  No change in skin, hair or nails.  NEUROLOGIC:  No Headaches, seizures or weakness.  PSYCHIATRIC:  No disorder of thought or mood.  ENDOCRINE:  No heat or cold intolerance  HEMATOLOGICAL:  No easy bruising or bleeding.       Physical Exam:    GEN: NAD, pleasant  HEENT: normocephalic and atraumatic. EOMI. PERRL.  Anicteric  NECK: Supple.   LUNGS: Clear to auscultation.  HEART: Regular rate and rhythm without murmur.  ABDOMEN: Soft, nontender, and nondistended.  Positive bowel sounds.    : No CVA tenderness  EXTREMITIES: Without any edema.  MSK: No joint swelling  NEUROLOGIC: Cranial nerves II through XII are grossly intact. No Focal Deficits  PSYCHIATRIC: Appropriate affect .  SKIN: No Rash        Vitals:    T(F): 100 (05 Aug 2021 10:06), Max: 100 (05 Aug 2021 10:06)  HR: 90 (05 Aug 2021 10:06)  BP: 136/81 (05 Aug 2021 10:06)  RR: 19 (05 Aug 2021 10:06)  SpO2: 95% (05 Aug 2021 04:36) (95% - 100%)  temp max in last 48H T(F): , Max: 104.5 (08-04-21 @ 13:41)    Current Antibiotics:  piperacillin/tazobactam IVPB.. 3.375 Gram(s) IV Intermittent every 8 hours  vancomycin  IVPB 750 milliGRAM(s) IV Intermittent <User Schedule>    Other medications:  atorvastatin 20 milliGRAM(s) Oral <User Schedule>  digoxin     Tablet 125 MICROGram(s) Oral daily  enoxaparin Injectable 40 milliGRAM(s) SubCutaneous daily  metoprolol succinate ER 75 milliGRAM(s) Oral daily  pantoprazole    Tablet 40 milliGRAM(s) Oral before breakfast  saccharomyces boulardii 250 milliGRAM(s) Oral two times a day                            12.3   20.20 )-----------( 194      ( 05 Aug 2021 05:24 )             37.9     08-05    134<L>  |  98  |  12.5  ----------------------------<  97  3.7   |  26.0  |  0.65    Ca    8.7      05 Aug 2021 05:24    TPro  7.0  /  Alb  3.5  /  TBili  1.1  /  DBili  x   /  AST  77<H>  /  ALT  106<H>  /  AlkPhos  341<H>  08-04    RECENT CULTURES:  08-04 @ 14:28          NotDete      WBC Count: 20.20 K/uL (08-05-21 @ 05:24)  WBC Count: 17.50 K/uL (08-04-21 @ 14:24)    Creatinine, Serum: 0.65 mg/dL (08-05-21 @ 05:24)  Creatinine, Serum: 0.77 mg/dL (08-04-21 @ 14:24)    C-Reactive Protein, Serum: 110 mg/L (08-05-21 @ 05:24)  C-Reactive Protein, Serum: <4 mg/L (08-04-21 @ 14:24)      Sedimentation Rate, Erythrocyte: 43 mm/hr (08-04-21 @ 14:24)    Procalcitonin, Serum: 0.20 ng/mL (08-05-21 @ 05:24)     SARS-CoV-2: NotDeteroxanne (08-04-21 @ 14:28)  Rapid RVP Result: NotDete (08-04-21 @ 14:28)    COVID-19 PCR: [place] (07-06-21 @ 19:01)      < from: CT Chest No Cont (08.04.21 @ 19:18) >  IMPRESSION:  Small pericardial effusion is new from the prior study.    Small left pleural effusion may be partially loculated.    --- End of Report ---      < end of copied text >

## 2021-08-05 NOTE — CONSULT NOTE ADULT - ASSESSMENT
76yoF hx remote hx of transient global amnesia, carotid artery atherosclerosis, HLD, recently diagnosed viral pericarditis (6/23/2021), complicated by new onset Afib with RVR requiring [facility] admission in 7/2021, not on AC due to known small pericardial effusion, who presents with fevers and cough.  Pt reports generalized weakness and decreased exercise tolerance ever since her Afib diagnosis which is a change for her as she was previously very active.  For the past day, she reports chills, worsening fatigue and non-productive cough.  Pt also reports exertional dyspnea which she has felt previously and attributes this to the Afib.  During her last admission, pt could not undergo AVEL/DCCV due to concern of unsuccessful conversion of arrythmia in presence of pericarditis. She was last seen by outpatient cardiology on 8/2 where AVEL/DCCV was discussed again and that this procedure would likely be done electively in the hospital, however no official date had been determined.  Pt denies any chest pain, abdominal pain, nausea, vomiting, diarrhea, urinary symptoms or rash.    On admission, pt febrile with Tmax 104.5, tachycardic with HR in 110s.  Labs notable for leukocytosis.  CXR showed new moderate left pleural effusion/adjacent atelectasis.  CT chest showed Small left pleural effusion may be partially loculated and known small pericardial effusion (stated on new in report but seen on previous TTE).      Leukocytosis  Fever  Loculated effusion    - febrile in ED  - no sputum  - check mrsa screen  - Continue zosyn/vancomycin  - per daughter pt more confused-?ct head  - Trend Fever  - Trend Leukocytosis    d/w attending  Will Follow

## 2021-08-05 NOTE — CONSULT NOTE ADULT - SUBJECTIVE AND OBJECTIVE BOX
[facility]                                                               Office:  [address] [place] [address]                                                              Telephone: [phone]. Fax:[phone]                                                                        CARDIOLOGY CONSULTATION NOTE                                                                                             History obtained by: Patient and medical record   obtained: No  Cardiologist:    Chief complaint:    Patient is a 76y old  Female who presents with a chief complaint of sepsis due to suspected bacterial PNA (04 Aug 2021 22:13)    HPI:  76yoF hx remote hx of transient global amnesia, carotid artery atherosclerosis, HLD, recently diagnosed viral pericarditis (2021), complicated by new onset Afib with RVR requiring [facility] admission in 2021, not on AC due to known small pericardial effusion, who presents with fevers and cough. On admission, pt febrile with Tmax 104.5, tachycardic with HR in 110s.  Labs notable for leukocytosis.  CXR showed new moderate left pleural effusion/adjacent atelectasis.  CT chest showed Small left pleural effusion may be partially loculated and known small pericardial effusion       REVIEW OF SYMPTOMS:   CONSTITUTIONAL: No fever, no weight loss, no fatigue, no weight gain   ENMT:  No difficulty hearing, tinnitus, vertigo; No sinus or throat pain  NECK: No pain or stiffness  CARDIOVASCULAR: + chest pain, + dyspnea, no syncope, no palpitations, + dizziness, no Orthopnea, no Paroxsymal nocturnal dyspnea  RESPIRATORY: + Dyspnea on exertion, + Shortness of breath, no cough, no wheezing  : No dysuria, no hematuria   GI: No dark color stool, no melena, no diarrhea, no constipation, no abdominal pain   NEURO: No headache, no dizziness, no slurred speech   MUSCULOSKELETAL: No joint pain or swelling; No muscle, back, or extremity pain  PSYCH: No agitation, no anxiety.    ALL OTHER REVIEW OF SYSTEMS ARE NEGATIVE.      PREVIOUS DIAGNOSTIC TESTING  ECHO FINDINGS:  < from: TTE Echo Limited or F/U (21 @ 10:51) >    Summary:   1. Left ventricular ejection fraction, by visual estimation, is 50 to 55%.   2. Spectral Doppler shows restrictive pattern of left ventricular myocardial filling (Grade III diastolic dysfunction).   3. Small pericardial effusion.    MD Stef Electronically signed on 2021 at 1:06:04 PM    < end of copied text >      ALLERGIES: Allergies  sulfa drugs (Hives)  Intolerances      PAST MEDICAL HISTORY  HLD (hyperlipidemia)  Osteoarthritis  FUO (fever of unknown origin)  H/Opericarditis  TIA (transient ischemic attack)  Afib      PAST SURGICAL HISTORY  H/O rotator cuff surgery        FAMILY HISTORY:  FH: heart disease (Father, Mother)  both had CAD, HTN, mother had MI, father had CHF      SOCIAL HISTORY:  Denies smoking/alcohol/drugs        CURRENT MEDICATIONS:  digoxin     Tablet 125 MICROGram(s) Oral daily  metoprolol succinate ER 75 milliGRAM(s) Oral daily  pantoprazole    Tablet  atorvastatin  enoxaparin Injectable  piperacillin/tazobactam IVPB..  vancomycin  IVPB      HOME MEDICATIONS:      Vital Signs Last 24 Hrs  T(C): 37.3 (05 Aug 2021 04:36), Max: 40.3 (04 Aug 2021 13:41)  T(F): 99.2 (05 Aug 2021 04:36), Max: 104.5 (04 Aug 2021 13:41)  HR: 99 (05 Aug 2021 04:36) (80 - 110)  BP: 126/76 (05 Aug 2021 04:36) (111/61 - 148/83)  RR: 18 (05 Aug 2021 04:36) (17 - 20)  SpO2: 95% (05 Aug 2021 04:36) (95% - 100%)      PHYSICAL EXAM:  Constitutional: Comfortable . No acute distress.   HEENT: Atraumatic and normocephalic , neck is supple . no JVD. No carotid bruit. PEERL   CNS: A&Ox3. No focal deficits. EOMI. Cranial nerves II-IX are intact.   Respiratory: CTAB, ulabored   Cardiovascular: S1S2 RRR. No murmur/rubs or gallop.  Gastrointestinal: Soft non-tender and non distended . +Bowel sounds. negative Conner's sign.  Extremities: No edema.   Psychiatric: Calm . no agitation.  Skin: No skin rash/ulcers visualized to face, hands or feet.    Intake and output:     LABS:                        12.3   20.20 )-----------( 194      ( 05 Aug 2021 05:24 )             37.9     08-05    134<L>  |  98  |  12.5  ----------------------------<  97  3.7   |  26.0  |  0.65    Ca    8.7      05 Aug 2021 05:24    TPro  7.0  /  Alb  3.5  /  TBili  1.1  /  DBili  x   /  AST  77<H>  /  ALT  106<H>  /  AlkPhos  341<H>  08-04    CARDIAC MARKERS ( 04 Aug 2021 14:24 )  x     / <0.01 ng/mL / x     / x     / x        ;p-BNP=  PT/INR - ( 04 Aug 2021 14:24 )   PT: 14.0 sec;   INR: 1.22 ratio         PTT - ( 04 Aug 2021 14:24 )  PTT:28.3 sec  Urinalysis Basic - ( 04 Aug 2021 18:27 )    Color: Yellow / Appearance: Clear / S.005 / pH: x  Gluc: x / Ketone: Negative  / Bili: Negative / Urobili: Negative mg/dL   Blood: x / Protein: 15 mg/dL / Nitrite: Negative   Leuk Esterase: Trace / RBC: 6-10 /HPF / WBC 3-5   Sq Epi: x / Non Sq Epi: Occasional / Bacteria: Occasional        INTERPRETATION OF TELEMETRY: Reviewed by me.   ECG: Reviewed by me.     RADIOLOGY & ADDITIONAL STUDIES:    X-ray:  reviewed by me.   CT scan:   MRI:                                                                          [facility]                                                               Office:  [address] [place] [address]                                                              Telephone: [phone]. Fax:[phone]                                                                        CARDIOLOGY CONSULTATION NOTE                                                                                             History obtained by: Patient and medical record   obtained: No  Cardiologist:    Chief complaint:    Patient is a 76y old  Female who presents with a chief complaint of sepsis due to suspected bacterial PNA (04 Aug 2021 22:13)    HPI:  76yoF hx remote hx of transient global amnesia, carotid artery atherosclerosis, HLD, recently diagnosed viral pericarditis (2021), complicated by new onset Afib with RVR requiring [facility] admission in 2021, not on AC due to known small pericardial effusion, who presents with fevers and cough. On admission, pt febrile with Tmax 104.5, tachycardic with HR in 110s.  Labs notable for leukocytosis.  CXR showed new moderate left pleural effusion/adjacent atelectasis.  CT chest showed Small left pleural effusion may be partially loculated and known small pericardial effusion. Endorses chest pain, similar to last month with pericarditis.       REVIEW OF SYMPTOMS:   CONSTITUTIONAL: No fever, no weight loss, no fatigue, no weight gain   ENMT:  No difficulty hearing, tinnitus, vertigo; No sinus or throat pain  NECK: No pain or stiffness  CARDIOVASCULAR: + chest pain, + dyspnea, no syncope, no palpitations, + dizziness, no Orthopnea, no Paroxsymal nocturnal dyspnea  RESPIRATORY: + Dyspnea on exertion, + Shortness of breath, no cough, no wheezing  : No dysuria, no hematuria   GI: No dark color stool, no melena, no diarrhea, no constipation, no abdominal pain   NEURO: No headache, no dizziness, no slurred speech   MUSCULOSKELETAL: No joint pain or swelling; No muscle, back, or extremity pain  PSYCH: No agitation, no anxiety.    ALL OTHER REVIEW OF SYSTEMS ARE NEGATIVE.      PREVIOUS DIAGNOSTIC TESTING  ECHO FINDINGS:  < from: TTE Echo Limited or F/U (21 @ 10:51) >    Summary:   1. Left ventricular ejection fraction, by visual estimation, is 50 to 55%.   2. Spectral Doppler shows restrictive pattern of left ventricular myocardial filling (Grade III diastolic dysfunction).   3. Small pericardial effusion.    MD Stef Electronically signed on 2021 at 1:06:04 PM    < end of copied text >      ALLERGIES: Allergies  sulfa drugs (Hives)  Intolerances      PAST MEDICAL HISTORY  HLD (hyperlipidemia)  Osteoarthritis  FUO (fever of unknown origin)  H/Opericarditis  TIA (transient ischemic attack)  Afib      PAST SURGICAL HISTORY  H/O rotator cuff surgery        FAMILY HISTORY:  FH: heart disease (Father, Mother)  both had CAD, HTN, mother had MI, father had CHF      SOCIAL HISTORY:  Denies smoking/alcohol/drugs        CURRENT MEDICATIONS:  digoxin     Tablet 125 MICROGram(s) Oral daily  metoprolol succinate ER 75 milliGRAM(s) Oral daily  pantoprazole    Tablet  atorvastatin  enoxaparin Injectable  piperacillin/tazobactam IVPB..  vancomycin  IVPB      HOME MEDICATIONS:      Vital Signs Last 24 Hrs  T(C): 37.3 (05 Aug 2021 04:36), Max: 40.3 (04 Aug 2021 13:41)  T(F): 99.2 (05 Aug 2021 04:36), Max: 104.5 (04 Aug 2021 13:41)  HR: 99 (05 Aug 2021 04:36) (80 - 110)  BP: 126/76 (05 Aug 2021 04:36) (111/61 - 148/83)  RR: 18 (05 Aug 2021 04:36) (17 - 20)  SpO2: 95% (05 Aug 2021 04:36) (95% - 100%)      PHYSICAL EXAM:  Constitutional: Comfortable . No acute distress.   HEENT: Atraumatic and normocephalic , neck is supple . no JVD. No carotid bruit. PEERL   CNS: A&Ox3. No focal deficits. EOMI. Cranial nerves II-IX are intact.   Respiratory: CTAB, ulabored   Cardiovascular: S1S2 RRR. No murmur/rubs or gallop.  Gastrointestinal: Soft non-tender and non distended . +Bowel sounds. negative Conner's sign.  Extremities: No edema.   Psychiatric: Calm . no agitation.  Skin: No skin rash/ulcers visualized to face, hands or feet.    Intake and output:     LABS:                        12.3   20.20 )-----------( 194      ( 05 Aug 2021 05:24 )             37.9     08-05    134<L>  |  98  |  12.5  ----------------------------<  97  3.7   |  26.0  |  0.65    Ca    8.7      05 Aug 2021 05:24    TPro  7.0  /  Alb  3.5  /  TBili  1.1  /  DBili  x   /  AST  77<H>  /  ALT  106<H>  /  AlkPhos  341<H>  08-04    CARDIAC MARKERS ( 04 Aug 2021 14:24 )  x     / <0.01 ng/mL / x     / x     / x        ;p-BNP=  PT/INR - ( 04 Aug 2021 14:24 )   PT: 14.0 sec;   INR: 1.22 ratio         PTT - ( 04 Aug 2021 14:24 )  PTT:28.3 sec  Urinalysis Basic - ( 04 Aug 2021 18:27 )    Color: Yellow / Appearance: Clear / S.005 / pH: x  Gluc: x / Ketone: Negative  / Bili: Negative / Urobili: Negative mg/dL   Blood: x / Protein: 15 mg/dL / Nitrite: Negative   Leuk Esterase: Trace / RBC: 6-10 /HPF / WBC 3-5   Sq Epi: x / Non Sq Epi: Occasional / Bacteria: Occasional        INTERPRETATION OF TELEMETRY:  ECG: Afib rate 110

## 2021-08-05 NOTE — PROGRESS NOTE ADULT - SUBJECTIVE AND OBJECTIVE BOX
CC: Follow up    INTERVAL HPI/OVERNIGHT EVENTS: Patient seen and examined, tmax of 100. Feels overall weak with chills. Non productive cough      Vital Signs Last 24 Hrs  T(C): 37.8 (05 Aug 2021 10:06), Max: 40.3 (04 Aug 2021 13:41)  T(F): 100 (05 Aug 2021 10:06), Max: 104.5 (04 Aug 2021 13:41)  HR: 90 (05 Aug 2021 10:06) (80 - 110)  BP: 136/81 (05 Aug 2021 10:06) (111/61 - 148/83)  BP(mean): --  RR: 19 (05 Aug 2021 10:06) (17 - 20)  SpO2: 95% (05 Aug 2021 04:36) (95% - 100%)    PHYSICAL EXAM:    GENERAL: NAD, AOX3  HEAD:  Atraumatic, Normocephalic  EYES: EOMI, PERRLA, conjunctiva and sclera clear  ENMT: Moist mucous membranes  NECK: Supple, No JVD  CHEST/LUNG: Decreased breath sounds at the lung bases  HEART: IRR; No murmurs, rubs, or gallops  ABDOMEN: Soft, Nontender, Nondistended; Bowel sounds present  EXTREMITIES:  2+ Peripheral Pulses, No clubbing, cyanosis, or edema        MEDICATIONS  (STANDING):  atorvastatin 20 milliGRAM(s) Oral <User Schedule>  digoxin     Tablet 125 MICROGram(s) Oral daily  enoxaparin Injectable 40 milliGRAM(s) SubCutaneous daily  metoprolol succinate ER 75 milliGRAM(s) Oral daily  pantoprazole    Tablet 40 milliGRAM(s) Oral before breakfast  piperacillin/tazobactam IVPB.. 3.375 Gram(s) IV Intermittent every 8 hours  vancomycin  IVPB 750 milliGRAM(s) IV Intermittent <User Schedule>    MEDICATIONS  (PRN):  acetaminophen   Tablet .. 650 milliGRAM(s) Oral every 6 hours PRN Temp greater or equal to 38C (100.4F), Mild Pain (1 - 3), Moderate Pain (4 - 6)      Allergies    sulfa drugs (Hives)    Intolerances          LABS:                          12.3   20.20 )-----------( 194      ( 05 Aug 2021 05:24 )             37.9     08-05    134<L>  |  98  |  12.5  ----------------------------<  97  3.7   |  26.0  |  0.65    Ca    8.7      05 Aug 2021 05:24    TPro  7.0  /  Alb  3.5  /  TBili  1.1  /  DBili  x   /  AST  77<H>  /  ALT  106<H>  /  AlkPhos  341<H>  08-04    PT/INR - ( 04 Aug 2021 14:24 )   PT: 14.0 sec;   INR: 1.22 ratio         PTT - ( 04 Aug 2021 14:24 )  PTT:28.3 sec  Urinalysis Basic - ( 04 Aug 2021 18:27 )    Color: Yellow / Appearance: Clear / S.005 / pH: x  Gluc: x / Ketone: Negative  / Bili: Negative / Urobili: Negative mg/dL   Blood: x / Protein: 15 mg/dL / Nitrite: Negative   Leuk Esterase: Trace / RBC: 6-10 /HPF / WBC 3-5   Sq Epi: x / Non Sq Epi: Occasional / Bacteria: Occasional        RADIOLOGY & ADDITIONAL TESTS:

## 2021-08-05 NOTE — CONSULT NOTE ADULT - ATTENDING COMMENTS
Pt is seen, examined, chart reviewed, d/w np/pa.  Management as outlined above.  Pericarditis: Just  completed prednisone. off asa, colchince 2/2 GI upset.  TTE 7/8- small pericardial effusion   Pleural Effusion:  Left side, moderate, partially loculated. Consider drainage  AF:  cont metoprolol, digoxin.  EP aware pt in hospital.  not on AC 2/2 small pericardial effusion   likely no AVEL DCCV at this time, will confirm with EP

## 2021-08-06 LAB
ANION GAP SERPL CALC-SCNC: 10 MMOL/L — SIGNIFICANT CHANGE UP (ref 5–17)
BUN SERPL-MCNC: 10.1 MG/DL — SIGNIFICANT CHANGE UP (ref 8–20)
CALCIUM SERPL-MCNC: 9.1 MG/DL — SIGNIFICANT CHANGE UP (ref 8.6–10.2)
CHLORIDE SERPL-SCNC: 99 MMOL/L — SIGNIFICANT CHANGE UP (ref 98–107)
CO2 SERPL-SCNC: 26 MMOL/L — SIGNIFICANT CHANGE UP (ref 22–29)
COVID-19 SPIKE DOMAIN AB INTERP: POSITIVE
COVID-19 SPIKE DOMAIN ANTIBODY RESULT: >250 U/ML — HIGH
CREAT SERPL-MCNC: 0.73 MG/DL — SIGNIFICANT CHANGE UP (ref 0.5–1.3)
GLUCOSE SERPL-MCNC: 90 MG/DL — SIGNIFICANT CHANGE UP (ref 70–99)
HCT VFR BLD CALC: 38.2 % — SIGNIFICANT CHANGE UP (ref 34.5–45)
HGB BLD-MCNC: 12.2 G/DL — SIGNIFICANT CHANGE UP (ref 11.5–15.5)
MCHC RBC-ENTMCNC: 29.7 PG — SIGNIFICANT CHANGE UP (ref 27–34)
MCHC RBC-ENTMCNC: 31.9 GM/DL — LOW (ref 32–36)
MCV RBC AUTO: 92.9 FL — SIGNIFICANT CHANGE UP (ref 80–100)
PLATELET # BLD AUTO: 231 K/UL — SIGNIFICANT CHANGE UP (ref 150–400)
POTASSIUM SERPL-MCNC: 4 MMOL/L — SIGNIFICANT CHANGE UP (ref 3.5–5.3)
POTASSIUM SERPL-SCNC: 4 MMOL/L — SIGNIFICANT CHANGE UP (ref 3.5–5.3)
RBC # BLD: 4.11 M/UL — SIGNIFICANT CHANGE UP (ref 3.8–5.2)
RBC # FLD: 14.7 % — HIGH (ref 10.3–14.5)
SARS-COV-2 IGG+IGM SERPL QL IA: >250 U/ML — HIGH
SARS-COV-2 IGG+IGM SERPL QL IA: POSITIVE
SODIUM SERPL-SCNC: 135 MMOL/L — SIGNIFICANT CHANGE UP (ref 135–145)
WBC # BLD: 14.34 K/UL — HIGH (ref 3.8–10.5)
WBC # FLD AUTO: 14.34 K/UL — HIGH (ref 3.8–10.5)

## 2021-08-06 PROCEDURE — 99232 SBSQ HOSP IP/OBS MODERATE 35: CPT

## 2021-08-06 PROCEDURE — 71045 X-RAY EXAM CHEST 1 VIEW: CPT | Mod: 26

## 2021-08-06 PROCEDURE — 99233 SBSQ HOSP IP/OBS HIGH 50: CPT

## 2021-08-06 RX ORDER — LANOLIN ALCOHOL/MO/W.PET/CERES
3 CREAM (GRAM) TOPICAL AT BEDTIME
Refills: 0 | Status: COMPLETED | OUTPATIENT
Start: 2021-08-06 | End: 2021-08-07

## 2021-08-06 RX ADMIN — Medication 75 MILLIGRAM(S): at 11:43

## 2021-08-06 RX ADMIN — PANTOPRAZOLE SODIUM 40 MILLIGRAM(S): 20 TABLET, DELAYED RELEASE ORAL at 06:49

## 2021-08-06 RX ADMIN — ATORVASTATIN CALCIUM 20 MILLIGRAM(S): 80 TABLET, FILM COATED ORAL at 22:29

## 2021-08-06 RX ADMIN — Medication 3 MILLIGRAM(S): at 23:07

## 2021-08-06 RX ADMIN — PIPERACILLIN AND TAZOBACTAM 25 GRAM(S): 4; .5 INJECTION, POWDER, LYOPHILIZED, FOR SOLUTION INTRAVENOUS at 14:32

## 2021-08-06 RX ADMIN — Medication 250 MILLIGRAM(S): at 22:29

## 2021-08-06 RX ADMIN — Medication 650 MILLIGRAM(S): at 18:40

## 2021-08-06 RX ADMIN — Medication 250 MILLIGRAM(S): at 06:49

## 2021-08-06 RX ADMIN — Medication 250 MILLIGRAM(S): at 11:24

## 2021-08-06 RX ADMIN — PIPERACILLIN AND TAZOBACTAM 25 GRAM(S): 4; .5 INJECTION, POWDER, LYOPHILIZED, FOR SOLUTION INTRAVENOUS at 22:33

## 2021-08-06 RX ADMIN — Medication 250 MILLIGRAM(S): at 17:34

## 2021-08-06 RX ADMIN — PIPERACILLIN AND TAZOBACTAM 25 GRAM(S): 4; .5 INJECTION, POWDER, LYOPHILIZED, FOR SOLUTION INTRAVENOUS at 06:49

## 2021-08-06 RX ADMIN — Medication 250 MILLIGRAM(S): at 00:44

## 2021-08-06 RX ADMIN — Medication 125 MICROGRAM(S): at 06:49

## 2021-08-06 NOTE — PROGRESS NOTE ADULT - ASSESSMENT
76yoF hx remote hx of transient global amnesia, carotid artery atherosclerosis, HLD, recently diagnosed viral pericarditis (6/23/2021), complicated by new onset Afib with RVR requiring [facility] admission in 7/2021, not on AC due to known small pericardial effusion, who presents with fevers and cough.  Pt reports generalized weakness and decreased exercise tolerance ever since her Afib diagnosis which is a change for her as she was previously very active.  For the past day, she reports chills, worsening fatigue and non-productive cough.  Pt also reports exertional dyspnea which she has felt previously and attributes this to the Afib.  During her last admission, pt could not undergo AVEL/DCCV due to concern of unsuccessful conversion of arrythmia in presence of pericarditis. She was last seen by outpatient cardiology on 8/2 where AVEL/DCCV was discussed again and that this procedure would likely be done electively in the hospital, however no official date had been determined.  Pt denies any chest pain, abdominal pain, nausea, vomiting, diarrhea, urinary symptoms or rash.    On admission, pt febrile with Tmax 104.5, tachycardic with HR in 110s.  Labs notable for leukocytosis.  CXR showed new moderate left pleural effusion/adjacent atelectasis.  CT chest showed Small left pleural effusion may be partially loculated and known small pericardial effusion (stated on new in report but seen on previous TTE).      Leukocytosis  Fever  Loculated effusion    - febrile in ED now afebrile  - no sputum  - check mrsa screen  - Continue zosyn/vancomycin  - monitor trough according to pharmacy protocol  - BCX ngtd  - if MRSA screen (-) can stop vanco  - agree with repeat imaging to assess effusion and need for drainage    - Trend Fever  - Trend Leukocytosis      Will Follow

## 2021-08-06 NOTE — PHARMACOTHERAPY INTERVENTION NOTE - COMMENTS
s/w MD to order MRSA nasal pcr for possible abx de-escalation
Vancomycin level ordered
Vancomycin level ordered

## 2021-08-06 NOTE — PROGRESS NOTE ADULT - SUBJECTIVE AND OBJECTIVE BOX
CC: Follow up    INTERVAL HPI/OVERNIGHT EVENTS: Patient seen and examined, overnight her daughter noted patient had an episode of confusion which correlated with a TEmp of 100 F. THis morning is awake and alert. Complaints of mild chest discomfort with deep inspiration.       Vital Signs Last 24 Hrs  T(C): 36.7 (06 Aug 2021 11:21), Max: 36.7 (06 Aug 2021 11:21)  T(F): 98.1 (06 Aug 2021 11:21), Max: 98.1 (06 Aug 2021 11:21)  HR: 103 (06 Aug 2021 11:21) (91 - 103)  BP: 128/74 (06 Aug 2021 11:) (120/81 - 128/74)  BP(mean): --  RR: 18 (06 Aug 2021 11:) (18 - 18)  SpO2: 98% (06 Aug 2021 11:) (94% - 98%)    PHYSICAL EXAM:    GENERAL: NAD, AOX4  HEAD:  Atraumatic, Normocephalic  EYES:  conjunctiva and sclera clear  ENMT: Moist mucous membranes  NECK: Supple, No JVD  CHEST/LUNG: Decreased breaths bilateral bases  HEART: Regular rate and rhythm; No murmurs, rubs, or gallops  ABDOMEN: Soft, Nontender, Nondistended; Bowel sounds present  EXTREMITIES:  2+ Peripheral Pulses, No clubbing, cyanosis, or edema        MEDICATIONS  (STANDING):  atorvastatin 20 milliGRAM(s) Oral <User Schedule>  digoxin     Tablet 125 MICROGram(s) Oral daily  enoxaparin Injectable 40 milliGRAM(s) SubCutaneous daily  metoprolol succinate ER 75 milliGRAM(s) Oral daily  pantoprazole    Tablet 40 milliGRAM(s) Oral before breakfast  piperacillin/tazobactam IVPB.. 3.375 Gram(s) IV Intermittent every 8 hours  saccharomyces boulardii 250 milliGRAM(s) Oral two times a day  vancomycin  IVPB 750 milliGRAM(s) IV Intermittent <User Schedule>    MEDICATIONS  (PRN):  acetaminophen   Tablet .. 650 milliGRAM(s) Oral every 6 hours PRN Temp greater or equal to 38C (100.4F), Mild Pain (1 - 3), Moderate Pain (4 - 6)      Allergies    sulfa drugs (Hives)    Intolerances          LABS:                          12.2   14.34 )-----------( 231      ( 06 Aug 2021 07:36 )             38.2     08-    135  |  99  |  10.1  ----------------------------<  90  4.0   |  26.0  |  0.73    Ca    9.1      06 Aug 2021 07:36    TPro  7.0  /  Alb  3.5  /  TBili  1.1  /  DBili  x   /  AST  77<H>  /  ALT  106<H>  /  AlkPhos  341<H>  08-04    PT/INR - ( 04 Aug 2021 14:24 )   PT: 14.0 sec;   INR: 1.22 ratio         PTT - ( 04 Aug 2021 14:24 )  PTT:28.3 sec  Urinalysis Basic - ( 04 Aug 2021 18:27 )    Color: Yellow / Appearance: Clear / S.005 / pH: x  Gluc: x / Ketone: Negative  / Bili: Negative / Urobili: Negative mg/dL   Blood: x / Protein: 15 mg/dL / Nitrite: Negative   Leuk Esterase: Trace / RBC: 6-10 /HPF / WBC 3-5   Sq Epi: x / Non Sq Epi: Occasional / Bacteria: Occasional        RADIOLOGY & ADDITIONAL TESTS:

## 2021-08-06 NOTE — PROGRESS NOTE ADULT - SUBJECTIVE AND OBJECTIVE BOX
[facility]                                                               Office: [address] [address]                                                              Telephone: [phone]. Fax:[phone]                                                                             PROGRESS NOTE  Reason for follow up: Afib RVR  Update: Tele Afib RVR, rates up to 150 overnight.,  Pt states overnight, chills, cold sweats.   high HR likely related to fevers sepsis       Review of symptoms:   Cardiac:  No chest pain. No dyspnea. No palpitations.  Respiratory: no cough. No dyspnea  Gastrointestinal: No diarrhea. No abdominal pain. No bleeding.   Neuro: No focal neuro complaints.      Vitals:  T(C): 36.7 (08-06-21 @ 11:56), Max: 36.7 (08-06-21 @ 11:21)  HR: 99 (08-06-21 @ 11:56) (91 - 103)  BP: 120/77 (08-06-21 @ 11:56) (120/77 - 128/74)  RR: 18 (08-06-21 @ 11:56) (18 - 18)  SpO2: 97% (08-06-21 @ 11:56) (94% - 98%)      I&O's Summary    05 Aug 2021 07:01  -  06 Aug 2021 07:00  --------------------------------------------------------  IN: 300 mL / OUT: 0 mL / NET: 300 mL      Weight (kg): 50.8 (08-04 @ 13:21)      PHYSICAL EXAM:  Appearance: Comfortable. No acute distress  HEENT:  Atraumatic. Normocephalic.  Normal oral mucosa  Neurologic: A & O x 3, no focal deficits. EOMI.  Cardiovascular: Normal S1 S2, No murmur, rubs/gallops. No JVD, No edema  Respiratory: Lungs clear to auscultation, unlabored   Gastrointestinal:  Soft, Non-tender, + BS  Lower Extremities: No edema  Psychiatry: Patient is calm. No agitation. Mood & affect appropriate  Skin: No rashes/ ecchymoses/cyanosis/ulcers visualized on the face, hands or feet.      CURRENT MEDICATIONS:  digoxin     Tablet 125 MICROGram(s) Oral daily  metoprolol succinate ER 75 milliGRAM(s) Oral daily    piperacillin/tazobactam IVPB..  vancomycin  IVPB  pantoprazole    Tablet  atorvastatin  enoxaparin Injectable  	      LABS:	 	  CARDIAC MARKERS ( 04 Aug 2021 14:24 )  x     / <0.01 ng/mL / x     / x     / x      p-BNP 04 Aug 2021 14:24: x                              12.2   14.34 )-----------( 231      ( 06 Aug 2021 07:36 )             38.2     08-06    135  |  99  |  10.1  ----------------------------<  90  4.0   |  26.0  |  0.73    Ca    9.1      06 Aug 2021 07:36    TPro  7.0  /  Alb  3.5  /  TBili  1.1  /  DBili  x   /  AST  77<H>  /  ALT  106<H>  /  AlkPhos  341<H>  08-04      TELEMETRY: AFib , high 150    < from: TTE Echo Limited or F/U (08.05.21 @ 21:58) >  PHYSICIAN INTERPRETATION:  Left Ventricle: The left ventricular internal cavity size is normal.  Global LV systolic function was low normal. Left ventricular ejection fraction, by visual estimation, is 50 to 55%.  Right Ventricle: The right ventricular size is normal. RV systolic function is normal.  Pericardium: Trivial pericardial effusion is present. The pericardial effusion is anterior to the right ventricle, localized near the right atrium and lateral to the left ventricle. There is a large pleural effusion in both left and right lateral regions.  Aortic Valve: Moderately calcified aortic valve.  Venous: The inferior vena cava was dilated, with respiratory size variation less than 50%.      Summary:   1. Low normal global left ventricular systolic function.   2. Left ventricular ejection fraction, by visual estimation, is 50 to 55%.   3. Trivial pericardial effusion.   4. Large pleural effusion in both left and right lateral regions.    MD Meryl Electronically signed on 8/6/2021 at 8:51:20 AM    < end of copied text >

## 2021-08-06 NOTE — PROGRESS NOTE ADULT - ASSESSMENT
The patient is a 76 year old female with a history of TIA and hyperlipidemia   recently diagnosed viral pericarditis (6/23/2021), complicated by new onset Afib with RVR requiring [facility] admission in 7/2021, not on AC due to known small pericardial effusion, who presents with fevers and cough    Assessment/Plan:    1. Sepsis present on admission due to suspected bacterial PNA with small parapneumonic effusion   REcent hospitalization; continue IV vancomcyin and zosyn day 3  Follow up chest xray  if effusions worsening will consult CT surgery  ID following  No evidence of hypoxia  Tylenol PRN    2. Hyponatremia: likely hypovolemic hyponatremia  resolved   Monitor BMP    3. Afib CHADsVASc 3, but AC not started due to concern for worsening pericardial effusion if initiated  On metoprolol and digoxin  Evaluated by EP, plan for AVEL/CV as outpatient   To remain off AC    4. History of viral pericarditis with small pericardial effusion  Was previously on ASA, colchicine, both d/c’ed by cardiology due to GI upset  S/p prednisone course, last dose on 8/4, completed dose prior to admission  FU echocardiogram with trivial effusion     5. Hyperlipidemia on statin     6. GERD on Protonix    VTE_ Lovenox subcut    The patient is a 76 year old female with a history of TIA and hyperlipidemia   recently diagnosed viral pericarditis (6/23/2021), complicated by new onset Afib with RVR requiring [facility] admission in 7/2021, not on AC due to known small pericardial effusion, who presents with fevers and cough    Assessment/Plan:    1. Sepsis present on admission due to suspected bacterial PNA with small parapneumonic effusion   REcent hospitalization; continue IV vancomcyin and zosyn day 3  Follow up chest xray  if effusions worsening will consult CT surgery  ID following  No evidence of hypoxia  Tylenol PRN  Follow up blood cultures sent on 08/04    2. Hyponatremia: likely hypovolemic hyponatremia  resolved   Monitor BMP    3. Afib CHADsVASc 3, but AC not started due to concern for worsening pericardial effusion if initiated  On metoprolol and digoxin  Evaluated by EP, plan for AVEL/CV as outpatient   To remain off AC    4. History of viral pericarditis with small pericardial effusion  Was previously on ASA, colchicine, both d/c’ed by cardiology due to GI upset  S/p prednisone course, last dose on 8/4, completed dose prior to admission  FU echocardiogram with trivial effusion     5. Transient encephalopathy correlating with febrile episodes. Baseline AOX4  Will hold off on CT head at this time     6. Hyperlipidemia on statin     7. GERD on Protonix    VTE_ Lovenox subcut     Plan discussed at length with patient and daughter at bedside

## 2021-08-06 NOTE — PROGRESS NOTE ADULT - SUBJECTIVE AND OBJECTIVE BOX
[facility]  INFECTIOUS DISEASES AND INTERNAL MEDICINE at [place]  =======================================================  Shahriar Galeano MD  Diplomates American Board of Internal Medicine and Infectious Diseases  Tel: [phone]      Fax: [phone]  =======================================================    LOUIS MEJIA [id number]    Follow up: loculated effusion  feels much better today  per daughter at bedside, she appears much less confused and improved today      Allergies:  sulfa drugs (Hives)           REVIEW OF SYSTEMS:  CONSTITUTIONAL:  No Fever or chills  HEENT:   No diplopia or blurred vision.  No earache, sore throat or runny nose.  CARDIOVASCULAR:  No pressure, squeezing, strangling, tightness, heaviness or aching about the chest, neck, axilla or epigastrium.  RESPIRATORY:  No cough, shortness of breath  GASTROINTESTINAL:  No nausea, vomiting or diarrhea.  GENITOURINARY:  No dysuria, frequency or urgency. No Blood in urine  MUSCULOSKELETAL:  no joint aches, no muscle pain  SKIN:  No change in skin, hair or nails.  NEUROLOGIC:  No Headaches, seizures or weakness.  PSYCHIATRIC:  No disorder of thought or mood.  ENDOCRINE:  No heat or cold intolerance  HEMATOLOGICAL:  No easy bruising or bleeding.       Physical Exam:  GEN: NAD, pleasant  HEENT: normocephalic and atraumatic. EOMI. PERRL.  Anicteric   NECK: Supple.   LUNGS: Clear to auscultation.  HEART: Regular rate and rhythm without murmur.  ABDOMEN: Soft, nontender, and nondistended.  Positive bowel sounds.    : No CVA tenderness  EXTREMITIES: Without any edema.  MSK: no joint swelling  NEUROLOGIC: Cranial nerves II through XII are grossly intact. No focal deficits  PSYCHIATRIC: Appropriate affect .  SKIN: No Rash      Vitals:    T(F): 98 (06 Aug 2021 11:56), Max: 98.1 (06 Aug 2021 11:21)  HR: 99 (06 Aug 2021 11:56)  BP: 120/77 (06 Aug 2021 11:56)  RR: 18 (06 Aug 2021 11:56)  SpO2: 97% (06 Aug 2021 11:56) (94% - 98%)  temp max in last 48H T(F): , Max: 100 (08-05-21 @ 10:06)    Current Antibiotics:  piperacillin/tazobactam IVPB.. 3.375 Gram(s) IV Intermittent every 8 hours  vancomycin  IVPB 750 milliGRAM(s) IV Intermittent <User Schedule>    Other medications:  atorvastatin 20 milliGRAM(s) Oral <User Schedule>  digoxin     Tablet 125 MICROGram(s) Oral daily  enoxaparin Injectable 40 milliGRAM(s) SubCutaneous daily  metoprolol succinate ER 75 milliGRAM(s) Oral daily  pantoprazole    Tablet 40 milliGRAM(s) Oral before breakfast  saccharomyces boulardii 250 milliGRAM(s) Oral two times a day                            12.2   14.34 )-----------( 231      ( 06 Aug 2021 07:36 )             38.2     08-06    135  |  99  |  10.1  ----------------------------<  90  4.0   |  26.0  |  0.73    Ca    9.1      06 Aug 2021 07:36      RECENT CULTURES:  08-05 @ 01:49 Clean Catch Clean Catch (Midstream)     <10,000 CFU/mL Normal Urogenital Rena        08-04 @ 14:35 .Blood Blood-Peripheral     No growth at 48 hours        08-04 @ 14:28          NotDetec  08-04 @ 14:26 .Blood Blood-Peripheral     No growth at 48 hours            WBC Count: 14.34 K/uL (08-06-21 @ 07:36)  WBC Count: 20.20 K/uL (08-05-21 @ 05:24)  WBC Count: 17.50 K/uL (08-04-21 @ 14:24)    Creatinine, Serum: 0.73 mg/dL (08-06-21 @ 07:36)  Creatinine, Serum: 0.65 mg/dL (08-05-21 @ 05:24)  Creatinine, Serum: 0.77 mg/dL (08-04-21 @ 14:24)    C-Reactive Protein, Serum: 110 mg/L (08-05-21 @ 05:24)  C-Reactive Protein, Serum: <4 mg/L (08-04-21 @ 14:24)      Sedimentation Rate, Erythrocyte: 43 mm/hr (08-04-21 @ 14:24)    Procalcitonin, Serum: 0.20 ng/mL (08-05-21 @ 05:24)     SARS-CoV-2: NotDetec (08-04-21 @ 14:28)  Rapid RVP Result: NotDetec (08-04-21 @ 14:28)

## 2021-08-06 NOTE — PROGRESS NOTE ADULT - ASSESSMENT
76yoF hx remote hx of transient global amnesia, carotid artery atherosclerosis, HLD, recently diagnosed viral pericarditis (6/23/2021), complicated by new onset Afib with RVR requiring [facility] admission in 7/2021, not on AC due to known small pericardial effusion, who presents with fevers and cough.     Pericarditis  - just finished prednisone  - off asa, colchicine 2/2 GI upset  - TTE 7/8- small pericardial effusion   - repeat TTE trivial pericardial effusion     Pleural Effusion  - Left side, moderate, partially loculated  - TTE large pleural effusion   - repeat CXR if effusion worsens CTSx consult - as per hospitalist     Afib   - cont metoprolol  - cont digoxin  - out pt AVEL/DCCV    HLD  - statin    Thank you for allowing me to participate in care of your patient.   Please call as needed.

## 2021-08-06 NOTE — PROGRESS NOTE ADULT - ATTENDING COMMENTS
Pt is seen, examined, chart reviewed, d/w np/pa.  Management as outlined above.  Pericarditis:  repeat TTE trivial pericardial effusion   Pleural Effusion: recommend  CTSx consult   AfF VR Controlled. Outpt DCCV

## 2021-08-07 ENCOUNTER — RESULT REVIEW (OUTPATIENT)
Age: 76
End: 2021-08-07

## 2021-08-07 LAB
B PERT IGG+IGM PNL SER: ABNORMAL
COLOR FLD: YELLOW
FLUID INTAKE SUBSTANCE CLASS: SIGNIFICANT CHANGE UP
HCT VFR BLD CALC: 33.3 % — LOW (ref 34.5–45)
HGB BLD-MCNC: 11 G/DL — LOW (ref 11.5–15.5)
MCHC RBC-ENTMCNC: 30.1 PG — SIGNIFICANT CHANGE UP (ref 27–34)
MCHC RBC-ENTMCNC: 33 GM/DL — SIGNIFICANT CHANGE UP (ref 32–36)
MCV RBC AUTO: 91.2 FL — SIGNIFICANT CHANGE UP (ref 80–100)
MRSA PCR RESULT.: SIGNIFICANT CHANGE UP
PH FLD: 8.5 — SIGNIFICANT CHANGE UP
PLATELET # BLD AUTO: 210 K/UL — SIGNIFICANT CHANGE UP (ref 150–400)
RBC # BLD: 3.65 M/UL — LOW (ref 3.8–5.2)
RBC # FLD: 14.6 % — HIGH (ref 10.3–14.5)
RCV VOL RI: 1000 /UL — HIGH (ref 0–0)
S AUREUS DNA NOSE QL NAA+PROBE: SIGNIFICANT CHANGE UP
TOTAL NUCLEATED CELL COUNT, BODY FLUID: 959 /UL — SIGNIFICANT CHANGE UP
TUBE TYPE: SIGNIFICANT CHANGE UP
VANCOMYCIN TROUGH SERPL-MCNC: 12.3 UG/ML — SIGNIFICANT CHANGE UP (ref 10–20)
WBC # BLD: 14.03 K/UL — HIGH (ref 3.8–10.5)
WBC # FLD AUTO: 14.03 K/UL — HIGH (ref 3.8–10.5)

## 2021-08-07 PROCEDURE — 32557 INSERT CATH PLEURA W/ IMAGE: CPT

## 2021-08-07 PROCEDURE — 71045 X-RAY EXAM CHEST 1 VIEW: CPT | Mod: 26

## 2021-08-07 PROCEDURE — 88312 SPECIAL STAINS GROUP 1: CPT | Mod: 26

## 2021-08-07 PROCEDURE — 88305 TISSUE EXAM BY PATHOLOGIST: CPT | Mod: 26

## 2021-08-07 PROCEDURE — 88112 CYTOPATH CELL ENHANCE TECH: CPT | Mod: 26

## 2021-08-07 PROCEDURE — 99233 SBSQ HOSP IP/OBS HIGH 50: CPT

## 2021-08-07 RX ORDER — LIDOCAINE HCL 20 MG/ML
20 VIAL (ML) INJECTION ONCE
Refills: 0 | Status: COMPLETED | OUTPATIENT
Start: 2021-08-07 | End: 2021-08-07

## 2021-08-07 RX ORDER — OXYCODONE AND ACETAMINOPHEN 5; 325 MG/1; MG/1
1 TABLET ORAL EVERY 4 HOURS
Refills: 0 | Status: DISCONTINUED | OUTPATIENT
Start: 2021-08-07 | End: 2021-08-10

## 2021-08-07 RX ORDER — LIDOCAINE 4 G/100G
1 CREAM TOPICAL DAILY
Refills: 0 | Status: DISCONTINUED | OUTPATIENT
Start: 2021-08-07 | End: 2021-08-10

## 2021-08-07 RX ORDER — ACETAMINOPHEN 500 MG
650 TABLET ORAL EVERY 6 HOURS
Refills: 0 | Status: DISCONTINUED | OUTPATIENT
Start: 2021-08-07 | End: 2021-08-10

## 2021-08-07 RX ADMIN — Medication 650 MILLIGRAM(S): at 16:56

## 2021-08-07 RX ADMIN — PIPERACILLIN AND TAZOBACTAM 25 GRAM(S): 4; .5 INJECTION, POWDER, LYOPHILIZED, FOR SOLUTION INTRAVENOUS at 21:02

## 2021-08-07 RX ADMIN — Medication 250 MILLIGRAM(S): at 05:08

## 2021-08-07 RX ADMIN — OXYCODONE AND ACETAMINOPHEN 1 TABLET(S): 5; 325 TABLET ORAL at 21:01

## 2021-08-07 RX ADMIN — Medication 3 MILLIGRAM(S): at 21:01

## 2021-08-07 RX ADMIN — Medication 250 MILLIGRAM(S): at 10:28

## 2021-08-07 RX ADMIN — Medication 75 MILLIGRAM(S): at 05:07

## 2021-08-07 RX ADMIN — PANTOPRAZOLE SODIUM 40 MILLIGRAM(S): 20 TABLET, DELAYED RELEASE ORAL at 05:09

## 2021-08-07 RX ADMIN — PIPERACILLIN AND TAZOBACTAM 25 GRAM(S): 4; .5 INJECTION, POWDER, LYOPHILIZED, FOR SOLUTION INTRAVENOUS at 13:53

## 2021-08-07 RX ADMIN — Medication 125 MICROGRAM(S): at 05:09

## 2021-08-07 RX ADMIN — Medication 250 MILLIGRAM(S): at 16:50

## 2021-08-07 RX ADMIN — PIPERACILLIN AND TAZOBACTAM 25 GRAM(S): 4; .5 INJECTION, POWDER, LYOPHILIZED, FOR SOLUTION INTRAVENOUS at 05:07

## 2021-08-07 RX ADMIN — LIDOCAINE 1 PATCH: 4 CREAM TOPICAL at 21:01

## 2021-08-07 NOTE — CONSULT NOTE ADULT - REASON FOR ADMISSION
sepsis due to suspected bacterial PNA

## 2021-08-07 NOTE — CONSULT NOTE ADULT - SUBJECTIVE AND OBJECTIVE BOX
HPI:  76yoF hx remote hx of transient global amnesia, carotid artery atherosclerosis, HLD, recently diagnosed viral pericarditis (6/23/2021), complicated by new onset Afib with RVR requiring [facility] admission in 7/2021, not on AC due to known small pericardial effusion, who presents with fevers and cough.  Pt reports generalized weakness and decreased exercise tolerance ever since her Afib diagnosis which is a change for her as she was previously very active.  For the past day, she reports chills, worsening fatigue and non-productive cough.  Pt also reports exertional dyspnea which she has felt previously and attributes this to the Afib.  During her last admission, pt could not undergo AVEL/DCCV due to concern of unsuccessful conversion of arrythmia in presence of pericarditis. She was last seen by outpatient cardiology on 8/2 where AVEL/DCCV was discussed again and that this procedure would likely be done electively in the hospital, however no official date had been determined.  Pt denies any chest pain, abdominal pain, nausea, vomiting, diarrhea, urinary symptoms or rash.    On admission, pt febrile with Tmax 104.5, tachycardic with HR in 110s.  Labs notable for leukocytosis.  CXR showed new moderate left pleural effusion/adjacent atelectasis.  CT chest showed Small left pleural effusion may be partially loculated and known small pericardial effusion (stated on new in report but seen on previous TTE). (04 Aug 2021 22:13)      PAST MEDICAL & SURGICAL HISTORY:  HLD (hyperlipidemia)    Osteoarthritis    FUO (fever of unknown origin)    H/O pericarditis    TIA (transient ischemic attack)    Afib    H/O rotator cuff surgery        REVIEW OF SYSTEMS      General:No Weight change/ Fatigue/ HA/Dizzy	    Skin/Breast: No Rashes/ Lesions/ Masses  	  Ophthalmologic: No Blurry vision/ Glaucoma/ Blindness  	  ENMT: No Hearing loss/ Drainage/ Lesions	    Respiratory and Thorax: No Cough/ Wheezing/ SOB/ Hemoptysis/ Sputum production  	  Cardiovascular: No Chest pain/ Palpitations/ Diaphoresis	    Gastrointestinal: No Nausea/ Vomiting/ Constipation/ Appetite Change	    Genitourinary: No Heamturia/ Dysuria/ Frequency change/ Impotence	    Musculoskeletal: No Pain/ Weakness/ Claudication	    Neurological: No Seizures/ TIA/CVA/ Parastesias	    Psychiatric: No Dementia/ Depression/ SI/HI	    Hematology/Lymphatics: No hx of bleeding/ Edema	    Endocrine:	No Hyperglycemia/ Hypoglycemia    Allergic/Immunologic:	 No Anaphylaxis/ Intolerance/ Recent illnesses    MEDICATIONS  (STANDING):  atorvastatin 20 milliGRAM(s) Oral <User Schedule>  digoxin     Tablet 125 MICROGram(s) Oral daily  enoxaparin Injectable 40 milliGRAM(s) SubCutaneous daily  melatonin 3 milliGRAM(s) Oral at bedtime  metoprolol succinate ER 75 milliGRAM(s) Oral daily  pantoprazole    Tablet 40 milliGRAM(s) Oral before breakfast  piperacillin/tazobactam IVPB.. 3.375 Gram(s) IV Intermittent every 8 hours  saccharomyces boulardii 250 milliGRAM(s) Oral two times a day    MEDICATIONS  (PRN):  acetaminophen   Tablet .. 650 milliGRAM(s) Oral every 6 hours PRN Temp greater or equal to 38C (100.4F), Mild Pain (1 - 3), Moderate Pain (4 - 6)      Allergies    sulfa drugs (Hives)    Intolerances        SOCIAL HISTORY:    FAMILY HISTORY:  FH: heart disease (Father, Mother)  both had CAD, HTN, mother had MI, father had CHF        Vital Signs Last 24 Hrs  T(C): 36.8 (07 Aug 2021 11:44), Max: 39.8 (06 Aug 2021 18:34)  T(F): 98.3 (07 Aug 2021 11:44), Max: 103.6 (06 Aug 2021 18:34)  HR: 97 (07 Aug 2021 11:44) (82 - 112)  BP: 109/71 (07 Aug 2021 11:44) (109/71 - 123/80)  BP(mean): --  RR: 18 (07 Aug 2021 11:44) (18 - 18)  SpO2: 96% (07 Aug 2021 11:44) (94% - 96%)    General: WN/WD NAD  Neurology: Awake, nonfocal, LAWSON x 4  Eyes: Scleras clear, PERRLA/ EOMI, Gross vision intact  ENT:Gross hearing intact, grossly patent pharynx, no stridor  Neck: Neck supple, trachea midline, No JVD,   Respiratory: CTA B/L, No wheezing, rales, rhonchi  CV: RRR, S1S2, no murmurs, rubs or gallops  Abdominal: Soft, NT, ND +BS,   Extremities: No edema, + peripheral pulses  Skin: No Rashes, Hematoma, Ecchymosis  Lymphatic: No Neck, axilla, groin LAD  Psych: Oriented x 3, normal affect  Incisions:   Tubes:    LABS:                        11.0   14.03 )-----------( 210      ( 07 Aug 2021 09:26 )             33.3     08-06    135  |  99  |  10.1  ----------------------------<  90  4.0   |  26.0  |  0.73    Ca    9.1      06 Aug 2021 07:36            RADIOLOGY & ADDITIONAL STUDIES:  < from: Xray Chest 1 View- PORTABLE-Urgent (Xray Chest 1 View- PORTABLE-Urgent .) (08.06.21 @ 14:57) >  EXAM:  XR CHEST PORTABLE URGENT 1V                          PROCEDURE DATE:  08/06/2021          INTERPRETATION:  Clinical history: 76-year-old female, pleural effusion.    Two expiratory/kyphotic views are compared to radiographs and chest CT of 8/24/2021.    FINDINGS: Normal pulmonary vasculature, unchanged.    Moderate left and small right pleural effusions with adjacent atelectasis, unchanged on the left and new on the right.    Pulmonary vasculature are within normal limits. No pneumoperitoneum or acute osseous finding, patient is post a total right shoulder arthroplasty.    IMPRESSION:  Moderate left and small right pleural effusions with adjacent atelectasis, unchanged on the left and new on the right. Better characterized on CT    --- End of Report ---            CODY DOHERTY DO; Attending Radiologist  This document has been electronically signed. Aug  6 2021  3:19PM    < end of copied text >  < from: CT Chest No Cont (08.04.21 @ 19:18) >   EXAM:  CT CHEST                          PROCEDURE DATE:  08/04/2021          INTERPRETATION:  EXAMINATION: CT CHEST    CLINICAL INDICATION: Sepsis.    TECHNIQUE: Noncontrast CT of the chest was obtained.    COMPARISON: Multiple CT, most recent 6/23/2021.    FINDINGS:    AIRWAYS AND LUNGS: The central tracheobronchial tree is patent.  The lungs are clear.    MEDIASTINUM AND PLEURA: There are no enlarged mediastinal, hilar or axillary lymph nodes. The visualized portion of the thyroid gland is unremarkable. Small left pleural effusion may be partially loculated. There is no pneumothorax.    HEART AND VESSELS: There is cardiomegaly.  There are atherosclerotic calcifications of the aorta and coronary arteries.  Small pericardial effusion is new from the prior study.    UPPER ABDOMEN: Images of the upper abdomen demonstrate no abnormality.    BONES AND SOFT TISSUES: The bones are unremarkable.  The soft tissues are unremarkable.    TUBES/LINES: None.    IMPRESSION:  Small pericardial effusion is new from the prior study.    Small left pleural effusion may be partially loculated.    --- End of Report ---            ISABELLE DOSS MD; Attending Radiologist  This document has been electronically signed. Aug  4 2021  7:38PM    < end of copied text >      ASSESSMENT:   76yFemalePAST MEDICAL & SURGICAL HISTORY:  HLD (hyperlipidemia)    Osteoarthritis    FUO (fever of unknown origin)    H/O pericarditis    TIA (transient ischemic attack)    Afib    H/O rotator cuff surgery    HEALTH ISSUES - PROBLEM Dx:      HEALTH ISSUES - R/O PROBLEM Dx:      PLAN: HPI:  76 year old female hx remote hx of transient global amnesia, carotid artery atherosclerosis, HLD, recently diagnosed viral pericarditis (6/23/2021), complicated by new onset Afib with RVR requiring [facility] admission in 7/2021, not on AC due to known small pericardial effusion, who presents with fevers and cough.  Pt reports generalized weakness and decreased exercise tolerance ever since her Afib diagnosis which is a change for her as she was previously very active.  For the past day, she reports chills, worsening fatigue and non-productive cough.  Pt also reports exertional dyspnea which she has felt previously and attributes this to the Afib.  During her last admission, pt could not undergo AVEL/DCCV due to concern of unsuccessful conversion of arrythmia in presence of pericarditis. She was last seen by outpatient cardiology on 8/2 where AVEL/DCCV was discussed again and that this procedure would likely be done electively in the hospital, however no official date had been determined.  Pt denies any chest pain, abdominal pain, nausea, vomiting, diarrhea, urinary symptoms or rash.    On admission, pt febrile with Tmax 104.5, tachycardic with HR in 110s.  Labs notable for leukocytosis.  CXR showed new moderate left pleural effusion/adjacent atelectasis.  CT chest showed Small left pleural effusion may be partially loculated and known small pericardial effusion (stated on new in report but seen on previous TTE). (04 Aug 2021 22:13) Chest xray today indicated moderate left and small right pleural effusions, Thoracic surgery consulted.        PAST MEDICAL & SURGICAL HISTORY:  HLD (hyperlipidemia)    Osteoarthritis    FUO (fever of unknown origin)    H/O pericarditis    TIA (transient ischemic attack)    Afib    H/O rotator cuff surgery        REVIEW OF SYSTEMS      General:No Weight change/ Fatigue/ HA/Dizzy	    Skin/Breast: No Rashes/ Lesions/ Masses  	  Ophthalmologic: No Blurry vision/ Glaucoma/ Blindness  	  ENMT: No Hearing loss/ Drainage/ Lesions	    Respiratory and Thorax: No Cough/ Wheezing/ SOB/ Hemoptysis/ Sputum production  	  Cardiovascular: No Chest pain/ Palpitations/ Diaphoresis	    Gastrointestinal: No Nausea/ Vomiting/ Constipation/ Appetite Change	    Genitourinary: No Heamturia/ Dysuria/ Frequency change/ Impotence	    Musculoskeletal: No Pain/ Weakness/ Claudication	    Neurological: No Seizures/ TIA/CVA/ Parastesias	    Psychiatric: No Dementia/ Depression/ SI/HI	    Hematology/Lymphatics: No hx of bleeding/ Edema	    Endocrine:	No Hyperglycemia/ Hypoglycemia    Allergic/Immunologic:	 No Anaphylaxis/ Intolerance/ Recent illnesses    MEDICATIONS  (STANDING):  atorvastatin 20 milliGRAM(s) Oral <User Schedule>  digoxin     Tablet 125 MICROGram(s) Oral daily  enoxaparin Injectable 40 milliGRAM(s) SubCutaneous daily  melatonin 3 milliGRAM(s) Oral at bedtime  metoprolol succinate ER 75 milliGRAM(s) Oral daily  pantoprazole    Tablet 40 milliGRAM(s) Oral before breakfast  piperacillin/tazobactam IVPB.. 3.375 Gram(s) IV Intermittent every 8 hours  saccharomyces boulardii 250 milliGRAM(s) Oral two times a day    MEDICATIONS  (PRN):  acetaminophen   Tablet .. 650 milliGRAM(s) Oral every 6 hours PRN Temp greater or equal to 38C (100.4F), Mild Pain (1 - 3), Moderate Pain (4 - 6)      Allergies    sulfa drugs (Hives)    Intolerances        SOCIAL HISTORY:    FAMILY HISTORY:  FH: heart disease (Father, Mother)  both had CAD, HTN, mother had MI, father had CHF        Vital Signs Last 24 Hrs  T(C): 36.8 (07 Aug 2021 11:44), Max: 39.8 (06 Aug 2021 18:34)  T(F): 98.3 (07 Aug 2021 11:44), Max: 103.6 (06 Aug 2021 18:34)  HR: 97 (07 Aug 2021 11:44) (82 - 112)  BP: 109/71 (07 Aug 2021 11:44) (109/71 - 123/80)  BP(mean): --  RR: 18 (07 Aug 2021 11:44) (18 - 18)  SpO2: 96% (07 Aug 2021 11:44) (94% - 96%)    General: WN/WD NAD  Neurology: Awake, nonfocal, LAWSON x 4  Eyes: Scleras clear, PERRLA/ EOMI, Gross vision intact  ENT: Gross hearing intact, grossly patent pharynx, no stridor  Neck: Neck supple, trachea midline, No JVD,   Respiratory: CTA B/L, No wheezing, rales, rhonchi  CV: RRR, S1S2, no murmurs, rubs or gallops  Abdominal: Soft, NT, ND +BS,   Extremities: No edema, + peripheral pulses  Skin: No Rashes, Hematoma, Ecchymosis  Lymphatic: No Neck, axilla, groin LAD  Psych: Oriented x 3, normal affect      LABS:                        11.0   14.03 )-----------( 210      ( 07 Aug 2021 09:26 )             33.3     08-06    135  |  99  |  10.1  ----------------------------<  90  4.0   |  26.0  |  0.73    Ca    9.1      06 Aug 2021 07:36            RADIOLOGY & ADDITIONAL STUDIES:  < from: Xray Chest 1 View- PORTABLE-Urgent (Xray Chest 1 View- PORTABLE-Urgent .) (08.06.21 @ 14:57) >  EXAM:  XR CHEST PORTABLE URGENT 1V                          PROCEDURE DATE:  08/06/2021          INTERPRETATION:  Clinical history: 76-year-old female, pleural effusion.    Two expiratory/kyphotic views are compared to radiographs and chest CT of 8/24/2021.    FINDINGS: Normal pulmonary vasculature, unchanged.    Moderate left and small right pleural effusions with adjacent atelectasis, unchanged on the left and new on the right.    Pulmonary vasculature are within normal limits. No pneumoperitoneum or acute osseous finding, patient is post a total right shoulder arthroplasty.    IMPRESSION:  Moderate left and small right pleural effusions with adjacent atelectasis, unchanged on the left and new on the right. Better characterized on CT    --- End of Report ---            CODY DOHERTY DO; Attending Radiologist  This document has been electronically signed. Aug  6 2021  3:19PM    < end of copied text >  < from: CT Chest No Cont (08.04.21 @ 19:18) >   EXAM:  CT CHEST                          PROCEDURE DATE:  08/04/2021          INTERPRETATION:  EXAMINATION: CT CHEST    CLINICAL INDICATION: Sepsis.    TECHNIQUE: Noncontrast CT of the chest was obtained.    COMPARISON: Multiple CT, most recent 6/23/2021.    FINDINGS:    AIRWAYS AND LUNGS: The central tracheobronchial tree is patent.  The lungs are clear.    MEDIASTINUM AND PLEURA: There are no enlarged mediastinal, hilar or axillary lymph nodes. The visualized portion of the thyroid gland is unremarkable. Small left pleural effusion may be partially loculated. There is no pneumothorax.    HEART AND VESSELS: There is cardiomegaly.  There are atherosclerotic calcifications of the aorta and coronary arteries.  Small pericardial effusion is new from the prior study.    UPPER ABDOMEN: Images of the upper abdomen demonstrate no abnormality.    BONES AND SOFT TISSUES: The bones are unremarkable.  The soft tissues are unremarkable.    TUBES/LINES: None.    IMPRESSION:  Small pericardial effusion is new from the prior study.    Small left pleural effusion may be partially loculated.    --- End of Report ---            ISABELLE DOSS MD; Attending Radiologist  This document has been electronically signed. Aug  4 2021  7:38PM    < end of copied text >          PAST MEDICAL & SURGICAL HISTORY:  HLD (hyperlipidemia)    Osteoarthritis    FUO (fever of unknown origin)    H/O pericarditis    TIA (transient ischemic attack)    Afib    H/O rotator cuff surgery    HEALTH ISSUES - PROBLEM Dx:      HEALTH ISSUES - R/O PROBLEM Dx:      PLAN:  Ultra sound chest today

## 2021-08-07 NOTE — PROGRESS NOTE ADULT - SUBJECTIVE AND OBJECTIVE BOX
HEALTH ISSUES - PROBLEM Dx:    PNA parapneumonic eff    INTERVAL HPI/OVERNIGHT EVENTS:    sitting up in chair  eating lunch  family at bedside  conversing well  no SOB  states she feels weak and has been for past 2 months  able to ambulate around in the room    REVIEW OF SYSTEMS:    as above    Vital Signs Last 24 Hrs  T(C): 36.8 (07 Aug 2021 11:44), Max: 39.8 (06 Aug 2021 18:34)  T(F): 98.3 (07 Aug 2021 11:44), Max: 103.6 (06 Aug 2021 18:34)  HR: 97 (07 Aug 2021 11:44) (82 - 112)  BP: 109/71 (07 Aug 2021 11:44) (109/71 - 123/80)  BP(mean): --  RR: 18 (07 Aug 2021 11:44) (18 - 18)  SpO2: 96% (07 Aug 2021 11:44) (94% - 96%)    PHYSICAL EXAM-  GENERAL: frail built, sitting up in chair, comfortable, no dyspnea  HEAD:  Atraumatic, Normocephalic  EYES: EOMI, conjunctiva and sclera clear  ENT: Moist mucous membranes, No lesions  NECK: Supple, No JVD, Normal thyroid  NERVOUS SYSTEM:  Alert & Oriented X 3, Motor Strength 5/5 B/L upper and lower extremities  CHEST/LUNG: Diminished breath sounds benito bases with few fine scattered rales on right base; No rhonchi, wheezing, or rubs  HEART: IRRegular rate and rhythm; No murmurs, rubs, or gallops  ABDOMEN: Soft, Nontender, Nondistended; Bowel sounds present  EXTREMITIES:  2+ Peripheral Pulses, No clubbing, cyanosis, or edema  SKIN: No rashes or lesions    MEDICATIONS  (STANDING):  atorvastatin 20 milliGRAM(s) Oral <User Schedule>  digoxin     Tablet 125 MICROGram(s) Oral daily  enoxaparin Injectable 40 milliGRAM(s) SubCutaneous daily  melatonin 3 milliGRAM(s) Oral at bedtime  metoprolol succinate ER 75 milliGRAM(s) Oral daily  pantoprazole    Tablet 40 milliGRAM(s) Oral before breakfast  piperacillin/tazobactam IVPB.. 3.375 Gram(s) IV Intermittent every 8 hours  saccharomyces boulardii 250 milliGRAM(s) Oral two times a day  vancomycin  IVPB 750 milliGRAM(s) IV Intermittent <User Schedule>    MEDICATIONS  (PRN):  acetaminophen   Tablet .. 650 milliGRAM(s) Oral every 6 hours PRN Temp greater or equal to 38C (100.4F), Mild Pain (1 - 3), Moderate Pain (4 - 6)      LABS:                        11.0   14.03 )-----------( 210      ( 07 Aug 2021 09:26 )             33.3     08-06    135  |  99  |  10.1  ----------------------------<  90  4.0   |  26.0  |  0.73    Ca    9.1      06 Aug 2021 07:36

## 2021-08-07 NOTE — PROGRESS NOTE ADULT - ASSESSMENT
The patient is a 76 year old female with a history of TIA and hyperlipidemia   recently diagnosed viral pericarditis (6/23/2021), complicated by new onset Afib with RVR requiring [facility] admission in 7/2021, not on AC due to known small pericardial effusion, who presents with fevers and cough    1. Sepsis present on admission due to suspected bacterial PNA with small parapneumonic effusion   Recent hospitalization;   On IV Vancomycin and zosyn   MRSA PCR neg. So stopping Vanco  CXR- Moderate left and small right pleural effusions with adjacent atelectasis, unchanged on the left and new on the right  But on CT chest- small left pl eff loculated  and ECHO reads- Large pl effusions  Will consult CTS to better analyze if pl eff is worsening/ need intervention etc alok given intermittent temp spikes despite being on antibiotics  ID following  No evidence of hypoxia  Bld c/s from 8/4 neg  another set from 8/6 testing  Leucocytosis improving    2. Hyponatremia: resolved   likely hypovolemic hyponatremia    3. Afib CHADsVASc 3, but AC not started due to concern for worsening pericardial effusion if initiated  On metoprolol and digoxin  Evaluated by EP, plan for AVEL/ CV as outpatient   To remain off AC    4. History of viral pericarditis with small pericardial effusion  Was previously on ASA, colchicine, both d/c’ed by cardiology due to GI upset  S/p prednisone course, last dose on 8/4, completed dose prior to admission  FU echocardiogram with trivial effusion     5. Transient encephalopathy correlating with febrile episodes.   at Baseline AOX4  Will hold off on CT head at this time   per dgtr [facility] in 2019 with microvascular disease    6. Hyperlipidemia on statin     7. GERD on Protonix    VTE_ Lovenox subcut     Plan discussed at length with patient, spouse and daughter at bedside   The patient is a 76 year old female with a history of TIA and hyperlipidemia   recently diagnosed viral pericarditis (6/23/2021), complicated by new onset Afib with RVR requiring [facility] admission in 7/2021, not on AC due to known small pericardial effusion, who presents with fevers and cough    1. Sepsis present on admission due to suspected bacterial PNA with small parapneumonic effusion   Recent hospitalization;   On IV Vancomycin and zosyn   MRSA PCR neg. So stopping Vanco  CXR- Moderate left and small right pleural effusions with adjacent atelectasis, unchanged on the left and new on the right  But on CT chest- small left pl eff loculated  and ECHO reads- Large pl effusions  Will consult CTS to better analyze if pl eff is worsening/ need intervention etc laok given intermittent temp spikes despite being on antibiotics  ID following  No evidence of hypoxia  Bld c/s from 8/4 neg  another set from 8/6 testing  Leucocytosis improving    2. Hyponatremia: resolved   likely hypovolemic hyponatremia    3. Afib CHADsVASc 3, but AC not started due to concern for worsening pericardial effusion if initiated  On metoprolol and digoxin  Evaluated by EP, plan for AVEL/ CV as outpatient   To remain off AC    4. History of viral pericarditis with small pericardial effusion  Was previously on ASA, colchicine, both d/c’ed by cardiology due to GI upset  S/p prednisone course, last dose on 8/4, completed dose prior to admission  FU echocardiogram with trivial effusion     5. Transient encephalopathy correlating with febrile episodes.   at Baseline AOX4  Will hold off on CT head at this time   per dgtr [facility] in 2019 with microvascular disease    6. Hyperlipidemia on statin     7. GERD on Protonix    VTE_ Lovenox subcut     Plan discussed at length with patient, spouse and daughter at bedside

## 2021-08-08 DIAGNOSIS — J90 PLEURAL EFFUSION, NOT ELSEWHERE CLASSIFIED: ICD-10-CM

## 2021-08-08 LAB
ALBUMIN FLD-MCNC: 2.2 G/DL — SIGNIFICANT CHANGE UP
ALBUMIN SERPL ELPH-MCNC: 2.8 G/DL — LOW (ref 3.3–5.2)
BASOPHILS # BLD AUTO: 0.01 K/UL — SIGNIFICANT CHANGE UP (ref 0–0.2)
BASOPHILS NFR BLD AUTO: 0.1 % — SIGNIFICANT CHANGE UP (ref 0–2)
EOSINOPHIL # BLD AUTO: 0.03 K/UL — SIGNIFICANT CHANGE UP (ref 0–0.5)
EOSINOPHIL NFR BLD AUTO: 0.3 % — SIGNIFICANT CHANGE UP (ref 0–6)
GLUCOSE FLD-MCNC: 133 MG/DL — SIGNIFICANT CHANGE UP
GRAM STN FLD: SIGNIFICANT CHANGE UP
HCT VFR BLD CALC: 32.6 % — LOW (ref 34.5–45)
HGB BLD-MCNC: 10.2 G/DL — LOW (ref 11.5–15.5)
IMM GRANULOCYTES NFR BLD AUTO: 0.3 % — SIGNIFICANT CHANGE UP (ref 0–1.5)
LDH SERPL L TO P-CCNC: 115 U/L — SIGNIFICANT CHANGE UP
LDH SERPL L TO P-CCNC: 150 U/L — SIGNIFICANT CHANGE UP (ref 98–192)
LYMPHOCYTES # BLD AUTO: 0.77 K/UL — LOW (ref 1–3.3)
LYMPHOCYTES # BLD AUTO: 8.9 % — LOW (ref 13–44)
MCHC RBC-ENTMCNC: 29.7 PG — SIGNIFICANT CHANGE UP (ref 27–34)
MCHC RBC-ENTMCNC: 31.3 GM/DL — LOW (ref 32–36)
MCV RBC AUTO: 94.8 FL — SIGNIFICANT CHANGE UP (ref 80–100)
MONOCYTES # BLD AUTO: 0.37 K/UL — SIGNIFICANT CHANGE UP (ref 0–0.9)
MONOCYTES NFR BLD AUTO: 4.3 % — SIGNIFICANT CHANGE UP (ref 2–14)
NEUTROPHILS # BLD AUTO: 7.49 K/UL — HIGH (ref 1.8–7.4)
NEUTROPHILS NFR BLD AUTO: 86.1 % — HIGH (ref 43–77)
PLATELET # BLD AUTO: 230 K/UL — SIGNIFICANT CHANGE UP (ref 150–400)
PROT FLD-MCNC: 3.5 G/DL — SIGNIFICANT CHANGE UP
PROT SERPL-MCNC: 5.9 G/DL — LOW (ref 6.6–8.7)
RBC # BLD: 3.44 M/UL — LOW (ref 3.8–5.2)
RBC # FLD: 14.6 % — HIGH (ref 10.3–14.5)
SPECIMEN SOURCE: SIGNIFICANT CHANGE UP
WBC # BLD: 8.7 K/UL — SIGNIFICANT CHANGE UP (ref 3.8–10.5)
WBC # FLD AUTO: 8.7 K/UL — SIGNIFICANT CHANGE UP (ref 3.8–10.5)

## 2021-08-08 PROCEDURE — 99232 SBSQ HOSP IP/OBS MODERATE 35: CPT

## 2021-08-08 PROCEDURE — 99233 SBSQ HOSP IP/OBS HIGH 50: CPT

## 2021-08-08 PROCEDURE — 71045 X-RAY EXAM CHEST 1 VIEW: CPT | Mod: 26

## 2021-08-08 RX ADMIN — LIDOCAINE 1 PATCH: 4 CREAM TOPICAL at 12:14

## 2021-08-08 RX ADMIN — LIDOCAINE 1 PATCH: 4 CREAM TOPICAL at 11:43

## 2021-08-08 RX ADMIN — OXYCODONE AND ACETAMINOPHEN 1 TABLET(S): 5; 325 TABLET ORAL at 05:52

## 2021-08-08 RX ADMIN — Medication 650 MILLIGRAM(S): at 12:17

## 2021-08-08 RX ADMIN — ATORVASTATIN CALCIUM 20 MILLIGRAM(S): 80 TABLET, FILM COATED ORAL at 22:13

## 2021-08-08 RX ADMIN — Medication 250 MILLIGRAM(S): at 05:47

## 2021-08-08 RX ADMIN — Medication 650 MILLIGRAM(S): at 22:13

## 2021-08-08 RX ADMIN — PIPERACILLIN AND TAZOBACTAM 25 GRAM(S): 4; .5 INJECTION, POWDER, LYOPHILIZED, FOR SOLUTION INTRAVENOUS at 05:51

## 2021-08-08 RX ADMIN — ENOXAPARIN SODIUM 40 MILLIGRAM(S): 100 INJECTION SUBCUTANEOUS at 12:13

## 2021-08-08 RX ADMIN — PIPERACILLIN AND TAZOBACTAM 25 GRAM(S): 4; .5 INJECTION, POWDER, LYOPHILIZED, FOR SOLUTION INTRAVENOUS at 14:08

## 2021-08-08 RX ADMIN — OXYCODONE AND ACETAMINOPHEN 1 TABLET(S): 5; 325 TABLET ORAL at 01:45

## 2021-08-08 RX ADMIN — Medication 250 MILLIGRAM(S): at 16:45

## 2021-08-08 RX ADMIN — Medication 75 MILLIGRAM(S): at 05:51

## 2021-08-08 RX ADMIN — Medication 650 MILLIGRAM(S): at 22:14

## 2021-08-08 RX ADMIN — LIDOCAINE 1 PATCH: 4 CREAM TOPICAL at 22:05

## 2021-08-08 RX ADMIN — LIDOCAINE 1 PATCH: 4 CREAM TOPICAL at 22:13

## 2021-08-08 RX ADMIN — OXYCODONE AND ACETAMINOPHEN 1 TABLET(S): 5; 325 TABLET ORAL at 05:51

## 2021-08-08 RX ADMIN — Medication 650 MILLIGRAM(S): at 14:07

## 2021-08-08 RX ADMIN — PANTOPRAZOLE SODIUM 40 MILLIGRAM(S): 20 TABLET, DELAYED RELEASE ORAL at 05:51

## 2021-08-08 RX ADMIN — Medication 125 MICROGRAM(S): at 05:47

## 2021-08-08 RX ADMIN — LIDOCAINE 1 PATCH: 4 CREAM TOPICAL at 05:52

## 2021-08-08 RX ADMIN — PIPERACILLIN AND TAZOBACTAM 25 GRAM(S): 4; .5 INJECTION, POWDER, LYOPHILIZED, FOR SOLUTION INTRAVENOUS at 22:13

## 2021-08-08 NOTE — PROGRESS NOTE ADULT - ASSESSMENT
76 year old female with a history of transient global amnesia, carotid artery atherosclerosis, HLD, recently diagnosed with viral pericarditis (6/23/2021) complicated by new onset Afib with RVR requiring [facility] admission in 7/2021, presents 8/4/21 with fevers and cough, work up notable for leukocytosis and moderate loculated left pleural effusion on CT chest for which Thoracic Surgery was consulted. Left chest tube was placed 8/7.

## 2021-08-08 NOTE — PROGRESS NOTE ADULT - SUBJECTIVE AND OBJECTIVE BOX
HEALTH ISSUES - PROBLEM Dx:    PNA parapneumonic eff    INTERVAL HPI/OVERNIGHT EVENTS:    got a CT left side yesterday with initial serous drainage of 1000 ml  looking better, feeling better, sitting up in bed  c/o soreness at the CT site  wants to minimize use of pain meds  Family at bedside    REVIEW OF SYSTEMS:    as above    Vital Signs Last 24 Hrs  T(C): 37.3 (07 Aug 2021 18:10), Max: 39.4 (07 Aug 2021 17:32)  T(F): 99.2 (07 Aug 2021 18:10), Max: 102.9 (07 Aug 2021 17:32)  HR: 103 (07 Aug 2021 17:32) (103 - 103)  BP: 142/86 (07 Aug 2021 17:32) (142/86 - 142/86)  BP(mean): --  RR: 17 (07 Aug 2021 17:32) (17 - 17)  SpO2: 100% (07 Aug 2021 17:32) (100% - 100%)    PHYSICAL EXAM-  GENERAL: frail built, sitting up in bed, comfortable, no dyspnea  HEAD:  Atraumatic, Normocephalic  EYES: EOMI, conjunctiva and sclera clear  ENT: Moist mucous membranes, No lesions  NECK: Supple, No JVD, Normal thyroid  NERVOUS SYSTEM:  Alert & Oriented X 3, Motor Strength 5/5 B/L upper and lower extremities  CHEST/LUNG: CTA benito, diminished breath sounds around left CT site; No rhonchi, wheezing, or rubs  HEART: IRRegular rate and rhythm; No murmurs, rubs, or gallops  ABDOMEN: Soft, Nontender, Nondistended; Bowel sounds present  EXTREMITIES:  2+ Peripheral Pulses, No clubbing, cyanosis, or edema  SKIN: No rashes or lesions    MEDICATIONS  (STANDING):  atorvastatin 20 milliGRAM(s) Oral <User Schedule>  digoxin     Tablet 125 MICROGram(s) Oral daily  enoxaparin Injectable 40 milliGRAM(s) SubCutaneous daily  lidocaine   4% Patch 1 Patch Transdermal daily  metoprolol succinate ER 75 milliGRAM(s) Oral daily  pantoprazole    Tablet 40 milliGRAM(s) Oral before breakfast  piperacillin/tazobactam IVPB.. 3.375 Gram(s) IV Intermittent every 8 hours  saccharomyces boulardii 250 milliGRAM(s) Oral two times a day    MEDICATIONS  (PRN):  acetaminophen   Tablet .. 650 milliGRAM(s) Oral every 6 hours PRN Temp greater or equal to 38C (100.4F), Mild Pain (1 - 3)  oxycodone    5 mG/acetaminophen 325 mG 1 Tablet(s) Oral every 4 hours PRN Moderate Pain (4 - 6)      LABS:                        10.2   8.70  )-----------( 230      ( 08 Aug 2021 09:16 )             32.6         TPro  5.9<L>  /  Alb  2.8<L>  /  TBili  x   /  DBili  x   /  AST  x   /  ALT  x   /  AlkPhos  x   08-08

## 2021-08-08 NOTE — PROGRESS NOTE ADULT - ASSESSMENT
The patient is a 76 year old female with a history of TIA and hyperlipidemia   recently diagnosed viral pericarditis (6/23/2021), complicated by new onset Afib with RVR requiring [facility] admission in 7/2021, not on AC due to known small pericardial effusion, who presents with fevers and cough    1. Sepsis present on admission due to suspected bacterial PNA with small parapneumonic effusion   Recent hospitalization;   On IV Vancomycin and zosyn   MRSA PCR neg. So stopping Vanco  CXR- Moderate left and small right pleural effusions with adjacent atelectasis, unchanged on the left and new on the right  But on CT chest- small left pl eff loculated  and ECHO reads- Large pl effusions  CTA consulted- Chest tube placed for large left loculated effusion. 1000ml drained. c/s testing  ID following  No evidence of hypoxia  Bld c/s from 8/4 neg  another set from 8/6 testing  Leucocytosis improving    8. Loculated large pl effusion  with chest tube now  output 1350ml since insertion  By light's criterion-     2. Hyponatremia: resolved   likely hypovolemic hyponatremia    3. Afib CHADsVASc 3, but AC not started due to concern for worsening pericardial effusion if initiated  On metoprolol and digoxin  Evaluated by EP, plan for AVEL/ CV as outpatient   To remain off AC    4. History of viral pericarditis with small pericardial effusion  Was previously on ASA, colchicine, both d/c’ed by cardiology due to GI upset  S/p prednisone course, last dose on 8/4, completed dose prior to admission  FU echocardiogram with trivial effusion     5. Transient encephalopathy correlating with febrile episodes.   at Baseline AOX4  Will hold off on CT head at this time   per dgtr [facility] in 2019 with microvascular disease    6. Hyperlipidemia on statin     7. GERD on Protonix    VTE_ Lovenox subcut     Plan discussed at length with patient, spouse and daughter at bedside       The patient is a 76 year old female with a history of TIA and hyperlipidemia   recently diagnosed viral pericarditis (6/23/2021), complicated by new onset Afib with RVR requiring [facility] admission in 7/2021, not on AC due to known small pericardial effusion, who presents with fevers and cough    1. Sepsis present on admission due to suspected bacterial PNA with small parapneumonic effusion   Recent hospitalization;   On IV Vancomycin and zosyn   MRSA PCR neg. So stopping Vanco  CXR- Moderate left and small right pleural effusions with adjacent atelectasis, unchanged on the left and new on the right  But on CT chest- small left pl eff loculated  and ECHO reads- Large pl effusions  CTA consulted- Chest tube placed for large left loculated effusion. 1000ml drained. c/s testing  ID following  No evidence of hypoxia  Bld c/s from 8/4 neg  another set from 8/6 testing  Leucocytosis improving    8. Loculated large pl effusion  with chest tube now  output 1350ml since insertion  By Light's criterion- Exudative effusion  cytology, c/s , fungal testing  D.D: viral/ bacterial/ fungal PNA; hepatic/ spleen and GB dz; Malignancy; Trauma; post cardiac injury  No clear defined infiltrate/ consolidation noted on imaging. Atelectasis associated with effusions noted.  It is highly likely this is sequela of Viral infection Post viral pericarditis.   Will follow the test results  Lidoderm percocet for pain  Incentive Spirometry    2. Hyponatremia: resolved   likely hypovolemic hyponatremia    3. Afib CHADsVASc 3, but AC not started due to concern for worsening pericardial effusion if initiated  On metoprolol and digoxin  Evaluated by EP, plan for AVEL/ CV as outpatient   To remain off AC  ECHO with 60-65% EF, grade 1 diastolic dysfxn    4. History of viral pericarditis with small pericardial effusion  Was previously on ASA, colchicine, both d/c’ ed by cardiology due to GI upset  S/p prednisone course, last dose on 8/4, completed dose prior to admission  FU echocardiogram with trivial effusion     5. Transient encephalopathy correlating with febrile episodes.   at Baseline AOX4  Will hold off on CT head at this time   per dgtr [facility] in 2019 with microvascular disease    6. Hyperlipidemia on statin     7. GERD on Protonix    VTE_ Lovenox subcut     Plan discussed at length with patient, daughter at bedside

## 2021-08-08 NOTE — PROGRESS NOTE ADULT - ASSESSMENT
76yoF hx remote hx of transient global amnesia, carotid artery atherosclerosis, HLD, recently diagnosed viral pericarditis (6/23/2021), complicated by new onset Afib with RVR requiring [facility] admission in 7/2021, not on AC due to known small pericardial effusion, who presents with fevers and cough.  Pt reports generalized weakness and decreased exercise tolerance ever since her Afib diagnosis which is a change for her as she was previously very active.  For the past day, she reports chills, worsening fatigue and non-productive cough.  Pt also reports exertional dyspnea which she has felt previously and attributes this to the Afib.  During her last admission, pt could not undergo AVEL/DCCV due to concern of unsuccessful conversion of arrythmia in presence of pericarditis. She was last seen by outpatient cardiology on 8/2 where AVEL/DCCV was discussed again and that this procedure would likely be done electively in the hospital, however no official date had been determined.  Pt denies any chest pain, abdominal pain, nausea, vomiting, diarrhea, urinary symptoms or rash.    On admission, pt febrile with Tmax 104.5, tachycardic with HR in 110s.  Labs notable for leukocytosis.  CXR showed new moderate left pleural effusion/adjacent atelectasis.  CT chest showed Small left pleural effusion may be partially loculated and known small pericardial effusion (stated on new in report but seen on previous TTE).      Leukocytosis  Fever  Loculated effusion   HAD BEEN PLACED ON EMPRIIC ABX ADN HAD CONTINEUD FEVERS  CT SURGERY CALLED AND  CHEST TUBE PLACED  WILL FOLLOWUP PLEURAL FLUID CULTURES    VANCO DISCONTNUED   CONTINUE ZOSYN  WILL FOLLOWUP   D/W HOSPITALIST  -

## 2021-08-08 NOTE — PROGRESS NOTE ADULT - SUBJECTIVE AND OBJECTIVE BOX
[facility]  INFECTIOUS DISEASES AND INTERNAL MEDICINE at [place]  =======================================================  Shahriar Galeano MD  Diplomates American Board of Internal Medicine and Infectious Diseases  Tel: [phone]      Fax: [phone]  =======================================================    LOUIS MEJIA [id number]    Follow up: loculated effusion   HAD CONTINUED FEVERS DESPITE ABX ADN CT SURGERY CALLED AND CT PLACED 8/7    Allergies:  sulfa drugs (Hives)           REVIEW OF SYSTEMS:  CONSTITUTIONAL:  No Fever or chills  HEENT:   No diplopia or blurred vision.  No earache, sore throat or runny nose.  CARDIOVASCULAR:  No pressure, squeezing, strangling, tightness, heaviness or aching about the chest, neck, axilla or epigastrium.  RESPIRATORY:   cough, shortness of breath  GASTROINTESTINAL:  No nausea, vomiting or diarrhea.  GENITOURINARY:  No dysuria, frequency or urgency. No Blood in urine  MUSCULOSKELETAL:  no joint aches, no muscle pain  SKIN:  No change in skin, hair or nails.  NEUROLOGIC:  No Headaches, seizures or weakness.  PSYCHIATRIC:  No disorder of thought or mood.  ENDOCRINE:  No heat or cold intolerance  HEMATOLOGICAL:  No easy bruising or bleeding.       Physical Exam:  GEN: NAD, pleasant  HEENT: normocephalic and atraumatic. EOMI. PERRL.  Anicteric   NECK: Supple.   LUNGS: Clear to auscultation. left chest tube in place   HEART: Regular rate and rhythm without murmur.  ABDOMEN: Soft, nontender, and nondistended.  Positive bowel sounds.    : No CVA tenderness  EXTREMITIES: Without any edema.  MSK: no joint swelling  NEUROLOGIC: Cranial nerves II through XII are grossly intact. No focal deficits  PSYCHIATRIC: Appropriate affect .  SKIN: No Rash      Vitals:      Vital Signs Last 24 Hrs  T(C): 37.3 (07 Aug 2021 18:10), Max: 39.4 (07 Aug 2021 17:32)  T(F): 99.2 (07 Aug 2021 18:10), Max: 102.9 (07 Aug 2021 17:32)  HR: 103 (07 Aug 2021 17:32) (97 - 103)  BP: 142/86 (07 Aug 2021 17:32) (109/71 - 142/86)  BP(mean): --  RR: 17 (07 Aug 2021 17:32) (17 - 18)  SpO2: 100% (07 Aug 2021 17:32) (96% - 100%))    Current Antibiotics:  piperacillin/tazobactam IVPB.. 3.375 Gram(s) IV Intermittent every 8 hours       Other medications:  atorvastatin 20 milliGRAM(s) Oral <User Schedule>  digoxin     Tablet 125 MICROGram(s) Oral daily  enoxaparin Injectable 40 milliGRAM(s) SubCutaneous daily  metoprolol succinate ER 75 milliGRAM(s) Oral daily  pantoprazole    Tablet 40 milliGRAM(s) Oral before breakfast  saccharomyces boulardii 250 milliGRAM(s) Oral two times a day                                      10.2   8.70  )-----------( 230      ( 08 Aug 2021 09:16 )             32.6         RECENT CULTURES:  08-05 @ 01:49 Clean Catch Clean Catch (Midstream)     <10,000 CFU/mL Normal Urogenital Rena        08-04 @ 14:35 .Blood Blood-Peripheral     No growth at 48 hours        08-04 @ 14:28          NotDetec  08-04 @ 14:26 .Blood Blood-Peripheral     No growth at 48 hours            WBC Count: 14.34 K/uL (08-06-21 @ 07:36)  WBC Count: 20.20 K/uL (08-05-21 @ 05:24)  WBC Count: 17.50 K/uL (08-04-21 @ 14:24)    Creatinine, Serum: 0.73 mg/dL (08-06-21 @ 07:36)  Creatinine, Serum: 0.65 mg/dL (08-05-21 @ 05:24)  Creatinine, Serum: 0.77 mg/dL (08-04-21 @ 14:24)    C-Reactive Protein, Serum: 110 mg/L (08-05-21 @ 05:24)  C-Reactive Protein, Serum: <4 mg/L (08-04-21 @ 14:24)      Sedimentation Rate, Erythrocyte: 43 mm/hr (08-04-21 @ 14:24)    Procalcitonin, Serum: 0.20 ng/mL (08-05-21 @ 05:24)     SARS-CoV-2: NotDetec (08-04-21 @ 14:28)  Rapid RVP Result: NotDetec (08-04-21 @ 14:28)

## 2021-08-08 NOTE — PROGRESS NOTE ADULT - SUBJECTIVE AND OBJECTIVE BOX
Subjective:    Vital Signs:  Vital Signs Last 24 Hrs  T(C): 37.3 (08-07-21 @ 18:10), Max: 39.4 (08-07-21 @ 17:32)  T(F): 99.2 (08-07-21 @ 18:10), Max: 102.9 (08-07-21 @ 17:32)  HR: 103 (08-07-21 @ 17:32) (97 - 103)  BP: 142/86 (08-07-21 @ 17:32) (109/71 - 142/86)  RR: 17 (08-07-21 @ 17:32) (17 - 18)  SpO2: 100% (08-07-21 @ 17:32) (96% - 100%) on (O2)    Relevant labs, radiology and Medications reviewed    Pertinent Physical Exam      08-07 @ 07:01  -  08-08 @ 07:00  --------------------------------------------------------  IN:  Total IN: 0 mL    OUT:    Chest Tube (mL): 1350 mL  Total OUT: 1350 mL    Total NET: -1350 mL             Subjective: "I feel much better."  Patient denies acute pain with radiating or aggravating factors.  She denies chest pain, shortness of breath, palpitations, headache, dizziness, nausea, or vomiting. She admits to soreness at chest tube insertion site.    Vital Signs:  Vital Signs Last 24 Hrs  T(C): 37.3 (08-07-21 @ 18:10), Max: 39.4 (08-07-21 @ 17:32)  T(F): 99.2 (08-07-21 @ 18:10), Max: 102.9 (08-07-21 @ 17:32)  HR: 103 (08-07-21 @ 17:32) (97 - 103)  BP: 142/86 (08-07-21 @ 17:32) (109/71 - 142/86)  RR: 17 (08-07-21 @ 17:32) (17 - 18)  SpO2: 100% (08-07-21 @ 17:32) (96% - 100%) on (O2)    Relevant labs, radiology and Medications reviewed    Pertinent Physical Exam  General: Well appearing, NAD  Neuro: AxO x3, non-focal, LAWSON  Cardiac: S1S2, no murmurs  Pulm: CTA b/l, no wheezing or rales  Abdomen: Soft, NT, ND,   Peripheral: +DP pulses b/l, no peripheral edema     08-07 @ 07:01  -  08-08 @ 07:00  --------------------------------------------------------  IN:  Total IN: 0 mL    OUT:    Chest Tube (mL): 1350 mL  Total OUT: 1350 mL    Total NET: -1350 mL

## 2021-08-09 LAB
ANION GAP SERPL CALC-SCNC: 10 MMOL/L — SIGNIFICANT CHANGE UP (ref 5–17)
BUN SERPL-MCNC: 10.3 MG/DL — SIGNIFICANT CHANGE UP (ref 8–20)
CALCIUM SERPL-MCNC: 8.8 MG/DL — SIGNIFICANT CHANGE UP (ref 8.6–10.2)
CHLORIDE SERPL-SCNC: 96 MMOL/L — LOW (ref 98–107)
CO2 SERPL-SCNC: 28 MMOL/L — SIGNIFICANT CHANGE UP (ref 22–29)
COMMENT - FLUIDS: SIGNIFICANT CHANGE UP
CREAT SERPL-MCNC: 0.63 MG/DL — SIGNIFICANT CHANGE UP (ref 0.5–1.3)
CULTURE RESULTS: SIGNIFICANT CHANGE UP
CULTURE RESULTS: SIGNIFICANT CHANGE UP
FLUID SEGMENTED GRANULOCYTES: 53 % — SIGNIFICANT CHANGE UP
GLUCOSE SERPL-MCNC: 91 MG/DL — SIGNIFICANT CHANGE UP (ref 70–99)
HCT VFR BLD CALC: 35.3 % — SIGNIFICANT CHANGE UP (ref 34.5–45)
HGB BLD-MCNC: 11.4 G/DL — LOW (ref 11.5–15.5)
LYMPHOCYTES # FLD: 13 % — SIGNIFICANT CHANGE UP
MCHC RBC-ENTMCNC: 30 PG — SIGNIFICANT CHANGE UP (ref 27–34)
MCHC RBC-ENTMCNC: 32.3 GM/DL — SIGNIFICANT CHANGE UP (ref 32–36)
MCV RBC AUTO: 92.9 FL — SIGNIFICANT CHANGE UP (ref 80–100)
MESOTHL CELL # FLD: 15 % — SIGNIFICANT CHANGE UP
MONOS+MACROS # FLD: 19 % — SIGNIFICANT CHANGE UP
PLATELET # BLD AUTO: 278 K/UL — SIGNIFICANT CHANGE UP (ref 150–400)
POTASSIUM SERPL-MCNC: 3.8 MMOL/L — SIGNIFICANT CHANGE UP (ref 3.5–5.3)
POTASSIUM SERPL-SCNC: 3.8 MMOL/L — SIGNIFICANT CHANGE UP (ref 3.5–5.3)
RBC # BLD: 3.8 M/UL — SIGNIFICANT CHANGE UP (ref 3.8–5.2)
RBC # FLD: 14.7 % — HIGH (ref 10.3–14.5)
SODIUM SERPL-SCNC: 134 MMOL/L — LOW (ref 135–145)
SPECIMEN SOURCE: SIGNIFICANT CHANGE UP
SPECIMEN SOURCE: SIGNIFICANT CHANGE UP
WBC # BLD: 8.04 K/UL — SIGNIFICANT CHANGE UP (ref 3.8–10.5)
WBC # FLD AUTO: 8.04 K/UL — SIGNIFICANT CHANGE UP (ref 3.8–10.5)

## 2021-08-09 PROCEDURE — 99232 SBSQ HOSP IP/OBS MODERATE 35: CPT

## 2021-08-09 PROCEDURE — 71045 X-RAY EXAM CHEST 1 VIEW: CPT | Mod: 26

## 2021-08-09 PROCEDURE — 99231 SBSQ HOSP IP/OBS SF/LOW 25: CPT

## 2021-08-09 RX ADMIN — Medication 250 MILLIGRAM(S): at 17:09

## 2021-08-09 RX ADMIN — Medication 650 MILLIGRAM(S): at 17:09

## 2021-08-09 RX ADMIN — Medication 125 MICROGRAM(S): at 05:28

## 2021-08-09 RX ADMIN — PIPERACILLIN AND TAZOBACTAM 25 GRAM(S): 4; .5 INJECTION, POWDER, LYOPHILIZED, FOR SOLUTION INTRAVENOUS at 21:09

## 2021-08-09 RX ADMIN — Medication 75 MILLIGRAM(S): at 05:28

## 2021-08-09 RX ADMIN — Medication 250 MILLIGRAM(S): at 05:28

## 2021-08-09 RX ADMIN — PIPERACILLIN AND TAZOBACTAM 25 GRAM(S): 4; .5 INJECTION, POWDER, LYOPHILIZED, FOR SOLUTION INTRAVENOUS at 13:32

## 2021-08-09 RX ADMIN — PANTOPRAZOLE SODIUM 40 MILLIGRAM(S): 20 TABLET, DELAYED RELEASE ORAL at 05:28

## 2021-08-09 RX ADMIN — Medication 650 MILLIGRAM(S): at 17:10

## 2021-08-09 RX ADMIN — Medication 650 MILLIGRAM(S): at 23:21

## 2021-08-09 RX ADMIN — Medication 650 MILLIGRAM(S): at 13:19

## 2021-08-09 RX ADMIN — PIPERACILLIN AND TAZOBACTAM 25 GRAM(S): 4; .5 INJECTION, POWDER, LYOPHILIZED, FOR SOLUTION INTRAVENOUS at 05:28

## 2021-08-09 RX ADMIN — Medication 650 MILLIGRAM(S): at 11:29

## 2021-08-09 RX ADMIN — ENOXAPARIN SODIUM 40 MILLIGRAM(S): 100 INJECTION SUBCUTANEOUS at 11:30

## 2021-08-09 NOTE — PROGRESS NOTE ADULT - SUBJECTIVE AND OBJECTIVE BOX
[facility]  INFECTIOUS DISEASES AND INTERNAL MEDICINE at [place]  =======================================================  Shahriar Galeano MD  Diplomates American Board of Internal Medicine and Infectious Diseases  Tel: [phone]      Fax: [phone]  =======================================================    LOUIS MEJIA [id number]    Follow up: pleural effusion s/p left ct placement  breathing much better  tmax 100.4f  plan to remove Ct tomorrow      Allergies:  sulfa drugs (Hives)           REVIEW OF SYSTEMS:  CONSTITUTIONAL:  No Fever or chills  HEENT:   No diplopia or blurred vision.  No earache, sore throat or runny nose.  CARDIOVASCULAR:  No pressure, squeezing, strangling, tightness, heaviness or aching about the chest, neck, axilla or epigastrium.  RESPIRATORY:  No cough, shortness of breath  GASTROINTESTINAL:  No nausea, vomiting or diarrhea.  GENITOURINARY:  No dysuria, frequency or urgency. No Blood in urine  MUSCULOSKELETAL:  no joint aches, no muscle pain  SKIN:  No change in skin, hair or nails.  NEUROLOGIC:  No Headaches, seizures or weakness.  PSYCHIATRIC:  No disorder of thought or mood.  ENDOCRINE:  No heat or cold intolerance  HEMATOLOGICAL:  No easy bruising or bleeding.       Physical Exam:  GEN: NAD, pleasant  HEENT: normocephalic and atraumatic. EOMI. PERRL.  Anicteric   NECK: Supple.   LUNGS: Clear to auscultation. left ct in place  HEART: Regular rate and rhythm without murmur.  ABDOMEN: Soft, nontender, and nondistended.  Positive bowel sounds.    : No CVA tenderness  EXTREMITIES: Without any edema.  MSK: no joint swelling  NEUROLOGIC: Cranial nerves II through XII are grossly intact. No focal deficits  PSYCHIATRIC: Appropriate affect .  SKIN: No Rash      Vitals:    T(F): 98.1 (09 Aug 2021 16:36), Max: 100.4 (09 Aug 2021 13:21)  HR: 88 (09 Aug 2021 16:36)  BP: 105/69 (09 Aug 2021 16:36)  RR: 18 (09 Aug 2021 16:36)  SpO2: 98% (09 Aug 2021 16:36) (98% - 98%)  temp max in last 48H T(F): , Max: 100.4 (08-09-21 @ 13:21)    Current Antibiotics:  piperacillin/tazobactam IVPB.. 3.375 Gram(s) IV Intermittent every 8 hours    Other medications:  atorvastatin 20 milliGRAM(s) Oral <User Schedule>  digoxin     Tablet 125 MICROGram(s) Oral daily  enoxaparin Injectable 40 milliGRAM(s) SubCutaneous daily  lidocaine   4% Patch 1 Patch Transdermal daily  metoprolol succinate ER 75 milliGRAM(s) Oral daily  pantoprazole    Tablet 40 milliGRAM(s) Oral before breakfast  saccharomyces boulardii 250 milliGRAM(s) Oral two times a day                            11.4   8.04  )-----------( 278      ( 09 Aug 2021 08:38 )             35.3     08-09    134<L>  |  96<L>  |  10.3  ----------------------------<  91  3.8   |  28.0  |  0.63    Ca    8.8      09 Aug 2021 08:38    TPro  5.9<L>  /  Alb  2.8<L>  /  TBili  x   /  DBili  x   /  AST  x   /  ALT  x   /  AlkPhos  x   08-08    RECENT CULTURES:  08-08 @ 02:20 .Body Fluid Pleural Fluid     No growth    polymorphonuclear leukocytes seen  No organisms seen  by cytocentrifuge      08-06 @ 20:37 .Blood Blood-Peripheral     No growth at 48 hours        08-05 @ 01:49 Clean Catch Clean Catch (Midstream)     <10,000 CFU/mL Normal Urogenital Rena        08-04 @ 14:35 .Blood Blood-Peripheral     No growth at 5 days.        08-04 @ 14:28          NotDetec  08-04 @ 14:26 .Blood Blood-Peripheral     No growth at 5 days.            WBC Count: 8.04 K/uL (08-09-21 @ 08:38)  WBC Count: 8.70 K/uL (08-08-21 @ 09:16)  WBC Count: 14.03 K/uL (08-07-21 @ 09:26)  WBC Count: 14.34 K/uL (08-06-21 @ 07:36)  WBC Count: 20.20 K/uL (08-05-21 @ 05:24)    Creatinine, Serum: 0.63 mg/dL (08-09-21 @ 08:38)  Creatinine, Serum: 0.73 mg/dL (08-06-21 @ 07:36)  Creatinine, Serum: 0.65 mg/dL (08-05-21 @ 05:24)    C-Reactive Protein, Serum: 110 mg/L (08-05-21 @ 05:24)  C-Reactive Protein, Serum: <4 mg/L (08-04-21 @ 14:24)      Sedimentation Rate, Erythrocyte: 43 mm/hr (08-04-21 @ 14:24)    Procalcitonin, Serum: 0.20 ng/mL (08-05-21 @ 05:24)     SARS-CoV-2: NotDetec (08-04-21 @ 14:28)  Rapid RVP Result: NotDetec (08-04-21 @ 14:28)

## 2021-08-09 NOTE — PROGRESS NOTE ADULT - PROVIDER SPECIALTY LIST ADULT
Hospitalist
Thoracic Surgery
Infectious Disease
Internal Medicine
Cardiology
Internal Medicine
Hospitalist
Internal Medicine
Infectious Disease
Infectious Disease
Thoracic Surgery

## 2021-08-09 NOTE — PROGRESS NOTE ADULT - ASSESSMENT
76yoF hx remote hx of transient global amnesia, carotid artery atherosclerosis, HLD, recently diagnosed viral pericarditis (6/23/2021), complicated by new onset Afib with RVR requiring [facility] admission in 7/2021, not on AC due to known small pericardial effusion, who presents with fevers and cough.  Pt reports generalized weakness and decreased exercise tolerance ever since her Afib diagnosis which is a change for her as she was previously very active.  For the past day, she reports chills, worsening fatigue and non-productive cough.  Pt also reports exertional dyspnea which she has felt previously and attributes this to the Afib.  During her last admission, pt could not undergo AVEL/DCCV due to concern of unsuccessful conversion of arrythmia in presence of pericarditis. She was last seen by outpatient cardiology on 8/2 where AVEL/DCCV was discussed again and that this procedure would likely be done electively in the hospital, however no official date had been determined.  Pt denies any chest pain, abdominal pain, nausea, vomiting, diarrhea, urinary symptoms or rash.    On admission, pt febrile with Tmax 104.5, tachycardic with HR in 110s.  Labs notable for leukocytosis.  CXR showed new moderate left pleural effusion/adjacent atelectasis.  CT chest showed Small left pleural effusion may be partially loculated and known small pericardial effusion (stated on new in report but seen on previous TTE).      Leukocytosis  Fever  Loculated effusion   HAD BEEN PLACED ON EMPRIIC ABX ADN HAD CONTINEUD FEVERS  CT SURGERY CALLED AND  CHEST TUBE PLACED  WILL FOLLOWUP PLEURAL FLUID CULTURES so far ngtd  on zosyn  - if remains afebrile and Ct removed, can complete abx with oral augmentin 875/125 q12h through 8/24    d/w Dr Craig    Id f/u 2weeks

## 2021-08-09 NOTE — PROGRESS NOTE ADULT - REASON FOR ADMISSION
sepsis due to suspected bacterial PNA

## 2021-08-09 NOTE — PROGRESS NOTE ADULT - NSICDXPILOT_GEN_ALL_CORE
[place]

## 2021-08-09 NOTE — PROGRESS NOTE ADULT - ASSESSMENT
The patient is a 76 year old female with a history of TIA and hyperlipidemia   recently diagnosed viral pericarditis (6/23/2021), complicated by new onset Afib with RVR requiring [facility] admission in 7/2021, not on AC due to known small pericardial effusion, who presents with fevers and cough    1. Sepsis present on admission due to suspected bacterial PNA with small parapneumonic effusion   Recent hospitalization;   On IV Vancomycin and zosyn   MRSA PCR neg. So stopping Vanco  CXR- Moderate left and small right pleural effusions with adjacent atelectasis, unchanged on the left and new on the right  But on CT chest- small left pl eff loculated  and ECHO reads- Large pl effusions  CTA consulted- Chest tube placed for large left loculated effusion. 1000ml drained. c/s testing  ID following  No evidence of hypoxia  Bld c/s from 8/4 neg  another set from 8/6 testing  Leucocytosis resolved    8. Loculated large pl effusion  with chest tube now  output 75 ml in 24 hrs. CTS plans to monitor 1 more day before removal. CXR resolved left pl eff and non acute  By Light's criterion- Exudative effusion  cytology, c/s , fungal testing  D.D: viral/ bacterial/ fungal PNA; hepatic/ spleen and GB dz; Malignancy; Trauma; post cardiac injury  No clear defined infiltrate/ consolidation noted on imaging. Atelectasis associated with effusions noted.  It is highly likely this is sequela of Viral infection Post viral pericarditis.   Will follow the test results  Lidoderm, percocet for pain  Incentive Spirometry    2. Hyponatremia:   likely hypovolemic hyponatremia initially  However more likely SIADH effect from pleural disease    3. Afib CHADsVASc 3, but AC not started due to concern for worsening pericardial effusion if initiated  On metoprolol and digoxin  Evaluated by EP, plan for AVEL/ CV as outpatient   To remain off AC  ECHO with 60-65% EF, grade 1 diastolic dysfxn    4. History of viral pericarditis with small pericardial effusion  Was previously on ASA, colchicine, both d/c’ ed by cardiology due to GI upset  S/p prednisone course, last dose on 8/4, completed dose prior to admission  FU echocardiogram with trivial effusion     5. Transient encephalopathy correlating with febrile episodes.   at Baseline AOX4  Will hold off on CT head at this time   per dgtr [facility] in 2019 with microvascular disease    6. Hyperlipidemia on statin     7. GERD on Protonix    VTE_ Lovenox subcut     Plan discussed at length with patient, daughter at bedside

## 2021-08-09 NOTE — PROGRESS NOTE ADULT - ASSESSMENT
76 year old female with a history of transient global amnesia, carotid artery atherosclerosis, HLD, recently diagnosed with viral pericarditis (6/23/2021) complicated by new onset Afib with RVR requiring [facility] admission in 7/2021, presents 8/4/21 with fevers and cough, work up notable for leukocytosis and moderate loculated left pleural effusion on CT chest for which Thoracic Surgery was consulted. Left chest tube was placed 8/7.

## 2021-08-09 NOTE — PROGRESS NOTE ADULT - SUBJECTIVE AND OBJECTIVE BOX
HEALTH ISSUES - PROBLEM Dx:    Pl effusion, febrile illness    INTERVAL HPI/ OVERNIGHT EVENTS:    comfortable  sitting up in chair  24 hr CT drainage 75ml  no fevers overnight    REVIEW OF SYSTEMS:    as above    Vital Signs Last 24 Hrs  T(C): 38 (09 Aug 2021 13:21), Max: 38 (09 Aug 2021 13:21)  T(F): 100.4 (09 Aug 2021 13:21), Max: 100.4 (09 Aug 2021 13:21)  HR: 86 (08 Aug 2021 17:03) (86 - 86)  BP: 103/69 (08 Aug 2021 17:03) (103/69 - 103/69)  BP(mean): --  RR: 17 (08 Aug 2021 17:03) (17 - 17)  SpO2: 97% (08 Aug 2021 17:03) (97% - 97%)    PHYSICAL EXAM-  GENERAL: frail built, sitting up in bed, comfortable, no dyspnea  HEAD:  Atraumatic, Normocephalic  EYES: EOMI, conjunctiva and sclera clear  ENT: Moist mucous membranes, No lesions  NECK: Supple, No JVD, Normal thyroid  NERVOUS SYSTEM:  Alert & Oriented X 3, Motor Strength 5/5 B/L upper and lower extremities  CHEST/LUNG: CTA [place], diminished breath sounds around left CT site; No rhonchi, wheezing, or rubs  HEART: IRRegular rate and rhythm; No murmurs, rubs, or gallops  ABDOMEN: Soft, Nontender, Nondistended; Bowel sounds present  EXTREMITIES:  2+ Peripheral Pulses, No clubbing, cyanosis, or edema  SKIN: No rashes or lesions      MEDICATIONS  (STANDING):  atorvastatin 20 milliGRAM(s) Oral <User Schedule>  digoxin     Tablet 125 MICROGram(s) Oral daily  enoxaparin Injectable 40 milliGRAM(s) SubCutaneous daily  lidocaine   4% Patch 1 Patch Transdermal daily  metoprolol succinate ER 75 milliGRAM(s) Oral daily  pantoprazole    Tablet 40 milliGRAM(s) Oral before breakfast  piperacillin/tazobactam IVPB.. 3.375 Gram(s) IV Intermittent every 8 hours  saccharomyces boulardii 250 milliGRAM(s) Oral two times a day    MEDICATIONS  (PRN):  acetaminophen   Tablet .. 650 milliGRAM(s) Oral every 6 hours PRN Temp greater or equal to 38C (100.4F), Mild Pain (1 - 3)  oxycodone    5 mG/acetaminophen 325 mG 1 Tablet(s) Oral every 4 hours PRN Moderate Pain (4 - 6)      LABS:                        11.4   8.04  )-----------( 278      ( 09 Aug 2021 08:38 )             35.3     08-09    134<L>  |  96<L>  |  10.3  ----------------------------<  91  3.8   |  28.0  |  0.63    Ca    8.8      09 Aug 2021 08:38    TPro  5.9<L>  /  Alb  2.8<L>  /  TBili  x   /  DBili  x   /  AST  x   /  ALT  x   /  AlkPhos  x   08-08

## 2021-08-09 NOTE — PROGRESS NOTE ADULT - SUBJECTIVE AND OBJECTIVE BOX
Subjective - patient seen and evaluated bedside. Sitting comfortably in bed. Denies CP, SOB, HA, dizziness, n/v/d    Review of Systems: negative x 10 systems except as noted above    Brief summary:  76yFemale w/ parapneumonic pleural effusion s/p LT CT placement     Significant/[id number] events: none       PAST MEDICAL & SURGICAL HISTORY:  HLD (hyperlipidemia)    Osteoarthritis    FUO (fever of unknown origin)    H/O pericarditis    TIA (transient ischemic attack)    Afib    H/O rotator cuff surgery          acetaminophen   Tablet .. 650 milliGRAM(s) Oral every 6 hours PRN  atorvastatin 20 milliGRAM(s) Oral <User Schedule>  digoxin     Tablet 125 MICROGram(s) Oral daily  enoxaparin Injectable 40 milliGRAM(s) SubCutaneous daily  lidocaine   4% Patch 1 Patch Transdermal daily  metoprolol succinate ER 75 milliGRAM(s) Oral daily  oxycodone    5 mG/acetaminophen 325 mG 1 Tablet(s) Oral every 4 hours PRN  pantoprazole    Tablet 40 milliGRAM(s) Oral before breakfast  piperacillin/tazobactam IVPB.. 3.375 Gram(s) IV Intermittent every 8 hours  saccharomyces boulardii 250 milliGRAM(s) Oral two times a day  MEDICATIONS  (PRN):  acetaminophen   Tablet .. 650 milliGRAM(s) Oral every 6 hours PRN Temp greater or equal to 38C (100.4F), Mild Pain (1 - 3)  oxycodone    5 mG/acetaminophen 325 mG 1 Tablet(s) Oral every 4 hours PRN Moderate Pain (4 - 6)      Daily     Daily                               11.4   8.04  )-----------( 278      ( 09 Aug 2021 08:38 )             35.3   08-09    134<L>  |  96<L>  |  10.3  ----------------------------<  91  3.8   |  28.0  |  0.63    Ca    8.8      09 Aug 2021 08:38    TPro  5.9<L>  /  Alb  2.8<L>  /  TBili  x   /  DBili  x   /  AST  x   /  ALT  x   /  AlkPhos  x   08-08            Objective:  T(C): 36.4 (08-08-21 @ 17:03), Max: 36.4 (08-08-21 @ 17:03)  HR: 86 (08-08-21 @ 17:03) (86 - 86)  BP: 103/69 (08-08-21 @ 17:03) (103/69 - 103/69)  RR: 17 (08-08-21 @ 17:03) (17 - 17)  SpO2: 97% (08-08-21 @ 17:03) (97% - 97%)  Wt(kg): --CAPILLARY BLOOD GLUCOSE      I&O's Summary    08 Aug 2021 07:01  -  09 Aug 2021 07:00  --------------------------------------------------------  IN: 0 mL / OUT: 75 mL / NET: -75 mL    09 Aug 2021 07:01  -  09 Aug 2021 11:18  --------------------------------------------------------  IN: 320 mL / OUT: 0 mL / NET: 320 mL        Physical Exam  Neuro: A+O x 3, non-focal, speech clear and intact  Pulm: CTA, equal bilaterally  CV: RRR,  +S1S2  Abd: soft, NT, ND, +BS  Ext: +DP Pulses b/l,  no edema  Chest tubes: LT pleural CT to WS, no air leak. draining appropriately serous fluid     Imaging:  CXR:  < from: Xray Chest 1 View- PORTABLE-Routine (Xray Chest 1 View- PORTABLE-Routine in AM.) (08.09.21 @ 05:28) >  IMPRESSION:  Trace left pleural effusion, improved, costophrenic angle now sharply defined    Small right pleural effusion/adjacent atelectasis, unchanged    < end of copied text >

## 2021-08-09 NOTE — PROGRESS NOTE ADULT - PROBLEM SELECTOR PLAN 1
s/p left chest tube placement 8/7  Initially with 1L serous fluid drainage  Pleural fluid analysis positive for Light's criteria (exudative effusion), gram stain negative, fungal pending, cyto pending   Maintain chest tube to water seal  CXR each AM while chest tube remains in place  Possibly remove tomorrow AM if output remains low.   Discussed with Dr. Mcbride in AM rounds
s/p left chest tube placement 8/7  Initially with 1L serous fluid drainage  Pleural fluid analysis positive for Light's criteria (exudative effusion), gram stain negative, fungal pending, cyto pending   Maintain chest tube to water seal  CXR each AM while chest tube remains in place  Discussed with Dr. Mulligan in AM rounds

## 2021-08-10 ENCOUNTER — TRANSCRIPTION ENCOUNTER (OUTPATIENT)
Age: 76
End: 2021-08-10

## 2021-08-10 VITALS
HEART RATE: 80 BPM | DIASTOLIC BLOOD PRESSURE: 75 MMHG | OXYGEN SATURATION: 100 % | SYSTOLIC BLOOD PRESSURE: 132 MMHG | RESPIRATION RATE: 16 BRPM | TEMPERATURE: 92 F

## 2021-08-10 LAB
ANION GAP SERPL CALC-SCNC: 11 MMOL/L — SIGNIFICANT CHANGE UP (ref 5–17)
BUN SERPL-MCNC: 8.9 MG/DL — SIGNIFICANT CHANGE UP (ref 8–20)
CALCIUM SERPL-MCNC: 8.9 MG/DL — SIGNIFICANT CHANGE UP (ref 8.6–10.2)
CHLORIDE SERPL-SCNC: 99 MMOL/L — SIGNIFICANT CHANGE UP (ref 98–107)
CO2 SERPL-SCNC: 27 MMOL/L — SIGNIFICANT CHANGE UP (ref 22–29)
CREAT SERPL-MCNC: 0.66 MG/DL — SIGNIFICANT CHANGE UP (ref 0.5–1.3)
GLUCOSE SERPL-MCNC: 100 MG/DL — HIGH (ref 70–99)
POTASSIUM SERPL-MCNC: 3.6 MMOL/L — SIGNIFICANT CHANGE UP (ref 3.5–5.3)
POTASSIUM SERPL-SCNC: 3.6 MMOL/L — SIGNIFICANT CHANGE UP (ref 3.5–5.3)
SODIUM SERPL-SCNC: 136 MMOL/L — SIGNIFICANT CHANGE UP (ref 135–145)

## 2021-08-10 PROCEDURE — 82945 GLUCOSE OTHER FLUID: CPT

## 2021-08-10 PROCEDURE — 99239 HOSP IP/OBS DSCHRG MGMT >30: CPT

## 2021-08-10 PROCEDURE — 83605 ASSAY OF LACTIC ACID: CPT

## 2021-08-10 PROCEDURE — 96374 THER/PROPH/DIAG INJ IV PUSH: CPT

## 2021-08-10 PROCEDURE — 84157 ASSAY OF PROTEIN OTHER: CPT

## 2021-08-10 PROCEDURE — 82042 OTHER SOURCE ALBUMIN QUAN EA: CPT

## 2021-08-10 PROCEDURE — 87205 SMEAR GRAM STAIN: CPT

## 2021-08-10 PROCEDURE — 93308 TTE F-UP OR LMTD: CPT

## 2021-08-10 PROCEDURE — 96375 TX/PRO/DX INJ NEW DRUG ADDON: CPT

## 2021-08-10 PROCEDURE — 80202 ASSAY OF VANCOMYCIN: CPT

## 2021-08-10 PROCEDURE — 87075 CULTR BACTERIA EXCEPT BLOOD: CPT

## 2021-08-10 PROCEDURE — 71250 CT THORAX DX C-: CPT | Mod: MA

## 2021-08-10 PROCEDURE — 85025 COMPLETE CBC W/AUTO DIFF WBC: CPT

## 2021-08-10 PROCEDURE — 88312 SPECIAL STAINS GROUP 1: CPT

## 2021-08-10 PROCEDURE — 0225U NFCT DS DNA&RNA 21 SARSCOV2: CPT

## 2021-08-10 PROCEDURE — 80053 COMPREHEN METABOLIC PANEL: CPT

## 2021-08-10 PROCEDURE — 93005 ELECTROCARDIOGRAM TRACING: CPT

## 2021-08-10 PROCEDURE — 86769 SARS-COV-2 COVID-19 ANTIBODY: CPT

## 2021-08-10 PROCEDURE — 88305 TISSUE EXAM BY PATHOLOGIST: CPT

## 2021-08-10 PROCEDURE — 87640 STAPH A DNA AMP PROBE: CPT

## 2021-08-10 PROCEDURE — 84145 PROCALCITONIN (PCT): CPT

## 2021-08-10 PROCEDURE — 84155 ASSAY OF PROTEIN SERUM: CPT

## 2021-08-10 PROCEDURE — 87040 BLOOD CULTURE FOR BACTERIA: CPT

## 2021-08-10 PROCEDURE — 89051 BODY FLUID CELL COUNT: CPT

## 2021-08-10 PROCEDURE — 87641 MR-STAPH DNA AMP PROBE: CPT

## 2021-08-10 PROCEDURE — 86140 C-REACTIVE PROTEIN: CPT

## 2021-08-10 PROCEDURE — 71045 X-RAY EXAM CHEST 1 VIEW: CPT | Mod: 26

## 2021-08-10 PROCEDURE — 87070 CULTURE OTHR SPECIMN AEROBIC: CPT

## 2021-08-10 PROCEDURE — 85610 PROTHROMBIN TIME: CPT

## 2021-08-10 PROCEDURE — 80048 BASIC METABOLIC PNL TOTAL CA: CPT

## 2021-08-10 PROCEDURE — 80162 ASSAY OF DIGOXIN TOTAL: CPT

## 2021-08-10 PROCEDURE — 82040 ASSAY OF SERUM ALBUMIN: CPT

## 2021-08-10 PROCEDURE — 85027 COMPLETE CBC AUTOMATED: CPT

## 2021-08-10 PROCEDURE — 85652 RBC SED RATE AUTOMATED: CPT

## 2021-08-10 PROCEDURE — 83615 LACTATE (LD) (LDH) ENZYME: CPT

## 2021-08-10 PROCEDURE — 88112 CYTOPATH CELL ENHANCE TECH: CPT

## 2021-08-10 PROCEDURE — 84484 ASSAY OF TROPONIN QUANT: CPT

## 2021-08-10 PROCEDURE — 99285 EMERGENCY DEPT VISIT HI MDM: CPT | Mod: 25

## 2021-08-10 PROCEDURE — 87086 URINE CULTURE/COLONY COUNT: CPT

## 2021-08-10 PROCEDURE — 87102 FUNGUS ISOLATION CULTURE: CPT

## 2021-08-10 PROCEDURE — 81001 URINALYSIS AUTO W/SCOPE: CPT

## 2021-08-10 PROCEDURE — 85730 THROMBOPLASTIN TIME PARTIAL: CPT

## 2021-08-10 PROCEDURE — 71045 X-RAY EXAM CHEST 1 VIEW: CPT

## 2021-08-10 PROCEDURE — 83986 ASSAY PH BODY FLUID NOS: CPT

## 2021-08-10 PROCEDURE — 36415 COLL VENOUS BLD VENIPUNCTURE: CPT

## 2021-08-10 RX ORDER — SACCHAROMYCES BOULARDII 250 MG
1 POWDER IN PACKET (EA) ORAL
Qty: 30 | Refills: 0
Start: 2021-08-10 | End: 2021-08-24

## 2021-08-10 RX ORDER — ACETAMINOPHEN 500 MG
2 TABLET ORAL
Qty: 0 | Refills: 0 | DISCHARGE
Start: 2021-08-10

## 2021-08-10 RX ADMIN — Medication 250 MILLIGRAM(S): at 05:01

## 2021-08-10 RX ADMIN — Medication 125 MICROGRAM(S): at 05:02

## 2021-08-10 RX ADMIN — PIPERACILLIN AND TAZOBACTAM 25 GRAM(S): 4; .5 INJECTION, POWDER, LYOPHILIZED, FOR SOLUTION INTRAVENOUS at 05:02

## 2021-08-10 RX ADMIN — Medication 650 MILLIGRAM(S): at 05:06

## 2021-08-10 RX ADMIN — PANTOPRAZOLE SODIUM 40 MILLIGRAM(S): 20 TABLET, DELAYED RELEASE ORAL at 05:02

## 2021-08-10 RX ADMIN — Medication 75 MILLIGRAM(S): at 05:04

## 2021-08-10 NOTE — DISCHARGE NOTE PROVIDER - NSDCCPCAREPLAN_GEN_ALL_CORE_FT
PRINCIPAL DISCHARGE DIAGNOSIS  Diagnosis: Sepsis, due to unspecified organism, unspecified whether acute organ dysfunction present  Assessment and Plan of Treatment:       SECONDARY DISCHARGE DIAGNOSES  Diagnosis: Pleural effusion on left  Assessment and Plan of Treatment: Pleural effusion on left    Diagnosis: Parapneumonic effusion  Assessment and Plan of Treatment:     Diagnosis: Febrile illness  Assessment and Plan of Treatment:     Diagnosis: Hyponatremia  Assessment and Plan of Treatment:     Diagnosis: Chronic atrial fibrillation  Assessment and Plan of Treatment:     Diagnosis: Sepsis with metabolic encephalopathy  Assessment and Plan of Treatment:

## 2021-08-10 NOTE — CHART NOTE - NSCHARTNOTEFT_GEN_A_CORE
Patient febrile to 103F. Chest xray with new right pleural effusion. Blood cultures on 8/4 negative x 2  On IV vanco and zosyn  Repeat blood cultures  CT surgery consult in am for thoracentesis
pt with rectal temp 103 this evening, reports f/c, otherwise has no complaints  pt in daughter in room - pt walking around, mental status much improved from yesterday   repeat bcx drawn given persistent fevers  CXR rev unchanged pleural effusions   prn tylenol given   VS otheriwise stable, HR mildly elevated as pt was walking around with IV pole/talking  will continue to closely monitor   RN to monitor, assess, escalate to PA PRN
Left pigtail removed and dry, sterile occlusive dressing placed.  Post removal cxr read as tiny apical pneumothorax.  Reviewed by Dr. Mcbride.  Pt may be discharged from a ct surgery standpoint.  Dressing to remain in place until tomorrow evening, at which point pt may shower (I discussed this with the patient as well).

## 2021-08-10 NOTE — DIETITIAN INITIAL EVALUATION ADULT. - ETIOLOGY
Related to inadequate protein energy intake with persistent lack of appetite in setting of viral pericarditis, now admitted with pleural effusion

## 2021-08-10 NOTE — DIETITIAN INITIAL EVALUATION ADULT. - OTHER INFO
Pt is a 76 year old female with a history of transient global amnesia, carotid artery atherosclerosis, HLD, recently diagnosed with viral pericarditis (6/23/2021) complicated by new onset Afib with RVR requiring [facility] admission in 7/2021, presents 8/4/21 with fevers and cough, work up notable for leukocytosis and moderate loculated left pleural effusion on CT chest for which Thoracic Surgery was consulted. Left chest tube was placed 8/7.   Pt typically follows a Heart Healthy diet and exercises regularly; however over endorses persistent lack of appetite over past few months secondary to "feeling Lousy". Pt endorses 6# weight loss over past few weeks. Pt reports feeling better and appetite slowly improving. NFPE conducted.

## 2021-08-10 NOTE — DISCHARGE NOTE PROVIDER - CARE PROVIDERS DIRECT ADDRESSES
,DirectAddress_Unknown,[email or web address]

## 2021-08-10 NOTE — DISCHARGE NOTE PROVIDER - DETAILS OF MALNUTRITION DIAGNOSIS/DIAGNOSES
This patient has been assessed with a concern for Malnutrition and was treated during this hospitalization for the following Nutrition diagnosis/diagnoses:     -  08/10/2021: Severe protein-calorie malnutrition   -  08/10/2021: Underweight (BMI < 19)

## 2021-08-10 NOTE — DISCHARGE NOTE PROVIDER - NSDCMRMEDTOKEN_GEN_ALL_CORE_FT
acetaminophen 325 mg oral tablet: 2 tab(s) orally every 6 hours, As needed, Temp greater or equal to 38C (100.4F), Mild Pain (1 - 3)  Augmentin 875 mg-125 mg oral tablet: 1 tab(s) orally every 12 hours   digoxin 125 mcg (0.125 mg) oral tablet: 1 tab(s) orally once a day  omeprazole 40 mg oral delayed release capsule: 1 cap(s) orally once a day   rosuvastatin 5 mg oral tablet: 1 tab(s) orally every other day  saccharomyces boulardii lyo 250 mg oral capsule: 1 cap(s) orally 2 times a day  Toprol-XL 25 mg oral tablet, extended release: 3 tab(s) orally once a day

## 2021-08-10 NOTE — DISCHARGE NOTE NURSING/CASE MANAGEMENT/SOCIAL WORK - PATIENT PORTAL LINK FT
You can access the FollowMyHealth Patient Portal offered by [facility] by registering at the following website: [email or web address]. By joining [employer]’s FollowMyHealth portal, you will also be able to view your health information using other applications (apps) compatible with our system. 2.01

## 2021-08-10 NOTE — DISCHARGE NOTE PROVIDER - NSDCFUSCHEDAPPT_GEN_ALL_CORE_FT
LOUIS MEJIA ; 08/27/2021 ; [facility] [address] ; 10/01/2021 ; [facility] [address]

## 2021-08-10 NOTE — DISCHARGE NOTE NURSING/CASE MANAGEMENT/SOCIAL WORK - NSDCFUADDAPPT_GEN_ALL_CORE_FT
F/U ID in 1 week to 10 days and follow with final fluid c/s, fungal cultures etc    F/U PMD    PT DECLINED HOME CARE

## 2021-08-10 NOTE — DISCHARGE NOTE PROVIDER - PROVIDER TOKENS
PROVIDER:[TOKEN:[[id number]]],PROVIDER:[TOKEN:[[id number]]],PROVIDER:[TOKEN:[[id number]]]

## 2021-08-10 NOTE — DISCHARGE NOTE PROVIDER - NSDCPNSUBOBJ_GEN_ALL_CORE
pt seen and examined    comfortable  chest tube removed  CXR reviewed and explained to patient  explained discharge and further plan of care    Vital Signs Last 24 Hrs  T(C): 36.9 (10 Aug 2021 04:33), Max: 38 (09 Aug 2021 13:21)  T(F): 98.4 (10 Aug 2021 04:33), Max: 100.4 (09 Aug 2021 13:21)  HR: 96 (10 Aug 2021 04:33) (88 - 96)  BP: 139/99 (10 Aug 2021 04:33) (105/69 - 139/99)  BP(mean): --  RR: 18 (10 Aug 2021 04:33) (18 - 18)  SpO2: 100% (10 Aug 2021 04:33) (98% - 100%)    PHYSICAL EXAM-  GENERAL: frail built, sitting up in bed, comfortable, no dyspnea  HEAD:  Atraumatic, Normocephalic  EYES: EOMI, conjunctiva and sclera clear  ENT: Moist mucous membranes, No lesions  NECK: Supple, No JVD, Normal thyroid  NERVOUS SYSTEM:  Alert & Oriented X 3, Motor Strength 5/5 B/L upper and lower extremities  CHEST/LUNG: CTA benito, diminished breath sounds left basal; No rhonchi, wheezing, or rubs  HEART: IRRegular rate and rhythm; No murmurs, rubs, or gallops  ABDOMEN: Soft, Nontender, Nondistended; Bowel sounds present  EXTREMITIES:  2+ Peripheral Pulses, No clubbing, cyanosis, or edema  SKIN: No rashes or lesions      LABS:                        11.4   8.04  )-----------( 278      ( 09 Aug 2021 08:38 )             35.3     08-10    136  |  99  |  8.9  ----------------------------<  100<H>  3.6   |  27.0  |  0.66    Ca    8.9      10 Aug 2021 07:18    The patient is a 76 year old female with a history of TIA and hyperlipidemia   recently diagnosed viral pericarditis (6/23/2021), complicated by new onset Afib with RVR requiring [facility] admission in 7/2021, not on AC due to known small pericardial effusion, who presents with fevers and cough    1. Sepsis present on admission due to suspected bacterial PNA with small parapneumonic effusion   Recent hospitalization;   was on IV Vancomycin and zosyn   MRSA PCR neg. So stopped Vanco  CXR- Moderate left and small right pleural effusions with adjacent atelectasis, unchanged on the left and new on the right  But on CT chest- small left pl eff loculated  and ECHO reads- Large pl effusions  CTA consulted- Chest tube placed for large left loculated effusion. 1000ml drained. c/s testing  chest tube removed today  ID following  No evidence of hypoxia  Bld c/s from 8/4 neg  another set from 8/6 neg  Leucocytosis resolved    will go home with Augmentin through 8/24/21 and f/u ID in the office for full c/s results    8. Loculated large pl effusion  with chest tube now  output 75 ml in 24 hrs. CTS plans to monitor 1 more day before removal. CXR resolved left pl eff and non acute  By Light's criterion- Exudative effusion  cytology, c/s , fungal testing  D.D: viral/ bacterial/ fungal PNA; hepatic/ spleen and GB dz; Malignancy; Trauma; post cardiac injury  No clear defined infiltrate/ consolidation noted on imaging. Atelectasis associated with effusions noted.  It is highly likely this is sequela of Viral infection Post viral pericarditis. and since she is already on antibiotics, if it is bacterial etiology, c/s might not yield anything  Lidoderm, percocet for pain  Incentive Spirometry    2. Hyponatremia:   likely hypovolemic hyponatremia initially  However more likely SIADH effect from pleural disease    3. Afib CHADsVASc 3, but AC not started due to concern for worsening pericardial effusion if initiated  On metoprolol and digoxin  Evaluated by EP, plan for AVEL/ CV as outpatient   To remain off AC  ECHO with 60-65% EF, grade 1 diastolic dysfxn    4. History of viral pericarditis with small pericardial effusion  Was previously on ASA, colchicine, both d/c’ ed by cardiology due to GI upset  S/p prednisone course, last dose on 8/4, completed dose prior to admission  FU echocardiogram with trivial effusion     5. Transient encephalopathy correlating with febrile episodes.   at Baseline AOX4  Will hold off on CT head at this time   per dgtr [facility] in 2019 with microvascular disease    6. Hyperlipidemia on statin     7. GERD on Protonix    VTE_ Lovenox subcut     discharge home today

## 2021-08-10 NOTE — DISCHARGE NOTE PROVIDER - CARE PROVIDER_API CALL
Kimmy Galeano)  Internal Medicine  [address] [place] [address]  Phone: [phone]  Fax: [phone]  Follow Up Time:     Mike Mcbride)  Surgery; Thoracic Surgery  [address] [place] [address]  Phone: [phone]  Fax: [phone]  Follow Up Time:     Sterling Alcantar)  Internal Medicine  [address] [place] [address], [place] [address]  Phone: [phone]  Fax: [phone]  Follow Up Time:

## 2021-08-10 NOTE — DIETITIAN INITIAL EVALUATION ADULT. - PERTINENT LABORATORY DATA
08-10 Na136 mmol/L Glu 100 mg/dL<H> K+ 3.6 mmol/L Cr  0.66 mg/dL BUN 8.9 mg/dL Phos n/a   Alb n/a   PAB n/a     n/a  HgbA1C

## 2021-08-11 ENCOUNTER — NON-APPOINTMENT (OUTPATIENT)
Age: 76
End: 2021-08-11

## 2021-08-11 PROBLEM — I48.91 UNSPECIFIED ATRIAL FIBRILLATION: Chronic | Status: ACTIVE | Noted: 2021-08-05

## 2021-08-11 LAB
CULTURE RESULTS: SIGNIFICANT CHANGE UP
CULTURE RESULTS: SIGNIFICANT CHANGE UP
NON-GYNECOLOGICAL CYTOLOGY STUDY: SIGNIFICANT CHANGE UP
SPECIMEN SOURCE: SIGNIFICANT CHANGE UP
SPECIMEN SOURCE: SIGNIFICANT CHANGE UP

## 2021-08-12 LAB
CULTURE RESULTS: SIGNIFICANT CHANGE UP
SPECIMEN SOURCE: SIGNIFICANT CHANGE UP

## 2021-08-16 ENCOUNTER — APPOINTMENT (OUTPATIENT)
Dept: CARE COORDINATION | Facility: HOME HEALTH | Age: 76
End: 2021-08-16
Payer: MEDICARE

## 2021-08-16 ENCOUNTER — NON-APPOINTMENT (OUTPATIENT)
Age: 76
End: 2021-08-16

## 2021-08-16 PROCEDURE — 99348 HOME/RES VST EST LOW MDM 30: CPT | Mod: 95

## 2021-08-16 NOTE — PHYSICAL EXAM
[No Acute Distress] : no acute distress [Well-Appearing] : well-appearing [Normal Sclera/Conjunctiva] : normal sclera/conjunctiva [No JVD] : no jugular venous distention [No Respiratory Distress] : no respiratory distress  [Clear to Auscultation] : lungs were clear to auscultation bilaterally [Normal Rate] : normal rate  [Regular Rhythm] : with a regular rhythm [No Edema] : there was no peripheral edema [Soft] : abdomen soft [Non Tender] : non-tender [No Focal Deficits] : no focal deficits [Normal Affect] : the affect was normal

## 2021-08-16 NOTE — HISTORY OF PRESENT ILLNESS
[FreeTextEntry2] : Note from [facility] with patient presented to the ER on August 4, 2021 secondary to fever with evaluation finding pleural effusion.\par On June 23, 2021 the patient was diagnosed with viral pericarditis. This was complicated by new onset of atrial fibrillation. Patient not anticoagulated secondary to pericardial effusion.\par \par Patient evaluation showing a pneumonia with a parapneumonic effusion. \par Chest x-ray showed moderate left and small right pleural effusions with adjacent atelectasis unchanged on the left and new on the right. \par CAT scan showed a loculated left pleural effusion. \par Treated with IV antibiotics.\par ID consultation gotten.\par Cardiothoracic surgery consulted and chest tube placed on the left with removal of 1000 cc of fluid.\par Blood cultures negative.\par Leukocytosis resolved.\par Chest tube removed on 810 and patient felt stable for discharge on August 10 on Augmentin through August 24.\par \par No anticoagulation on discharge secondary to pericardial effusion and continued on metoprolol and digoxin. Plan is for outpatient EP with AVEL and cardioversion as an outpatient.\par \par Patient to followup with infectious disease.

## 2021-08-17 ENCOUNTER — INPATIENT (INPATIENT)
Facility: HOSPITAL | Age: 76
LOS: 6 days | Discharge: HOME | DRG: 186 | End: 2021-08-24
Attending: HOSPITALIST | Admitting: FAMILY MEDICINE
Payer: MEDICARE

## 2021-08-17 ENCOUNTER — APPOINTMENT (OUTPATIENT)
Dept: INTERNAL MEDICINE | Facility: CLINIC | Age: 76
End: 2021-08-17

## 2021-08-17 ENCOUNTER — RESULT REVIEW (OUTPATIENT)
Age: 76
End: 2021-08-17

## 2021-08-17 ENCOUNTER — NON-APPOINTMENT (OUTPATIENT)
Age: 76
End: 2021-08-17

## 2021-08-17 ENCOUNTER — APPOINTMENT (OUTPATIENT)
Dept: CARDIOLOGY | Facility: CLINIC | Age: 76
End: 2021-08-17
Payer: MEDICARE

## 2021-08-17 ENCOUNTER — TRANSCRIPTION ENCOUNTER (OUTPATIENT)
Age: 76
End: 2021-08-17

## 2021-08-17 VITALS
WEIGHT: 108 LBS | HEART RATE: 96 BPM | OXYGEN SATURATION: 96 % | HEIGHT: 65 IN | SYSTOLIC BLOOD PRESSURE: 158 MMHG | BODY MASS INDEX: 17.99 KG/M2 | DIASTOLIC BLOOD PRESSURE: 80 MMHG | TEMPERATURE: 98.6 F

## 2021-08-17 VITALS
DIASTOLIC BLOOD PRESSURE: 88 MMHG | WEIGHT: 108.91 LBS | HEART RATE: 83 BPM | OXYGEN SATURATION: 97 % | TEMPERATURE: 98 F | HEIGHT: 65 IN | SYSTOLIC BLOOD PRESSURE: 152 MMHG | RESPIRATION RATE: 17 BRPM

## 2021-08-17 VITALS — DIASTOLIC BLOOD PRESSURE: 80 MMHG | SYSTOLIC BLOOD PRESSURE: 152 MMHG

## 2021-08-17 DIAGNOSIS — Z98.890 OTHER SPECIFIED POSTPROCEDURAL STATES: Chronic | ICD-10-CM

## 2021-08-17 DIAGNOSIS — J18.9 PNEUMONIA, UNSPECIFIED ORGANISM: ICD-10-CM

## 2021-08-17 DIAGNOSIS — J90 PLEURAL EFFUSION, NOT ELSEWHERE CLASSIFIED: ICD-10-CM

## 2021-08-17 DIAGNOSIS — R06.02 SHORTNESS OF BREATH: ICD-10-CM

## 2021-08-17 LAB
ALBUMIN SERPL ELPH-MCNC: 3.1 G/DL — LOW (ref 3.3–5.2)
ALP SERPL-CCNC: 440 U/L — HIGH (ref 40–120)
ALT FLD-CCNC: 33 U/L — HIGH
ANION GAP SERPL CALC-SCNC: 12 MMOL/L — SIGNIFICANT CHANGE UP (ref 5–17)
APTT BLD: 32.5 SEC — SIGNIFICANT CHANGE UP (ref 27.5–35.5)
AST SERPL-CCNC: 40 U/L — HIGH
B PERT IGG+IGM PNL SER: ABNORMAL
BASOPHILS # BLD AUTO: 0.03 K/UL — SIGNIFICANT CHANGE UP (ref 0–0.2)
BASOPHILS NFR BLD AUTO: 0.3 % — SIGNIFICANT CHANGE UP (ref 0–2)
BILIRUB SERPL-MCNC: 0.4 MG/DL — SIGNIFICANT CHANGE UP (ref 0.4–2)
BLD GP AB SCN SERPL QL: SIGNIFICANT CHANGE UP
BUN SERPL-MCNC: 13.2 MG/DL — SIGNIFICANT CHANGE UP (ref 8–20)
CALCIUM SERPL-MCNC: 9.4 MG/DL — SIGNIFICANT CHANGE UP (ref 8.6–10.2)
CHLORIDE SERPL-SCNC: 97 MMOL/L — LOW (ref 98–107)
CO2 SERPL-SCNC: 25 MMOL/L — SIGNIFICANT CHANGE UP (ref 22–29)
COLOR FLD: YELLOW
CREAT SERPL-MCNC: 0.73 MG/DL — SIGNIFICANT CHANGE UP (ref 0.5–1.3)
CRP SERPL-MCNC: 81 MG/L — HIGH
DIGOXIN SERPL-MCNC: 0.8 NG/ML — SIGNIFICANT CHANGE UP (ref 0.8–2)
EOSINOPHIL # BLD AUTO: 0.02 K/UL — SIGNIFICANT CHANGE UP (ref 0–0.5)
EOSINOPHIL NFR BLD AUTO: 0.2 % — SIGNIFICANT CHANGE UP (ref 0–6)
ERYTHROCYTE [SEDIMENTATION RATE] IN BLOOD: 55 MM/HR — HIGH (ref 0–20)
FLUID INTAKE SUBSTANCE CLASS: SIGNIFICANT CHANGE UP
GLUCOSE SERPL-MCNC: 97 MG/DL — SIGNIFICANT CHANGE UP (ref 70–99)
HCT VFR BLD CALC: 33.9 % — LOW (ref 34.5–45)
HGB BLD-MCNC: 10.7 G/DL — LOW (ref 11.5–15.5)
IMM GRANULOCYTES NFR BLD AUTO: 0.3 % — SIGNIFICANT CHANGE UP (ref 0–1.5)
INR BLD: 1.27 RATIO — HIGH (ref 0.88–1.16)
LACTATE BLDV-MCNC: 1.1 MMOL/L — SIGNIFICANT CHANGE UP (ref 0.5–2)
LYMPHOCYTES # BLD AUTO: 1.05 K/UL — SIGNIFICANT CHANGE UP (ref 1–3.3)
LYMPHOCYTES # BLD AUTO: 11.3 % — LOW (ref 13–44)
MCHC RBC-ENTMCNC: 29.3 PG — SIGNIFICANT CHANGE UP (ref 27–34)
MCHC RBC-ENTMCNC: 31.6 GM/DL — LOW (ref 32–36)
MCV RBC AUTO: 92.9 FL — SIGNIFICANT CHANGE UP (ref 80–100)
MONOCYTES # BLD AUTO: 0.45 K/UL — SIGNIFICANT CHANGE UP (ref 0–0.9)
MONOCYTES NFR BLD AUTO: 4.9 % — SIGNIFICANT CHANGE UP (ref 2–14)
NEUTROPHILS # BLD AUTO: 7.68 K/UL — HIGH (ref 1.8–7.4)
NEUTROPHILS NFR BLD AUTO: 83 % — HIGH (ref 43–77)
NT-PROBNP SERPL-SCNC: 1992 PG/ML — HIGH (ref 0–300)
PH FLD: 8 — SIGNIFICANT CHANGE UP
PLATELET # BLD AUTO: 515 K/UL — HIGH (ref 150–400)
POTASSIUM SERPL-MCNC: 3.9 MMOL/L — SIGNIFICANT CHANGE UP (ref 3.5–5.3)
POTASSIUM SERPL-SCNC: 3.9 MMOL/L — SIGNIFICANT CHANGE UP (ref 3.5–5.3)
PROT SERPL-MCNC: 6.6 G/DL — SIGNIFICANT CHANGE UP (ref 6.6–8.7)
PROTHROM AB SERPL-ACNC: 14.6 SEC — HIGH (ref 10.6–13.6)
RBC # BLD: 3.65 M/UL — LOW (ref 3.8–5.2)
RBC # FLD: 14.4 % — SIGNIFICANT CHANGE UP (ref 10.3–14.5)
RCV VOL RI: <1000 /UL — HIGH (ref 0–0)
SARS-COV-2 RNA SPEC QL NAA+PROBE: SIGNIFICANT CHANGE UP
SODIUM SERPL-SCNC: 134 MMOL/L — LOW (ref 135–145)
TOTAL NUCLEATED CELL COUNT, BODY FLUID: 1236 /UL — SIGNIFICANT CHANGE UP
TROPONIN T SERPL-MCNC: <0.01 NG/ML — SIGNIFICANT CHANGE UP (ref 0–0.06)
TUBE TYPE: SIGNIFICANT CHANGE UP
WBC # BLD: 9.26 K/UL — SIGNIFICANT CHANGE UP (ref 3.8–10.5)
WBC # FLD AUTO: 9.26 K/UL — SIGNIFICANT CHANGE UP (ref 3.8–10.5)

## 2021-08-17 PROCEDURE — 99223 1ST HOSP IP/OBS HIGH 75: CPT

## 2021-08-17 PROCEDURE — 93000 ELECTROCARDIOGRAM COMPLETE: CPT

## 2021-08-17 PROCEDURE — 99221 1ST HOSP IP/OBS SF/LOW 40: CPT | Mod: 25

## 2021-08-17 PROCEDURE — 32557 INSERT CATH PLEURA W/ IMAGE: CPT

## 2021-08-17 PROCEDURE — 88112 CYTOPATH CELL ENHANCE TECH: CPT | Mod: 26

## 2021-08-17 PROCEDURE — 71250 CT THORAX DX C-: CPT | Mod: 26

## 2021-08-17 PROCEDURE — 99285 EMERGENCY DEPT VISIT HI MDM: CPT | Mod: CS

## 2021-08-17 PROCEDURE — 93010 ELECTROCARDIOGRAM REPORT: CPT

## 2021-08-17 PROCEDURE — 99215 OFFICE O/P EST HI 40 MIN: CPT

## 2021-08-17 PROCEDURE — 88305 TISSUE EXAM BY PATHOLOGIST: CPT | Mod: 26

## 2021-08-17 PROCEDURE — 71046 X-RAY EXAM CHEST 2 VIEWS: CPT | Mod: 26,76

## 2021-08-17 RX ORDER — PIPERACILLIN AND TAZOBACTAM 4; .5 G/20ML; G/20ML
3.38 INJECTION, POWDER, LYOPHILIZED, FOR SOLUTION INTRAVENOUS EVERY 8 HOURS
Refills: 0 | Status: DISCONTINUED | OUTPATIENT
Start: 2021-08-17 | End: 2021-08-17

## 2021-08-17 RX ORDER — DIGOXIN 250 MCG
125 TABLET ORAL DAILY
Refills: 0 | Status: DISCONTINUED | OUTPATIENT
Start: 2021-08-17 | End: 2021-08-24

## 2021-08-17 RX ORDER — ENOXAPARIN SODIUM 100 MG/ML
40 INJECTION SUBCUTANEOUS DAILY
Refills: 0 | Status: DISCONTINUED | OUTPATIENT
Start: 2021-08-17 | End: 2021-08-24

## 2021-08-17 RX ORDER — ACETAMINOPHEN 500 MG
750 TABLET ORAL ONCE
Refills: 0 | Status: COMPLETED | OUTPATIENT
Start: 2021-08-17 | End: 2021-08-17

## 2021-08-17 RX ORDER — PANTOPRAZOLE SODIUM 20 MG/1
40 TABLET, DELAYED RELEASE ORAL
Refills: 0 | Status: DISCONTINUED | OUTPATIENT
Start: 2021-08-17 | End: 2021-08-24

## 2021-08-17 RX ORDER — VANCOMYCIN HCL 1 G
750 VIAL (EA) INTRAVENOUS EVERY 12 HOURS
Refills: 0 | Status: DISCONTINUED | OUTPATIENT
Start: 2021-08-18 | End: 2021-08-18

## 2021-08-17 RX ORDER — PIPERACILLIN AND TAZOBACTAM 4; .5 G/20ML; G/20ML
3.38 INJECTION, POWDER, LYOPHILIZED, FOR SOLUTION INTRAVENOUS ONCE
Refills: 0 | Status: COMPLETED | OUTPATIENT
Start: 2021-08-17 | End: 2021-08-17

## 2021-08-17 RX ORDER — HYDROMORPHONE HYDROCHLORIDE 2 MG/ML
0.25 INJECTION INTRAMUSCULAR; INTRAVENOUS; SUBCUTANEOUS ONCE
Refills: 0 | Status: DISCONTINUED | OUTPATIENT
Start: 2021-08-17 | End: 2021-08-17

## 2021-08-17 RX ORDER — VANCOMYCIN HCL 1 G
1000 VIAL (EA) INTRAVENOUS ONCE
Refills: 0 | Status: COMPLETED | OUTPATIENT
Start: 2021-08-17 | End: 2021-08-17

## 2021-08-17 RX ORDER — LANOLIN ALCOHOL/MO/W.PET/CERES
5 CREAM (GRAM) TOPICAL ONCE
Refills: 0 | Status: COMPLETED | OUTPATIENT
Start: 2021-08-17 | End: 2021-08-17

## 2021-08-17 RX ORDER — ETHACRYNIC ACID 25 MG/1
25 TABLET ORAL
Refills: 0 | Status: DISCONTINUED | OUTPATIENT
Start: 2021-08-17 | End: 2021-08-20

## 2021-08-17 RX ORDER — ALBUTEROL 90 UG/1
2.5 AEROSOL, METERED ORAL EVERY 6 HOURS
Refills: 0 | Status: DISCONTINUED | OUTPATIENT
Start: 2021-08-17 | End: 2021-08-19

## 2021-08-17 RX ORDER — METOPROLOL TARTRATE 50 MG
75 TABLET ORAL DAILY
Refills: 0 | Status: DISCONTINUED | OUTPATIENT
Start: 2021-08-17 | End: 2021-08-24

## 2021-08-17 RX ORDER — ATORVASTATIN CALCIUM 80 MG/1
20 TABLET, FILM COATED ORAL AT BEDTIME
Refills: 0 | Status: DISCONTINUED | OUTPATIENT
Start: 2021-08-17 | End: 2021-08-24

## 2021-08-17 RX ORDER — PREDNISONE 10 MG/1
10 TABLET ORAL
Qty: 2 | Refills: 0 | Status: DISCONTINUED | COMMUNITY
Start: 2021-07-16 | End: 2021-08-17

## 2021-08-17 RX ORDER — VANCOMYCIN HCL 1 G
1000 VIAL (EA) INTRAVENOUS DAILY
Refills: 0 | Status: DISCONTINUED | OUTPATIENT
Start: 2021-08-17 | End: 2021-08-17

## 2021-08-17 RX ORDER — COLCHICINE 0.6 MG
0.3 TABLET ORAL
Refills: 0 | Status: DISCONTINUED | OUTPATIENT
Start: 2021-08-17 | End: 2021-08-20

## 2021-08-17 RX ORDER — SACCHAROMYCES BOULARDII 250 MG
250 POWDER IN PACKET (EA) ORAL
Refills: 0 | Status: DISCONTINUED | OUTPATIENT
Start: 2021-08-17 | End: 2021-08-24

## 2021-08-17 RX ORDER — PIPERACILLIN AND TAZOBACTAM 4; .5 G/20ML; G/20ML
3.38 INJECTION, POWDER, LYOPHILIZED, FOR SOLUTION INTRAVENOUS EVERY 8 HOURS
Refills: 0 | Status: DISCONTINUED | OUTPATIENT
Start: 2021-08-18 | End: 2021-08-24

## 2021-08-17 RX ADMIN — Medication 0.3 MILLIGRAM(S): at 20:37

## 2021-08-17 RX ADMIN — Medication 750 MILLIGRAM(S): at 17:58

## 2021-08-17 RX ADMIN — Medication 5 MILLIGRAM(S): at 23:26

## 2021-08-17 RX ADMIN — Medication 250 MILLIGRAM(S): at 20:37

## 2021-08-17 RX ADMIN — HYDROMORPHONE HYDROCHLORIDE 0.25 MILLIGRAM(S): 2 INJECTION INTRAMUSCULAR; INTRAVENOUS; SUBCUTANEOUS at 17:13

## 2021-08-17 RX ADMIN — Medication 300 MILLIGRAM(S): at 17:33

## 2021-08-17 RX ADMIN — PIPERACILLIN AND TAZOBACTAM 200 GRAM(S): 4; .5 INJECTION, POWDER, LYOPHILIZED, FOR SOLUTION INTRAVENOUS at 17:54

## 2021-08-17 RX ADMIN — ATORVASTATIN CALCIUM 20 MILLIGRAM(S): 80 TABLET, FILM COATED ORAL at 20:37

## 2021-08-17 RX ADMIN — HYDROMORPHONE HYDROCHLORIDE 0.25 MILLIGRAM(S): 2 INJECTION INTRAMUSCULAR; INTRAVENOUS; SUBCUTANEOUS at 17:58

## 2021-08-17 RX ADMIN — Medication 250 MILLIGRAM(S): at 20:36

## 2021-08-17 RX ADMIN — ETHACRYNIC ACID 25 MILLIGRAM(S): 25 TABLET ORAL at 20:37

## 2021-08-17 NOTE — H&P ADULT - NSHPPHYSICALEXAM_GEN_ALL_CORE
T(C): 36.5 (08-17-21 @ 16:53), Max: 36.7 (08-17-21 @ 11:48)  HR: 92 (08-17-21 @ 16:53) (83 - 92)  BP: 115/75 (08-17-21 @ 16:53) (115/75 - 152/88)  RR: 24 (08-17-21 @ 16:53) (17 - 24)  SpO2: 99% (08-17-21 @ 16:53) (97% - 99%)    GEN - NAD  HEENT - NCAT, EOMI, SHILPI,  RESP - decrease breath sound both bases. no wheeze/stridor/rhonchi/crackles. not on supplemental O2. tender back of lt chest-chest  tube side  CARDIO - NS1S2, No murmurs  ABD - Soft/Non tender/Non distended. Normal BS x4 quadrants.   Ext - No SOURAV. no signs of venous/arterial stasis ulcers  MSK - full ROM of BL upper and lower extremities without pain or restriction. BL 5/5 strength on upper and lower extremities.   Neuro - cn 2-12 grossly intact. . no visible seizure activity appreciated. no tremor. gait not observed.   Skin - clean, dry, intact.   Psych- AAOx3. appropriate behaviour. attentive. normal affect.

## 2021-08-17 NOTE — ED PROVIDER NOTE - CLINICAL SUMMARY MEDICAL DECISION MAKING FREE TEXT BOX
patient with persistent fevers, and worsening pleural effusion. will send cultures. restart zosyn. TBA

## 2021-08-17 NOTE — CONSULT NOTE ADULT - PROBLEM SELECTOR RECOMMENDATION 9
Pt with left recurrent pleural effusion  Left pigtail placed 900 cc initially and clamped 2/2 to tightness exhibited by the patient  CXR daily   F/U Pleural fluid specimens    Plan of care d/w Dr. Mcbride

## 2021-08-17 NOTE — CONSULT NOTE ADULT - SUBJECTIVE AND OBJECTIVE BOX
[facility]                                                               Office:  [address] [address]                                                              Telephone: [phone]. Fax:[phone]                                                                        CARDIOLOGY CONSULTATION NOTE                                                                                             Consult requested by: Dr. Church    Reason for Consultation: SOB  PMD: [facility]  Primary Cardiology: Annelise  History obtained by: Patient and medical record   obtained: No    Chief complaint:    Patient is a 76y old  Female who presents with a chief complaint of sob,fever (17 Aug 2021 16:58)        HPI:  The patient is a 76 year old female with a history of TIA and hyperlipidemia   recently diagnosed viral pericarditis (6/23/2021), complicated by new onset Afib with RVR , recent admission for PNA,lt Pleural effusion s/p chest tube  was discharged 08/10/21 with po augmentin requiring recurrent hospitalization [facility] , not on AC due to known small pericardial effusion, who presents with fever 103 f at home, sob on exertion,fatigue,poor appetite. Seen by ct surgery at ed today,lt chest tube placed drained 1000 cc fluid.  Pt denies chest pain,palpitation,n/v/d.  Cardiology PA at bedside. ID was called by ED. Pt is afebrile with so2 >95% RA before chest tube at rest.  (17 Aug 2021 16:58)    Above Appreciated  Pt is a 76 year female with medical history of new onset Afib (not on AC because of pericardial effusion), viral pericardial effusion, TIA, OA, HLD, who presents to [facility]-ED for SOB. Pt has been recently hospitalized for pleural effusion and fevers and had chest tube placed and removed. Pt was seen in outpatient cardiology office, pt c/o of SOB with mild exertion and dry cough with fever of 101.7. POCUS was performed and large left pleural effusion with constrictive pericarditis. Pt was advised to go to [facility] for re insertion of drain. In ED, WBC-9.26, bnp-1992, Trop#1-negative, CT Chest:Left lower lobe thick linear opacity with bronchial dilatation more likely represents atelectasis.                REVIEW OF SYMPTOMS:     CONSTITUTIONAL: No fever, weight loss, or fatigue  ENMT:  No difficulty hearing, tinnitus, vertigo; No sinus or throat pain  NECK: No pain or stiffness  CARDIOVASCULAR: See HPI  RESPIRATORY: No Dyspnea on exertion, Shortness of breath, cough, wheezing  : No dysuria, no hematuria   GI: No dark color stool, no melena, no diarrhea, no constipation, no abdominal pain   NEURO: No headache, no dizziness, no slurred speech   MUSCULOSKELETAL: No joint pain or swelling; No muscle, back, or extremity pain  PSYCH: No agitation, no anxiety.    ALL OTHER REVIEW OF SYSTEMS ARE NEGATIVE.      PREVIOUS DIAGNOSTIC TESTING    ECHO FINDINGS: < from: TTE Echo Limited or F/U (08.05.21 @ 21:58) >  Summary:   1. Low normal global left ventricular systolic function.   2. Left ventricular ejection fraction, by visual estimation, is 50 to 55%.   3. Trivial pericardial effusion.   4. Large pleural effusion in both left and right lateral regions.      ALLERGIES: Allergies    sulfa drugs (Hives)    Intolerances      PAST MEDICAL HISTORY  HLD (hyperlipidemia)    Osteoarthritis    FUO (fever of unknown origin)    H/O pericarditis    TIA (transient ischemic attack)    Afib        PAST SURGICAL HISTORY  No significant past surgical history    H/O rotator cuff surgery        FAMILY HISTORY:  FH: heart disease (Father, Mother)  both had CAD, HTN, mother had MI, father had CHF        SOCIAL HISTORY:  Denies smoking, alcohol, or drug use      CURRENT MEDICATIONS:  digoxin     Tablet 125 MICROGram(s) Oral daily  ethacrynic acid 25 milliGRAM(s) Oral two times a day  metoprolol succinate ER 75 milliGRAM(s) Oral daily  ALBUTerol    0.083%  pantoprazole    Tablet  atorvastatin  enoxaparin Injectable  piperacillin/tazobactam IVPB..  saccharomyces boulardii  vancomycin  IVPB  vancomycin  IVPB.        HOME MEDICATIONS:  Digoxin 125mcg  Metoprolol 25mg ER  Rosuvastatin 5mg    Vital Signs Last 24 Hrs  T(C): 36.5 (17 Aug 2021 16:53), Max: 36.7 (17 Aug 2021 11:48)  T(F): 97.7 (17 Aug 2021 16:53), Max: 98.1 (17 Aug 2021 11:48)  HR: 92 (17 Aug 2021 16:53) (83 - 92)  BP: 115/75 (17 Aug 2021 16:53) (115/75 - 152/88)  RR: 24 (17 Aug 2021 16:53) (17 - 24)  SpO2: 99% (17 Aug 2021 16:53) (97% - 99%)      PHYSICAL EXAM:  Constitutional: Comfortable . No acute distress.   HEENT: Atraumatic and normocephalic , neck is supple . no JVD. No carotid bruit. PEERL   CNS: A&Ox3. No focal deficits. EOMI.   Lymph Nodes: Cervical : Not palpable.  Respiratory: LLL diminished, +pigtail drain  Cardiovascular: S1S2 RRR. No murmur/rubs or gallop.  Gastrointestinal: Soft non-tender and non distended . +Bowel sounds. negative Conner's sign.  Extremities: No edema.   Psychiatric: Calm . no agitation.  Skin: No skin rash/ulcers visualized to face, hands or feet.    Intake and output:   08-17 @ 07:01  -  08-17 @ 18:50  --------------------------------------------------------  IN: 0 mL / OUT: 900 mL / NET: -900 mL        LABS:                        10.7   9.26  )-----------( 515      ( 17 Aug 2021 12:48 )             33.9     08-17    134<L>  |  97<L>  |  13.2  ----------------------------<  97  3.9   |  25.0  |  0.73    Ca    9.4      17 Aug 2021 12:48    TPro  6.6  /  Alb  3.1<L>  /  TBili  0.4  /  DBili  x   /  AST  40<H>  /  ALT  33<H>  /  AlkPhos  440<H>  08-17    CARDIAC MARKERS ( 17 Aug 2021 12:48 )  x     / <0.01 ng/mL / x     / x     / x        ;p-BNP=Serum Pro-Brain Natriuretic Peptide: 1992 pg/mL (08-17 @ 12:48)    PT/INR - ( 17 Aug 2021 12:48 )   PT: 14.6 sec;   INR: 1.27 ratio         PTT - ( 17 Aug 2021 12:48 )  PTT:32.5 sec      INTERPRETATION OF TELEMETRY: Afib HR @ 90-110s  ECG: Afib HR @ 88    RADIOLOGY & ADDITIONAL STUDIES:      X-ray:  < from: Xray Chest 2 Views PA/Lat (08.17.21 @ 12:10) >  IMPRESSION:  Minimal right pleural effusion, improved.    Small left pleural effusion, increased      CT:< from: CT Chest No Cont (08.17.21 @ 17:50) >    IMPRESSION:  Left lower lobe thick linear opacity with bronchial dilatation more likely represents atelectasis

## 2021-08-17 NOTE — H&P ADULT - HISTORY OF PRESENT ILLNESS
The patient is a 76 year old female with a history of TIA and hyperlipidemia   recently diagnosed viral pericarditis (6/23/2021), complicated by new onset Afib with RVR , recent admission for PNA,lt Pleural effusion s/p chest tube  was discharged 08/10/21 with po augmentin requiring recurrent hospitalization [facility] , not on AC due to known small pericardial effusion, who presents with fever 103 f at home, sob on exertion,fatigue,poor appetite. Seen by ct surgery at ed today,lt chest tube placed drained 1000 cc fluid.  Pt denies chest pain,palpitation,n/v/d.  Cardiology PA at bedside. ID was called by ED. Pt is afebrile with so2 >95% RA before chest tube at rest.          < from: Xray Chest 2 Views PA/Lat (08.17.21 @ 12:10) >  Minimal right pleural effusion, improved.    Small left pleural effusion, increased    < end of copied text >    PMH:  Afib   H/O VIRAL  pericarditis   HLD   Osteoarthritis   TIA (transient ischemic attack).   Pleural effusion s/p chest tube     PAST SURGICAL HISTORY:  H/O rotator cuff surgery.   chest tube    FAMILY HISTORY:  FH: heart disease,     Social history:  Denies hx of smoking/alcohol/ilicit drug uses.

## 2021-08-17 NOTE — CONSULT NOTE ADULT - ATTENDING COMMENTS
dyspnea on exertion : Quick bedside hand held (point of care ultrasound) POCUS echo : large pleural effusion left side.     transfer to ER . Spoke to Dr. rodney.   will get repeat echo to get constrictive pericarditis.

## 2021-08-17 NOTE — ED PROVIDER NOTE - OBJECTIVE STATEMENT
75yo F with multiple recent admission for PNA/pleural effusion/pericardial effusion. still with persistent fever on augmentin, last was 101 yesterday. still with dry not productive cough. +SOB worse on exertion, no improvement just getting gradually worse. no leg swelling. no urinar sx. also with sore throat/tongue pain started on nystatin with some improvement. also on fluconazole for it as well. no CP. has had pleural effusion drained, never the pericardial effuion

## 2021-08-17 NOTE — ED ADULT TRIAGE NOTE - CHIEF COMPLAINT QUOTE
Patient arrived to the ED from Dr. Salmon's office. Pt told to come to the ED for a pericardial effusion and a plural effusion drainage. Pt reports worsening SOB over the last few weeks.

## 2021-08-17 NOTE — CONSULT NOTE ADULT - SUBJECTIVE AND OBJECTIVE BOX
HPI:  75yo female with a history of TIA and hyperlipidemia recently diagnosed viral pericarditis (6/23/2021), complicated by new onset Afib with RVR ( not on AC due to known small pericardial effusion,). Pt with recent admission for PNA, as well as left Pleural effusion s/p chest tube which was removed and pt was discharged 08/10/21 with po augmentin.  Pt now presented to the ER after being sent from her cardiologist office (Dr. Giles) for noted left pleural effusion. Pt c/o of progressive worsening fatigue associated with SOB on exertion, needing to sleep on a recliner, intermittent dry cough, and persistent fevers, (Tmax 103), Pt denies any chest pain, palpitations, n/v/ or lower extremity leg swelling. Pt denies any urinary symptoms, pt illicit to burning pain around her mouth area in which nystatin was given however, pt states problem persistent more surrounding lip area.  CTS consulted for Left pleural effusion       PAST MEDICAL & SURGICAL HISTORY:  HLD (hyperlipidemia)    Osteoarthritis    FUO (fever of unknown origin)    H/O pericarditis    TIA (transient ischemic attack)    Afib    H/O rotator cuff surgery        REVIEW OF SYSTEMS  General: Weight loss / Fatigue/    Skin/Breast: No Rashes/ Lesions/ Masses  	  Ophthalmologic: No Blurry vision/ Glaucoma/ Blindness  	  ENMT: No Hearing loss/ Drainage/ Lesions	    Respiratory and Thorax: + Cough, + ACOSTA, + PND, pt denies wheezing/ Hemoptysis/ Sputum production  	  Cardiovascular: No Chest pain/ Palpitations/    Gastrointestinal: No Nausea/ Vomiting/ Constipation/ + decreased Appetite 	    Genitourinary: No Heamturia/ Dysuria/ Frequency change/ Impotence	    Musculoskeletal: No Pain/ Weakness/ Claudication	    Neurological: No Seizures/ TIA/CVA/ Parastesias	    Psychiatric: No Dementia/ Depression/ SI/HI	    Hematology/Lymphatics: No hx of bleeding/ Edema	    Endocrine: No Hyperglycemia/ Hypoglycemia    Allergic/Immunologic: No Anaphylaxis/ Intolerance/ Recent illnesses        MEDICATIONS  (STANDING):  acetaminophen  IVPB .. 750 milliGRAM(s) IV Intermittent once  ALBUTerol    0.083% 2.5 milliGRAM(s) Nebulizer every 6 hours  atorvastatin 20 milliGRAM(s) Oral at bedtime  digoxin     Tablet 125 MICROGram(s) Oral daily  HYDROmorphone  Injectable 0.25 milliGRAM(s) IV Push once  metoprolol succinate ER 75 milliGRAM(s) Oral daily  pantoprazole    Tablet 40 milliGRAM(s) Oral before breakfast  piperacillin/tazobactam IV Intermittent - Peds 3.375 milliGRAM(s) IV Intermittent every 8 hours  piperacillin/tazobactam IVPB... 3.375 Gram(s) IV Intermittent once  saccharomyces boulardii 250 milliGRAM(s) Oral two times a day  vancomycin  IVPB 1000 milliGRAM(s) IV Intermittent daily  vancomycin  IVPB. 1000 milliGRAM(s) IV Intermittent once    MEDICATIONS  (PRN):      Allergies    sulfa drugs (Hives)    Intolerances        SOCIAL HISTORY:  Marital status:   Lives with:    ADLs: independent   Assistive Devices: none ( pt states always very active until now, walking 6 miles daily)     Tobacco use: denies  Alcohol use: denies   Illicit drug use: denies    Code status:  HCP:   FAMILY HISTORY:  FH: heart disease (Father, Mother)  both had CAD, HTN, mother had MI, father had CHF        Vital Signs Last 24 Hrs  T(C): 36.5 (17 Aug 2021 16:53), Max: 36.7 (17 Aug 2021 11:48)  T(F): 97.7 (17 Aug 2021 16:53), Max: 98.1 (17 Aug 2021 11:48)  HR: 92 (17 Aug 2021 16:53) (83 - 92)  BP: 115/75 (17 Aug 2021 16:53) (115/75 - 152/88)  BP(mean): --  RR: 24 (17 Aug 2021 16:53) (17 - 24)  SpO2: 99% (17 Aug 2021 16:53) (97% - 99%)    PHYSICAL EXAM   General: NAD  Neurology: Awake, nonfocal, LAWSON x 4  Eyes: Scleras clear, PERRLA/ EOMI, Gross vision intact  ENT:Gross hearing intact, grossly patent pharynx, no stridor  Neck: Neck supple, trachea midline, No JVD,   Respiratory: Left diminished right CTA, No wheezing, rales, rhonchi  CV: irregularly irregular, S1S2, no murmurs, rubs or gallops  Abdominal: Soft, NT, ND +BS,   Extremities: No edema, + peripheral pulses  Skin: No Rashes, Hematoma, Ecchymosis  Psych: Oriented x 3, normal affect  Tubes: Left chest pigtail placed to water seal     LABS:                        10.7   9.26  )-----------( 515      ( 17 Aug 2021 12:48 )             33.9     08-17    134<L>  |  97<L>  |  13.2  ----------------------------<  97  3.9   |  25.0  |  0.73    Ca    9.4      17 Aug 2021 12:48    TPro  6.6  /  Alb  3.1<L>  /  TBili  0.4  /  DBili  x   /  AST  40<H>  /  ALT  33<H>  /  AlkPhos  440<H>  08-17    PT/INR - ( 17 Aug 2021 12:48 )   PT: 14.6 sec;   INR: 1.27 ratio         PTT - ( 17 Aug 2021 12:48 )  PTT:32.5 sec          < from: Xray Chest 2 Views PA/Lat (08.17.21 @ 12:10) >     EXAM:  XR CHEST PA LAT 2V                          PROCEDURE DATE:  08/17/2021          INTERPRETATION:  Clinical history: 76-year-old female, chest pain.    Two views of the chest are compared to 8/10/2021.    FINDINGS: Normal cardiac silhouette and normal pulmonary vasculature with no pneumothorax or acute osseous finding, balanced thoracic scoliosis is again evident.    Minimal right and small left pleural effusions, improved on the right and increased on the left.    IMPRESSION:  Minimal right pleural effusion, improved.    Small left pleural effusion, increased    --- End of Report ---    < end of copied text >

## 2021-08-17 NOTE — DISCHARGE NOTE NURSING/CASE MANAGEMENT/SOCIAL WORK - NSDCFUADDAPPT_GEN_ALL_CORE_FT
Agrees to Meds to beds for any new medications prescribed  Already has an appt. with Dr. Gomez on August 27th.   Agrees to Meds to beds  from [facility]  for any new medications prescribed  Already has an appt. with Dr. Gomez on August 27th.

## 2021-08-17 NOTE — ED ADULT NURSE REASSESSMENT NOTE - NS ED NURSE REASSESS COMMENT FT1
Patient A&Ox4 complaining of shortness of breath, chronic in nature, worse today. Stated has fluid on lungs, sent by MD for draining. Respirations even & unlabored.  in progress, 97% room air. Cardiac monitor in place. Plan of care discussed, all questions answered.

## 2021-08-17 NOTE — H&P ADULT - ASSESSMENT
The patient is a 76 year old female with a history of TIA and hyperlipidemia   recently diagnosed viral pericarditis (6/23/2021), complicated by new onset Afib with RVR , recent admission for PNA,lt Pleural effusion s/p chest tube  was discharged 08/10/21 with po Augmentin requiring recurrent hospitalization [facility] , not on AC due to known small pericardial effusion, who presents with fever 103 f at home, sob on exertion,fatigue,poor appetite.      Fever,dyspnea on admission due to suspected bacterial PNA with parapneumonic effusion s/p lt chest tube placement ED BY CT SURGERY  -failure po augmentin  -oxygen to keep so2 >91%,bronchodilator  -Start  IV Vancomycin and zosyn   -Blood c/s,urine c/s  - CT chest  -ECHO   -CTA consulted- Chest tube placed for  left loculated effusion. 1000ml drained. c/s testing  -c/s ID by ED      Afib CHADsVASc 3, but AC not started due to concern for worsening pericardial effusion  -Continue  metoprolol and digoxin  -f/u cardiology rec,spoke with cardio PA  -LAST ECHO with 60-65% EF, grade 1 diastolic dysfxn    History of viral pericarditis with small pericardial effusion  Was previously on ASA, colchicine, both d/c’ ed by cardiology due to GI upset  S/p prednisone course, last dose on 8/4, completed dose prior to admission  F/U CARDIO REC    Hyperlipidemia - on statin     GERD - on Protonix    VTE_ Lovenox subcut     Plan discussed at length with patient and  at bedside.

## 2021-08-17 NOTE — ED PROVIDER NOTE - PHYSICAL EXAMINATION
Gen: NAD, AOx3  Head: NCAT  HEENT: EOMI, oral mucosa moist, normal conjunctiva, neck supple  Lung: decreased BS Lt base, no respiratory distress  CV: rrr, no murmur, Normal perfusion  Abd: soft, NTND  MSK: No edema, no visible deformities  Neuro: No focal neurologic deficits  Skin: No rash   Psych: normal affect

## 2021-08-17 NOTE — ED CLERICAL - NS ED CLERK NOTE PRE-ARRIVAL INFORMATION; ADDITIONAL PRE-ARRIVAL INFORMATION
This patient is enrolled in a readmission reduction program and has active care navigation. This patient can be followed up by the care navigation team within 24 hours. To arrange close follow-up or to obtain additional clinical information about this patient, please call the contact number above. Please speak with the [facility] ED Case Manager for assistance with discharge planning

## 2021-08-17 NOTE — ED ADULT NURSE NOTE - OBJECTIVE STATEMENT
pt here with 'fluid on lungs again'  'they have to drain in'  pt was just here recently for same.  daughter at bedside

## 2021-08-17 NOTE — PROCEDURE NOTE - NSPROCDETAILS_GEN_ALL_CORE
Seldinger technique/dressing applied/secured in place/sterile dressing applied/percutaneous/ultrasound assessment of fluid (location)

## 2021-08-17 NOTE — DISCHARGE NOTE NURSING/CASE MANAGEMENT/SOCIAL WORK - PATIENT PORTAL LINK FT
You can access the FollowMyHealth Patient Portal offered by [facility] by registering at the following website: [email or web address]. By joining [employer]’s FollowMyHealth portal, you will also be able to view your health information using other applications (apps) compatible with our system.

## 2021-08-17 NOTE — DATA REVIEWED
[FreeTextEntry1] : \par  EXAM: XR CHEST PA LAT 2V\par \par PROCEDURE DATE: 08/17/2021\par \par \par \par INTERPRETATION: Clinical history: 76-year-old female, chest pain.\par \par Two views of the chest are compared to 8/10/2021.\par \par FINDINGS: Normal cardiac silhouette and normal pulmonary vasculature with no pneumothorax or acute osseous finding, balanced thoracic scoliosis is again evident.\par \par Minimal right and small left pleural effusions, improved on the right and increased on the left.\par \par IMPRESSION:\par Minimal right pleural effusion, improved.\par \par Small left pleural effusion, increased\par \par --- End of Report ---\par

## 2021-08-18 LAB
ALBUMIN FLD-MCNC: 2 G/DL — SIGNIFICANT CHANGE UP
ALBUMIN SERPL ELPH-MCNC: 2.7 G/DL — LOW (ref 3.3–5.2)
ALP SERPL-CCNC: 376 U/L — HIGH (ref 40–120)
ALT FLD-CCNC: 29 U/L — SIGNIFICANT CHANGE UP
ANION GAP SERPL CALC-SCNC: 13 MMOL/L — SIGNIFICANT CHANGE UP (ref 5–17)
AST SERPL-CCNC: 25 U/L — SIGNIFICANT CHANGE UP
BASOPHILS # BLD AUTO: 0.02 K/UL — SIGNIFICANT CHANGE UP (ref 0–0.2)
BASOPHILS NFR BLD AUTO: 0.3 % — SIGNIFICANT CHANGE UP (ref 0–2)
BILIRUB SERPL-MCNC: 0.4 MG/DL — SIGNIFICANT CHANGE UP (ref 0.4–2)
BUN SERPL-MCNC: 11.1 MG/DL — SIGNIFICANT CHANGE UP (ref 8–20)
CALCIUM SERPL-MCNC: 9.1 MG/DL — SIGNIFICANT CHANGE UP (ref 8.6–10.2)
CHLORIDE SERPL-SCNC: 97 MMOL/L — LOW (ref 98–107)
CO2 SERPL-SCNC: 25 MMOL/L — SIGNIFICANT CHANGE UP (ref 22–29)
COVID-19 SPIKE DOMAIN AB INTERP: POSITIVE
COVID-19 SPIKE DOMAIN ANTIBODY RESULT: >250 U/ML — HIGH
CREAT SERPL-MCNC: 0.71 MG/DL — SIGNIFICANT CHANGE UP (ref 0.5–1.3)
CRP SERPL-MCNC: 64 MG/L — HIGH
EOSINOPHIL # BLD AUTO: 0.06 K/UL — SIGNIFICANT CHANGE UP (ref 0–0.5)
EOSINOPHIL # FLD: 1 % — SIGNIFICANT CHANGE UP
EOSINOPHIL NFR BLD AUTO: 0.9 % — SIGNIFICANT CHANGE UP (ref 0–6)
ERYTHROCYTE [SEDIMENTATION RATE] IN BLOOD: 52 MM/HR — HIGH (ref 0–20)
FLUID SEGMENTED GRANULOCYTES: 71 % — SIGNIFICANT CHANGE UP
GLUCOSE FLD-MCNC: 92 MG/DL — SIGNIFICANT CHANGE UP
GLUCOSE SERPL-MCNC: 107 MG/DL — HIGH (ref 70–99)
GRAM STN FLD: SIGNIFICANT CHANGE UP
HCT VFR BLD CALC: 33.8 % — LOW (ref 34.5–45)
HGB BLD-MCNC: 10.7 G/DL — LOW (ref 11.5–15.5)
IMM GRANULOCYTES NFR BLD AUTO: 0.3 % — SIGNIFICANT CHANGE UP (ref 0–1.5)
LDH SERPL L TO P-CCNC: 102 U/L — SIGNIFICANT CHANGE UP
LYMPHOCYTES # BLD AUTO: 0.89 K/UL — LOW (ref 1–3.3)
LYMPHOCYTES # BLD AUTO: 13.5 % — SIGNIFICANT CHANGE UP (ref 13–44)
LYMPHOCYTES # FLD: 10 % — SIGNIFICANT CHANGE UP
MCHC RBC-ENTMCNC: 29.1 PG — SIGNIFICANT CHANGE UP (ref 27–34)
MCHC RBC-ENTMCNC: 31.7 GM/DL — LOW (ref 32–36)
MCV RBC AUTO: 91.8 FL — SIGNIFICANT CHANGE UP (ref 80–100)
MESOTHL CELL # FLD: 5 % — SIGNIFICANT CHANGE UP
MONOCYTES # BLD AUTO: 0.34 K/UL — SIGNIFICANT CHANGE UP (ref 0–0.9)
MONOCYTES NFR BLD AUTO: 5.2 % — SIGNIFICANT CHANGE UP (ref 2–14)
MONOS+MACROS # FLD: 13 % — SIGNIFICANT CHANGE UP
NEUTROPHILS # BLD AUTO: 5.24 K/UL — SIGNIFICANT CHANGE UP (ref 1.8–7.4)
NEUTROPHILS NFR BLD AUTO: 79.8 % — HIGH (ref 43–77)
PLATELET # BLD AUTO: 538 K/UL — HIGH (ref 150–400)
POTASSIUM SERPL-MCNC: 3.7 MMOL/L — SIGNIFICANT CHANGE UP (ref 3.5–5.3)
POTASSIUM SERPL-SCNC: 3.7 MMOL/L — SIGNIFICANT CHANGE UP (ref 3.5–5.3)
PROCALCITONIN SERPL-MCNC: 0.1 NG/ML — SIGNIFICANT CHANGE UP (ref 0.02–0.1)
PROT FLD-MCNC: 3.9 G/DL — SIGNIFICANT CHANGE UP
PROT SERPL-MCNC: 6 G/DL — LOW (ref 6.6–8.7)
RBC # BLD: 3.68 M/UL — LOW (ref 3.8–5.2)
RBC # FLD: 14.2 % — SIGNIFICANT CHANGE UP (ref 10.3–14.5)
SARS-COV-2 IGG+IGM SERPL QL IA: >250 U/ML — HIGH
SARS-COV-2 IGG+IGM SERPL QL IA: POSITIVE
SODIUM SERPL-SCNC: 135 MMOL/L — SIGNIFICANT CHANGE UP (ref 135–145)
SPECIMEN SOURCE: SIGNIFICANT CHANGE UP
WBC # BLD: 6.57 K/UL — SIGNIFICANT CHANGE UP (ref 3.8–10.5)
WBC # FLD AUTO: 6.57 K/UL — SIGNIFICANT CHANGE UP (ref 3.8–10.5)

## 2021-08-18 PROCEDURE — 99233 SBSQ HOSP IP/OBS HIGH 50: CPT

## 2021-08-18 PROCEDURE — 99223 1ST HOSP IP/OBS HIGH 75: CPT

## 2021-08-18 PROCEDURE — 71045 X-RAY EXAM CHEST 1 VIEW: CPT | Mod: 26

## 2021-08-18 PROCEDURE — 93306 TTE W/DOPPLER COMPLETE: CPT | Mod: 26

## 2021-08-18 PROCEDURE — 99231 SBSQ HOSP IP/OBS SF/LOW 25: CPT

## 2021-08-18 RX ORDER — VANCOMYCIN HCL 1 G
500 VIAL (EA) INTRAVENOUS EVERY 12 HOURS
Refills: 0 | Status: DISCONTINUED | OUTPATIENT
Start: 2021-08-18 | End: 2021-08-19

## 2021-08-18 RX ORDER — OXYCODONE AND ACETAMINOPHEN 5; 325 MG/1; MG/1
1 TABLET ORAL EVERY 4 HOURS
Refills: 0 | Status: DISCONTINUED | OUTPATIENT
Start: 2021-08-18 | End: 2021-08-24

## 2021-08-18 RX ORDER — ACETAMINOPHEN 500 MG
750 TABLET ORAL ONCE
Refills: 0 | Status: COMPLETED | OUTPATIENT
Start: 2021-08-18 | End: 2021-08-18

## 2021-08-18 RX ADMIN — ETHACRYNIC ACID 25 MILLIGRAM(S): 25 TABLET ORAL at 05:24

## 2021-08-18 RX ADMIN — PIPERACILLIN AND TAZOBACTAM 25 GRAM(S): 4; .5 INJECTION, POWDER, LYOPHILIZED, FOR SOLUTION INTRAVENOUS at 17:45

## 2021-08-18 RX ADMIN — Medication 250 MILLIGRAM(S): at 05:24

## 2021-08-18 RX ADMIN — PIPERACILLIN AND TAZOBACTAM 25 GRAM(S): 4; .5 INJECTION, POWDER, LYOPHILIZED, FOR SOLUTION INTRAVENOUS at 03:48

## 2021-08-18 RX ADMIN — Medication 125 MICROGRAM(S): at 05:24

## 2021-08-18 RX ADMIN — Medication 75 MILLIGRAM(S): at 05:24

## 2021-08-18 RX ADMIN — Medication 250 MILLIGRAM(S): at 17:45

## 2021-08-18 RX ADMIN — Medication 0.3 MILLIGRAM(S): at 17:44

## 2021-08-18 RX ADMIN — OXYCODONE AND ACETAMINOPHEN 1 TABLET(S): 5; 325 TABLET ORAL at 10:40

## 2021-08-18 RX ADMIN — Medication 300 MILLIGRAM(S): at 01:09

## 2021-08-18 RX ADMIN — Medication 0.3 MILLIGRAM(S): at 05:23

## 2021-08-18 RX ADMIN — OXYCODONE AND ACETAMINOPHEN 1 TABLET(S): 5; 325 TABLET ORAL at 11:40

## 2021-08-18 RX ADMIN — ALBUTEROL 2.5 MILLIGRAM(S): 90 AEROSOL, METERED ORAL at 04:01

## 2021-08-18 RX ADMIN — PANTOPRAZOLE SODIUM 40 MILLIGRAM(S): 20 TABLET, DELAYED RELEASE ORAL at 05:24

## 2021-08-18 RX ADMIN — ETHACRYNIC ACID 25 MILLIGRAM(S): 25 TABLET ORAL at 17:44

## 2021-08-18 RX ADMIN — ALBUTEROL 2.5 MILLIGRAM(S): 90 AEROSOL, METERED ORAL at 20:07

## 2021-08-18 RX ADMIN — ATORVASTATIN CALCIUM 20 MILLIGRAM(S): 80 TABLET, FILM COATED ORAL at 21:15

## 2021-08-18 RX ADMIN — ALBUTEROL 2.5 MILLIGRAM(S): 90 AEROSOL, METERED ORAL at 09:31

## 2021-08-18 RX ADMIN — Medication 100 MILLIGRAM(S): at 21:16

## 2021-08-18 RX ADMIN — Medication 750 MILLIGRAM(S): at 01:37

## 2021-08-18 RX ADMIN — ENOXAPARIN SODIUM 40 MILLIGRAM(S): 100 INJECTION SUBCUTANEOUS at 12:29

## 2021-08-18 RX ADMIN — PIPERACILLIN AND TAZOBACTAM 25 GRAM(S): 4; .5 INJECTION, POWDER, LYOPHILIZED, FOR SOLUTION INTRAVENOUS at 12:29

## 2021-08-18 RX ADMIN — Medication 250 MILLIGRAM(S): at 10:41

## 2021-08-18 NOTE — PROGRESS NOTE ADULT - PROBLEM SELECTOR PLAN 1
Pt with left recurrent pleural effusion  Left pigtail placed 900 cc initially   CXR daily   gram stain w/ no organisms seen, fungal culture still pending   Maintain tube   cards to perform further work up repeat echo and possible RHC.    PLAN of care d/w Dr. Mcbride

## 2021-08-18 NOTE — PROGRESS NOTE ADULT - SUBJECTIVE AND OBJECTIVE BOX
SUBJECTIVE:  Pt in stretcher in the ED, Pt states, " I am doing ok"  pt c/o of minor pain surrounding the left chest tube allievated with pain medication.   pt denies CP, SOB, palpitations, n/v    Overnight events:  none    PAST MEDICAL & SURGICAL HISTORY:  HLD (hyperlipidemia)    Osteoarthritis    FUO (fever of unknown origin)    H/O pericarditis    TIA (transient ischemic attack)    Afib    H/O rotator cuff surgery        MEDICATIONS  ALBUTerol    0.083% 2.5 milliGRAM(s) Nebulizer every 6 hours  atorvastatin 20 milliGRAM(s) Oral at bedtime  colchicine 0.3 milliGRAM(s) Oral two times a day  digoxin     Tablet 125 MICROGram(s) Oral daily  enoxaparin Injectable 40 milliGRAM(s) SubCutaneous daily  ethacrynic acid 25 milliGRAM(s) Oral two times a day  metoprolol succinate ER 75 milliGRAM(s) Oral daily  pantoprazole    Tablet 40 milliGRAM(s) Oral before breakfast  piperacillin/tazobactam IVPB.. 3.375 Gram(s) IV Intermittent every 8 hours  saccharomyces boulardii 250 milliGRAM(s) Oral two times a day  vancomycin  IVPB 750 milliGRAM(s) IV Intermittent every 12 hours  MEDICATIONS  (PRN):    Height (cm): 165.1 (08-17 @ 11:48)  Weight (kg): 49.4 (08-17 @ 11:48)  BMI (kg/m2): 18.1 (08-17 @ 11:48)  BSA (m2): 1.53 (08-17 @ 11:48)  Daily Height in cm: 165.1 (17 Aug 2021 11:48)    Daily                               10.7   9.26  )-----------( 515      ( 17 Aug 2021 12:48 )             33.9   08-17    134<L>  |  97<L>  |  13.2  ----------------------------<  97  3.9   |  25.0  |  0.73    Ca    9.4      17 Aug 2021 12:48    TPro  6.6  /  Alb  3.1<L>  /  TBili  0.4  /  DBili  x   /  AST  40<H>  /  ALT  33<H>  /  AlkPhos  440<H>  08-17    CARDIAC MARKERS ( 17 Aug 2021 12:48 )  x     / <0.01 ng/mL / x     / x     / x        PT/INR - ( 17 Aug 2021 12:48 )   PT: 14.6 sec;   INR: 1.27 ratio         PTT - ( 17 Aug 2021 12:48 )  PTT:32.5 sec      Objective:  T(C): 36.5 (08-18-21 @ 07:43), Max: 36.8 (08-17-21 @ 19:36)  HR: 94 (08-18-21 @ 07:43) (83 - 94)  BP: 123/79 (08-18-21 @ 07:43) (115/75 - 152/88)  RR: 18 (08-18-21 @ 07:43) (17 - 24)  SpO2: 97% (08-18-21 @ 07:43) (95% - 99%)  Wt(kg): --CAPILLARY BLOOD GLUCOSE      I&O's Summary    17 Aug 2021 07:01  -  18 Aug 2021 07:00  --------------------------------------------------------  IN: 0 mL / OUT: 930 mL / NET: -930 mL        Physical Exam  General: NAD  Neurology: Awake, nonfocal, LAWSON x 4  Neck: Neck supple, trachea midline, No JVD,   Respiratory: Left diminished right CTA, No wheezing, rales, rhonchi  CV: irregularly irregular, S1S2, no murmurs, rubs or gallops  Abdominal: Soft, NT, ND +BS,   Extremities: No edema, + peripheral pulses  Skin: warm and dry  Psych: Oriented x 3, normal affect  Tubes: Left chest pigtail placed to water seal    Imaging:  CXR:  stable with left pigtail in place and left effusion improvement   final read pending

## 2021-08-18 NOTE — PROGRESS NOTE ADULT - ASSESSMENT
The patient is a 76 year old female with a history of TIA and hyperlipidemia   recently diagnosed viral pericarditis (6/23/2021), complicated by new onset Afib with RVR , recent admission for PNA,lt Pleural effusion s/p chest tube  was discharged 08/10/21 with po Augmentin requiring recurrent hospitalization [facility] , not on AC due to known small pericardial effusion, who presents with fever 103 f at home, sob on exertion,fatigue,poor appetite.      Fever,dyspnea on admission due to suspected bacterial PNA with parapneumonic effusion s/p lt chest tube placement ED BY CT SURGERY  -failure po augmentin  -oxygen to keep so2 >91%,bronchodilator  -Start  IV Vancomycin and zosyn   -Blood c/s,urine c/s  - CT chest LLL opacity  -Recent ECHO reviewed  -CTA consulted- Chest tube placed for  left loculated effusion. 1000ml drained. c/s testing  -c/s ID by ED      Afib CHADsVASc 3, but AC not started due to concern for worsening pericardial effusion  -Continue  metoprolol and digoxin  -f/u cardiology rec,spoke with cardio PA  -LAST ECHO with 60-65% EF, grade 1 diastolic dysfxn    History of viral pericarditis with small pericardial effusion  Was previously on ASA, colchicine, both d/c’ ed by cardiology due to GI upset  S/p prednisone course, last dose on 8/4, completed dose prior to admission  Cardio started colchicine as esr/crp high    Hyperlipidemia - on statin     GERD - on Protonix    VTE_ Lovenox subcut     Plan discussed at length with patient and  at bedside.   The patient is a 76 year old female with a history of TIA and hyperlipidemia   recently diagnosed viral pericarditis (6/23/2021), complicated by new onset Afib with RVR , recent admission for PNA,lt Pleural effusion s/p chest tube  was discharged 08/10/21 with po Augmentin requiring recurrent hospitalization [facility] , not on AC due to known small pericardial effusion, who presents with fever 103 f at home, sob on exertion,fatigue,poor appetite.      Fever,dyspnea on admission due to suspected bacterial PNA with parapneumonic effusion s/p lt chest tube placement ED BY CT SURGERY  -failure po augmentin  -oxygen to keep so2 >91%,bronchodilator  -Start  IV Vancomycin and zosyn   -Blood c/s,urine c/s  - CT chest LLL opacity  -Recent ECHO reviewed  -CTA consulted- Chest tube placed for  left loculated effusion. 1000ml drained. c/s testing  -c/s ID by ED      Afib CHADsVASc 3, but AC not started due to concern for worsening pericardial effusion  -Continue  metoprolol and digoxin  -f/u cardiology rec,spoke with cardio PA  -LAST ECHO with 60-65% EF, grade 1 diastolic dysfxn    History of viral pericarditis with small pericardial effusion  Was previously on ASA, colchicine, both d/c’ ed by cardiology due to GI upset  S/p prednisone course, last dose on 8/4, completed dose prior to admission  Cardio started colchicine as esr/crp high    Hyperlipidemia - on statin     GERD - on Protonix    VTE_ Lovenox subcut     Plan discussed at length with patient

## 2021-08-18 NOTE — PROGRESS NOTE ADULT - ASSESSMENT
75yo female with a history of TIA and hyperlipidemia recently diagnosed viral pericarditis (6/23/2021), complicated by new onset Afib with RVR ( not on AC due to known small pericardial effusion,). Pt with recent admission for PNA, as well as left Pleural effusion s/p chest tube which was removed and pt was discharged 08/10/21 with po augmentin.  Pt now presented to the ER after being sent from her cardiologist office (Dr. Giles) for noted left pleural effusion. Pt c/o of progressive worsening fatigue associated with SOB on exertion, needing to sleep on a recliner, intermittent dry cough, and persistent fevers.  CTS consulted for Left pleural effusion, and left pigtail placement.

## 2021-08-18 NOTE — CONSULT NOTE ADULT - SUBJECTIVE AND OBJECTIVE BOX
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 Patient returned to the ER on 8/17 with fever to 103F at home, SOB on exertion. Patient was seen by CT surgery in the ER and is s/p CT placement in the ER 8/17. No fever this hospitalization, no leukocytosis. Patient was started on IV Vancomycin and Zosyn. ID input requested.       Past Medical & Surgical Hx:  HLD (hyperlipidemia)  Osteoarthritis  FUO (fever of unknown origin)  H/O pericarditis  TIA (transient ischemic attack)  Afib  H/O rotator cuff surgery      Social Hx:  Former smoker      FAMILY HISTORY:  FH: heart disease (Father, Mother)  both had CAD, HTN, mother had MI, father had CHF      Allergies  sulfa drugs (Hives)      REVIEW OF SYSTEMS:  CONSTITUTIONAL:  No Fever or chills  HEENT:  No diplopia or blurred vision.  No earache, sore throat or runny nose.  CARDIOVASCULAR:  No pressure, squeezing, strangling, tightness, heaviness or aching about the chest, neck, axilla or epigastrium.  RESPIRATORY:  No cough, + shortness of breath  GASTROINTESTINAL:  No nausea, vomiting or diarrhea.  GENITOURINARY:  No dysuria, frequency or urgency.   MUSCULOSKELETAL:  no joint aches, no muscle pain  SKIN:  No change in skin, hair or nails.  NEUROLOGIC:  No Headaches, seizures  PSYCHIATRIC:  No disorder of thought or mood.  ENDOCRINE:  No heat or cold intolerance  HEMATOLOGICAL:  No easy bruising or bleeding.       Physical Exam:  GEN: NAD, pleasant  HEENT: normocephalic and atraumatic. EOMI. PERRL.  Anicteric  NECK: Supple.   LUNGS: Clear to auscultation.  HEART: Regular rate and rhythm   ABDOMEN: Soft, nontender, and nondistended.  Positive bowel sounds.    : No CVA tenderness  EXTREMITIES: Without any edema.  MSK: No joint swelling  NEUROLOGIC: No Focal Deficits  PSYCHIATRIC: Appropriate affect .  SKIN: No Rash      Vitals:  T(F): 97.8 (18 Aug 2021 15:31), Max: 98.7 (18 Aug 2021 11:33)  HR: 98 (18 Aug 2021 17:41)  BP: 107/74 (18 Aug 2021 17:41)  RR: 18 (18 Aug 2021 15:31)  SpO2: 96% (18 Aug 2021 15:31) (94% - 97%)  temp max in last 48H T(F): , Max: 98.7 (08-18-21 @ 11:33)    Current Antibiotics:  piperacillin/tazobactam IVPB.. 3.375 Gram(s) IV Intermittent every 8 hours  vancomycin  IVPB 500 milliGRAM(s) IV Intermittent every 12 hours    Other medications:  ALBUTerol    0.083% 2.5 milliGRAM(s) Nebulizer every 6 hours  atorvastatin 20 milliGRAM(s) Oral at bedtime  colchicine 0.3 milliGRAM(s) Oral two times a day  digoxin     Tablet 125 MICROGram(s) Oral daily  enoxaparin Injectable 40 milliGRAM(s) SubCutaneous daily  ethacrynic acid 25 milliGRAM(s) Oral two times a day  metoprolol succinate ER 75 milliGRAM(s) Oral daily  pantoprazole    Tablet 40 milliGRAM(s) Oral before breakfast  saccharomyces boulardii 250 milliGRAM(s) Oral two times a day                            10.7   6.57  )-----------( 538      ( 18 Aug 2021 09:09 )             33.8     08-18    135  |  97<L>  |  11.1  ----------------------------<  107<H>  3.7   |  25.0  |  0.71    Ca    9.1      18 Aug 2021 09:09    TPro  6.0<L>  /  Alb  2.7<L>  /  TBili  0.4  /  DBili  x   /  AST  25  /  ALT  29  /  AlkPhos  376<H>  08-18    RECENT CULTURES:  08-17 @ 22:18 .Body Fluid Pleural Fluid     No growth    polymorphonuclear leukocytes seen  No organisms seen  by cytocentrifuge    08-08 @ 02:20 .Body Fluid Pleural Fluid     No growth at 5 days  polymorphonuclear leukocytes seen  No organisms seen  by cytocentrifuge    08-06 @ 20:37 .Blood Blood-Peripheral     No growth at 5 days.    08-05 @ 01:49 Clean Catch Clean Catch (Midstream)     <10,000 CFU/mL Normal Urogenital Rena      WBC Count: 6.57 K/uL (08-18-21 @ 09:09)  WBC Count: 9.26 K/uL (08-17-21 @ 12:48)    Creatinine, Serum: 0.71 mg/dL (08-18-21 @ 09:09)  Creatinine, Serum: 0.73 mg/dL (08-17-21 @ 12:48)    C-Reactive Protein, Serum: 64 mg/L (08-18-21 @ 09:09)  C-Reactive Protein, Serum: 81 mg/L (08-17-21 @ 20:05)  C-Reactive Protein, Serum: 110 mg/L (08-05-21 @ 05:24)    Sedimentation Rate, Erythrocyte: 52 mm/hr (08-18-21 @ 09:09)  Sedimentation Rate, Erythrocyte: 55 mm/hr (08-17-21 @ 20:05)    Procalcitonin, Serum: 0.10 ng/mL (08-18-21 @ 09:09)  Procalcitonin, Serum: 0.20 ng/mL (08-05-21 @ 05:24)     SARS-CoV-2: NotDetec (08-04-21 @ 14:28)  Rapid RVP Result: NotDetec (08-04-21 @ 14:28)    COVID-19 PCR: NotDetec (08-17-21 @ 12:43)          < from: Xray Chest 1 View- PORTABLE-Routine (Xray Chest 1 View- PORTABLE-Routine in AM.) (08.18.21 @ 05:21) >  EXAM:  XR CHEST PORTABLE ROUTINE 1V                          PROCEDURE DATE:  08/18/2021      INTERPRETATION:  Clinical history: 76-year-old female, status post left pigtail..    Two views of the chest are correlated with a concurrent CT.    FINDINGS: Mild cardiomegaly and normal pulmonary vasculature with no consolidation or effusion.    Small bilateral pleural effusions with adjacent atelectasis on the right and an area of retrocardiac atelectasis are better characterized on CT.    No acute osseous finding. Mild mid thoracic dextroscoliosis is unchanged.    IMPRESSION:  Small bilateral pleural effusions with adjacent atelectasis on the right and an area of retrocardiac atelectasis atelectasis, better characterized on CT.    < end of copied text >      < from: CT Chest No Cont (08.17.21 @ 17:50) >  EXAM:  CT CHEST                          PROCEDURE DATE:  08/17/2021      INTERPRETATION:  EXAMINATION: CT CHEST    CLINICAL INDICATION: Pneumonia, dyspnea.    TECHNIQUE: Noncontrast CT of the chest was obtained.    COMPARISON: Multiple CT, most recent 8/4/2021.    FINDINGS:    AIRWAYS AND LUNGS: The central tracheobronchial tree is patent.  Left lower lobe thick linear opacity with bronchial dilatation. Other scattered linear atelectasis/scarring.    MEDIASTINUM AND PLEURA: There are no enlarged mediastinal, hilar or axillary lymph nodes. The visualized portion of the thyroid gland is unremarkable. There is a small right pleural effusion. There is no pneumothorax.    HEART AND VESSELS: There is mild cardiomegaly.  There are atherosclerotic calcifications of the aorta and coronary arteries.  There is no pericardial effusion.    UPPER ABDOMEN: Images of the upper abdomen demonstrate no abnormality.    BONES AND SOFT TISSUES: There are mild degenerative changes of the spine.  The soft tissues are unremarkable.    TUBES/LINES: None.    IMPRESSION:  Left lower lobe thick linear opacity with bronchial dilatation more likely represents atelectasis    < end of copied text >

## 2021-08-18 NOTE — PROGRESS NOTE ADULT - SUBJECTIVE AND OBJECTIVE BOX
[facility]                                                               Office: [address] [address]                                                              Telephone: [phone]. Fax:[phone]                                                                             PROGRESS NOTE  Reason for follow up: SOB/Fever  Overnight: No new events.   Update: 8/18: Pt denies chest pain, palpitations. SOB improved and patient pain more controlled. Tele- Afib HR @ 90-100s. Chest tube yielding another approx. 200 ml of serous fluid.  Echo-completed, awaiting results. CRP,ESR- 81/55. Cont. Colchicine 0.3mg BID.       Subjective: No new events    	  Vitals:  T(C): 37.1 (08-18-21 @ 11:33), Max: 37.1 (08-18-21 @ 11:33)  HR: 89 (08-18-21 @ 11:33) (89 - 94)  BP: 98/65 (08-18-21 @ 11:33) (98/65 - 132/82)  RR: 18 (08-18-21 @ 11:33) (18 - 24)  SpO2: 94% (08-18-21 @ 11:33) (94% - 99%)    I&O's Summary    17 Aug 2021 07:01  -  18 Aug 2021 07:00  --------------------------------------------------------  IN: 0 mL / OUT: 930 mL / NET: -930 mL    18 Aug 2021 07:01  -  18 Aug 2021 14:30  --------------------------------------------------------  IN: 0 mL / OUT: 200 mL / NET: -200 mL      Weight (kg): 49.4 (08-17 @ 11:48)      PHYSICAL EXAM:  Appearance: Comfortable. No acute distress  HEENT:  Head and neck: Atraumatic. Normocephalic.  Normal oral mucosa, PERRL, Neck is supple. No JVD, No carotid bruit.   Neurologic: A & O x 3, no focal deficits. EOMI.  Lymphatic: No cervical lymphadenopathy  Cardiovascular: Normal S1 S2, No murmur, rubs/gallops. No JVD, No edema  Respiratory: bilateral diminished breath sounds, +chest tube   Gastrointestinal:  Soft, Non-tender, + BS  Lower Extremities: No edema  Psychiatry: Patient is calm. No agitation. Mood & affect appropriate  Skin: No rashes/ ecchymoses/cyanosis/ulcers visualized on the face, hands or feet       CURRENT MEDICATIONS:  digoxin     Tablet 125 MICROGram(s) Oral daily  ethacrynic acid 25 milliGRAM(s) Oral two times a day  metoprolol succinate ER 75 milliGRAM(s) Oral daily  ALBUTerol    0.083%  piperacillin/tazobactam IVPB..  vancomycin  IVPB  pantoprazole    Tablet  atorvastatin  colchicine  enoxaparin Injectable      DIAGNOSTIC TESTING:    [ ] Echocardiogram: < from: TTE Echo Limited or F/U (08.05.21 @ 21:58) >  Summary:   1. Low normal global left ventricular systolic function.   2. Left ventricular ejection fraction, by visual estimation, is 50 to 55%.   3. Trivial pericardial effusion.   4. Large pleural effusion in both left and right lateral regions.        LABS:	 	  CARDIAC MARKERS ( 17 Aug 2021 12:48 )  x     / <0.01 ng/mL / x     / x     / x      p-BNP 17 Aug 2021 12:48: 1992 pg/mL                          10.7   6.57  )-----------( 538      ( 18 Aug 2021 09:09 )             33.8     08-18    135  |  97<L>  |  11.1  ----------------------------<  107<H>  3.7   |  25.0  |  0.71    Ca    9.1      18 Aug 2021 09:09    TPro  6.0<L>  /  Alb  2.7<L>  /  TBili  0.4  /  DBili  x   /  AST  25  /  ALT  29  /  AlkPhos  376<H>  08-18    proBNP: Serum Pro-Brain Natriuretic Peptide: 1992 pg/mL (08-17 @ 12:48)          TELEMETRY: Afib HR @ 90-100s

## 2021-08-18 NOTE — PROGRESS NOTE ADULT - ASSESSMENT
Pt is a 76 year female with medical history of new onset Afib (not on AC because of pericardial effusion), viral pericardial effusion, TIA, OA, HLD, who presents to [facility]-ED for SOB. Pt has been recently hospitalized for pleural effusion and fevers and had chest tube placed and removed. Pt was seen in outpatient cardiology office, pt c/o of SOB with mild exertion and dry cough with fever of 101.7. POCUS was performed and large left pleural effusion with constrictive pericarditis. Pt was advised to go to [facility] for re insertion of drain. In ED, WBC-9.26, BNP-1992, Trop#1-negative, CT Chest:Left lower lobe thick linear opacity with bronchial dilatation more likely represents atelectasis.     8/18: Pt denies chest pain, palpitations. SOB improved and patient pain more controlled. Tele- Afib HR @ 90-100s. Chest tube yielding another approx. 200 ml of serous fluid.  Echo-completed, awaiting results. CRP,ESR- 81/55. Cont. Colchicine 0.3mg BID.     Dyspnea secondary to Pleural Effusion  -WBC-9.26  -BNP-1992  -Trop#1-negative  -CT Chest: Left lower lobe thick linear opacity with bronchial dilatation more likely represents atelectasis  -Chest tube-yielding another approx. 200 ml of serous fluid  -Pt pain better controlled today  -Echo-completed, awaiting results  -If echo indeterminant MRI to be completed   -CRP,ESR- 81/55  -Cont. Colchicine 0.3mg BID

## 2021-08-18 NOTE — PROGRESS NOTE ADULT - SUBJECTIVE AND OBJECTIVE BOX
Patient is a 76y old  Female who presents with a chief complaint of sob,fever (18 Aug 2021 08:38)    pt seen and exam. breathing is better. Currently on RA, Chest tube draining. denies fever,chill,cp,sob,  No n/v/d    REVIEW OF SYSTEMS: All systems are reviewed and found to be negative except above    MEDICATIONS  (STANDING):  ALBUTerol    0.083% 2.5 milliGRAM(s) Nebulizer every 6 hours  atorvastatin 20 milliGRAM(s) Oral at bedtime  colchicine 0.3 milliGRAM(s) Oral two times a day  digoxin     Tablet 125 MICROGram(s) Oral daily  enoxaparin Injectable 40 milliGRAM(s) SubCutaneous daily  ethacrynic acid 25 milliGRAM(s) Oral two times a day  metoprolol succinate ER 75 milliGRAM(s) Oral daily  pantoprazole    Tablet 40 milliGRAM(s) Oral before breakfast  piperacillin/tazobactam IVPB.. 3.375 Gram(s) IV Intermittent every 8 hours  saccharomyces boulardii 250 milliGRAM(s) Oral two times a day  vancomycin  IVPB 750 milliGRAM(s) IV Intermittent every 12 hours    MEDICATIONS  (PRN):  oxycodone    5 mG/acetaminophen 325 mG 1 Tablet(s) Oral every 4 hours PRN Moderate Pain (4 - 6)      CAPILLARY BLOOD GLUCOSE        I&O's Summary    17 Aug 2021 07:01  -  18 Aug 2021 07:00  --------------------------------------------------------  IN: 0 mL / OUT: 930 mL / NET: -930 mL    18 Aug 2021 07:01  -  18 Aug 2021 11:26  --------------------------------------------------------  IN: 0 mL / OUT: 200 mL / NET: -200 mL        PHYSICAL EXAM:  Vital Signs Last 24 Hrs  T(C): 36.5 (18 Aug 2021 07:43), Max: 36.8 (17 Aug 2021 19:36)  T(F): 97.7 (18 Aug 2021 07:43), Max: 98.2 (17 Aug 2021 19:36)  HR: 94 (18 Aug 2021 07:43) (83 - 94)  BP: 123/79 (18 Aug 2021 07:43) (115/75 - 152/88)  BP(mean): --  RR: 18 (18 Aug 2021 07:43) (17 - 24)  SpO2: 97% (18 Aug 2021 07:43) (95% - 99%)    CONSTITUTIONAL: NAD,  EYES: PERRLA; conjunctiva and sclera clear  ENMT: Moist oral mucosa,   RESPIRATORY: Normal respiratory effort;  mild crackle  to auscultation bilaterally  CARDIOVASCULAR:, normal S1 and S2, no murmur   EXTS: No lower extremity edema; Peripheral pulses are 2+ bilaterally  ABDOMEN: Nontender to palpation, normoactive bowel sounds, no rebound/guarding;   MUSCLOSKELETAL:  no joint swelling or tenderness to palpation  PSYCH: affect appropriate  NEUROLOGY: A+O to person, place, and time; CN 2-12 are intact and symmetric; no gross  deficits;       LABS:                        10.7   6.57  )-----------( 538      ( 18 Aug 2021 09:09 )             33.8     08-18    135  |  97<L>  |  11.1  ----------------------------<  107<H>  3.7   |  25.0  |  0.71    Ca    9.1      18 Aug 2021 09:09    TPro  6.0<L>  /  Alb  2.7<L>  /  TBili  0.4  /  DBili  x   /  AST  25  /  ALT  29  /  AlkPhos  376<H>  08-18    PT/INR - ( 17 Aug 2021 12:48 )   PT: 14.6 sec;   INR: 1.27 ratio         PTT - ( 17 Aug 2021 12:48 )  PTT:32.5 sec  CARDIAC MARKERS ( 17 Aug 2021 12:48 )  x     / <0.01 ng/mL / x     / x     / x              Culture - Fungal, Body Fluid (collected 17 Aug 2021 22:18)  Source: .Body Fluid Pleural Fluid  Preliminary Report (18 Aug 2021 08:07):    Testing in progress    Culture - Body Fluid with Gram Stain (collected 17 Aug 2021 22:18)  Source: .Body Fluid Pleural Fluid  Gram Stain (18 Aug 2021 02:20):    polymorphonuclear leukocytes seen    No organisms seen    by cytocentrifuge        RADIOLOGY & ADDITIONAL TESTS:  Results Reviewed:   Imaging Personally Reviewed:  Electrocardiogram Personally Reviewed:    COORDINATION OF CARE:  Care Discussed with Consultants/Other Providers [Y/N]:  Prior or Outpatient Records Reviewed [Y/N]:

## 2021-08-19 ENCOUNTER — APPOINTMENT (OUTPATIENT)
Dept: THORACIC SURGERY | Facility: CLINIC | Age: 76
End: 2021-08-19

## 2021-08-19 LAB
ALBUMIN SERPL ELPH-MCNC: 2.7 G/DL — LOW (ref 3.3–5.2)
ALP SERPL-CCNC: 347 U/L — HIGH (ref 40–120)
ALT FLD-CCNC: 25 U/L — SIGNIFICANT CHANGE UP
ANION GAP SERPL CALC-SCNC: 10 MMOL/L — SIGNIFICANT CHANGE UP (ref 5–17)
AST SERPL-CCNC: 21 U/L — SIGNIFICANT CHANGE UP
BASOPHILS # BLD AUTO: 0.03 K/UL — SIGNIFICANT CHANGE UP (ref 0–0.2)
BASOPHILS NFR BLD AUTO: 0.3 % — SIGNIFICANT CHANGE UP (ref 0–2)
BILIRUB SERPL-MCNC: 0.2 MG/DL — LOW (ref 0.4–2)
BUN SERPL-MCNC: 9.5 MG/DL — SIGNIFICANT CHANGE UP (ref 8–20)
CALCIUM SERPL-MCNC: 9 MG/DL — SIGNIFICANT CHANGE UP (ref 8.6–10.2)
CHLORIDE SERPL-SCNC: 98 MMOL/L — SIGNIFICANT CHANGE UP (ref 98–107)
CO2 SERPL-SCNC: 29 MMOL/L — SIGNIFICANT CHANGE UP (ref 22–29)
CREAT SERPL-MCNC: 0.73 MG/DL — SIGNIFICANT CHANGE UP (ref 0.5–1.3)
CRP SERPL-MCNC: 40 MG/L — HIGH
EOSINOPHIL # BLD AUTO: 0.09 K/UL — SIGNIFICANT CHANGE UP (ref 0–0.5)
EOSINOPHIL NFR BLD AUTO: 0.8 % — SIGNIFICANT CHANGE UP (ref 0–6)
ERYTHROCYTE [SEDIMENTATION RATE] IN BLOOD: 52 MM/HR — HIGH (ref 0–20)
GLUCOSE SERPL-MCNC: 101 MG/DL — HIGH (ref 70–99)
HAV IGM SER-ACNC: SIGNIFICANT CHANGE UP
HBV CORE IGM SER-ACNC: SIGNIFICANT CHANGE UP
HBV SURFACE AG SER-ACNC: SIGNIFICANT CHANGE UP
HCT VFR BLD CALC: 34.9 % — SIGNIFICANT CHANGE UP (ref 34.5–45)
HCV AB S/CO SERPL IA: 0.09 S/CO — SIGNIFICANT CHANGE UP (ref 0–0.99)
HCV AB SERPL-IMP: SIGNIFICANT CHANGE UP
HGB BLD-MCNC: 11 G/DL — LOW (ref 11.5–15.5)
IMM GRANULOCYTES NFR BLD AUTO: 0.3 % — SIGNIFICANT CHANGE UP (ref 0–1.5)
LYMPHOCYTES # BLD AUTO: 0.8 K/UL — LOW (ref 1–3.3)
LYMPHOCYTES # BLD AUTO: 6.8 % — LOW (ref 13–44)
MCHC RBC-ENTMCNC: 29 PG — SIGNIFICANT CHANGE UP (ref 27–34)
MCHC RBC-ENTMCNC: 31.5 GM/DL — LOW (ref 32–36)
MCV RBC AUTO: 92.1 FL — SIGNIFICANT CHANGE UP (ref 80–100)
MONOCYTES # BLD AUTO: 0.55 K/UL — SIGNIFICANT CHANGE UP (ref 0–0.9)
MONOCYTES NFR BLD AUTO: 4.7 % — SIGNIFICANT CHANGE UP (ref 2–14)
NEUTROPHILS # BLD AUTO: 10.26 K/UL — HIGH (ref 1.8–7.4)
NEUTROPHILS NFR BLD AUTO: 87.1 % — HIGH (ref 43–77)
PLATELET # BLD AUTO: 558 K/UL — HIGH (ref 150–400)
POTASSIUM SERPL-MCNC: 4.2 MMOL/L — SIGNIFICANT CHANGE UP (ref 3.5–5.3)
POTASSIUM SERPL-SCNC: 4.2 MMOL/L — SIGNIFICANT CHANGE UP (ref 3.5–5.3)
PROCALCITONIN SERPL-MCNC: 0.08 NG/ML — SIGNIFICANT CHANGE UP (ref 0.02–0.1)
PROT SERPL-MCNC: 6 G/DL — LOW (ref 6.6–8.7)
RBC # BLD: 3.79 M/UL — LOW (ref 3.8–5.2)
RBC # FLD: 14.3 % — SIGNIFICANT CHANGE UP (ref 10.3–14.5)
SODIUM SERPL-SCNC: 137 MMOL/L — SIGNIFICANT CHANGE UP (ref 135–145)
VANCOMYCIN TROUGH SERPL-MCNC: 14.6 UG/ML — SIGNIFICANT CHANGE UP (ref 10–20)
WBC # BLD: 11.77 K/UL — HIGH (ref 3.8–10.5)
WBC # FLD AUTO: 11.77 K/UL — HIGH (ref 3.8–10.5)

## 2021-08-19 PROCEDURE — 71045 X-RAY EXAM CHEST 1 VIEW: CPT | Mod: 26

## 2021-08-19 PROCEDURE — 99232 SBSQ HOSP IP/OBS MODERATE 35: CPT

## 2021-08-19 PROCEDURE — 99222 1ST HOSP IP/OBS MODERATE 55: CPT

## 2021-08-19 PROCEDURE — 99231 SBSQ HOSP IP/OBS SF/LOW 25: CPT

## 2021-08-19 PROCEDURE — 99233 SBSQ HOSP IP/OBS HIGH 50: CPT

## 2021-08-19 PROCEDURE — 76700 US EXAM ABDOM COMPLETE: CPT | Mod: 26

## 2021-08-19 RX ORDER — ALBUTEROL 90 UG/1
2.5 AEROSOL, METERED ORAL EVERY 6 HOURS
Refills: 0 | Status: DISCONTINUED | OUTPATIENT
Start: 2021-08-19 | End: 2021-08-24

## 2021-08-19 RX ADMIN — OXYCODONE AND ACETAMINOPHEN 1 TABLET(S): 5; 325 TABLET ORAL at 16:04

## 2021-08-19 RX ADMIN — Medication 125 MICROGRAM(S): at 05:23

## 2021-08-19 RX ADMIN — PIPERACILLIN AND TAZOBACTAM 25 GRAM(S): 4; .5 INJECTION, POWDER, LYOPHILIZED, FOR SOLUTION INTRAVENOUS at 17:53

## 2021-08-19 RX ADMIN — Medication 75 MILLIGRAM(S): at 05:23

## 2021-08-19 RX ADMIN — Medication 250 MILLIGRAM(S): at 05:19

## 2021-08-19 RX ADMIN — ALBUTEROL 2.5 MILLIGRAM(S): 90 AEROSOL, METERED ORAL at 09:13

## 2021-08-19 RX ADMIN — Medication 100 MILLIGRAM(S): at 07:35

## 2021-08-19 RX ADMIN — OXYCODONE AND ACETAMINOPHEN 1 TABLET(S): 5; 325 TABLET ORAL at 14:15

## 2021-08-19 RX ADMIN — PIPERACILLIN AND TAZOBACTAM 25 GRAM(S): 4; .5 INJECTION, POWDER, LYOPHILIZED, FOR SOLUTION INTRAVENOUS at 03:44

## 2021-08-19 RX ADMIN — ETHACRYNIC ACID 25 MILLIGRAM(S): 25 TABLET ORAL at 05:23

## 2021-08-19 RX ADMIN — ATORVASTATIN CALCIUM 20 MILLIGRAM(S): 80 TABLET, FILM COATED ORAL at 20:29

## 2021-08-19 RX ADMIN — PIPERACILLIN AND TAZOBACTAM 25 GRAM(S): 4; .5 INJECTION, POWDER, LYOPHILIZED, FOR SOLUTION INTRAVENOUS at 12:09

## 2021-08-19 RX ADMIN — PANTOPRAZOLE SODIUM 40 MILLIGRAM(S): 20 TABLET, DELAYED RELEASE ORAL at 05:23

## 2021-08-19 RX ADMIN — ETHACRYNIC ACID 25 MILLIGRAM(S): 25 TABLET ORAL at 17:52

## 2021-08-19 RX ADMIN — Medication 250 MILLIGRAM(S): at 17:53

## 2021-08-19 NOTE — PROGRESS NOTE ADULT - ASSESSMENT
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 Pt with recent admission for PNA, as well as left Pleural effusion s/p chest tube which was removed and pt was discharged 08/10/21 with po augmentin.  Pt now presented to the ER after being sent from her cardiologist office (Dr. Giles) for noted left pleural effusion. Pt c/o of progressive worsening fatigue associated with SOB on exertion, needing to sleep on a recliner, intermittent dry cough, and persistent fevers.  CTS consulted for Left pleural effusion, and left pigtail placement.

## 2021-08-19 NOTE — PROGRESS NOTE ADULT - SUBJECTIVE AND OBJECTIVE BOX
Patient is a 76y old  Female who presents with a chief complaint of sob,fever (19 Aug 2021 11:16)  pt seen and exam. c/o hiccup/gerd after taking colchicine. pt stated she does not want to take that meds. she is taking since last nigh.  denies cp,sob,fever,chill,cough    SUBJECTIVE / OVERNIGHT EVENTS:  REVIEW OF SYSTEMS: All systems are reviewed and found to be negative except above    MEDICATIONS  (STANDING):  ALBUTerol    0.083% 2.5 milliGRAM(s) Nebulizer every 6 hours  atorvastatin 20 milliGRAM(s) Oral at bedtime  colchicine 0.3 milliGRAM(s) Oral two times a day  digoxin     Tablet 125 MICROGram(s) Oral daily  enoxaparin Injectable 40 milliGRAM(s) SubCutaneous daily  ethacrynic acid 25 milliGRAM(s) Oral two times a day  metoprolol succinate ER 75 milliGRAM(s) Oral daily  pantoprazole    Tablet 40 milliGRAM(s) Oral before breakfast  piperacillin/tazobactam IVPB.. 3.375 Gram(s) IV Intermittent every 8 hours  saccharomyces boulardii 250 milliGRAM(s) Oral two times a day    MEDICATIONS  (PRN):  oxycodone    5 mG/acetaminophen 325 mG 1 Tablet(s) Oral every 4 hours PRN Moderate Pain (4 - 6)      CAPILLARY BLOOD GLUCOSE        I&O's Summary    18 Aug 2021 07:01  -  19 Aug 2021 07:00  --------------------------------------------------------  IN: 0 mL / OUT: 700 mL / NET: -700 mL        PHYSICAL EXAM:  Vital Signs Last 24 Hrs  T(C): 36.7 (19 Aug 2021 08:45), Max: 36.8 (18 Aug 2021 21:09)  T(F): 98 (19 Aug 2021 08:45), Max: 98.2 (18 Aug 2021 21:09)  HR: 94 (19 Aug 2021 09:14) (79 - 98)  BP: 129/77 (19 Aug 2021 08:45) (101/69 - 132/84)  BP(mean): 94 (19 Aug 2021 08:45) (94 - 94)  RR: 18 (19 Aug 2021 08:45) (16 - 19)  SpO2: 98% (19 Aug 2021 09:14) (96% - 99%)    CONSTITUTIONAL: NAD,  EYES: PERRLA; conjunctiva and sclera clear  ENMT: Moist oral mucosa,   RESPIRATORY: Normal respiratory effort; mild crackle  to auscultation bilaterally bases  CARDIOVASCULAR:  normal S1 and S2, no murmur   EXTS: No lower extremity edema; Peripheral pulses are 2+ bilaterally  ABDOMEN: Nontender to palpation, normoactive bowel sounds, no rebound/guarding;   MUSCLOSKELETAL:   no clubbing or cyanosis of digits; no joint swelling or tenderness to palpation  PSYCH: affect appropriate  NEUROLOGY: A+O to person, place, and time; CN 2-12 are intact and symmetric; no gross  deficits;       LABS:                        11.0   11.77 )-----------( 558      ( 19 Aug 2021 06:10 )             34.9     08-19    137  |  98  |  9.5  ----------------------------<  101<H>  4.2   |  29.0  |  0.73    Ca    9.0      19 Aug 2021 06:10    TPro  6.0<L>  /  Alb  2.7<L>  /  TBili  0.2<L>  /  DBili  x   /  AST  21  /  ALT  25  /  AlkPhos  347<H>  08-19    PT/INR - ( 17 Aug 2021 12:48 )   PT: 14.6 sec;   INR: 1.27 ratio         PTT - ( 17 Aug 2021 12:48 )  PTT:32.5 sec  CARDIAC MARKERS ( 17 Aug 2021 12:48 )  x     / <0.01 ng/mL / x     / x     / x              Culture - Fungal, Body Fluid (collected 17 Aug 2021 22:18)  Source: .Body Fluid Pleural Fluid  Preliminary Report (18 Aug 2021 08:07):    Testing in progress    Culture - Body Fluid with Gram Stain (collected 17 Aug 2021 22:18)  Source: .Body Fluid Pleural Fluid  Gram Stain (18 Aug 2021 02:20):    polymorphonuclear leukocytes seen    No organisms seen    by cytocentrifuge  Preliminary Report (18 Aug 2021 19:30):    No growth        RADIOLOGY & ADDITIONAL TESTS:  Results Reviewed:   Imaging Personally Reviewed:  Electrocardiogram Personally Reviewed:    COORDINATION OF CARE:  Care Discussed with Consultants/Other Providers [Y/N]:  Prior or Outpatient Records Reviewed [Y/N]:

## 2021-08-19 NOTE — PROGRESS NOTE ADULT - ASSESSMENT
77Y/O female with  of new onset Afib (not on AC because of pericardial effusion), viral pericardial effusion, TIA, OA, HLD, who presents to [facility]-ED for SOB. Pt has been recently hospitalized for pleural effusion and fevers and had chest tube placed and removed. Pt was seen in outpatient cardiology office, pt c/o of SOB with mild exertion and dry cough with fever of 101.7. POCUS was performed and large left pleural effusion with constrictive pericarditis. Pt was advised to go to [facility] for re insertion of drain. In ED, WBC-9.26, BNP-1992, Trop#1-negative, CT Chest:Left lower lobe thick linear opacity with bronchial dilatation more likely represents atelectasis. Crp 88  Left lung with large effusion s/p chest tube placement and draining 1250 including thoracentesis.(900cc)    PLEURAL EFFUSION With elevated crp/esr  await cytology  c/w colchicine  Rheumatology consult    ATRIAL FIB WITH CONTROLLED VENTRICULAR RATE  c/w metoprolol and digoxin    HLD C/W atorvastatin    CARDIAC MEDS  atorvastatin 20 milliGRAM(s) Oral at bedtime  colchicine 0.3 milliGRAM(s) Oral two times a day  digoxin     Tablet 125 MICROGram(s) Oral daily  ethacrynic acid 25 milliGRAM(s) Oral two times a day  metoprolol succinate ER 75 milliGRAM(s) Oral daily      77Y/O female with  of new onset Afib (not on AC because of pericardial effusion), viral pericardial effusion, TIA, OA, HLD, who presents to [facility]-ED for SOB. Pt has been recently hospitalized for pleural effusion and fevers and had chest tube placed and removed. Pt was seen in outpatient cardiology office, pt c/o of SOB with mild exertion and dry cough with fever of 101.7. POCUS was performed and large left pleural effusion with constrictive pericarditis. Pt was advised to go to [facility] for re insertion of drain. In ED, WBC-9.26, BNP-1992, Trop#1-negative, CT Chest:Left lower lobe thick linear opacity with bronchial dilatation more likely represents atelectasis. Crp 88  Left lung with large effusion s/p chest tube placement and draining 1250 including thoracentesis.(900cc)    PLEURAL EFFUSION With elevated crp/esr  cytology negative for malignant cells  c/w colchicine  Rheumatology consult    ATRIAL FIB WITH CONTROLLED VENTRICULAR RATE  c/w metoprolol and digoxin    HLD C/W atorvastatin    Hx squamous cell skin cancer or tibia s/p mohs    CARDIAC MEDS  atorvastatin 20 milliGRAM(s) Oral at bedtime  colchicine 0.3 milliGRAM(s) Oral two times a day  digoxin     Tablet 125 MICROGram(s) Oral daily  ethacrynic acid 25 milliGRAM(s) Oral two times a day  metoprolol succinate ER 75 milliGRAM(s) Oral daily

## 2021-08-19 NOTE — PROGRESS NOTE ADULT - SUBJECTIVE AND OBJECTIVE BOX
[facility]  INFECTIOUS DISEASES AND INTERNAL MEDICINE at [place]  =======================================================  Shahirar Galeano MD  Diplomates American Board of Internal Medicine and Infectious Diseases  Tel: [phone]      Fax: [phone]  =======================================================    LOUIS MEJIA [id number]    Follow up: Loculated pleural effusion    No fever or chills  No complaints       Allergies:  sulfa drugs (Hives)      REVIEW OF SYSTEMS:  CONSTITUTIONAL:  No Fever or chills  HEENT:  No diplopia or blurred vision.  No earache, sore throat or runny nose.  CARDIOVASCULAR:  No pressure, squeezing, strangling, tightness, heaviness or aching about the chest, neck, axilla or epigastrium.  RESPIRATORY:  No cough, + shortness of breath  GASTROINTESTINAL:  No nausea, vomiting or diarrhea.  GENITOURINARY:  No dysuria, frequency or urgency.   MUSCULOSKELETAL:  no joint aches, no muscle pain  SKIN:  No change in skin, hair or nails.  NEUROLOGIC:  No Headaches, seizures  PSYCHIATRIC:  No disorder of thought or mood.  ENDOCRINE:  No heat or cold intolerance  HEMATOLOGICAL:  No easy bruising or bleeding.       Physical Exam:  GEN: NAD, pleasant  HEENT: normocephalic and atraumatic. EOMI. PERRL.  Anicteric  NECK: Supple.   LUNGS: Clear to auscultation.  HEART: Regular rate and rhythm   ABDOMEN: Soft, nontender, and nondistended.  Positive bowel sounds.    : No CVA tenderness  EXTREMITIES: Without any edema.  MSK: No joint swelling  NEUROLOGIC: No Focal Deficits  PSYCHIATRIC: Appropriate affect .  SKIN: No Rash      Vitals:  T(F): 98 (19 Aug 2021 08:45), Max: 98.7 (18 Aug 2021 11:33)  HR: 94 (19 Aug 2021 09:14)  BP: 129/77 (19 Aug 2021 08:45)  RR: 18 (19 Aug 2021 08:45)  SpO2: 98% (19 Aug 2021 09:14) (94% - 99%)  temp max in last 48H T(F): , Max: 98.7 (08-18-21 @ 11:33)    Current Antibiotics:  piperacillin/tazobactam IVPB.. 3.375 Gram(s) IV Intermittent every 8 hours    Other medications:  ALBUTerol    0.083% 2.5 milliGRAM(s) Nebulizer every 6 hours  atorvastatin 20 milliGRAM(s) Oral at bedtime  colchicine 0.3 milliGRAM(s) Oral two times a day  digoxin     Tablet 125 MICROGram(s) Oral daily  enoxaparin Injectable 40 milliGRAM(s) SubCutaneous daily  ethacrynic acid 25 milliGRAM(s) Oral two times a day  metoprolol succinate ER 75 milliGRAM(s) Oral daily  pantoprazole    Tablet 40 milliGRAM(s) Oral before breakfast  saccharomyces boulardii 250 milliGRAM(s) Oral two times a day                            11.0   11.77 )-----------( 558      ( 19 Aug 2021 06:10 )             34.9     08-19    137  |  98  |  9.5  ----------------------------<  101<H>  4.2   |  29.0  |  0.73    Ca    9.0      19 Aug 2021 06:10    TPro  6.0<L>  /  Alb  2.7<L>  /  TBili  0.2<L>  /  DBili  x   /  AST  21  /  ALT  25  /  AlkPhos  347<H>  08-19    RECENT CULTURES:  08-17 @ 22:18 .Body Fluid Pleural Fluid     No growth  polymorphonuclear leukocytes seen  No organisms seen  by cytocentrifuge    08-08 @ 02:20 .Body Fluid Pleural Fluid     No growth at 5 days  polymorphonuclear leukocytes seen  No organisms seen  by cytocentrifuge    08-06 @ 20:37 .Blood Blood-Peripheral     No growth at 5 days.      WBC Count: 11.77 K/uL (08-19-21 @ 06:10)  WBC Count: 6.57 K/uL (08-18-21 @ 09:09)  WBC Count: 9.26 K/uL (08-17-21 @ 12:48)    Creatinine, Serum: 0.73 mg/dL (08-19-21 @ 06:10)  Creatinine, Serum: 0.71 mg/dL (08-18-21 @ 09:09)  Creatinine, Serum: 0.73 mg/dL (08-17-21 @ 12:48)    C-Reactive Protein, Serum: 40 mg/L (08-19-21 @ 06:10)  C-Reactive Protein, Serum: 64 mg/L (08-18-21 @ 09:09)  C-Reactive Protein, Serum: 81 mg/L (08-17-21 @ 20:05)    Sedimentation Rate, Erythrocyte: 52 mm/hr (08-19-21 @ 06:10)  Sedimentation Rate, Erythrocyte: 52 mm/hr (08-18-21 @ 09:09)  Sedimentation Rate, Erythrocyte: 55 mm/hr (08-17-21 @ 20:05)    Procalcitonin, Serum: 0.08 ng/mL (08-19-21 @ 06:10)  Procalcitonin, Serum: 0.10 ng/mL (08-18-21 @ 09:09)     SARS-CoV-2: NotDetec (08-04-21 @ 14:28)  Rapid RVP Result: NotDetec (08-04-21 @ 14:28)    COVID-19 PCR: NotDetec (08-17-21 @ 12:43)        < from: Xray Chest 1 View- PORTABLE-Routine (Xray Chest 1 View- PORTABLE-Routine in AM.) (08.19.21 @ 04:29) >  EXAM:  XR CHEST PORTABLE ROUTINE 1V                          PROCEDURE DATE:  08/19/2021      INTERPRETATION:  Clinical history: 76 year old female, recurrent pleural effusions, status post left PICC tail.    Two views of the chest are compared to 8/18/2021 and are correlated with the chest CT of 8/17/2021.    FINDINGS: Mild cardiomegaly with marked right atrial enlargement, unchanged.    Normal pulmonary vasculature with no consolidation.    Small bilateral pleural effusions/adjacent atelectasis and an area of platelike atelectasis in the left infrahilar region, better characterized on CT, grossly unchanged.    IMPRESSION:    Small bilateral pleural effusions with adjacent atelectasis and an area of retrocardiac atelectasis, unchanged    < end of copied text >

## 2021-08-19 NOTE — CONSULT NOTE ADULT - ASSESSMENT
- Recurrent L pleural effusion  - H/o recent viral pericarditis (06/2021)  - Raynaud's    Plan:  F/u pleural fluid studies, cultures, which are thus far negative. Cytopath from initial pleural drainage (8/7/21) negative for malignant cells. Cell count shows neutrophil predominant exudative fluid. Would continue antibiotics for now, ID is on board. ?Concern for underlying inflammatory process - would check CARL, ANCA, dsDNA, SS-A/B. Has h/o of Raynaud's. Would consult rheumatology. May need PleurX, would consider pleural biopsy as well. C/w Colchicine for underlying pericarditis, per cardiology.     D/w Dr. Delia Vora M.D.  Pulmonary & Critical Care Medicine  [facility]  Pulmonary and Sleep Medicine at [place]  [address]  [place] [address]  T: [phone]  F: [phone]
76y  Female with h/o Transient global amnesia, carotid artery atherosclerosis, HLD, recently diagnosed viral pericarditis (6/23/2021), complicated by new onset Afib with RVR requiring [facility] admission in 7/2021, not on AC due to known small pericardial effusion recent admission for PNA, fever with loculated pleural effusion s/p chest tube and pleural fluid cultures are negative was on Zosyn while admitted and was discharged on PO AAugmentin which patient was supposed to complete on 8/24, was discharged on 8/10. Patient returned to the ER on 8/17 with fever to 103F at home, SOB on exertion. Patient was seen by CT surgery in the ER and is s/p CT placement in the ER 8/17. No fever this hospitalization, no leukocytosis. Patient was started on IV Vancomycin and Zosyn.    Loculated pleural effusion  Fever at home       - Blood cultures pending  - Pleural fluid cultures not obtained   - RVP/COVID 19 PCR negative   - Urine for legionella ordered   - CXR with pleural effusions   - Procalcitonin level ordered  - Continue Zosyn  - D/C Vancomycin   - Follow up cultures  - Trend Fever  - Trend Leukocytosis      Thank you for allowing me to participate in the care of your patient.   Will Follow      
Pt is a 76 year female with medical history of new onset Afib (not on AC because of pericardial effusion), viral pericardial effusion, TIA, OA, HLD, who presents to [facility]ED for SOB. Pt has been recently hospitalized for pleural effusion and fevers and had chest tube placed and removed. Pt was seen in outpatient cardiology office, pt c/o of SOB with mild exertion and dry cough with fever of 101.7. POCUS was performed and large left pleural effusion with constrictive pericarditis. Pt was advised to go to [facility] for re insertion of drain. In ED, WBC-9.26, BNP-1992, Trop#1-negative, CT Chest:Left lower lobe thick linear opacity with bronchial dilatation more likely represents atelectasis.     Dyspnea secondary to Pleural Effusion  -WBC-9.26  -BNP-1992  -Trop#1-negative  -CT Chest: Left lower lobe thick linear opacity with bronchial dilatation more likely represents atelectasis  -Chest tube placed by CTSX team yielding approx. 1L serous fluid, currently clamped  -Pt c/o pain, CTSX ordered IV Acetaminophen  -Echo-limited to evaluate for constrictive pericarditis  -If echo indeterminant MRI to be completed   -CRP,ESR- ordered  -Start Colchicine 0.3mg BID if elevated
75yo female with a history of TIA and hyperlipidemia recently diagnosed viral pericarditis (6/23/2021), complicated by new onset Afib with RVR ( not on AC due to known small pericardial effusion,). Pt with recent admission for PNA, as well as left Pleural effusion s/p chest tube which was removed and pt was discharged 08/10/21 with po augmentin.  Pt now presented to the ER after being sent from her cardiologist office (Dr. Giles) for noted left pleural effusion. Pt c/o of progressive worsening fatigue associated with SOB on exertion, needing to sleep on a recliner, intermittent dry cough, and persistent fevers.  CTS consulted for Left pleural effusion, and left pigtail placement.

## 2021-08-19 NOTE — CONSULT NOTE ADULT - SUBJECTIVE AND OBJECTIVE BOX
PULMONARY CONSULT NOTE      LOUIS MEJIA  MRN-[id number]    Patient is a 76y old  Female who presents with a chief complaint of sob,fever (19 Aug 2021 11:16)      HISTORY OF PRESENT ILLNESS:  76F PMH recently dx viral pericarditis (06/2021) c/b new-onset AFib with RVR, Raynaud's, TIA, HLD, prior admission for PNA with L pleural effusion s/p chest tube and discharged with PO antibiotics, who presents with fevers, ACOSTA, fatigue, and poor appetite. In the ED, had L-sided PTC placed with 1000cc fluid drained, and afterwards she notes improvement in SOB. Denying chest tightness. Denying productive cough. Denying N/V/D.     MEDICATIONS  (STANDING):  ALBUTerol    0.083% 2.5 milliGRAM(s) Nebulizer every 6 hours  atorvastatin 20 milliGRAM(s) Oral at bedtime  colchicine 0.3 milliGRAM(s) Oral two times a day  digoxin     Tablet 125 MICROGram(s) Oral daily  enoxaparin Injectable 40 milliGRAM(s) SubCutaneous daily  ethacrynic acid 25 milliGRAM(s) Oral two times a day  metoprolol succinate ER 75 milliGRAM(s) Oral daily  pantoprazole    Tablet 40 milliGRAM(s) Oral before breakfast  piperacillin/tazobactam IVPB.. 3.375 Gram(s) IV Intermittent every 8 hours  saccharomyces boulardii 250 milliGRAM(s) Oral two times a day    MEDICATIONS  (PRN):  oxycodone    5 mG/acetaminophen 325 mG 1 Tablet(s) Oral every 4 hours PRN Moderate Pain (4 - 6)    Allergies    sulfa drugs (Hives)    Intolerances      PAST MEDICAL & SURGICAL HISTORY:  HLD (hyperlipidemia)    Osteoarthritis    FUO (fever of unknown origin)    H/O pericarditis    TIA (transient ischemic attack)    Afib    H/O rotator cuff surgery      FAMILY HISTORY:  FH: heart disease (Father, Mother)  both had CAD, HTN, mother had MI, father had CHF          SOCIAL HISTORY  Smoking History:   Former social smoker x15 years, quit >30 years ago    REVIEW OF SYSTEMS:  CONSTITUTIONAL:  +Fevers  HEENT:  No headache  CARDIOVASCULAR:  No chest pain, no palpitations  RESPIRATORY:  As per HPI  GASTROINTESTINAL:  No abdominal pain, N/V/D  GENITOURINARY:  No dysuria, frequency or urgency  NEUROLOGIC:  No seizures or headaches  PSYCHIATRIC:  No disorder of thought or mood      Vital Signs Last 24 Hrs  T(C): 36.7 (19 Aug 2021 08:45), Max: 36.8 (18 Aug 2021 21:09)  T(F): 98 (19 Aug 2021 08:45), Max: 98.2 (18 Aug 2021 21:09)  HR: 94 (19 Aug 2021 09:14) (79 - 98)  BP: 129/77 (19 Aug 2021 08:45) (101/69 - 132/84)  BP(mean): 94 (19 Aug 2021 08:45) (94 - 94)  RR: 18 (19 Aug 2021 08:45) (16 - 19)  SpO2: 98% (19 Aug 2021 09:14) (96% - 99%)      PHYSICAL EXAMINATION:  GENERAL: In no apparent distress  HEENT: NC/AT  NECK: Supple, non-tender   LUNGS: CTA B/L with L basilar pigtail in place, good inspiratory effort, non-labored breathing  CV: +S1, S2  ABDOMEN: Soft, non-tender  EXTREMITIES: No rashes, lesions, edema  NEUROLOGIC: Grossly non-focal  PSYCH: Normal affect      LABS:                        11.0   11.77 )-----------( 558      ( 19 Aug 2021 06:10 )             34.9     08-19    137  |  98  |  9.5  ----------------------------<  101<H>  4.2   |  29.0  |  0.73    Ca    9.0      19 Aug 2021 06:10    TPro  6.0<L>  /  Alb  2.7<L>  /  TBili  0.2<L>  /  DBili  x   /  AST  21  /  ALT  25  /  AlkPhos  347<H>  08-19                Serum Pro-Brain Natriuretic Peptide: 1992 pg/mL (08-17-21 @ 12:48)      Procalcitonin, Serum: 0.08 ng/mL (08-19-21 @ 06:10)  Procalcitonin, Serum: 0.10 ng/mL (08-18-21 @ 09:09)      MICROBIOLOGY:  Culture - Body Fluid with Gram Stain (08.17.21 @ 22:18)    Gram Stain:   polymorphonuclear leukocytes seen  No organisms seen  by cytocentrifuge    Specimen Source: .Body Fluid Pleural Fluid    Culture Results:   No growth          RADIOLOGY & ADDITIONAL STUDIES:    < from: CT Chest No Cont (08.17.21 @ 17:50) >  EXAM:  CT CHEST                          PROCEDURE DATE:  08/17/2021          INTERPRETATION:  EXAMINATION: CT CHEST    CLINICAL INDICATION: Pneumonia, dyspnea.    TECHNIQUE: Noncontrast CT of the chest was obtained.    COMPARISON: Multiple CT, most recent 8/4/2021.    FINDINGS:    AIRWAYS AND LUNGS: The central tracheobronchial tree is patent.  Left lower lobe thick linear opacity with bronchial dilatation. Other scattered linear atelectasis/scarring.    MEDIASTINUM AND PLEURA: There are no enlarged mediastinal, hilar or axillary lymph nodes. The visualized portion of the thyroid gland is unremarkable. There is a small right pleural effusion. There is no pneumothorax.    HEART AND VESSELS: There is mild cardiomegaly.  There are atherosclerotic calcifications of the aorta and coronary arteries.  There is no pericardial effusion.    UPPER ABDOMEN: Images of the upper abdomen demonstrate no abnormality.    BONES AND SOFT TISSUES: There are mild degenerative changes of the spine.  The soft tissues are unremarkable.    TUBES/LINES: None.    IMPRESSION:  Left lower lobe thick linear opacity with bronchial dilatation more likely represents atelectasis    --- End of Report ---            ISABELLE DOSS MD; Attending Radiologist  This document has been electronically signed. Aug 17 2021  6:48PM    < end of copied text >      COVID-19 PCR . (08.17.21 @ 12:43)    COVID-19 PCR: NotDetec: You can help in the fight against COVID-19. [employer] system master may contact  you to see if you are interested in voluntarily participating in one of  our clinical trials.  Testing is performed using polymerase chain reaction (PCR) or  transcription mediated amplification (TMA). This COVID-19 (SARS-CoV-2)  nucleic acid amplification test was validated by [employer] system master and is  in use under the FDA Emergency Use Authorization (EUA) for clinical labs  CLIA-certified to perform high complexity testing. Test results should be  correlated with clinical presentation, patient history, and epidemiology.      ECHO:

## 2021-08-19 NOTE — PROGRESS NOTE ADULT - PROBLEM SELECTOR PLAN 1
Pt with left recurrent pleural effusion  Left pigtail placed 900 cc initially   CXR daily   gram stain w/ no organisms seen, fungal culture still pending   Maintain tube as per Dr. Mcbride   cards to perform further work up   echo pending results and possible RHC.    PLAN of care d/w Dr. Mcbride Pt with left recurrent pleural effusion  Left pigtail placed 900 cc initially   CXR daily   gram stain w/ no organisms seen, fungal culture still pending   Maintain tube as per Dr. Mcbride   cards to perform further work up   echo pending results and possible RHC.  Possible pleurex placement next week     PLAN of care d/w Dr. Mcbride

## 2021-08-19 NOTE — PROGRESS NOTE ADULT - SUBJECTIVE AND OBJECTIVE BOX
[facility]  [address] [place] [address]        Patient is a 76y old  Female who presents with a chief complaint of sob,fever (19 Aug 2021 09:29)    SUBJECTIVE / OVERNIGHT EVENTS:  Had thoracentesis for 900cc than chest tube was placed and is draining  feels better just pain at the site  Rheum consult is pending    ROS:  ++ weight loss and weakness  CARDIAC: No cp sob or palp  RESP: No mucus production no uri symptoms  GI:  No melana no abd pain  EXTREMITIES:  no calf tenderness no edema    TELEMETRY:      MEDICATIONS  (STANDING):  ALBUTerol    0.083% 2.5 milliGRAM(s) Nebulizer every 6 hours  atorvastatin 20 milliGRAM(s) Oral at bedtime  colchicine 0.3 milliGRAM(s) Oral two times a day  digoxin     Tablet 125 MICROGram(s) Oral daily  enoxaparin Injectable 40 milliGRAM(s) SubCutaneous daily  ethacrynic acid 25 milliGRAM(s) Oral two times a day  metoprolol succinate ER 75 milliGRAM(s) Oral daily  pantoprazole    Tablet 40 milliGRAM(s) Oral before breakfast  piperacillin/tazobactam IVPB.. 3.375 Gram(s) IV Intermittent every 8 hours  saccharomyces boulardii 250 milliGRAM(s) Oral two times a day    MEDICATIONS  (PRN):  oxycodone    5 mG/acetaminophen 325 mG 1 Tablet(s) Oral every 4 hours PRN Moderate Pain (4 - 6)      Vital Signs Last 24 Hrs  T(C): 36.7 (19 Aug 2021 08:45), Max: 37.1 (18 Aug 2021 11:33)  T(F): 98 (19 Aug 2021 08:45), Max: 98.7 (18 Aug 2021 11:33)  HR: 94 (19 Aug 2021 09:14) (79 - 98)  BP: 129/77 (19 Aug 2021 08:45) (98/65 - 132/84)  BP(mean): 94 (19 Aug 2021 08:45) (94 - 94)  RR: 18 (19 Aug 2021 08:45) (16 - 19)  SpO2: 98% (19 Aug 2021 09:14) (94% - 99%)  CAPILLARY BLOOD GLUCOSE        I&O's Summary    18 Aug 2021 07:01  -  19 Aug 2021 07:00  --------------------------------------------------------  IN: 0 mL / OUT: 700 mL / NET: -700 mL        PHYSICAL EXAM:  GENERAL: NAD, well-developed  EYES: EOMI, PERRLA, conjunctiva and sclera clear  NECK:  No JVD  CHEST/LUNG: CTABL ; No wheeze  HEART: RRR; No murmurs  ABDOMEN: Soft, Nontender, Nondistended; Bowel sounds present  EXTREMITIES:  2+ Peripheral Pulses, No edema  PSYCH: AAOx3  NEUROLOGY: non-focal  SKIN: No rashes or lesions. No sacral ulcer    LABS:                        11.0   11.77 )-----------( 558      ( 19 Aug 2021 06:10 )             34.9     08-19    137  |  98  |  9.5  ----------------------------<  101<H>  4.2   |  29.0  |  0.73    Ca    9.0      19 Aug 2021 06:10    TPro  6.0<L>  /  Alb  2.7<L>  /  TBili  0.2<L>  /  DBili  x   /  AST  21  /  ALT  25  /  AlkPhos  347<H>  08-19    PT/INR - ( 17 Aug 2021 12:48 )   PT: 14.6 sec;   INR: 1.27 ratio         PTT - ( 17 Aug 2021 12:48 )  PTT:32.5 sec  CARDIAC MARKERS ( 17 Aug 2021 12:48 )  x     / <0.01 ng/mL / x     / x     / x          < from: TTE Echo Limited or F/U (07.08.21 @ 10:51) >   1. Left ventricular ejection fraction, by visual estimation, is 50 to 55%.   2. Spectral Doppler shows restrictive pattern of left ventricular myocardial filling (Grade III diastolic dysfunction).   3. Small pericardial effusion.    < end of copied text >  < from: TTE Echo Limited or F/U (08.05.21 @ 21:58) >  Summary:   1. Low normal global left ventricular systolic function.   2. Left ventricular ejection fraction, by visual estimation, is 50 to 55%.   3. Trivial pericardial effusion.   4. Large pleural effusion in both left and right lateral regions.    MD Meryl Electronically signed on 8/6/2021 at 8:51:20 AM    < end of copied text >                           [facility]  [address] [place] [address]        Patient is a 76y old  Female who presents with a chief complaint of sob,fever (19 Aug 2021 09:29)    SUBJECTIVE / OVERNIGHT EVENTS:  Had thoracentesis for 900cc than chest tube was placed and is draining  feels better just pain at the site  Rheum consult is pending    ROS:  ++ weight loss and weakness  CARDIAC: No cp sob or palp  RESP: No mucus production no uri symptoms  GI:  No melana no abd pain  EXTREMITIES:  no calf tenderness no edema    TELEMETRY:      MEDICATIONS  (STANDING):  ALBUTerol    0.083% 2.5 milliGRAM(s) Nebulizer every 6 hours  atorvastatin 20 milliGRAM(s) Oral at bedtime  colchicine 0.3 milliGRAM(s) Oral two times a day  digoxin     Tablet 125 MICROGram(s) Oral daily  enoxaparin Injectable 40 milliGRAM(s) SubCutaneous daily  ethacrynic acid 25 milliGRAM(s) Oral two times a day  metoprolol succinate ER 75 milliGRAM(s) Oral daily  pantoprazole    Tablet 40 milliGRAM(s) Oral before breakfast  piperacillin/tazobactam IVPB.. 3.375 Gram(s) IV Intermittent every 8 hours  saccharomyces boulardii 250 milliGRAM(s) Oral two times a day    MEDICATIONS  (PRN):  oxycodone    5 mG/acetaminophen 325 mG 1 Tablet(s) Oral every 4 hours PRN Moderate Pain (4 - 6)      Vital Signs Last 24 Hrs  T(C): 36.7 (19 Aug 2021 08:45), Max: 37.1 (18 Aug 2021 11:33)  T(F): 98 (19 Aug 2021 08:45), Max: 98.7 (18 Aug 2021 11:33)  HR: 94 (19 Aug 2021 09:14) (79 - 98)  BP: 129/77 (19 Aug 2021 08:45) (98/65 - 132/84)  BP(mean): 94 (19 Aug 2021 08:45) (94 - 94)  RR: 18 (19 Aug 2021 08:45) (16 - 19)  SpO2: 98% (19 Aug 2021 09:14) (94% - 99%)  CAPILLARY BLOOD GLUCOSE        I&O's Summary    18 Aug 2021 07:01  -  19 Aug 2021 07:00  --------------------------------------------------------  IN: 0 mL / OUT: 700 mL / NET: -700 mL        PHYSICAL EXAM:  GENERAL: Thin frail appearing  EYES: EOMI, PERRLA, conjunctiva and sclera clear  NECK:  No JVD  CHEST/LUNG: CTABL ; No wheeze  + chest tube present  HEART: RRR; No murmurs  ABDOMEN: Soft, Nontender, Nondistended; Bowel sounds present  EXTREMITIES:  2+ Peripheral Pulses, No edema  PSYCH: AAOx3  NEUROLOGY: non-focal  SKIN: No rashes or lesions. No sacral ulcer    LABS:                        11.0   11.77 )-----------( 558      ( 19 Aug 2021 06:10 )             34.9     08-19    137  |  98  |  9.5  ----------------------------<  101<H>  4.2   |  29.0  |  0.73    Ca    9.0      19 Aug 2021 06:10    TPro  6.0<L>  /  Alb  2.7<L>  /  TBili  0.2<L>  /  DBili  x   /  AST  21  /  ALT  25  /  AlkPhos  347<H>  08-19    PT/INR - ( 17 Aug 2021 12:48 )   PT: 14.6 sec;   INR: 1.27 ratio         PTT - ( 17 Aug 2021 12:48 )  PTT:32.5 sec  CARDIAC MARKERS ( 17 Aug 2021 12:48 )  x     / <0.01 ng/mL / x     / x     / x          < from: TTE Echo Limited or F/U (07.08.21 @ 10:51) >   1. Left ventricular ejection fraction, by visual estimation, is 50 to 55%.   2. Spectral Doppler shows restrictive pattern of left ventricular myocardial filling (Grade III diastolic dysfunction).   3. Small pericardial effusion.    < end of copied text >  < from: TTE Echo Limited or F/U (08.05.21 @ 21:58) >  Summary:   1. Low normal global left ventricular systolic function.   2. Left ventricular ejection fraction, by visual estimation, is 50 to 55%.   3. Trivial pericardial effusion.   4. Large pleural effusion in both left and right lateral regions.    MD Meryl Electronically signed on 8/6/2021 at 8:51:20 AM    < end of copied text >

## 2021-08-19 NOTE — PROGRESS NOTE ADULT - ASSESSMENT
The patient is a 76 year old female with a history of TIA and hyperlipidemia   recently diagnosed viral pericarditis (6/23/2021), complicated by new onset Afib with RVR , recent admission for PNA,lt Pleural effusion s/p chest tube  was discharged 08/10/21 with po Augmentin requiring recurrent hospitalization [facility] , not on AC due to known small pericardial effusion, who presents with fever 103 f at home, sob on exertion,fatigue,poor appetite.      Fever,dyspnea on admission due to suspected bacterial PNA with parapneumonic effusion s/p lt chest tube placement ED BY CT SURGERY  -failure po augmentin  -oxygen to keep so2 >91%,bronchodilator  -dc IV Vancomycin and cont zosyn   -Blood c/s ,urine c/s  - CT chest LLL opacity  -Recent ECHO reviewed  -CTA consulted- Chest tube placed for  left loculated effusion. 1000ml drained. c/s testing  -f/u final c/s pl fluid  -f/u id rec  -hx of dental abscess 4 yrs ago lt U molar s/p extraction. last dental check up 2 months ago including imaging ,was stable. no acute le swelling/tenderness present.      Afib CHADsVASc 3, but AC not started due to concern for worsening pericardial effusion  -Continue  metoprolol and digoxin  -f/u cardiology rec,spoke with cardio PA  -LAST ECHO with 60-65% EF, grade 1 diastolic dysfxn    History of viral pericarditis with small pericardial effusion  Was previously on ASA, colchicine, both d/c’ ed by cardiology due to GI upset  S/p prednisone course, last dose on 8/4, completed dose prior to admission  Cardio started colchicine as esr/crp high    Hyperlipidemia - on statin     persistant High alphos  -us abd  -hepatitis panel.    GERD - on Protonix    VTE_ Lovenox subcut     Plan discussed at length with patient   The patient is a 76 year old female with a history of TIA and hyperlipidemia   recently diagnosed viral pericarditis (6/23/2021), complicated by new onset Afib with RVR , recent admission for PNA,lt Pleural effusion s/p chest tube  was discharged 08/10/21 with po Augmentin requiring recurrent hospitalization [facility] , not on AC due to known small pericardial effusion, who presents with fever 103 f at home, sob on exertion,fatigue,poor appetite.      Fever,dyspnea on admission due to suspected bacterial PNA with parapneumonic effusion s/p lt chest tube placement ED BY CT SURGERY  -failure po augmentin  -oxygen to keep so2 >91%,bronchodilator  -dc IV Vancomycin and cont zosyn   -Blood c/s ,urine c/s  - CT chest LLL opacity  -Recent ECHO reviewed  -CTA consulted- Chest tube placed for  left loculated effusion. 1000ml drained. c/s testing  -f/u final c/s pl fluid  -f/u id rec  -hx of dental abscess 4 yrs ago lt U molar s/p extraction. last dental check up 2 months ago including imaging ,was stable. no acute le swelling/tenderness present.  -spoke with pulmonary order CARL,DS DNA,ANCA. F/U FURTHER REC      Afib CHADsVASc 3, but AC not started due to concern for worsening pericardial effusion  -Continue  metoprolol and digoxin  -f/u cardiology rec,spoke with cardio PA  -LAST ECHO with 60-65% EF, grade 1 diastolic dysfxn    History of viral pericarditis with small pericardial effusion  Was previously on ASA, colchicine, both d/c’ ed by cardiology due to GI upset  S/p prednisone course, last dose on 8/4, completed dose prior to admission  Cardio started colchicine as esr/crp high BUT Pt did not tolerate    Hyperlipidemia - on statin     persistant High alphos  -us abd  -hepatitis panel.    GERD - on Protonix    VTE_ Lovenox subcut     Plan discussed at length with patient

## 2021-08-19 NOTE — PROGRESS NOTE ADULT - ASSESSMENT
76y  Female with h/o Transient global amnesia, carotid artery atherosclerosis, HLD, recently diagnosed viral pericarditis (6/23/2021), complicated by new onset Afib with RVR requiring [facility] admission in 7/2021, not on AC due to known small pericardial effusion recent admission for PNA, fever with loculated pleural effusion s/p chest tube and pleural fluid cultures are negative was on Zosyn while admitted and was discharged on PO AAugmentin which patient was supposed to complete on 8/24, was discharged on 8/10. Patient returned to the ER on 8/17 with fever to 103F at home, SOB on exertion. Patient was seen by CT surgery in the ER and is s/p CT placement in the ER 8/17. No fever this hospitalization, no leukocytosis. Patient was started on IV Vancomycin and Zosyn.      Loculated pleural effusion  Fever at home       - Blood cultures negative   - Pleural fluid cultures not obtained   - RVP/COVID 19 PCR negative   - Urine for legionella ordered   - CXR with pleural effusions   - Procalcitonin level 0.08  - Continue Zosyn  - Recommend Rheumatology eval given reoccurrence of these effusions  - Follow up cultures  - Trend Fever  - Trend Leukocytosis      Thank you for allowing me to participate in the care of your patient.   Will Follow      d/w Dr Lin

## 2021-08-19 NOTE — PROGRESS NOTE ADULT - SUBJECTIVE AND OBJECTIVE BOX
SUBJECTIVE:  Pt in bed, pt states, I don't feel so good, they started me back on colchicine, I can't take that, it makes me so nausea.  Pt c/o of fatigue, slight pain at the left chest tube site. pt denies Chest, pain, SOB, or palpitation.     Overnight events:  none    PAST MEDICAL & SURGICAL HISTORY:  HLD (hyperlipidemia)    Osteoarthritis    FUO (fever of unknown origin)    H/O pericarditis    TIA (transient ischemic attack)    Afib    H/O rotator cuff surgery        MEDICATIONS  ALBUTerol    0.083% 2.5 milliGRAM(s) Nebulizer every 6 hours  atorvastatin 20 milliGRAM(s) Oral at bedtime  colchicine 0.3 milliGRAM(s) Oral two times a day  digoxin     Tablet 125 MICROGram(s) Oral daily  enoxaparin Injectable 40 milliGRAM(s) SubCutaneous daily  ethacrynic acid 25 milliGRAM(s) Oral two times a day  metoprolol succinate ER 75 milliGRAM(s) Oral daily  oxycodone    5 mG/acetaminophen 325 mG 1 Tablet(s) Oral every 4 hours PRN  pantoprazole    Tablet 40 milliGRAM(s) Oral before breakfast  piperacillin/tazobactam IVPB.. 3.375 Gram(s) IV Intermittent every 8 hours  saccharomyces boulardii 250 milliGRAM(s) Oral two times a day  vancomycin  IVPB 500 milliGRAM(s) IV Intermittent every 12 hours  MEDICATIONS  (PRN):  oxycodone    5 mG/acetaminophen 325 mG 1 Tablet(s) Oral every 4 hours PRN Moderate Pain (4 - 6)      Daily     Daily Weight in k.9 (19 Aug 2021 04:45)                              11.0   11.77 )-----------( 558      ( 19 Aug 2021 06:10 )             34.9       137  |  98  |  9.5  ----------------------------<  101<H>  4.2   |  29.0  |  0.73    Ca    9.0      19 Aug 2021 06:10    TPro  6.0<L>  /  Alb  2.7<L>  /  TBili  0.2<L>  /  DBili  x   /  AST  21  /  ALT  25  /  AlkPhos  347<H>      CARDIAC MARKERS ( 17 Aug 2021 12:48 )  x     / <0.01 ng/mL / x     / x     / x        PT/INR - ( 17 Aug 2021 12:48 )   PT: 14.6 sec;   INR: 1.27 ratio         PTT - ( 17 Aug 2021 12:48 )  PTT:32.5 sec      Objective:  T(C): 36.7 (21 @ 08:45), Max: 37.1 (21 @ 11:33)  HR: 94 (21 @ 09:14) (79 - 98)  BP: 129/77 (21 @ 08:45) (98/65 - 132/84)  RR: 18 (21 @ 08:45) (16 - 19)  SpO2: 98% (21 @ 09:14) (94% - 99%)  Wt(kg): --CAPILLARY BLOOD GLUCOSE      I&O's Summary    18 Aug 2021 07:01  -  19 Aug 2021 07:00  --------------------------------------------------------  IN: 0 mL / OUT: 700 mL / NET: -700 mL          Physical Exam  General: NAD  Neurology: Awake, nonfocal, LAWSON x 4  Neck: Neck supple, trachea midline, No JVD,   Respiratory: Left diminished right CTA, No wheezing, rales, rhonchi  CV: irregularly irregular, S1S2, no murmurs, rubs or gallops  Abdominal: Soft, NT, ND +BS,   Extremities: No edema, + peripheral pulses  Skin: warm and dry  Tubes: Left chest pigtail placed to water seal    Imaging:  CXR:    < from: Xray Chest 1 View- PORTABLE-Routine (Xray Chest 1 View- PORTABLE-Routine in AM.) (21 @ 05:21) >  IMPRESSION:  Small bilateral pleural effusions with adjacent atelectasis on the right and an area of retrocardiac atelectasis atelectasis, better characterized on CT.        --- End of Report ---    < end of copied text >    ECG:    < from: 12 Lead ECG (21 @ 18:47) >  Ventricular Rate 88 BPM    Atrial Rate 375 BPM    QRS Duration 76 ms    Q-T Interval 336 ms    QTC Calculation(Bazett) 406 ms    R Axis 54 degrees    T Axis 237 degrees    Diagnosis Line Atrial fibrillation  ST & T wave abnormality, consider inferolateral ischemia    < end of copied text >        < from: CT Chest No Cont (21 @ 17:50) >    FINDINGS:    AIRWAYS AND LUNGS: The central tracheobronchial tree is patent.  Left lower lobe thick linear opacity with bronchial dilatation. Other scattered linear atelectasis/scarring.    MEDIASTINUM AND PLEURA: There are no enlarged mediastinal, hilar or axillary lymph nodes. The visualized portion of the thyroid gland is unremarkable. There is a small right pleural effusion. There is no pneumothorax.    HEART AND VESSELS: There is mild cardiomegaly.  There are atherosclerotic calcifications of the aorta and coronary arteries.  There is no pericardial effusion.    UPPER ABDOMEN: Images of the upper abdomen demonstrate no abnormality.    BONES AND SOFT TISSUES: There are mild degenerative changes of the spine.  The soft tissues are unremarkable.    TUBES/LINES: None.    IMPRESSION:  Left lower lobe thick linear opacity with bronchial dilatation more likely represents atelectasis    --- End of Report ---    < end of copied text >

## 2021-08-20 LAB
ALBUMIN SERPL ELPH-MCNC: 2.8 G/DL — LOW (ref 3.3–5.2)
ALP SERPL-CCNC: 300 U/L — HIGH (ref 40–120)
ALT FLD-CCNC: 20 U/L — SIGNIFICANT CHANGE UP
ANION GAP SERPL CALC-SCNC: 9 MMOL/L — SIGNIFICANT CHANGE UP (ref 5–17)
AST SERPL-CCNC: 15 U/L — SIGNIFICANT CHANGE UP
BASOPHILS # BLD AUTO: 0.03 K/UL — SIGNIFICANT CHANGE UP (ref 0–0.2)
BASOPHILS NFR BLD AUTO: 0.4 % — SIGNIFICANT CHANGE UP (ref 0–2)
BILIRUB SERPL-MCNC: 0.3 MG/DL — LOW (ref 0.4–2)
BUN SERPL-MCNC: 8.1 MG/DL — SIGNIFICANT CHANGE UP (ref 8–20)
CALCIUM SERPL-MCNC: 8.9 MG/DL — SIGNIFICANT CHANGE UP (ref 8.6–10.2)
CHLORIDE SERPL-SCNC: 98 MMOL/L — SIGNIFICANT CHANGE UP (ref 98–107)
CO2 SERPL-SCNC: 31 MMOL/L — HIGH (ref 22–29)
CREAT SERPL-MCNC: 0.73 MG/DL — SIGNIFICANT CHANGE UP (ref 0.5–1.3)
CRP SERPL-MCNC: 83 MG/L — HIGH
EOSINOPHIL # BLD AUTO: 0.1 K/UL — SIGNIFICANT CHANGE UP (ref 0–0.5)
EOSINOPHIL NFR BLD AUTO: 1.3 % — SIGNIFICANT CHANGE UP (ref 0–6)
ERYTHROCYTE [SEDIMENTATION RATE] IN BLOOD: 55 MM/HR — HIGH (ref 0–20)
GLUCOSE SERPL-MCNC: 96 MG/DL — SIGNIFICANT CHANGE UP (ref 70–99)
HCT VFR BLD CALC: 31.7 % — LOW (ref 34.5–45)
HGB BLD-MCNC: 10.5 G/DL — LOW (ref 11.5–15.5)
IMM GRANULOCYTES NFR BLD AUTO: 0.4 % — SIGNIFICANT CHANGE UP (ref 0–1.5)
LYMPHOCYTES # BLD AUTO: 1.13 K/UL — SIGNIFICANT CHANGE UP (ref 1–3.3)
LYMPHOCYTES # BLD AUTO: 15.2 % — SIGNIFICANT CHANGE UP (ref 13–44)
MCHC RBC-ENTMCNC: 29.3 PG — SIGNIFICANT CHANGE UP (ref 27–34)
MCHC RBC-ENTMCNC: 33.1 GM/DL — SIGNIFICANT CHANGE UP (ref 32–36)
MCV RBC AUTO: 88.5 FL — SIGNIFICANT CHANGE UP (ref 80–100)
MONOCYTES # BLD AUTO: 0.51 K/UL — SIGNIFICANT CHANGE UP (ref 0–0.9)
MONOCYTES NFR BLD AUTO: 6.9 % — SIGNIFICANT CHANGE UP (ref 2–14)
NEUTROPHILS # BLD AUTO: 5.64 K/UL — SIGNIFICANT CHANGE UP (ref 1.8–7.4)
NEUTROPHILS NFR BLD AUTO: 75.8 % — SIGNIFICANT CHANGE UP (ref 43–77)
PLATELET # BLD AUTO: 472 K/UL — HIGH (ref 150–400)
POTASSIUM SERPL-MCNC: 3.8 MMOL/L — SIGNIFICANT CHANGE UP (ref 3.5–5.3)
POTASSIUM SERPL-SCNC: 3.8 MMOL/L — SIGNIFICANT CHANGE UP (ref 3.5–5.3)
PROT SERPL-MCNC: 5.9 G/DL — LOW (ref 6.6–8.7)
RBC # BLD: 3.58 M/UL — LOW (ref 3.8–5.2)
RBC # FLD: 14.3 % — SIGNIFICANT CHANGE UP (ref 10.3–14.5)
SODIUM SERPL-SCNC: 138 MMOL/L — SIGNIFICANT CHANGE UP (ref 135–145)
WBC # BLD: 7.44 K/UL — SIGNIFICANT CHANGE UP (ref 3.8–10.5)
WBC # FLD AUTO: 7.44 K/UL — SIGNIFICANT CHANGE UP (ref 3.8–10.5)

## 2021-08-20 PROCEDURE — 99231 SBSQ HOSP IP/OBS SF/LOW 25: CPT

## 2021-08-20 PROCEDURE — 99221 1ST HOSP IP/OBS SF/LOW 40: CPT

## 2021-08-20 PROCEDURE — 99233 SBSQ HOSP IP/OBS HIGH 50: CPT

## 2021-08-20 PROCEDURE — 99232 SBSQ HOSP IP/OBS MODERATE 35: CPT

## 2021-08-20 PROCEDURE — 71045 X-RAY EXAM CHEST 1 VIEW: CPT | Mod: 26

## 2021-08-20 RX ORDER — ETHACRYNIC ACID 25 MG/1
25 TABLET ORAL DAILY
Refills: 0 | Status: DISCONTINUED | OUTPATIENT
Start: 2021-08-20 | End: 2021-08-24

## 2021-08-20 RX ORDER — COLCHICINE 0.6 MG
0.3 TABLET ORAL
Refills: 0 | Status: DISCONTINUED | OUTPATIENT
Start: 2021-08-20 | End: 2021-08-24

## 2021-08-20 RX ADMIN — PIPERACILLIN AND TAZOBACTAM 25 GRAM(S): 4; .5 INJECTION, POWDER, LYOPHILIZED, FOR SOLUTION INTRAVENOUS at 09:00

## 2021-08-20 RX ADMIN — ETHACRYNIC ACID 25 MILLIGRAM(S): 25 TABLET ORAL at 06:30

## 2021-08-20 RX ADMIN — Medication 250 MILLIGRAM(S): at 17:09

## 2021-08-20 RX ADMIN — PIPERACILLIN AND TAZOBACTAM 25 GRAM(S): 4; .5 INJECTION, POWDER, LYOPHILIZED, FOR SOLUTION INTRAVENOUS at 02:08

## 2021-08-20 RX ADMIN — ETHACRYNIC ACID 25 MILLIGRAM(S): 25 TABLET ORAL at 17:09

## 2021-08-20 RX ADMIN — Medication 75 MILLIGRAM(S): at 08:08

## 2021-08-20 RX ADMIN — ATORVASTATIN CALCIUM 20 MILLIGRAM(S): 80 TABLET, FILM COATED ORAL at 20:59

## 2021-08-20 RX ADMIN — Medication 125 MICROGRAM(S): at 06:02

## 2021-08-20 RX ADMIN — Medication 0.3 MILLIGRAM(S): at 20:59

## 2021-08-20 RX ADMIN — PIPERACILLIN AND TAZOBACTAM 25 GRAM(S): 4; .5 INJECTION, POWDER, LYOPHILIZED, FOR SOLUTION INTRAVENOUS at 17:10

## 2021-08-20 RX ADMIN — PANTOPRAZOLE SODIUM 40 MILLIGRAM(S): 20 TABLET, DELAYED RELEASE ORAL at 06:02

## 2021-08-20 RX ADMIN — Medication 250 MILLIGRAM(S): at 06:02

## 2021-08-20 NOTE — PROGRESS NOTE ADULT - SUBJECTIVE AND OBJECTIVE BOX
[facility]                                                               Office: [address] [address]                                                              Telephone: [phone]. Fax:[phone]                                                                             PROGRESS NOTE  Reason for follow up: SOB,fever  Overnight: No new events.   Update: 8/19: Pt denies chest pain, palpitations, SOB. Tele-Afib HR @ 80-90s. Pt states that her SOB has greatly improved but she feels physically weak. Consider PT consult, continue protein shake supplement. Pt was encouraged to use incentive spirometer.  Chest tube yielding serous 100ml fluid.    Subjective: No new events    	  Vitals:  T(C): 36.8 (08-20-21 @ 08:06), Max: 37.1 (08-19-21 @ 20:20)  HR: 95 (08-20-21 @ 08:06) (77 - 95)  BP: 105/71 (08-20-21 @ 08:06) (100/66 - 120/80)  RR: 18 (08-20-21 @ 08:06) (16 - 18)  SpO2: 99% (08-20-21 @ 08:06) (98% - 100%)    I&O's Summary    19 Aug 2021 07:01  -  20 Aug 2021 07:00  --------------------------------------------------------  IN: 560 mL / OUT: 950 mL / NET: -390 mL      Weight (kg): 49.4 (08-17 @ 11:48)      PHYSICAL EXAM:  Appearance: Comfortable. No acute distress  HEENT:  Head and neck: Atraumatic. Normocephalic.  Normal oral mucosa, PERRL, Neck is supple. No JVD, No carotid bruit.   Neurologic: A & O x 3, no focal deficits. EOMI.  Lymphatic: No cervical lymphadenopathy  Cardiovascular: Normal S1 S2, No murmur, rubs/gallops. No JVD, No edema  Respiratory: Lungs clear to auscultation, +chest tube left  Gastrointestinal:  Soft, Non-tender, + BS  Lower Extremities: No edema  Psychiatry: Patient is calm. No agitation. Mood & affect appropriate  Skin: No rashes/ ecchymoses/cyanosis/ulcers visualized on the face, hands or feet.      CURRENT MEDICATIONS:  digoxin     Tablet 125 MICROGram(s) Oral daily  ethacrynic acid 25 milliGRAM(s) Oral two times a day  metoprolol succinate ER 75 milliGRAM(s) Oral daily  piperacillin/tazobactam IVPB..  pantoprazole    Tablet  atorvastatin  enoxaparin Injectable      DIAGNOSTIC TESTING:    [ ] Echocardiogram: < from: TTE Echo Limited or F/U (08.05.21 @ 21:58) >  Summary:   1. Low normal global left ventricular systolic function.   2. Left ventricular ejection fraction, by visual estimation, is 50 to 55%.   3. Trivial pericardial effusion.   4. Large pleural effusion in both left and right lateral regions.      OTHER:     CT:< from: CT Chest No Cont (08.17.21 @ 17:50) >    IMPRESSION:  Left lower lobe thick linear opacity with bronchial dilatation more likely represents atelectasis    Xray:< from: Xray Chest 1 View- PORTABLE-Routine (Xray Chest 1 View- PORTABLE-Routine in AM.) (08.19.21 @ 04:29) >  IMPRESSION:    Small bilateral pleural effusions with adjacent atelectasis and an area of retrocardiac atelectasis, unchanged    US:< from: US Abdomen Complete (US Abdomen Complete .) (08.19.21 @ 17:19) >  IMPRESSION:  RIGHT pleural effusion. Otherwise unremarkable exam.      LABS:	 	  CARDIAC MARKERS ( 17 Aug 2021 12:48 )  x     / <0.01 ng/mL / x     / x     / x      p-BNP 17 Aug 2021 12:48: 1992 pg/mL                          10.5   7.44  )-----------( 472      ( 20 Aug 2021 05:49 )             31.7     08-20    138  |  98  |  8.1  ----------------------------<  96  3.8   |  31.0<H>  |  0.73    Ca    8.9      20 Aug 2021 05:49    TPro  5.9<L>  /  Alb  2.8<L>  /  TBili  0.3<L>  /  DBili  x   /  AST  15  /  ALT  20  /  AlkPhos  300<H>  08-20    proBNP: Serum Pro-Brain Natriuretic Peptide: 1992 pg/mL (08-17 @ 12:48)        TELEMETRY: Afib HR @ 80-90s

## 2021-08-20 NOTE — PROGRESS NOTE ADULT - SUBJECTIVE AND OBJECTIVE BOX
Patient is a 76y old  Female who presents with a chief complaint of sob,fever (20 Aug 2021 12:26)  pt denies cp,sob,n/v/d,fever,chill.  lt chest tube present    SUBJECTIVE / OVERNIGHT EVENTS:  REVIEW OF SYSTEMS: All systems are reviewed and found to be negative except above    MEDICATIONS  (STANDING):  atorvastatin 20 milliGRAM(s) Oral at bedtime  digoxin     Tablet 125 MICROGram(s) Oral daily  enoxaparin Injectable 40 milliGRAM(s) SubCutaneous daily  ethacrynic acid 25 milliGRAM(s) Oral two times a day  metoprolol succinate ER 75 milliGRAM(s) Oral daily  pantoprazole    Tablet 40 milliGRAM(s) Oral before breakfast  piperacillin/tazobactam IVPB.. 3.375 Gram(s) IV Intermittent every 8 hours  saccharomyces boulardii 250 milliGRAM(s) Oral two times a day    MEDICATIONS  (PRN):  ALBUTerol    0.083% 2.5 milliGRAM(s) Nebulizer every 6 hours PRN Wheezing  oxycodone    5 mG/acetaminophen 325 mG 1 Tablet(s) Oral every 4 hours PRN Moderate Pain (4 - 6)      CAPILLARY BLOOD GLUCOSE        I&O's Summary    19 Aug 2021 07:01  -  20 Aug 2021 07:00  --------------------------------------------------------  IN: 560 mL / OUT: 950 mL / NET: -390 mL    20 Aug 2021 07:01  -  20 Aug 2021 13:31  --------------------------------------------------------  IN: 240 mL / OUT: 0 mL / NET: 240 mL        PHYSICAL EXAM:  Vital Signs Last 24 Hrs  T(C): 36.8 (20 Aug 2021 08:06), Max: 37.1 (19 Aug 2021 20:20)  T(F): 98.2 (20 Aug 2021 08:06), Max: 98.7 (19 Aug 2021 20:20)  HR: 95 (20 Aug 2021 08:06) (77 - 95)  BP: 105/71 (20 Aug 2021 08:06) (100/66 - 120/80)  BP(mean): 82 (20 Aug 2021 08:06) (82 - 82)  RR: 18 (20 Aug 2021 08:06) (16 - 18)  SpO2: 99% (20 Aug 2021 08:06) (98% - 100%)    CONSTITUTIONAL: NAD,  EYES: PERRLA; conjunctiva and sclera clear  ENMT: Moist oral mucosa,   RESPIRATORY: Normal respiratory effort; mild crackle  to auscultation bilaterally  CARDIOVASCULAR: normal S1 and S2, no murmur   EXTS: No lower extremity edema; Peripheral pulses are 2+ bilaterally  ABDOMEN: Nontender to palpation, normoactive bowel sounds, no rebound/guarding;   MUSCLOSKELETAL: no joint swelling or tenderness to palpation  PSYCH: affect appropriate  NEUROLOGY: A+O to person, place, and time; CN 2-12 are intact and symmetric; no gross  deficits;       LABS:                        10.5   7.44  )-----------( 472      ( 20 Aug 2021 05:49 )             31.7     08-20    138  |  98  |  8.1  ----------------------------<  96  3.8   |  31.0<H>  |  0.73    Ca    8.9      20 Aug 2021 05:49    TPro  5.9<L>  /  Alb  2.8<L>  /  TBili  0.3<L>  /  DBili  x   /  AST  15  /  ALT  20  /  AlkPhos  300<H>  08-20              Culture - Fungal, Body Fluid (collected 17 Aug 2021 22:18)  Source: .Body Fluid Pleural Fluid  Preliminary Report (18 Aug 2021 08:07):    Testing in progress    Culture - Body Fluid with Gram Stain (collected 17 Aug 2021 22:18)  Source: .Body Fluid Pleural Fluid  Gram Stain (18 Aug 2021 02:20):    polymorphonuclear leukocytes seen    No organisms seen    by cytocentrifuge  Preliminary Report (18 Aug 2021 19:30):    No growth    Culture - Blood (collected 17 Aug 2021 16:19)  Source: .Blood Blood  Preliminary Report (19 Aug 2021 17:00):    No growth at 48 hours    Culture - Blood (collected 17 Aug 2021 16:18)  Source: .Blood Blood  Preliminary Report (19 Aug 2021 17:00):    No growth at 48 hours        RADIOLOGY & ADDITIONAL TESTS:  Results Reviewed:   Imaging Personally Reviewed:  Electrocardiogram Personally Reviewed:    COORDINATION OF CARE:  Care Discussed with Consultants/Other Providers [Y/N]:  Prior or Outpatient Records Reviewed [Y/N]:

## 2021-08-20 NOTE — CDI QUERY NOTE - NSCDIOTHERTXTBX_GEN_ALL_CORE_HH
Documentation noted bacterial pneumonia with antibiotic of IV vancomycin and zosyn.     Can you please further clarify the type of pneumonia treated that necessitates the use of IV vancomycin and zosyn.    A) Gram negative paulo pneumonia  B) Other, please specify  C) Not clinically significant     Supporting Documentation:  8/20 Hospitalist note:  Fever,dyspnea on admission due to suspected bacterial PNA with parapneumonic effusion s/p lt chest tube placement ED BY CT SURGERY  -failure po augmentin  -oxygen to keep so2 >91%,bronchodilator  -dc IV Vancomycin and cont zosyn   -Blood c/s ,urine c/s      Antibiotics:  Zosyn 3.375 IV Q8hr  Vancomycin IV completed    Xray results:  IMPRESSION:    Small bilateral pleural effusions with adjacent atelectasis and an area of retrocardiac atelectasis, unchanged

## 2021-08-20 NOTE — PROGRESS NOTE ADULT - SUBJECTIVE AND OBJECTIVE BOX
Subjective: Pt sitting up in chair.  Denies SOB or CP.  NAD noted.      Tele:  Afib                        T(F): 98.2 (08-20-21 @ 08:06), Max: 98.7 (08-19-21 @ 20:20)  HR: 95 (08-20-21 @ 08:06) (77 - 95)  BP: 105/71 (08-20-21 @ 08:06) (100/66 - 120/80)  RR: 18 (08-20-21 @ 08:06) (16 - 18)  SpO2: 99% (08-20-21 @ 08:06) (98% - 100%) on room air.    Allergies:  sulfa drugs (Hives)      08-20    138  |  98  |  8.1  ----------------------------<  96  3.8   |  31.0<H>  |  0.73    Ca    8.9      20 Aug 2021 05:49    TPro  5.9<L>  /  Alb  2.8<L>  /  TBili  0.3<L>  /  DBili  x   /  AST  15  /  ALT  20  /  AlkPhos  300<H>  08-20                               10.5   7.44  )-----------( 472      ( 20 Aug 2021 05:49 )             31.7         CXR: < from: Xray Chest 1 View- PORTABLE-Routine (Xray Chest 1 View- PORTABLE-Routine in AM.) (08.19.21 @ 04:29) >     EXAM:  XR CHEST PORTABLE ROUTINE 1V                          PROCEDURE DATE:  08/19/2021        INTERPRETATION:  Clinical history: 76 year old female, recurrent pleural effusions, status post left PICC tail.    Two views of the chest are compared to 8/18/2021 and are correlated with the chest CT of 8/17/2021.    FINDINGS: Mild cardiomegaly with marked right atrial enlargement, unchanged.    Normal pulmonary vasculature with no consolidation.    Small bilateral pleural effusions/adjacent atelectasis and an area of platelike atelectasis in the left infrahilar region, better characterized on CT, grossly unchanged.    IMPRESSION:    Small bilateral pleural effusions with adjacent atelectasis and an area of retrocardiac atelectasis, unchanged    --- End of Report ---      CODY S HERSKOVITS DO; Attending Radiologist  This document has been electronically signed. Aug 19 2021 10:17AM    < end of copied text >      I&O's Detail    19 Aug 2021 07:01  -  20 Aug 2021 07:00  --------------------------------------------------------  IN:    IV PiggyBack: 200 mL    Oral Fluid: 360 mL  Total IN: 560 mL    OUT:    Chest Tube (mL): 100 mL    Voided (mL): 850 mL  Total OUT: 950 mL    Total NET: -390 mL      CHEST TUBE:  [ x] YES [ ] NO  OUTPUT:  10ml overnight and 100ml over the last 24 hours    AIR LEAKS:  [ x] YES [ ] NO        Active Medications:  ALBUTerol    0.083% 2.5 milliGRAM(s) Nebulizer every 6 hours PRN  atorvastatin 20 milliGRAM(s) Oral at bedtime  digoxin     Tablet 125 MICROGram(s) Oral daily  enoxaparin Injectable 40 milliGRAM(s) SubCutaneous daily  ethacrynic acid 25 milliGRAM(s) Oral two times a day  metoprolol succinate ER 75 milliGRAM(s) Oral daily  oxycodone    5 mG/acetaminophen 325 mG 1 Tablet(s) Oral every 4 hours PRN  pantoprazole    Tablet 40 milliGRAM(s) Oral before breakfast  piperacillin/tazobactam IVPB.. 3.375 Gram(s) IV Intermittent every 8 hours  saccharomyces boulardii 250 milliGRAM(s) Oral two times a day      Physical Exam:    Neuro: AAOX3.  Non focal.    Pulm: Diminished BLL L pigtail to waterseal.  No crepitus and no airleak.    CV: RRR.  +S1+S2.    Abd: Soft/NT/ND.  +BS.                   PAST MEDICAL & SURGICAL HISTORY:  HLD (hyperlipidemia)    Osteoarthritis    FUO (fever of unknown origin)    H/O pericarditis    TIA (transient ischemic attack)    Afib    H/O rotator cuff surgery

## 2021-08-20 NOTE — PROGRESS NOTE ADULT - PROBLEM SELECTOR PLAN 1
Pt with left recurrent pleural effusion  Left pigtail placed 900 cc initially   CXR daily   gram stain w/ no organisms seen, fungal culture still pending   Maintain tube as per Dr. Mcbride   cards to perform further work up   echo pending and possible RHC.  Possible pleurex placement next week     PLAN of care d/w Dr. Mcbride

## 2021-08-20 NOTE — PROGRESS NOTE ADULT - ASSESSMENT
The patient is a 76 year old female with a history of TIA and hyperlipidemia   recently diagnosed viral pericarditis (6/23/2021), complicated by new onset Afib with RVR ,Has h/o of Raynaud's.  recent admission for PNA,lt Pleural effusion s/p chest tube  was discharged 08/10/21 with po Augmentin requiring recurrent hospitalization [facility] , not on AC due to known small pericardial effusion, who presents with fever 103 f at home, sob on exertion,fatigue,poor appetite.      Fever,dyspnea on admission due to suspected bacterial PNA with parapneumonic effusion s/p lt chest tube placement ED BY CT SURGERY  -failure po augmentin  -oxygen to keep so2 >91%,bronchodilator  -dc IV Vancomycin and cont zosyn   -Blood c/s ,urine c/s  - CT chest LLL opacity  -Recent ECHO reviewed  -CTA consulted- Chest tube placed for  left loculated effusion. 1000ml drained. c/s testing  -f/u final c/s pl fluid  -f/u id rec  -hx of dental abscess 4 yrs ago lt U molar s/p extraction. last dental check up 2 months ago including imaging ,was stable. no acute le swelling/tenderness present.  -spoke with pulmonary order CARL,DS DNA,ANCA. F/U FURTHER REC    Recurrent Pleural effusion s/p lt chest tube on admission  F/u pleural fluid studies, cultures, which are thus far negative. Cytopath from initial pleural drainage (8/7/21) negative for malignant cells. Cell count shows neutrophil predominant exudative fluid. Would continue antibiotics for now, ID is on board. ?Concern for underlying inflammatory process - would check CARL, ANCA, dsDNA, SS-A/B. Has h/o of Raynaud's. consult rheumatology. per pulm May need PleurX, would consider pleural biopsy as well.  f/u pulm, ct surgery rec      Afib CHADsVASc 3, but AC not started due to concern for worsening pericardial effusion  -Continue  metoprolol and digoxin  -f/u cardiology rec,spoke with cardio PA  -LAST ECHO with 60-65% EF, grade 1 diastolic dysfxn    History of viral pericarditis with small pericardial effusion  Was previously on ASA, colchicine, both d/c’ ed by cardiology due to GI upset  S/p prednisone course, last dose on 8/4, completed dose prior to admission  Cardio started colchicine as esr/crp high BUT Pt did not tolerate. plan for long course steroid. f/u cardio rec.    Hyperlipidemia - on statin     persistant High alphos  -us abd stable   -hepatitis panel neg  -f/u lfts.    GERD - on Protonix    VTE_ Lovenox subcut     Plan discussed at length with patient   The patient is a 76 year old female with a history of TIA and hyperlipidemia   recently diagnosed viral pericarditis (6/23/2021), complicated by new onset Afib with RVR ,Has h/o of Raynaud's.  recent admission for PNA,lt Pleural effusion s/p chest tube  was discharged 08/10/21 with po Augmentin requiring recurrent hospitalization [facility] , not on AC due to known small pericardial effusion, who presents with fever 103 f at home, sob on exertion,fatigue,poor appetite.      Fever,dyspnea on admission due to suspected GNR PNA with parapneumonic effusion s/p lt chest tube placement ED BY CT SURGERY  -failure po augmentin  -oxygen to keep so2 >91%,bronchodilator  -dc IV Vancomycin UNLIKELY MRSA and cont zosyn   -Blood c/s NGD ,  - CT chest LLL opacity  -Recent ECHO reviewed  -CTA consulted- Chest tube placed for  left loculated effusion. 1000ml drained. c/s testing  -f/u final c/s pl fluid  -f/u id rec  -hx of dental abscess 4 yrs ago lt U molar s/p extraction. last dental check up 2 months ago including imaging ,was stable. no acute le swelling/tenderness present.  -spoke with pulmonary order CARL,DS DNA,ANCA. F/U FURTHER REC    Recurrent Pleural effusion s/p lt chest tube on admission  F/u pleural fluid studies, cultures, which are thus far negative. Cytopath from initial pleural drainage (8/7/21) negative for malignant cells. Cell count shows neutrophil predominant exudative fluid. Would continue antibiotics for now, ID is on board. ?Concern for underlying inflammatory process - would check CARL, ANCA, dsDNA, SS-A/B. Has h/o of Raynaud's. consult rheumatology. per pulm May need PleurX, would consider pleural biopsy as well.  f/u pulm, ct surgery rec      Afib CHADsVASc 3, but AC not started due to concern for worsening pericardial effusion  -Continue  metoprolol and digoxin  -f/u cardiology rec,spoke with cardio PA  -LAST ECHO with 60-65% EF, grade 1 diastolic dysfxn    History of viral pericarditis with small pericardial effusion  Was previously on ASA, colchicine, both d/c’ ed by cardiology due to GI upset  S/p prednisone course, last dose on 8/4, completed dose prior to admission  Cardio started colchicine as esr/crp high BUT Pt did not tolerate. plan for long course steroid. f/u cardio rec.    Hyperlipidemia - on statin     persistant High alphos  -us abd stable   -hepatitis panel neg  -f/u lfts.    GERD - on Protonix    VTE_ Lovenox subcut     Plan discussed at length with patient

## 2021-08-20 NOTE — PROGRESS NOTE ADULT - ASSESSMENT
75Y/O female with  of new onset Afib (not on AC because of pericardial effusion), viral pericardial effusion, TIA, OA, HLD, who presents to [facility]-ED for SOB. Pt has been recently hospitalized for pleural effusion and fevers and had chest tube placed and removed. Pt was seen in outpatient cardiology office, pt c/o of SOB with mild exertion and dry cough with fever of 101.7. POCUS was performed and large left pleural effusion with constrictive pericarditis. Pt was advised to go to [facility] for re insertion of drain. In ED, WBC-9.26, BNP-1992, Trop#1-negative, CT Chest:Left lower lobe thick linear opacity with bronchial dilatation more likely represents atelectasis. Crp 88  Left lung with large effusion s/p chest tube placement and draining 1250 including thoracentesis.(900cc)    8/19: Pt denies chest pain, palpitations, SOB. Tele-Afib HR @ 80-90s. Pt states that her SOB has greatly improved but she feels physically weak. Consider PT consult, continue protein shake supplement. Pt was encouraged to use incentive spirometer.  Chest tube yielding serous 100ml fluid.     PLEURAL EFFUSION   With elevated crp/esr  Patient could not tolerate colchicine  cytology negative for malignant cells  Rheumatology consult    ATRIAL FIB WITH CONTROLLED VENTRICULAR RATE  c/w metoprolol and digoxin    HLD C/W atorvastatin    Hx squamous cell skin cancer or tibia s/p mohs

## 2021-08-20 NOTE — CONSULT NOTE ADULT - SUBJECTIVE AND OBJECTIVE BOX
77 yo woman with history of osteopenia and abnormal LFTs presents to hospital for fevers, SOB and recurrent pericarditis and pleural effusions.  Patient was recently discharged after presenting with similar symptoms.  on prior admission ( around aug 7)   she had thoracocentesis with noted exudative fluid.  cytology was negative, negative AFB and GBM.  patient was treated with antibiotics with imporvement.  she was sent home on augmentin however fever returned and increase SOB and was sent into the hospital afain.  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  he has repeat thorocentesis and chest tube placed.  Presently on zosyn     Patient states taht she has been a very heathy person.  she routinely exercises and walks 6 miles daily.  she states that in March 2021 she recieved her second dose of COVID 19 vaccine and she become very ill.  she had fevers, myalgias, fatigue, syncope and GI Issues.  The GI issues persisted for months causing bloating, gas and discomfort.  IN june she started to experiences fevers and CP and was noted to have pericarditis.  This was complicated by AFIB with RVR.  she was started on colchicine and ASA however she was not able to tolerate this.  she was given steroids which per patient improved her symptoms.  However she was admitted in [facility] with sepsis and possible PNA and loculated pleural effusions. Patient endorses wt loss throughout this period about 10 lbs ( initally wts 118 and down 106) she feels weak and fatigue and has not been excercises in some time.    ROS: patient denies any morning stiffness or joint swelling.  she has osteoarthritis but no symptoms to suggest inflammatory arthritis.  she denies any alopecia, oral lesions, red painful eye, dry eye/dry mouth, malar rash, photosensitivity. No history of low plts, anemias, low wbc.  No history of blood per rectum. had recurrent sinus infection when young and thought to be related to allergies and congestion.  denies any recent sinus issues and no nose bleeding.  denies any rashes or hemoptysis.      she has raynauds but no history of digital ulcers, GERD, dysphagia, cough when drinking or eating solids, no tightness of skin over hands or face.       she does have cough, sob, wt loss, fevers.  states that her symptoms feel better since getting thoracentesis.       maliganancy screening:   she is due for mammo.  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  Patient was recently discharged after presenting with similar symptoms.  on prior admission ( around aug 7)   she had thoracocentesis with noted exudative fluid.  cytology was negative, negative AFB and GBM.  patient was treated with antibiotics with imporvement.  she was sent home on augmentin however fever returned and increase SOB and was sent into the hospital afain.  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  he has repeat thorocentesis and chest tube placed.  Presently on zosyn     Patient states taht she has been a very heathy person.  she routinely exercises and walks 6 miles daily.  she states that in March 2021 she recieved her second dose of COVID 19 vaccine and she become very ill.  she had fevers, myalgias, fatigue, syncope and GI Issues.  The GI issues persisted for months causing bloating, gas and discomfort.  IN june she started to experiences fevers and CP and was noted to have pericarditis.  This was complicated by AFIB with RVR.  she was started on colchicine and ASA however she was not able to tolerate this.  she was given steroids which per patient improved her symptoms.  However she was admitted in [facility] with sepsis and possible PNA and loculated pleural effusions. Patient endorses wt loss throughout this period about 10 lbs ( initally wts 118 and down 106) she feels weak and fatigue and has not been excercises in some time.    ROS: patient denies any morning stiffness or joint swelling.  she has osteoarthritis but no symptoms to suggest inflammatory arthritis.  she denies any alopecia, oral lesions, red painful eye, dry eye/dry mouth, malar rash, photosensitivity. No history of low plts, anemias, low wbc.  No history of blood per rectum. had recurrent sinus infection when young and thought to be related to allergies and congestion.  denies any recent sinus issues and no nose bleeding.  denies any rashes or hemoptysis.      she has raynauds but no history of digital ulcers, GERD, dysphagia, cough when drinking or eating solids, no tightness of skin over hands or face.       she does have cough, sob, wt loss, fevers.  states that her symptoms feel better since getting thoracentesis.       maliganancy screening:   she is due for mammo.  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 [employer] hospital,  former smoker   no illict drugs   rare alcohol     labs:   ESR 50  CRP fluatates presently 83   procalcitonin 0.10  CMP with TP 5.9, alb 2.8 , , normal ast/alt   UA ( aug 4) [id number], tr LE, small blood RBC 6-10, occasional rosibel   acute hep panel normal   lyme negative tsh normal     trop negative     pleural fluid  alb 2.2 (  2)  glu 133 (92)  ldh 115 (102)  Tp 3.5 (3.9)  ph 8.5 (8)    RBC 1000  nucletic cells 1236  granulocyte predominant   prior cytology of pleural effusion negative and negative afb and gbm   culture negative                          75 yo woman with history of osteopenia and abnormal LFTs presents to hospital for fevers, SOB and recurrent pericarditis and pleural effusions.  Patient was recently discharged after presenting with similar symptoms.  on prior admission ( around aug 7)   she had thoracocentesis with noted exudative fluid.  cytology was negative, negative AFB and GBM.  patient was treated with antibiotics with imporvement.  she was sent home on augmentin however fever returned and increase SOB and was sent into the hospital afain.  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  he has repeat thorocentesis and chest tube placed.  Presently on zosyn     Patient states taht she has been a very heathy person.  she routinely exercises and walks 6 miles daily.  she states that in March 2021 she recieved her second dose of COVID 19 vaccine and she become very ill.  she had fevers, myalgias, fatigue, syncope and GI Issues.  The GI issues persisted for months causing bloating, gas and discomfort.  IN june she started to experiences fevers and CP and was noted to have pericarditis.  This was complicated by AFIB with RVR.  she was started on colchicine and ASA however she was not able to tolerate this.  she was given steroids which per patient improved her symptoms.  However she was admitted in [facility] with sepsis and possible PNA and loculated pleural effusions. Patient endorses wt loss throughout this period about 10 lbs ( initally wts 118 and down 106) she feels weak and fatigue and has not been excercises in some time.    ROS: patient denies any morning stiffness or joint swelling.  she has osteoarthritis but no symptoms to suggest inflammatory arthritis.  she denies any alopecia, oral lesions, red painful eye, dry eye/dry mouth, malar rash, photosensitivity. No history of low plts, anemias, low wbc.  No history of blood per rectum. had recurrent sinus infection when young and thought to be related to allergies and congestion.  denies any recent sinus issues and no nose bleeding.  denies any rashes or hemoptysis.      she has raynauds but no history of digital ulcers, GERD, dysphagia, cough when drinking or eating solids, no tightness of skin over hands or face.       she does have cough, sob, wt loss, fevers.  states that her symptoms feel better since getting thoracentesis.       maliganancy screening:   she is due for mammo.  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 No LN  US abdomen: right Pleural efusion. abdomen normal   ECHO mod enlartged RA and LA  RV size normal , midl reduce d RV systolic function  Grade III diastolic dysfunction  mild to mod AR   small pericadial effsuuiom   : subtle septal bounce with annular paradox with dilated IVC: suggest constriction     PE:   GEN well appearing however very skinny with temporal wasting   HEENT: no facial rashes, oral lesions, parotid enlargement.++dry mouth, clear sclera  CVS RRR   abdomen soft nontender   MSK no synovtis, FROM thorughout, nontender joint palpation throughout   skin mild cyanosis of the finger and cold fingers and toes   no LE edema     a/p 75 yo woman with recurrent pericarditis and pleural effusions associated with fevers, mild leukocytosis.  pleural effusion is exudative with predominate neutrophils.  cytology negative afb and GMS negative.  culture negative.  patient now s/p Chest tube placement.  Patient also with elevted ALP of unclear etiology and some wt loss with temporal wasting.      --sent serologies: CARL/dsDNA/C3/c4/sjogrens/HECTOR/ANCA/Pauline/centromere/scl70/RNA polymerase  --sent GGT and ALP isozymes   --consider CT a/p   --sent                             77 yo woman with history of osteopenia and abnormal LFTs presents to hospital for fevers, SOB and recurrent pericarditis and pleural effusions.  Patient was recently discharged after presenting with similar symptoms.  on prior admission ( around aug 7)   she had thoracocentesis with noted exudative fluid.  cytology was negative, negative AFB and GBM.  patient was treated with antibiotics with imporvement.  she was sent home on augmentin however fever returned and increase SOB and was sent into the hospital afain.  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  he has repeat thorocentesis and chest tube placed.  Presently on zosyn     Patient states taht she has been a very heathy person.  she routinely exercises and walks 6 miles daily.  she states that in March 2021 she recieved her second dose of COVID 19 vaccine and she become very ill.  she had fevers, myalgias, fatigue, syncope and GI Issues.  The GI issues persisted for months causing bloating, gas and discomfort.  IN june she started to experiences fevers and CP and was noted to have pericarditis.  This was complicated by AFIB with RVR.  she was started on colchicine and ASA however she was not able to tolerate this.  she was given steroids which per patient improved her symptoms.  However she was admitted in [facility] with sepsis and possible PNA and loculated pleural effusions. Patient endorses wt loss throughout this period about 10 lbs ( initally wts 118 and down 106) she feels weak and fatigue and has not been excercises in some time.    ROS: patient denies any morning stiffness or joint swelling.  she has osteoarthritis but no symptoms to suggest inflammatory arthritis.  she denies any alopecia, oral lesions, red painful eye, dry eye/dry mouth, malar rash, photosensitivity. No history of low plts, anemias, low wbc.  No history of blood per rectum. had recurrent sinus infection when young and thought to be related to allergies and congestion.  denies any recent sinus issues and no nose bleeding.  denies any rashes or hemoptysis.      she has raynauds but no history of digital ulcers, GERD, dysphagia, cough when drinking or eating solids, no tightness of skin over hands or face.       she does have cough, sob, wt loss, fevers.  states that her symptoms feel better since getting thoracentesis.       maliganancy screening:   she is due for mammo.  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 No LN  US abdomen: right Pleural efusion. abdomen normal   ECHO mod enlartged RA and LA  RV size normal , midl reduce d RV systolic function  Grade III diastolic dysfunction  mild to mod AR   small pericadial effsuuiom   : subtle septal bounce with annular paradox with dilated IVC: suggest constriction     PE:   GEN well appearing however very skinny with temporal wasting   HEENT: no facial rashes, oral lesions, parotid enlargement.++dry mouth, clear sclera  CVS RRR   abdomen soft nontender   MSK no synovtis, FROM thorughout, nontender joint palpation throughout   skin mild cyanosis of the finger and cold fingers and toes   no LE edema     a/p 77 yo woman with recurrent pericarditis and pleural effusions associated with fevers, mild leukocytosis.  pleural effusion is exudative with predominate neutrophils.  cytology negative afb and GMS negative.  culture negative.  patient now s/p Chest tube placement.  Patient also with elevted ALP of unclear etiology and some wt loss with temporal wasting.      --sent serologies: CARL/dsDNA/C3/c4/sjogrens/HECTOR/ANCA/Pauline/centromere/scl70/RNA polymerase  --sent GGT and ALP isozymes   --consider CT a/p   --sent quantiferon ( patient worked at [facility] hospital)   --repeat UA and random protein to creat ( had mild hematuria)   --check lipase and amylase  --check SPEP  --pericardial biopsy to be considered   --steroids as per cards

## 2021-08-21 LAB
ALBUMIN SERPL ELPH-MCNC: 3.1 G/DL — LOW (ref 3.3–5.2)
ALP SERPL-CCNC: 338 U/L — HIGH (ref 40–120)
ALT FLD-CCNC: 23 U/L — SIGNIFICANT CHANGE UP
ANION GAP SERPL CALC-SCNC: 13 MMOL/L — SIGNIFICANT CHANGE UP (ref 5–17)
AST SERPL-CCNC: 25 U/L — SIGNIFICANT CHANGE UP
BASOPHILS # BLD AUTO: 0.05 K/UL — SIGNIFICANT CHANGE UP (ref 0–0.2)
BASOPHILS NFR BLD AUTO: 0.4 % — SIGNIFICANT CHANGE UP (ref 0–2)
BILIRUB SERPL-MCNC: 0.4 MG/DL — SIGNIFICANT CHANGE UP (ref 0.4–2)
BUN SERPL-MCNC: 10.5 MG/DL — SIGNIFICANT CHANGE UP (ref 8–20)
CALCIUM SERPL-MCNC: 9.7 MG/DL — SIGNIFICANT CHANGE UP (ref 8.6–10.2)
CHLORIDE SERPL-SCNC: 94 MMOL/L — LOW (ref 98–107)
CO2 SERPL-SCNC: 30 MMOL/L — HIGH (ref 22–29)
CREAT SERPL-MCNC: 0.81 MG/DL — SIGNIFICANT CHANGE UP (ref 0.5–1.3)
EOSINOPHIL # BLD AUTO: 0.09 K/UL — SIGNIFICANT CHANGE UP (ref 0–0.5)
EOSINOPHIL NFR BLD AUTO: 0.8 % — SIGNIFICANT CHANGE UP (ref 0–6)
GLUCOSE SERPL-MCNC: 89 MG/DL — SIGNIFICANT CHANGE UP (ref 70–99)
HCT VFR BLD CALC: 37.2 % — SIGNIFICANT CHANGE UP (ref 34.5–45)
HGB BLD-MCNC: 11.9 G/DL — SIGNIFICANT CHANGE UP (ref 11.5–15.5)
IMM GRANULOCYTES NFR BLD AUTO: 0.6 % — SIGNIFICANT CHANGE UP (ref 0–1.5)
LYMPHOCYTES # BLD AUTO: 1.48 K/UL — SIGNIFICANT CHANGE UP (ref 1–3.3)
LYMPHOCYTES # BLD AUTO: 13 % — SIGNIFICANT CHANGE UP (ref 13–44)
MCHC RBC-ENTMCNC: 28.8 PG — SIGNIFICANT CHANGE UP (ref 27–34)
MCHC RBC-ENTMCNC: 32 GM/DL — SIGNIFICANT CHANGE UP (ref 32–36)
MCV RBC AUTO: 90.1 FL — SIGNIFICANT CHANGE UP (ref 80–100)
MONOCYTES # BLD AUTO: 0.56 K/UL — SIGNIFICANT CHANGE UP (ref 0–0.9)
MONOCYTES NFR BLD AUTO: 4.9 % — SIGNIFICANT CHANGE UP (ref 2–14)
NEUTROPHILS # BLD AUTO: 9.16 K/UL — HIGH (ref 1.8–7.4)
NEUTROPHILS NFR BLD AUTO: 80.3 % — HIGH (ref 43–77)
PLATELET # BLD AUTO: 610 K/UL — HIGH (ref 150–400)
POTASSIUM SERPL-MCNC: 3.8 MMOL/L — SIGNIFICANT CHANGE UP (ref 3.5–5.3)
POTASSIUM SERPL-SCNC: 3.8 MMOL/L — SIGNIFICANT CHANGE UP (ref 3.5–5.3)
PROT SERPL-MCNC: 6.9 G/DL — SIGNIFICANT CHANGE UP (ref 6.6–8.7)
RBC # BLD: 4.13 M/UL — SIGNIFICANT CHANGE UP (ref 3.8–5.2)
RBC # FLD: 14.2 % — SIGNIFICANT CHANGE UP (ref 10.3–14.5)
SODIUM SERPL-SCNC: 137 MMOL/L — SIGNIFICANT CHANGE UP (ref 135–145)
WBC # BLD: 11.41 K/UL — HIGH (ref 3.8–10.5)
WBC # FLD AUTO: 11.41 K/UL — HIGH (ref 3.8–10.5)

## 2021-08-21 PROCEDURE — 99231 SBSQ HOSP IP/OBS SF/LOW 25: CPT

## 2021-08-21 PROCEDURE — 99232 SBSQ HOSP IP/OBS MODERATE 35: CPT

## 2021-08-21 PROCEDURE — 71045 X-RAY EXAM CHEST 1 VIEW: CPT | Mod: 26

## 2021-08-21 RX ADMIN — Medication 0.3 MILLIGRAM(S): at 09:05

## 2021-08-21 RX ADMIN — ETHACRYNIC ACID 25 MILLIGRAM(S): 25 TABLET ORAL at 06:09

## 2021-08-21 RX ADMIN — Medication 250 MILLIGRAM(S): at 06:09

## 2021-08-21 RX ADMIN — Medication 30 MILLILITER(S): at 17:13

## 2021-08-21 RX ADMIN — Medication 30 MILLILITER(S): at 06:08

## 2021-08-21 RX ADMIN — ATORVASTATIN CALCIUM 20 MILLIGRAM(S): 80 TABLET, FILM COATED ORAL at 21:07

## 2021-08-21 RX ADMIN — Medication 0.3 MILLIGRAM(S): at 17:13

## 2021-08-21 RX ADMIN — Medication 250 MILLIGRAM(S): at 17:13

## 2021-08-21 RX ADMIN — PIPERACILLIN AND TAZOBACTAM 25 GRAM(S): 4; .5 INJECTION, POWDER, LYOPHILIZED, FOR SOLUTION INTRAVENOUS at 01:37

## 2021-08-21 RX ADMIN — Medication 75 MILLIGRAM(S): at 06:09

## 2021-08-21 RX ADMIN — PIPERACILLIN AND TAZOBACTAM 25 GRAM(S): 4; .5 INJECTION, POWDER, LYOPHILIZED, FOR SOLUTION INTRAVENOUS at 17:18

## 2021-08-21 RX ADMIN — PIPERACILLIN AND TAZOBACTAM 25 GRAM(S): 4; .5 INJECTION, POWDER, LYOPHILIZED, FOR SOLUTION INTRAVENOUS at 09:06

## 2021-08-21 RX ADMIN — PANTOPRAZOLE SODIUM 40 MILLIGRAM(S): 20 TABLET, DELAYED RELEASE ORAL at 06:09

## 2021-08-21 RX ADMIN — Medication 125 MICROGRAM(S): at 06:09

## 2021-08-21 NOTE — PROGRESS NOTE ADULT - SUBJECTIVE AND OBJECTIVE BOX
Subjective: Pt sitting up in bed.  Denies CP or SOB.  NAD noted.      Tele:   SR                       T(F): 98.3 (08-21-21 @ 10:25), Max: 98.7 (08-20-21 @ 21:02)  HR: 91 (08-21-21 @ 10:25) (88 - 91)  BP: 90/61 (08-21-21 @ 10:25) (90/61 - 119/78)  RR: 16 (08-21-21 @ 10:25) (16 - 18)  SpO2: 99% (08-21-21 @ 10:25) (97% - 100%) on room air.    Allergies:  sulfa drugs (Hives)      08-21    137  |  94<L>  |  10.5  ----------------------------<  89  3.8   |  30.0<H>  |  0.81    Ca    9.7      21 Aug 2021 07:46    TPro  6.9  /  Alb  3.1<L>  /  TBili  0.4  /  DBili  x   /  AST  25  /  ALT  23  /  AlkPhos  338<H>  08-21                               11.9   11.41 )-----------( 610      ( 21 Aug 2021 07:46 )             37.2         CXR: < from: Xray Chest 1 View- PORTABLE-Routine (Xray Chest 1 View- PORTABLE-Routine in AM.) (08.20.21 @ 05:19) >   EXAM:  XR CHEST PORTABLE ROUTINE 1V                          PROCEDURE DATE:  08/20/2021      INTERPRETATION:  Clinical history: 76-year-old female, recurrent pleural effusion.    Two views of the chest are compared to 8/19/2021 and demonstrate a left chest tube in place with no pneumothorax, unchanged.    Mild cardiomegaly and marked right atrial enlargement, unchanged from chest CT of 8/17/2021.    Normal with no consolidation, effusion, gross adenopathy, pneumothorax or acute osseous findings. Small area of retrocardiac atelectasis, unchanged    Hemidiaphragms are clearly defined.    IMPRESSION:  Left chest tube in satisfactory position with no pneumothorax    --- End of Report ---    CODY DOHERTY DO; Attending Radiologist  This document has been electronically signed. Aug 20 2021  2:15PM    < end of copied text >      I&O's Detail    20 Aug 2021 07:01  -  21 Aug 2021 07:00  --------------------------------------------------------  IN:    IV PiggyBack: 100 mL    Oral Fluid: 580 mL  Total IN: 680 mL    OUT:    Chest Tube (mL): 30 mL  Total OUT: 30 mL    Total NET: 650 mL      21 Aug 2021 07:01  -  21 Aug 2021 11:33  --------------------------------------------------------  IN:    Oral Fluid: 180 mL  Total IN: 180 mL    OUT:  Total OUT: 0 mL    Total NET: 180 mL      CHEST TUBE:  [x ] YES [ ] NO  OUTPUT:   Left pigtail> 30ml over the last 24 hours    AIR LEAKS:  [ ] YES [x ] NO      Active Medications:  ALBUTerol    0.083% 2.5 milliGRAM(s) Nebulizer every 6 hours PRN  aluminum hydroxide/magnesium hydroxide/simethicone Suspension 30 milliLiter(s) Oral every 6 hours PRN  atorvastatin 20 milliGRAM(s) Oral at bedtime  colchicine 0.3 milliGRAM(s) Oral two times a day  digoxin     Tablet 125 MICROGram(s) Oral daily  enoxaparin Injectable 40 milliGRAM(s) SubCutaneous daily  ethacrynic acid 25 milliGRAM(s) Oral daily  metoprolol succinate ER 75 milliGRAM(s) Oral daily  oxycodone    5 mG/acetaminophen 325 mG 1 Tablet(s) Oral every 4 hours PRN  pantoprazole    Tablet 40 milliGRAM(s) Oral before breakfast  piperacillin/tazobactam IVPB.. 3.375 Gram(s) IV Intermittent every 8 hours  saccharomyces boulardii 250 milliGRAM(s) Oral two times a day      Physical Exam:  Neuro: AAOX3.  Non focal.    Pulm: Diminished BLL L pigtail to waterseal.  No crepitus and no airleak.    CV: RRR.  +S1+S2.    Abd: Soft/NT/ND.  +BS.            PAST MEDICAL & SURGICAL HISTORY:  HLD (hyperlipidemia)    Osteoarthritis    FUO (fever of unknown origin)    H/O pericarditis    TIA (transient ischemic attack)    Afib    H/O rotator cuff surgery

## 2021-08-21 NOTE — PROGRESS NOTE ADULT - PROBLEM SELECTOR PLAN 1
Pt with left recurrent pleural effusion  Left pigtail placed 900 cc initially   CXR daily   gram stain w/ no organisms seen, fungal culture still pending   Maintain tube.  Cards workup in progress.  Possible constriction on TTE reading.  Rheumatology workup in progress.  Rheum also recommending possible pericardial biopsy, as well as a CT of the Abdomen and pelvis.    Possible pleurex placement next week     Discussed with Dr. Gardiner.

## 2021-08-21 NOTE — PROGRESS NOTE ADULT - ASSESSMENT
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  Left Chest tube placed with 900ml fluid.      Fever, dyspnea and large left pleural effusion on admission due to suspected GNR PNA with parapneumonic effusion s/p left chest tube placement. Normoxic on room air. Afebrile now. Doubt infectious pneumonia and Vancomycin discontinued earlier. Cultures negative so far. TTE with intact LVSF but suggestive early constriction. CT with atelectasis  -oxygen to keep so2 >91%,bronchodilator  -Discontinue zosyn   - Follow Rheumatology work up  - CT Abdomen and Pelvis ordered  -hx of dental abscess 4 yrs ago lt U molar s/p extraction. last dental check up 2 months ago including imaging ,was stable.  - Pulmonology, Rheumatology follow up  - Biopsy?    AF rate controlled. CHADsVASc 3, but AC not started due to concern for worsening pericardial effusion  -Continue  metoprolol and digoxin  -f/u cardiology rec,spoke with cardio PA  -LAST ECHO with 60-65% EF, grade 1 diastolic dysfxn    History of viral pericarditis with small pericardial effusion  Was previously on ASA, colchicine, both d/c’ ed by cardiology due to GI upset  S/p prednisone course, last dose on 8/4, completed dose prior to admission  Cardio started colchicine as esr/crp high - gastroprotection    Hyperlipidemia - on statin     Persistent elevated Alkaline phosphatase  -us abd stable   -hepatitis panel neg  -f/u lfts.    GERD - on Protonix    VTE_ Lovenox subcut     Plan discussed at length with patient and

## 2021-08-21 NOTE — PROGRESS NOTE ADULT - SUBJECTIVE AND OBJECTIVE BOX
HOSPITALIST PROGRESS NOTE    LOUIS MEJIA  [id number]  76yFemale    Patient is a 76y old  Female who presents with a chief complaint of sob,fever (21 Aug 2021 11:33)      SUBJECTIVE:   Chart reviewed since last visit.  Patient seen and examined at bedside.      OBJECTIVE:  Vital Signs Last 24 Hrs  T(C): 36.6 (21 Aug 2021 16:23), Max: 37.1 (20 Aug 2021 21:02)  T(F): 97.8 (21 Aug 2021 16:23), Max: 98.7 (20 Aug 2021 21:02)  HR: 86 (21 Aug 2021 16:23) (86 - 91)  BP: 104/70 (21 Aug 2021 16:23) (90/61 - 110/78)  BP(mean): --  RR: 16 (21 Aug 2021 16:23) (16 - 16)  SpO2: 100% (21 Aug 2021 16:23) (98% - 100%)    PHYSICAL EXAMINATION  General:   HEENT:    NECK:    CVS:   RESP:    GI:    :   MSK:    CNS:    INTEG:    PSYCH:      MONITOR:  CAPILLARY BLOOD GLUCOSE            I&O's Summary    20 Aug 2021 07:01  -  21 Aug 2021 07:00  --------------------------------------------------------  IN: 680 mL / OUT: 30 mL / NET: 650 mL    21 Aug 2021 07:01  -  21 Aug 2021 17:41  --------------------------------------------------------  IN: 420 mL / OUT: 0 mL / NET: 420 mL                            11.9   11.41 )-----------( 610      ( 21 Aug 2021 07:46 )             37.2       08-21    137  |  94<L>  |  10.5  ----------------------------<  89  3.8   |  30.0<H>  |  0.81    Ca    9.7      21 Aug 2021 07:46    TPro  6.9  /  Alb  3.1<L>  /  TBili  0.4  /  DBili  x   /  AST  25  /  ALT  23  /  AlkPhos  338<H>  08-21            Culture:  Culture - Blood (08.17.21 @ 16:19)   Specimen Source: .Blood Blood   Culture Results:   No growth at 48 hours   Culture - Blood (08.17.21 @ 16:18)   Specimen Source: .Blood Blood   Culture Results:   No growth at 48 hours   Culture - Body Fluid with Gram Stain (08.17.21 @ 22:18)   Gram Stain:   polymorphonuclear leukocytes seen   No organisms seen   by cytocentrifuge   Specimen Source: .Body Fluid Pleural Fluid   Culture Results:   No growth   Culture - Fungal, Body Fluid (08.17.21 @ 22:18)   Specimen Source: .Body Fluid Pleural Fluid   Culture Results:   Testing in progress     TTE:  < from: TTE Echo Complete w/ Contrast w/ Doppler (08.18.21 @ 13:22) >  Summary:   1. Moderately enlarged left atrium.   2. Left ventricular ejection fraction, by visual estimation, is 50 to 55%.   3. Moderately enlarged right atrium.   4. The right ventricular size is normal. Mildly reduced RV systolic function.   5. Grade III diastolic dysfunction.   6. Mild to moderate aortic regurgitation.   7. Small pericardial effusion.   8. There is subtle septal bounce with annluar paradox and dilated IVC. Compared to study from 2012, the tissue Doppler velocity has increased and is pardox to what is expected. These may suggest early signs of constriction. Please repeat study after further diuresis.    MD Jeremy, RPVI Electronically signed on 8/20/2021 at 11:33:34 AM            *** Final ***    < end of copied text >          RADIOLOGY  < from: Xray Chest 2 Views PA/Lat (08.17.21 @ 12:10) >  IMPRESSION:  Minimal right pleural effusion, improved.    Small left pleural effusion, increased    --- End of Report ---            CODY DOHERTY DO; Attending Radiologist  This document has been electronically signed. Aug 17 2021 12:13PM    < end of copied text >    < from: CT Chest No Cont (08.17.21 @ 17:50) >  IMPRESSION:  Left lower lobe thick linear opacity with bronchial dilatation more likely represents atelectasis    --- End of Report ---            ISABELLE DOSS MD; Attending Radiologist  This document has been electronically signed. Aug 17 2021  6:48PM    < end of copied text >      < from: US Abdomen Complete (US Abdomen Complete .) (08.19.21 @ 17:19) >  IMPRESSION:  RIGHT pleural effusion. Otherwise unremarkable exam.    --- End of Report ---            CORNELIUS VILLATORO MD; Attending Radiologist  This document has been electronically signed. Aug 19 2021  6:42PM    < end of copied text >  MEDICATIONS  (STANDING):  atorvastatin 20 milliGRAM(s) Oral at bedtime  colchicine 0.3 milliGRAM(s) Oral two times a day  digoxin     Tablet 125 MICROGram(s) Oral daily  enoxaparin Injectable 40 milliGRAM(s) SubCutaneous daily  ethacrynic acid 25 milliGRAM(s) Oral daily  metoprolol succinate ER 75 milliGRAM(s) Oral daily  pantoprazole    Tablet 40 milliGRAM(s) Oral before breakfast  piperacillin/tazobactam IVPB.. 3.375 Gram(s) IV Intermittent every 8 hours  saccharomyces boulardii 250 milliGRAM(s) Oral two times a day      MEDICATIONS  (PRN):  ALBUTerol    0.083% 2.5 milliGRAM(s) Nebulizer every 6 hours PRN Wheezing  aluminum hydroxide/magnesium hydroxide/simethicone Suspension 30 milliLiter(s) Oral every 6 hours PRN Dyspepsia  oxycodone    5 mG/acetaminophen 325 mG 1 Tablet(s) Oral every 4 hours PRN Moderate Pain (4 - 6)     HOSPITALIST PROGRESS NOTE    LOUIS MEJIA  [id number]  76yFemale    Patient is a 76y old  Female who presents with a chief complaint of sob,fever (21 Aug 2021 11:33)      SUBJECTIVE:   Chart reviewed admission.   Patient seen and examined at bedside for pleural effusion, pericarditis/pericardial effusion.  Denies any chest pain, dyspnea is better, denies coughing or fever.  Loose BM, no abdominal pain, nausea or vomiting      OBJECTIVE:  Vital Signs Last 24 Hrs  T(C): 36.6 (21 Aug 2021 16:23), Max: 37.1 (20 Aug 2021 21:02)  T(F): 97.8 (21 Aug 2021 16:23), Max: 98.7 (20 Aug 2021 21:02)  HR: 86 (21 Aug 2021 16:23) (86 - 91)  BP: 104/70 (21 Aug 2021 16:23) (90/61 - 110/78)   RR: 16 (21 Aug 2021 16:23) (16 - 16)  SpO2: 100% (21 Aug 2021 16:23) (98% - 100%)    PHYSICAL EXAMINATION  BMI - 18  General: lean elderly female, sitting up in bed, No acute distress  HEENT:  EOMI  NECK:  Supple  CVS: Irregular S1 S2  RESP: Fair air entry, decreased at left base. Chest tube with 1300 ml serous fluid   GI:  Soft nondistended, nontender BS+  : No suprapubic tenderness  MSK:  FROM, no Edema  CNS:  AAOX3. CN, Strength and sensation grossly intact  INTEG:  warm dry skin  PSYCH:  Fair mood    MONITOR:  CAPILLARY BLOOD GLUCOSE            I&O's Summary    20 Aug 2021 07:01  -  21 Aug 2021 07:00  --------------------------------------------------------  IN: 680 mL / OUT: 30 mL / NET: 650 mL    21 Aug 2021 07:01  -  21 Aug 2021 17:41  --------------------------------------------------------  IN: 420 mL / OUT: 0 mL / NET: 420 mL                            11.9   11.41 )-----------( 610      ( 21 Aug 2021 07:46 )             37.2       08-21    137  |  94<L>  |  10.5  ----------------------------<  89  3.8   |  30.0<H>  |  0.81    Ca    9.7      21 Aug 2021 07:46    TPro  6.9  /  Alb  3.1<L>  /  TBili  0.4  /  DBili  x   /  AST  25  /  ALT  23  /  AlkPhos  338<H>  08-21            Culture:  Culture - Blood (08.17.21 @ 16:19)   Specimen Source: .Blood Blood   Culture Results:   No growth at 48 hours   Culture - Blood (08.17.21 @ 16:18)   Specimen Source: .Blood Blood   Culture Results:   No growth at 48 hours   Culture - Body Fluid with Gram Stain (08.17.21 @ 22:18)   Gram Stain:   polymorphonuclear leukocytes seen   No organisms seen   by cytocentrifuge   Specimen Source: .Body Fluid Pleural Fluid   Culture Results:   No growth   Culture - Fungal, Body Fluid (08.17.21 @ 22:18)   Specimen Source: .Body Fluid Pleural Fluid   Culture Results:   Testing in progress     TTE:  < from: TTE Echo Complete w/ Contrast w/ Doppler (08.18.21 @ 13:22) >  Summary:   1. Moderately enlarged left atrium.   2. Left ventricular ejection fraction, by visual estimation, is 50 to 55%.   3. Moderately enlarged right atrium.   4. The right ventricular size is normal. Mildly reduced RV systolic function.   5. Grade III diastolic dysfunction.   6. Mild to moderate aortic regurgitation.   7. Small pericardial effusion.   8. There is subtle septal bounce with annluar paradox and dilated IVC. Compared to study from 2012, the tissue Doppler velocity has increased and is pardox to what is expected. These may suggest early signs of constriction. Please repeat study after further diuresis.    MD Jeremy, RPVI Electronically signed on 8/20/2021 at 11:33:34 AM            *** Final ***    < end of copied text >          RADIOLOGY  < from: Xray Chest 2 Views PA/Lat (08.17.21 @ 12:10) >  IMPRESSION:  Minimal right pleural effusion, improved.    Small left pleural effusion, increased    --- End of Report ---            CODY DOHERTY DO; Attending Radiologist  This document has been electronically signed. Aug 17 2021 12:13PM    < end of copied text >    < from: CT Chest No Cont (08.17.21 @ 17:50) >  IMPRESSION:  Left lower lobe thick linear opacity with bronchial dilatation more likely represents atelectasis    --- End of Report ---            ISABELLE DOSS MD; Attending Radiologist  This document has been electronically signed. Aug 17 2021  6:48PM    < end of copied text >      < from: US Abdomen Complete (US Abdomen Complete .) (08.19.21 @ 17:19) >  IMPRESSION:  RIGHT pleural effusion. Otherwise unremarkable exam.    --- End of Report ---            CORNELIUS VILLATORO MD; Attending Radiologist  This document has been electronically signed. Aug 19 2021  6:42PM    < end of copied text >  MEDICATIONS  (STANDING):  atorvastatin 20 milliGRAM(s) Oral at bedtime  colchicine 0.3 milliGRAM(s) Oral two times a day  digoxin     Tablet 125 MICROGram(s) Oral daily  enoxaparin Injectable 40 milliGRAM(s) SubCutaneous daily  ethacrynic acid 25 milliGRAM(s) Oral daily  metoprolol succinate ER 75 milliGRAM(s) Oral daily  pantoprazole    Tablet 40 milliGRAM(s) Oral before breakfast  piperacillin/tazobactam IVPB.. 3.375 Gram(s) IV Intermittent every 8 hours  saccharomyces boulardii 250 milliGRAM(s) Oral two times a day      MEDICATIONS  (PRN):  ALBUTerol    0.083% 2.5 milliGRAM(s) Nebulizer every 6 hours PRN Wheezing  aluminum hydroxide/magnesium hydroxide/simethicone Suspension 30 milliLiter(s) Oral every 6 hours PRN Dyspepsia  oxycodone    5 mG/acetaminophen 325 mG 1 Tablet(s) Oral every 4 hours PRN Moderate Pain (4 - 6)

## 2021-08-22 LAB
ALBUMIN SERPL ELPH-MCNC: 3 G/DL — LOW (ref 3.3–5.2)
ALP SERPL-CCNC: 325 U/L — HIGH (ref 40–120)
ALT FLD-CCNC: 20 U/L — SIGNIFICANT CHANGE UP
ANA TITR SER: NEGATIVE — SIGNIFICANT CHANGE UP
ANION GAP SERPL CALC-SCNC: 10 MMOL/L — SIGNIFICANT CHANGE UP (ref 5–17)
AST SERPL-CCNC: 20 U/L — SIGNIFICANT CHANGE UP
BILIRUB SERPL-MCNC: 0.3 MG/DL — LOW (ref 0.4–2)
BUN SERPL-MCNC: 12.2 MG/DL — SIGNIFICANT CHANGE UP (ref 8–20)
CALCIUM SERPL-MCNC: 9.6 MG/DL — SIGNIFICANT CHANGE UP (ref 8.6–10.2)
CHLORIDE SERPL-SCNC: 95 MMOL/L — LOW (ref 98–107)
CO2 SERPL-SCNC: 34 MMOL/L — HIGH (ref 22–29)
CREAT SERPL-MCNC: 0.83 MG/DL — SIGNIFICANT CHANGE UP (ref 0.5–1.3)
CULTURE RESULTS: NO GROWTH — SIGNIFICANT CHANGE UP
CULTURE RESULTS: SIGNIFICANT CHANGE UP
CULTURE RESULTS: SIGNIFICANT CHANGE UP
GLUCOSE SERPL-MCNC: 87 MG/DL — SIGNIFICANT CHANGE UP (ref 70–99)
HCT VFR BLD CALC: 37.3 % — SIGNIFICANT CHANGE UP (ref 34.5–45)
HGB BLD-MCNC: 12.1 G/DL — SIGNIFICANT CHANGE UP (ref 11.5–15.5)
MCHC RBC-ENTMCNC: 29.2 PG — SIGNIFICANT CHANGE UP (ref 27–34)
MCHC RBC-ENTMCNC: 32.4 GM/DL — SIGNIFICANT CHANGE UP (ref 32–36)
MCV RBC AUTO: 90.1 FL — SIGNIFICANT CHANGE UP (ref 80–100)
PLATELET # BLD AUTO: 568 K/UL — HIGH (ref 150–400)
POTASSIUM SERPL-MCNC: 3.6 MMOL/L — SIGNIFICANT CHANGE UP (ref 3.5–5.3)
POTASSIUM SERPL-SCNC: 3.6 MMOL/L — SIGNIFICANT CHANGE UP (ref 3.5–5.3)
PROT SERPL-MCNC: 6.6 G/DL — SIGNIFICANT CHANGE UP (ref 6.6–8.7)
RBC # BLD: 4.14 M/UL — SIGNIFICANT CHANGE UP (ref 3.8–5.2)
RBC # FLD: 14 % — SIGNIFICANT CHANGE UP (ref 10.3–14.5)
SODIUM SERPL-SCNC: 138 MMOL/L — SIGNIFICANT CHANGE UP (ref 135–145)
SPECIMEN SOURCE: SIGNIFICANT CHANGE UP
WBC # BLD: 7.11 K/UL — SIGNIFICANT CHANGE UP (ref 3.8–10.5)
WBC # FLD AUTO: 7.11 K/UL — SIGNIFICANT CHANGE UP (ref 3.8–10.5)

## 2021-08-22 PROCEDURE — 99232 SBSQ HOSP IP/OBS MODERATE 35: CPT

## 2021-08-22 PROCEDURE — 99231 SBSQ HOSP IP/OBS SF/LOW 25: CPT

## 2021-08-22 PROCEDURE — 71045 X-RAY EXAM CHEST 1 VIEW: CPT | Mod: 26

## 2021-08-22 PROCEDURE — 71045 X-RAY EXAM CHEST 1 VIEW: CPT | Mod: 26,77

## 2021-08-22 RX ADMIN — Medication 0.3 MILLIGRAM(S): at 09:46

## 2021-08-22 RX ADMIN — PIPERACILLIN AND TAZOBACTAM 25 GRAM(S): 4; .5 INJECTION, POWDER, LYOPHILIZED, FOR SOLUTION INTRAVENOUS at 09:47

## 2021-08-22 RX ADMIN — Medication 75 MILLIGRAM(S): at 06:19

## 2021-08-22 RX ADMIN — ETHACRYNIC ACID 25 MILLIGRAM(S): 25 TABLET ORAL at 06:19

## 2021-08-22 RX ADMIN — Medication 0.3 MILLIGRAM(S): at 17:16

## 2021-08-22 RX ADMIN — ATORVASTATIN CALCIUM 20 MILLIGRAM(S): 80 TABLET, FILM COATED ORAL at 21:12

## 2021-08-22 RX ADMIN — PANTOPRAZOLE SODIUM 40 MILLIGRAM(S): 20 TABLET, DELAYED RELEASE ORAL at 06:19

## 2021-08-22 RX ADMIN — Medication 125 MICROGRAM(S): at 06:19

## 2021-08-22 RX ADMIN — PIPERACILLIN AND TAZOBACTAM 25 GRAM(S): 4; .5 INJECTION, POWDER, LYOPHILIZED, FOR SOLUTION INTRAVENOUS at 02:58

## 2021-08-22 RX ADMIN — PIPERACILLIN AND TAZOBACTAM 25 GRAM(S): 4; .5 INJECTION, POWDER, LYOPHILIZED, FOR SOLUTION INTRAVENOUS at 17:16

## 2021-08-22 RX ADMIN — Medication 250 MILLIGRAM(S): at 17:16

## 2021-08-22 RX ADMIN — Medication 250 MILLIGRAM(S): at 06:19

## 2021-08-22 RX ADMIN — Medication 30 MILLILITER(S): at 06:18

## 2021-08-22 NOTE — PROGRESS NOTE ADULT - PROBLEM SELECTOR PLAN 2
care as per primary team and ID
care as per primary team
care as per primary team and ID

## 2021-08-22 NOTE — PROGRESS NOTE ADULT - ASSESSMENT
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  Left Chest tube placed with 900ml fluid.      Fever, dyspnea and large left pleural effusion on admission due to suspected GNR PNA with parapneumonic effusion s/p left chest tube placement. Normoxic on room air. Afebrile now. Doubt infectious pneumonia and Vancomycin discontinued earlier. Cultures negative so far. TTE with intact LVSF but suggestive early constriction. CT with atelectasis  -oxygen to keep SpO2 >91%,bronchodilator  -Discontinue zosyn if final cultures negative  - Follow Rheumatology work up  - CT Abdomen and Pelvis ordered  -hx of dental abscess 4 yrs ago lt U molar s/p extraction. last dental check up 2 months ago including imaging ,was stable.  - Pulmonology, Rheumatology follow up  - Biopsy?    AF rate controlled. CHADsVASc 3, but AC not started due to concern for worsening pericardial effusion  -Continue  metoprolol and digoxin  -f/u cardiology rec,spoke with cardio PA  -LAST ECHO with 60-65% EF, grade 1 diastolic dysfxn    History of viral pericarditis with small pericardial effusion  Was previously on ASA, colchicine, both d/c’ ed by cardiology due to GI upset  S/p prednisone course, last dose on 8/4, completed dose prior to admission  Cardio started colchicine as esr/crp high - gastroprotection    Hyperlipidemia - on statin     Persistent elevated Alkaline phosphatase  -us abd stable   -hepatitis panel neg  -f/u lfts.    GERD - on Protonix    VTE_ Lovenox subcut      76 year old female with PMH TIA, dyslipidemia, Raynaud's and recently diagnosed viral pericarditis (6/23/2021), complicated by new onset Afib with RVR during  recent admission for Pneumonia and left Pleural effusion s/p chest tube  was discharged 08/10/21 with po Augmentin referred for large left pleural effusion .  Left Chest tube placed with 900ml fluid.      Fever, dyspnea and large left pleural effusion on admission due to suspected GNR PNA with parapneumonic effusion s/p left chest tube placement. Normoxic on room air. Afebrile now. Doubt infectious pneumonia and Vancomycin discontinued earlier. Cultures negative so far. TTE with intact LVSF but suggestive early constriction. CT with atelectasis  -oxygen to keep SpO2 >91%,bronchodilator  -Discontinue zosyn if final cultures negative  - Follow Rheumatology work up  - CT Abdomen and Pelvis ordered  -hx of dental abscess 4 yrs ago lt U molar s/p extraction. last dental check up 2 months ago including imaging ,was stable.  - Pulmonology, Rheumatology follow up  - Biopsy?    AF rate controlled. CHADsVASc 3, but AC not started due to concern for worsening pericardial effusion  -Continue  metoprolol and digoxin  -f/u cardiology rec,spoke with cardio PA  -LAST ECHO with 60-65% EF, grade 1 diastolic dysfxn    History of viral pericarditis with small pericardial effusion  Was previously on ASA, colchicine, both d/c’ ed by cardiology due to GI upset  S/p prednisone course, last dose on 8/4, completed dose prior to admission  Cardio started colchicine as esr/crp high - gastroprotection    Hyperlipidemia - on statin     Persistent elevated Alkaline phosphatase  -us abd stable   -hepatitis panel neg  -f/u lfts.    GERD - on Protonix    VTE_ Lovenox subcut     Discussed with patient and

## 2021-08-22 NOTE — PROGRESS NOTE ADULT - SUBJECTIVE AND OBJECTIVE BOX
HOSPITALIST PROGRESS NOTE    LOUIS MEJIA  [id number]  76yFemale    Patient is a 76y old  Female who presents with a chief complaint of sob,fever (22 Aug 2021 12:25)      SUBJECTIVE:   Chart reviewed since last visit.  Patient seen and examined at bedside for pleural effusion, pericarditis, AF      OBJECTIVE:  Vital Signs Last 24 Hrs  T(C): 36.9 (22 Aug 2021 16:01), Max: 37.2 (21 Aug 2021 21:03)  T(F): 98.4 (22 Aug 2021 16:01), Max: 99 (21 Aug 2021 21:03)  HR: 87 (22 Aug 2021 16:01) (76 - 93)  BP: 99/84 (22 Aug 2021 16:01) (92/62 - 119/73)   RR: 16 (22 Aug 2021 16:01) (16 - 16)  SpO2: 98% (22 Aug 2021 16:01) (97% - 99%)    PHYSICAL EXAMINATION  BMI - 18  General: lean elderly female, sitting up in bed, No acute distress  HEENT:  EOMI  NECK:  Supple  CVS: Irregular S1 S2  RESP: Fair air entry, decreased at left base. Chest tube   GI:  Soft nondistended, nontender BS+  : No suprapubic tenderness  MSK:  FROM, no Edema  CNS:  AAOX3. CN, Strength and sensation grossly intact  INTEG:  warm dry skin  PSYCH:  Fair mood    MONITOR:  CAPILLARY BLOOD GLUCOSE            I&O's Summary    21 Aug 2021 07:01  -  22 Aug 2021 07:00  --------------------------------------------------------  IN: 620 mL / OUT: 50 mL / NET: 570 mL    22 Aug 2021 07:01  -  22 Aug 2021 16:36  --------------------------------------------------------  IN: 180 mL / OUT: 0 mL / NET: 180 mL                            12.1   7.11  )-----------( 568      ( 22 Aug 2021 10:15 )             37.3       08-22    138  |  95<L>  |  12.2  ----------------------------<  87  3.6   |  34.0<H>  |  0.83    Ca    9.6      22 Aug 2021 10:15    TPro  6.6  /  Alb  3.0<L>  /  TBili  0.3<L>  /  DBili  x   /  AST  20  /  ALT  20  /  AlkPhos  325<H>  08-22            Culture:    TTE:    RADIOLOGY        MEDICATIONS  (STANDING):  atorvastatin 20 milliGRAM(s) Oral at bedtime  colchicine 0.3 milliGRAM(s) Oral two times a day  digoxin     Tablet 125 MICROGram(s) Oral daily  enoxaparin Injectable 40 milliGRAM(s) SubCutaneous daily  ethacrynic acid 25 milliGRAM(s) Oral daily  metoprolol succinate ER 75 milliGRAM(s) Oral daily  pantoprazole    Tablet 40 milliGRAM(s) Oral before breakfast  piperacillin/tazobactam IVPB.. 3.375 Gram(s) IV Intermittent every 8 hours  saccharomyces boulardii 250 milliGRAM(s) Oral two times a day      MEDICATIONS  (PRN):  ALBUTerol    0.083% 2.5 milliGRAM(s) Nebulizer every 6 hours PRN Wheezing  aluminum hydroxide/magnesium hydroxide/simethicone Suspension 30 milliLiter(s) Oral every 6 hours PRN Dyspepsia  oxycodone    5 mG/acetaminophen 325 mG 1 Tablet(s) Oral every 4 hours PRN Moderate Pain (4 - 6)     HOSPITALIST PROGRESS NOTE    LOUIS MEJIA  [id number]  76yFemale    Patient is a 76y old  Female who presents with a chief complaint of sob,fever (22 Aug 2021 12:25)      SUBJECTIVE:   Chart reviewed since last visit.  Patient seen and examined at bedside for pleural effusion, pericarditis, AF  Chest tube removed.  Has some chest pain upon breathing  Denies any dyspnea, fever, chills, cough, dizziness or palpitations  Denies any nausea or vomiting - prefer taking Colchicine after meal      OBJECTIVE:  Vital Signs Last 24 Hrs  T(C): 36.9 (22 Aug 2021 16:01), Max: 37.2 (21 Aug 2021 21:03)  T(F): 98.4 (22 Aug 2021 16:01), Max: 99 (21 Aug 2021 21:03)  HR: 87 (22 Aug 2021 16:01) (76 - 93)  BP: 99/84 (22 Aug 2021 16:01) (92/62 - 119/73)   RR: 16 (22 Aug 2021 16:01) (16 - 16)  SpO2: 98% (22 Aug 2021 16:01) (97% - 99%)    PHYSICAL EXAMINATION  BMI - 18  General: lean elderly female, sitting up in bed, No acute distress  HEENT:  EOMI  NECK:  Supple  CVS: Irregular S1 S2  RESP: Fair air entry, decreased at left base. ( Interval removal Chest tube )  GI:  Soft nondistended, nontender BS+  : No suprapubic tenderness  MSK:  FROM, no Edema  CNS:  AAOX3. CN, Strength and sensation grossly intact  INTEG:  warm dry skin  PSYCH:  Fair mood    MONITOR:  CAPILLARY BLOOD GLUCOSE            I&O's Summary    21 Aug 2021 07:01  -  22 Aug 2021 07:00  --------------------------------------------------------  IN: 620 mL / OUT: 50 mL / NET: 570 mL    22 Aug 2021 07:01  -  22 Aug 2021 16:36  --------------------------------------------------------  IN: 180 mL / OUT: 0 mL / NET: 180 mL                            12.1   7.11  )-----------( 568      ( 22 Aug 2021 10:15 )             37.3       08-22    138  |  95<L>  |  12.2  ----------------------------<  87  3.6   |  34.0<H>  |  0.83    Ca    9.6      22 Aug 2021 10:15    TPro  6.6  /  Alb  3.0<L>  /  TBili  0.3<L>  /  DBili  x   /  AST  20  /  ALT  20  /  AlkPhos  325<H>  08-22            Culture:    TTE:    RADIOLOGY        MEDICATIONS  (STANDING):  atorvastatin 20 milliGRAM(s) Oral at bedtime  colchicine 0.3 milliGRAM(s) Oral two times a day  digoxin     Tablet 125 MICROGram(s) Oral daily  enoxaparin Injectable 40 milliGRAM(s) SubCutaneous daily  ethacrynic acid 25 milliGRAM(s) Oral daily  metoprolol succinate ER 75 milliGRAM(s) Oral daily  pantoprazole    Tablet 40 milliGRAM(s) Oral before breakfast  piperacillin/tazobactam IVPB.. 3.375 Gram(s) IV Intermittent every 8 hours  saccharomyces boulardii 250 milliGRAM(s) Oral two times a day      MEDICATIONS  (PRN):  ALBUTerol    0.083% 2.5 milliGRAM(s) Nebulizer every 6 hours PRN Wheezing  aluminum hydroxide/magnesium hydroxide/simethicone Suspension 30 milliLiter(s) Oral every 6 hours PRN Dyspepsia  oxycodone    5 mG/acetaminophen 325 mG 1 Tablet(s) Oral every 4 hours PRN Moderate Pain (4 - 6)

## 2021-08-22 NOTE — PROGRESS NOTE ADULT - SUBJECTIVE AND OBJECTIVE BOX
76y Female s/p left pigtail for pleural effusion    Subjective: feels weak.  No pain.     T(C): 36.4 (08-22-21 @ 09:05), Max: 37.2 (08-21-21 @ 21:03)  HR: 93 (08-22-21 @ 09:05) (76 - 93)  BP: 118/78 (08-22-21 @ 09:05) (95/63 - 119/73)  ABP: --  ABP(mean): --  RR: 16 (08-22-21 @ 09:05) (16 - 16)  SpO2: 98% (08-22-21 @ 09:05) (97% - 100%)  Wt(kg): --  CVP(mm Hg): --  CO: --  CI: --  PA: --         08-22    138  |  95<L>  |  12.2  ----------------------------<  87  3.6   |  34.0<H>  |  0.83    Ca    9.6      22 Aug 2021 10:15    TPro  6.6  /  Alb  3.0<L>  /  TBili  0.3<L>  /  DBili  x   /  AST  20  /  ALT  20  /  AlkPhos  325<H>  08-22                               12.1   7.11  )-----------( 568      ( 22 Aug 2021 10:15 )             37.3                     CAPILLARY BLOOD GLUCOSE               CXR:    I&O's Detail    21 Aug 2021 07:01  -  22 Aug 2021 07:00  --------------------------------------------------------  IN:    Oral Fluid: 620 mL  Total IN: 620 mL    OUT:    Chest Tube (mL): 50 mL  Total OUT: 50 mL    Total NET: 570 mL      22 Aug 2021 07:01  -  22 Aug 2021 12:25  --------------------------------------------------------  IN:    Oral Fluid: 180 mL  Total IN: 180 mL    OUT:  Total OUT: 0 mL    Total NET: 180 mL          MEDICATIONS  (STANDING):  atorvastatin 20 milliGRAM(s) Oral at bedtime  colchicine 0.3 milliGRAM(s) Oral two times a day  digoxin     Tablet 125 MICROGram(s) Oral daily  enoxaparin Injectable 40 milliGRAM(s) SubCutaneous daily  ethacrynic acid 25 milliGRAM(s) Oral daily  metoprolol succinate ER 75 milliGRAM(s) Oral daily  pantoprazole    Tablet 40 milliGRAM(s) Oral before breakfast  piperacillin/tazobactam IVPB.. 3.375 Gram(s) IV Intermittent every 8 hours  saccharomyces boulardii 250 milliGRAM(s) Oral two times a day    MEDICATIONS  (PRN):  ALBUTerol    0.083% 2.5 milliGRAM(s) Nebulizer every 6 hours PRN Wheezing  aluminum hydroxide/magnesium hydroxide/simethicone Suspension 30 milliLiter(s) Oral every 6 hours PRN Dyspepsia  oxycodone    5 mG/acetaminophen 325 mG 1 Tablet(s) Oral every 4 hours PRN Moderate Pain (4 - 6)      Physical Exam  Neuro: A+O x 3, non-focal, speech clear and intact  Pulm: CTA, equal bilaterally  Pigtail to pleurevac - 50cc/24 hours no air leak    -----------------------------------------------------------------------------------------------------------------------------------------------------------------------------  -----------------------------------------------------------------------------------------------------------------------------------------------------------------------------

## 2021-08-22 NOTE — PROGRESS NOTE ADULT - PROBLEM SELECTOR PLAN 1
Pt with left recurrent pleural effusion  Left pigtail placed 900 cc initially, now 50cc/24 hours  D/C tube today, f/u chest xray  f/u final cultures of pleural fluid  Rheumatology workup in progress.  Rheum also recommending possible pericardial biopsy, as well as a CT of the Abdomen and pelvis.    -If pericardial drainage and/or biopsy are required as part of workup, please re-consult and we will perform this admission.  Possible pleurex placement if effusion re-accumulates.      Discussed with Dr. Gardiner.

## 2021-08-23 LAB
ANION GAP SERPL CALC-SCNC: 10 MMOL/L — SIGNIFICANT CHANGE UP (ref 5–17)
BUN SERPL-MCNC: 14.6 MG/DL — SIGNIFICANT CHANGE UP (ref 8–20)
CALCIUM SERPL-MCNC: 8.8 MG/DL — SIGNIFICANT CHANGE UP (ref 8.6–10.2)
CHLORIDE SERPL-SCNC: 96 MMOL/L — LOW (ref 98–107)
CO2 SERPL-SCNC: 33 MMOL/L — HIGH (ref 22–29)
CREAT SERPL-MCNC: 0.67 MG/DL — SIGNIFICANT CHANGE UP (ref 0.5–1.3)
CRP SERPL-MCNC: 28 MG/L — HIGH
DSDNA AB SER-ACNC: <12 IU/ML — SIGNIFICANT CHANGE UP
ERYTHROCYTE [SEDIMENTATION RATE] IN BLOOD: 52 MM/HR — HIGH (ref 0–20)
GLUCOSE SERPL-MCNC: 94 MG/DL — SIGNIFICANT CHANGE UP (ref 70–99)
HCT VFR BLD CALC: 33.8 % — LOW (ref 34.5–45)
HGB BLD-MCNC: 10.6 G/DL — LOW (ref 11.5–15.5)
MCHC RBC-ENTMCNC: 28.6 PG — SIGNIFICANT CHANGE UP (ref 27–34)
MCHC RBC-ENTMCNC: 31.4 GM/DL — LOW (ref 32–36)
MCV RBC AUTO: 91.4 FL — SIGNIFICANT CHANGE UP (ref 80–100)
NT-PROBNP SERPL-SCNC: 1577 PG/ML — HIGH (ref 0–300)
PLATELET # BLD AUTO: 474 K/UL — HIGH (ref 150–400)
POTASSIUM SERPL-MCNC: 3.2 MMOL/L — LOW (ref 3.5–5.3)
POTASSIUM SERPL-SCNC: 3.2 MMOL/L — LOW (ref 3.5–5.3)
RBC # BLD: 3.7 M/UL — LOW (ref 3.8–5.2)
RBC # FLD: 13.9 % — SIGNIFICANT CHANGE UP (ref 10.3–14.5)
SODIUM SERPL-SCNC: 139 MMOL/L — SIGNIFICANT CHANGE UP (ref 135–145)
WBC # BLD: 6.44 K/UL — SIGNIFICANT CHANGE UP (ref 3.8–10.5)
WBC # FLD AUTO: 6.44 K/UL — SIGNIFICANT CHANGE UP (ref 3.8–10.5)

## 2021-08-23 PROCEDURE — 99232 SBSQ HOSP IP/OBS MODERATE 35: CPT

## 2021-08-23 PROCEDURE — 74177 CT ABD & PELVIS W/CONTRAST: CPT | Mod: 26

## 2021-08-23 PROCEDURE — 99233 SBSQ HOSP IP/OBS HIGH 50: CPT

## 2021-08-23 RX ORDER — POTASSIUM CHLORIDE 20 MEQ
40 PACKET (EA) ORAL EVERY 4 HOURS
Refills: 0 | Status: COMPLETED | OUTPATIENT
Start: 2021-08-23 | End: 2021-08-23

## 2021-08-23 RX ADMIN — Medication 0.3 MILLIGRAM(S): at 17:26

## 2021-08-23 RX ADMIN — Medication 75 MILLIGRAM(S): at 05:04

## 2021-08-23 RX ADMIN — Medication 40 MILLIEQUIVALENT(S): at 16:12

## 2021-08-23 RX ADMIN — Medication 250 MILLIGRAM(S): at 05:03

## 2021-08-23 RX ADMIN — Medication 250 MILLIGRAM(S): at 17:25

## 2021-08-23 RX ADMIN — Medication 125 MICROGRAM(S): at 05:04

## 2021-08-23 RX ADMIN — PANTOPRAZOLE SODIUM 40 MILLIGRAM(S): 20 TABLET, DELAYED RELEASE ORAL at 05:04

## 2021-08-23 RX ADMIN — Medication 30 MILLILITER(S): at 08:35

## 2021-08-23 RX ADMIN — Medication 0.3 MILLIGRAM(S): at 08:34

## 2021-08-23 RX ADMIN — PIPERACILLIN AND TAZOBACTAM 25 GRAM(S): 4; .5 INJECTION, POWDER, LYOPHILIZED, FOR SOLUTION INTRAVENOUS at 10:03

## 2021-08-23 RX ADMIN — Medication 40 MILLIEQUIVALENT(S): at 10:03

## 2021-08-23 RX ADMIN — PIPERACILLIN AND TAZOBACTAM 25 GRAM(S): 4; .5 INJECTION, POWDER, LYOPHILIZED, FOR SOLUTION INTRAVENOUS at 03:00

## 2021-08-23 RX ADMIN — Medication 30 MILLILITER(S): at 17:25

## 2021-08-23 RX ADMIN — ETHACRYNIC ACID 25 MILLIGRAM(S): 25 TABLET ORAL at 05:04

## 2021-08-23 RX ADMIN — PIPERACILLIN AND TAZOBACTAM 25 GRAM(S): 4; .5 INJECTION, POWDER, LYOPHILIZED, FOR SOLUTION INTRAVENOUS at 17:28

## 2021-08-23 NOTE — PROGRESS NOTE ADULT - SUBJECTIVE AND OBJECTIVE BOX
HOSPITALIST PROGRESS NOTE    LOUIS MEJIA  [id number]  76yFemale    Patient is a 76y old  Female who presents with a chief complaint of sob,fever (23 Aug 2021 12:06)      SUBJECTIVE:   Chart reviewed since last visit.  Patient seen and examined at bedside for pleural effusion, pericarditis.  Chest pain (at tube insertion site) - denies any dizziness, palpitations, dyspnea, cough, fever or chills  Denies any abdominal pain, nausea or vomiting. Had BM      OBJECTIVE:  Vital Signs Last 24 Hrs  T(C): 37.1 (23 Aug 2021 08:32), Max: 37.1 (22 Aug 2021 21:13)  T(F): 98.7 (23 Aug 2021 08:32), Max: 98.7 (22 Aug 2021 21:13)  HR: 88 (23 Aug 2021 08:32) (81 - 88)  BP: 103/71 (23 Aug 2021 08:32) (92/62 - 118/86)   RR: 18 (23 Aug 2021 08:32) (16 - 18)  SpO2: 99% (23 Aug 2021 08:32) (98% - 99%)    PHYSICAL EXAMINATION  BMI - 18  General: lean elderly female, sitting up in chair, No acute distress  HEENT:  EOMI  NECK:  Supple  CVS: Irregular S1 S2  RESP: Fair air entry, decreased at right base   GI:  Soft nondistended, nontender BS+  : No suprapubic tenderness  MSK:  FROM, no Edema  CNS:  AAOX3. CN, Strength and sensation grossly intact  INTEG:  warm dry skin  PSYCH:  Fair mood    MONITOR:  CAPILLARY BLOOD GLUCOSE            I&O's Summary    22 Aug 2021 07:01  -  23 Aug 2021 07:00  --------------------------------------------------------  IN: 580 mL / OUT: 0 mL / NET: 580 mL    23 Aug 2021 07:01  -  23 Aug 2021 15:06  --------------------------------------------------------  IN: 240 mL / OUT: 0 mL / NET: 240 mL                            10.6   6.44  )-----------( 474      ( 23 Aug 2021 05:46 )             33.8       08-23    139  |  96<L>  |  14.6  ----------------------------<  94  3.2<L>   |  33.0<H>  |  0.67    Ca    8.8      23 Aug 2021 05:46    TPro  6.6  /  Alb  3.0<L>  /  TBili  0.3<L>  /  DBili  x   /  AST  20  /  ALT  20  /  AlkPhos  325<H>  08-22            Culture:    TTE:    RADIOLOGY        MEDICATIONS  (STANDING):  atorvastatin 20 milliGRAM(s) Oral at bedtime  colchicine 0.3 milliGRAM(s) Oral two times a day  digoxin     Tablet 125 MICROGram(s) Oral daily  enoxaparin Injectable 40 milliGRAM(s) SubCutaneous daily  ethacrynic acid 25 milliGRAM(s) Oral daily  metoprolol succinate ER 75 milliGRAM(s) Oral daily  pantoprazole    Tablet 40 milliGRAM(s) Oral before breakfast  piperacillin/tazobactam IVPB.. 3.375 Gram(s) IV Intermittent every 8 hours  potassium chloride    Tablet ER 40 milliEquivalent(s) Oral every 4 hours  saccharomyces boulardii 250 milliGRAM(s) Oral two times a day      MEDICATIONS  (PRN):  ALBUTerol    0.083% 2.5 milliGRAM(s) Nebulizer every 6 hours PRN Wheezing  aluminum hydroxide/magnesium hydroxide/simethicone Suspension 30 milliLiter(s) Oral every 6 hours PRN Dyspepsia  oxycodone    5 mG/acetaminophen 325 mG 1 Tablet(s) Oral every 4 hours PRN Moderate Pain (4 - 6)

## 2021-08-23 NOTE — DIETITIAN NUTRITION RISK NOTIFICATION - TREATMENT: THE FOLLOWING DIET HAS BEEN RECOMMENDED
Diet, Regular:   DASH/TLC {Sodium & Cholesterol Restricted} (DASH)  Supplement Feeding Modality:  Oral  Ensure Enlive Cans or Servings Per Day:  1       Frequency:  Three Times a day (08-18-21 @ 11:47) [Active]

## 2021-08-23 NOTE — DIETITIAN INITIAL EVALUATION ADULT. - OTHER INFO
Pt with h/o TIA, dyslipidemia, Raynaud's and recently diagnosed viral pericarditis (6/23/2021), complicated by new onset Afib with RVR during recent admission for Pneumonia and left Pleural effusion s/p chest tube was discharged 08/10/21 with po Augmentin referred for large left pleural effusion .

## 2021-08-23 NOTE — PROGRESS NOTE ADULT - TIME BILLING
Review of labs, notes, orders, radiographic studies, as well as counseling and coordinating care with the relevant multidisciplinary team, including with the primary and consulting providers.
.as
as above.
.as
as above.

## 2021-08-23 NOTE — DIETITIAN INITIAL EVALUATION ADULT. - ORAL INTAKE PTA/DIET HISTORY
Pt reports fair po intake at meals; states appetite is slightly improved.  Pt states poor appetite/po intake x2 months with ~10 lb unintentional wt loss.  Pt sipping on Ensure supplement at each meals.  Discussed importance of consuming adequate protein intake at meals to optimize nutrition status. Pt very receptive and demonstrates good understanding.

## 2021-08-23 NOTE — PROGRESS NOTE ADULT - ASSESSMENT
75Y/O female with  of new onset Afib (not on AC because of pericardial effusion), viral pericardial effusion, TIA, OA, HLD, who presents to [facility]-ED for SOB. Pt has been recently hospitalized for pleural effusion and fevers and had chest tube placed and removed. Pt was seen in outpatient cardiology office, pt c/o of SOB with mild exertion and dry cough with fever of 101.7. POCUS was performed and large left pleural effusion with constrictive pericarditis. Pt was advised to go to [facility] for re insertion of drain. In ED, WBC-9.26, BNP-1992, Trop#1-negative, CT Chest:Left lower lobe thick linear opacity with bronchial dilatation more likely represents atelectasis. Crp 88  Left lung with large effusion s/p chest tube placement and draining 1250 including thoracentesis.(900cc)    8/19: Pt denies chest pain, palpitations, SOB. Tele-Afib HR @ 80-90s. Pt states that her SOB has greatly improved but she feels physically weak. Consider PT consult, continue protein shake supplement. Pt was encouraged to use incentive spirometer.  Chest tube yielding serous 100ml fluid.     PLEURAL EFFUSION    pleurisy: and CHF.   unknown etiology. rhuematology work up is pending.   As per ID and hopspitalist: non infectious.   cytology negative for malignant cells  Rheumatology  fllowiung.   will defer pleurex for now as patient improved.   steroid and colhicine and diuretics.  incentive spiromter.     Pericardial EFfusion:   idiopathic.  Unclear cause. rtheumatology work up pending   recent echo: no reccurence.    Constrictive pericarditis and diastolic heart failure:   There is no other cause of elevated CRP and ESR. likely due to underlying chronic inflammation of unclear cause. rheumatology follopwuing.  patient has early features of constriction and we will treat the underlying inflammaiton aggressive.  if no infection, then recommend short course sterod taper. discuss with Rheumatology evaln follow up.   tolerating Colchicine now with CGI ppx.   ethacrynic acid 25 mg daily.   Potassium repletion.  repeat EStr and CRP in 8-10 days.     ATRIAL FIB WITH CONTROLLED VENTRICULAR RATE  c/w metoprolol and digoxin  once inflammation is controlled to initiate anticoagulation .   probably in 8-10 days.   rate controlled.     HLD C/W atorvastatin    Cachexia: undernutrition: protein supplemtn  CT abeomne pending.       Hx squamous cell skin cancer or tibia s/p mohs    No further in-patient cardiac work-up/management is needed.  Follow-up in cardiology office in 2 weeks.

## 2021-08-23 NOTE — PROGRESS NOTE ADULT - ASSESSMENT
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  Left Chest tube placed with 900ml fluid eventually removed 8/22/21      Fever, dyspnea and large left pleural effusion on admission due to suspected GNR PNA with parapneumonic effusion s/p left chest tube placement. Normoxic on room air. Afebrile now. Doubt infectious pneumonia and Vancomycin discontinued earlier. Cultures negative so far. TTE with intact LVSF but suggestive early constriction. CT with atelectasis  -oxygen to keep SpO2 >91%,bronchodilator  -Discontinue zosyn if final cultures negative  - Follow Rheumatology work up; Negative so far  - CT Abdomen and Pelvis ordered  -hx of dental abscess 4 yrs ago lt U molar s/p extraction. last dental check up 2 months ago including imaging ,was stable.  - Pulmonology, Rheumatology follow up  Will start on Prednisone 30mg daily as per discussion with Rheumatology and cardiology    AF rate controlled. CHADsVASc 3, but AC not started due to concern for worsening pericardial effusion  -Continue  metoprolol and digoxin  -f/u cardiology rec,spoke with cardio PA  -LAST ECHO with 60-65% EF, grade 1 diastolic dysfxn  Defer anticoagulation for now - will re-evaluate in a few days as pre discussion with Cardiology    History of viral pericarditis with small pericardial effusion  Was previously on ASA, colchicine, both d/c’ ed by cardiology due to GI upset  S/p prednisone course, last dose on 8/4, completed dose prior to admission  Cardio started colchicine as esr/crp high - gastroprotection    Hyperlipidemia - on statin     Persistent elevated Alkaline phosphatase  -us abd stable   -hepatitis panel neg  -f/u lfts.    GERD - on Protonix    VTE_ Lovenox subcut     Discussed with patient and , cardiology, rheumatology and ID    Disposition - Likely discharge home in next 24 hours. Has appointment with primary Cardiologist Dr Gomez on 8/27/21 as per patient

## 2021-08-23 NOTE — PROGRESS NOTE ADULT - REASON FOR ADMISSION
sob,fever

## 2021-08-23 NOTE — DIETITIAN INITIAL EVALUATION ADULT. - PERTINENT LABORATORY DATA
08-23 Na139 mmol/L Glu 94 mg/dL K+ 3.2 mmol/L<L> Cr  0.67 mg/dL BUN 14.6 mg/dL Phos n/a   Alb n/a   PAB n/a

## 2021-08-23 NOTE — PROGRESS NOTE ADULT - PROVIDER SPECIALTY LIST ADULT
Cardiology
Hospitalist
Hospitalist
Infectious Disease
Hospitalist
Infectious Disease
Thoracic Surgery
Thoracic Surgery
Cardiology
Cardiology
Hospitalist
Pulmonology
Cardiology
Thoracic Surgery

## 2021-08-23 NOTE — DIETITIAN INITIAL EVALUATION ADULT. - SIGNS/SYMPTOMS
as evidenced by pt with 8% wt loss x2 m, sev muscle wasting/fat loss, meeting <50% est energy intake

## 2021-08-23 NOTE — PROGRESS NOTE ADULT - SUBJECTIVE AND OBJECTIVE BOX
[place] [facility]                                                               Office: [address] [address]                                                              Telephone: [phone]. Fax:[phone]                                                                             PROGRESS NOTE  Reason for follow up: SOB,fever  Overnight: No new events.   Update:    8/23/2021:   Feels abetter. chest tube out.  Out of Bed to Chair. ambulates.  tolerated colchicine with PPI and GERd treatment.     8/19: Pt denies chest pain, palpitations, SOB. Tele-Afib HR @ 80-90s. Pt states that her SOB has greatly improved but she feels physically weak. Consider PT consult, continue protein shake supplement. Pt was encouraged to use incentive spirometer.  Chest tube yielding serous 100ml fluid.    Subjective: " i am feelign better ;  ROS: cardiac : no chest pain  Resp : No cough  GI: no     	  Vitals:   Vital Signs Last 24 Hrs  T(C): 37.1 (08-23-21 @ 08:32), Max: 37.1 (08-22-21 @ 21:13)  T(F): 98.7 (08-23-21 @ 08:32), Max: 98.7 (08-22-21 @ 21:13)  HR: 88 (08-23-21 @ 08:32) (81 - 88)  BP: 103/71 (08-23-21 @ 08:32) (92/62 - 118/86)  BP(mean): --  RR: 18 (08-23-21 @ 08:32) (16 - 18)  SpO2: 99% (08-23-21 @ 08:32) (98% - 99%)    PHYSICAL EXAM:  Appearance: Comfortable. No acute distress  HEENT:  Head and neck: Atraumatic. Normocephalic.  Normal oral mucosa, PERRL, Neck is supple. No JVD, No carotid bruit.   Neurologic: A & O x 3, no focal deficits. EOMI.  Lymphatic: No cervical lymphadenopathy  Cardiovascular: Normal S1 S2, No murmur, rubs/gallops. No JVD, No edema  Respiratory: Lungs clear to auscultation, +chest tube left  Gastrointestinal:  Soft, Non-tender, + BS  Lower Extremities: No edema  Psychiatry: Patient is calm. No agitation. Mood & affect appropriate  Skin: No rashes/ ecchymoses/cyanosis/ulcers visualized on the face, hands or feet.      CURRENT MEDICATIONS:   MEDICATIONS  (STANDING):  digoxin     Tablet 125 MICROGram(s) Oral daily  ethacrynic acid 25 milliGRAM(s) Oral daily  metoprolol succinate ER 75 milliGRAM(s) Oral daily    pantoprazole    Tablet  piperacillin/tazobactam IVPB..  atorvastatin  colchicine  enoxaparin Injectable  potassium chloride    Tablet ER  saccharomyces boulardii    PRN: ALBUTerol    0.083% PRN  aluminum hydroxide/magnesium hydroxide/simethicone Suspension PRN  oxycodone    5 mG/acetaminophen 325 mG PRN        DIAGNOSTIC TESTING:    [ ] Echocardiogram: < from: TTE Echo Limited or F/U (08.05.21 @ 21:58) >  Summary:   1. Low normal global left ventricular systolic function.   2. Left ventricular ejection fraction, by visual estimation, is 50 to 55%.   3. Trivial pericardial effusion.   4. Large pleural effusion in both left and right lateral regions.      OTHER:     CT:< from: CT Chest No Cont (08.17.21 @ 17:50) >    IMPRESSION:  Left lower lobe thick linear opacity with bronchial dilatation more likely represents atelectasis    Xray:< from: Xray Chest 1 View- PORTABLE-Routine (Xray Chest 1 View- PORTABLE-Routine in AM.) (08.19.21 @ 04:29) >  IMPRESSION:    Small bilateral pleural effusions with adjacent atelectasis and an area of retrocardiac atelectasis, unchanged    US:< from: US Abdomen Complete (US Abdomen Complete .) (08.19.21 @ 17:19) >  IMPRESSION:  RIGHT pleural effusion. Otherwise unremarkable exam.      LABS:	 	  CARDIAC MARKERS ( 17 Aug 2021 12:48 )  x     / <0.01 ng/mL / x     / x     / x      p-BNP 17 Aug 2021 12:48: 1992 pg/mL                                            10.6   6.44  )-----------( 474      ( 23 Aug 2021 05:46 )             33.8   N=x    ; L=x        23 Aug 2021 05:46    139    |  96     |  14.6   ----------------------------<  94     3.2     |  33.0   |  0.67     Ca    8.8        23 Aug 2021 05:46    TPro  6.6    /  Alb  3.0    /  TBili  0.3    /  DBili  x      /  AST  20     /  ALT  20     /  AlkPhos  325    22 Aug 2021 10:15      Hepatic panel: 22 Aug 2021 10:15  6.6   | 3.0                            0.3   | 0.3  /x                              20    | 20                                /325  \par                                   TELEMETRY: Afib HR @ 80-90s

## 2021-08-23 NOTE — PROGRESS NOTE ADULT - SUBJECTIVE AND OBJECTIVE BOX
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  Left Chest tube placed with 900ml fluid.  Fever, dyspnea and large left pleural effusion on admission due to suspected GNR PNA with parapneumonic effusion s/p left chest tube placement. Normoxic on room air. Afebrile now. Doubt infectious pneumonia and Vancomycin discontinued earlier. Cultures negative so far. TTE with intact LVSF but suggestive early constriction. CT with atelectasis  -oxygen to keep SpO2 >91%,bronchodilator  -Discontinue zosyn if final cultures negative  - Follow Rheumatology work up  ACOSTA      INTERVAL HPI/OVERNIGHT EVENTS:  Comfortable     MEDICATIONS  (STANDING):  atorvastatin 20 milliGRAM(s) Oral at bedtime  colchicine 0.3 milliGRAM(s) Oral two times a day  digoxin     Tablet 125 MICROGram(s) Oral daily  enoxaparin Injectable 40 milliGRAM(s) SubCutaneous daily  ethacrynic acid 25 milliGRAM(s) Oral daily  metoprolol succinate ER 75 milliGRAM(s) Oral daily  pantoprazole    Tablet 40 milliGRAM(s) Oral before breakfast  piperacillin/tazobactam IVPB.. 3.375 Gram(s) IV Intermittent every 8 hours  potassium chloride    Tablet ER 40 milliEquivalent(s) Oral every 4 hours  saccharomyces boulardii 250 milliGRAM(s) Oral two times a day      MEDICATIONS  (PRN):  ALBUTerol    0.083% 2.5 milliGRAM(s) Nebulizer every 6 hours PRN Wheezing  aluminum hydroxide/magnesium hydroxide/simethicone Suspension 30 milliLiter(s) Oral every 6 hours PRN Dyspepsia  oxycodone    5 mG/acetaminophen 325 mG 1 Tablet(s) Oral every 4 hours PRN Moderate Pain (4 - 6)      Allergies    sulfa drugs (Hives)    Intolerances        PAST MEDICAL & SURGICAL HISTORY:  HLD (hyperlipidemia)    Osteoarthritis    FUO (fever of unknown origin)    H/O pericarditis    TIA (transient ischemic attack)    Afib    H/O rotator cuff surgery        SOCIAL HISTORY  Smoking History:       REVIEW OF SYSTEMS:    CONSTITUTIONAL:  No distress    HEENT:  Eyes:  No diplopia or blurred vision. ENT:  No earache, sore throat or runny nose.    CARDIOVASCULAR:  No pressure, squeezing, tightness, heaviness or aching about the chest; no palpitations.    RESPIRATORY:  No cough, shortness of breath, PND or orthopnea. Mild SOBOE    GASTROINTESTINAL:  No nausea, vomiting or diarrhea.    GENITOURINARY:  No dysuria, frequency or urgency.    NEUROLOGIC:  No paresthesias, fasciculations, seizures or weakness.    PSYCHIATRIC:  No disorder of thought or mood.    Vital Signs Last 24 Hrs  T(C): 37.1 (23 Aug 2021 08:32), Max: 37.1 (22 Aug 2021 21:13)  T(F): 98.7 (23 Aug 2021 08:32), Max: 98.7 (22 Aug 2021 21:13)  HR: 88 (23 Aug 2021 08:32) (81 - 88)  BP: 103/71 (23 Aug 2021 08:32) (92/62 - 118/86)  BP(mean): --  RR: 18 (23 Aug 2021 08:32) (16 - 18)  SpO2: 99% (23 Aug 2021 08:32) (98% - 99%)    PHYSICAL EXAMINATION:    GENERAL: The patient is awake and alert in no apparent distress.     HEENT: Head is normocephalic and atraumatic. Extraocular muscles are intact. Mucous membranes are moist.    NECK: Supple.    LUNGS: Clear to auscultation without wheezing, rales or rhonchi; respirations unlabored    HEART: Regular rate and rhythm without murmur.    ABDOMEN: Soft, nontender, and nondistended.      EXTREMITIES: Without any cyanosis, clubbing, rash, lesions or edema.    NEUROLOGIC: Grossly intact.    LABS:                        10.6   6.44  )-----------( 474      ( 23 Aug 2021 05:46 )             33.8     08-23    139  |  96<L>  |  14.6  ----------------------------<  94  3.2<L>   |  33.0<H>  |  0.67    Ca    8.8      23 Aug 2021 05:46    TPro  6.6  /  Alb  3.0<L>  /  TBili  0.3<L>  /  DBili  x   /  AST  20  /  ALT  20  /  AlkPhos  325<H>  08-22      MICROBIOLOGY:  Neg  Stephen>250    RADIOLOGY & ADDITIONAL STUDIES:       EXAM:  XR CHEST PORTABLE ROUTINE 1V                          PROCEDURE DATE:  08/22/2021          INTERPRETATION:  TECHNIQUE: Single portable view of the chest.    COMPARISON:  8/21/2021    CLINICAL HISTORY: s/p left pigtail    FINDINGS:    Singlefrontal view of the chest demonstrates left lower lobe pigtail catheter. No large pneumothorax. S-shaped scoliosis of the thoracic spine. Right shoulder arthroplasty. The cardiomediastinal silhouette is normal. No acute osseous abnormalities. Overlying EKG leads and wires are noted    IMPRESSION: No interval change.    --- End of Report ---    JUAN ODONNELL MD; Attending Radiologist  This document has been electronically signed. Aug 22 2021  9:16PM

## 2021-08-23 NOTE — PROGRESS NOTE ADULT - SUBJECTIVE AND OBJECTIVE BOX
[facility]  INFECTIOUS DISEASES AND INTERNAL MEDICINE at [place]  =======================================================  Shahriar Galeano MD  Diplomates American Board of Internal Medicine and Infectious Diseases  Tel: [phone]      Fax: [phone]  =======================================================    LOUIS MEJIA [id number]    Follow up: Loculated pleural effusion    No fever or chills  No complaints       Allergies:  sulfa drugs (Hives)      REVIEW OF SYSTEMS:  CONSTITUTIONAL:  No Fever or chills  HEENT:  No diplopia or blurred vision.  No earache, sore throat or runny nose.  CARDIOVASCULAR:  No pressure, squeezing, strangling, tightness, heaviness or aching about the chest, neck, axilla or epigastrium.  RESPIRATORY:  No cough, shortness of breath  GASTROINTESTINAL:  No nausea, vomiting or diarrhea.  GENITOURINARY:  No dysuria, frequency or urgency.   MUSCULOSKELETAL:  no joint aches, no muscle pain  SKIN:  No change in skin, hair or nails.  NEUROLOGIC:  No Headaches, seizures  PSYCHIATRIC:  No disorder of thought or mood.  ENDOCRINE:  No heat or cold intolerance  HEMATOLOGICAL:  No easy bruising or bleeding.       Physical Exam:  GEN: NAD, pleasant  HEENT: normocephalic and atraumatic. EOMI. PERRL.  Anicteric  NECK: Supple.   LUNGS: Clear to auscultation.  HEART: Regular rate and rhythm   ABDOMEN: Soft, nontender, and nondistended.  Positive bowel sounds.    : No CVA tenderness  EXTREMITIES: Without any edema.  MSK: No joint swelling  NEUROLOGIC: No Focal Deficits  PSYCHIATRIC: Appropriate affect .  SKIN: No Rash      Vitals:    T(F): 98.7 (23 Aug 2021 08:32), Max: 98.7 (22 Aug 2021 21:13)  HR: 88 (23 Aug 2021 08:32)  BP: 103/71 (23 Aug 2021 08:32)  RR: 18 (23 Aug 2021 08:32)  SpO2: 99% (23 Aug 2021 08:32) (98% - 99%)  temp max in last 48H T(F): , Max: 99 (08-21-21 @ 21:03)    Current Antibiotics:  piperacillin/tazobactam IVPB.. 3.375 Gram(s) IV Intermittent every 8 hours    Other medications:  atorvastatin 20 milliGRAM(s) Oral at bedtime  colchicine 0.3 milliGRAM(s) Oral two times a day  digoxin     Tablet 125 MICROGram(s) Oral daily  enoxaparin Injectable 40 milliGRAM(s) SubCutaneous daily  ethacrynic acid 25 milliGRAM(s) Oral daily  metoprolol succinate ER 75 milliGRAM(s) Oral daily  pantoprazole    Tablet 40 milliGRAM(s) Oral before breakfast  potassium chloride    Tablet ER 40 milliEquivalent(s) Oral every 4 hours  saccharomyces boulardii 250 milliGRAM(s) Oral two times a day                            10.6   6.44  )-----------( 474      ( 23 Aug 2021 05:46 )             33.8     08-23    139  |  96<L>  |  14.6  ----------------------------<  94  3.2<L>   |  33.0<H>  |  0.67    Ca    8.8      23 Aug 2021 05:46    TPro  6.6  /  Alb  3.0<L>  /  TBili  0.3<L>  /  DBili  x   /  AST  20  /  ALT  20  /  AlkPhos  325<H>  08-22    RECENT CULTURES:  08-17 @ 22:18 .Body Fluid Pleural Fluid     No growth  polymorphonuclear leukocytes seen  No organisms seen  by cytocentrifuge    08-17 @ 16:19 .Blood Blood     No growth at 5 days.    08-17 @ 16:18 .Blood Blood     No growth at 5 days.      WBC Count: 6.44 K/uL (08-23-21 @ 05:46)  WBC Count: 7.11 K/uL (08-22-21 @ 10:15)  WBC Count: 11.41 K/uL (08-21-21 @ 07:46)  WBC Count: 7.44 K/uL (08-20-21 @ 05:49)  WBC Count: 11.77 K/uL (08-19-21 @ 06:10)    Creatinine, Serum: 0.67 mg/dL (08-23-21 @ 05:46)  Creatinine, Serum: 0.83 mg/dL (08-22-21 @ 10:15)  Creatinine, Serum: 0.81 mg/dL (08-21-21 @ 07:46)  Creatinine, Serum: 0.73 mg/dL (08-20-21 @ 05:49)  Creatinine, Serum: 0.73 mg/dL (08-19-21 @ 06:10)    C-Reactive Protein, Serum: 28 mg/L (08-23-21 @ 05:46)  C-Reactive Protein, Serum: 83 mg/L (08-20-21 @ 05:49)  C-Reactive Protein, Serum: 40 mg/L (08-19-21 @ 06:10)  C-Reactive Protein, Serum: 64 mg/L (08-18-21 @ 09:09)  C-Reactive Protein, Serum: 81 mg/L (08-17-21 @ 20:05)    Sedimentation Rate, Erythrocyte: 52 mm/hr (08-23-21 @ 05:46)  Sedimentation Rate, Erythrocyte: 55 mm/hr (08-20-21 @ 05:49)  Sedimentation Rate, Erythrocyte: 52 mm/hr (08-19-21 @ 06:10)  Sedimentation Rate, Erythrocyte: 52 mm/hr (08-18-21 @ 09:09)  Sedimentation Rate, Erythrocyte: 55 mm/hr (08-17-21 @ 20:05)    Procalcitonin, Serum: 0.08 ng/mL (08-19-21 @ 06:10)  Procalcitonin, Serum: 0.10 ng/mL (08-18-21 @ 09:09)     SARS-CoV-2: NotDetec (08-04-21 @ 14:28)  Rapid RVP Result: NotDetec (08-04-21 @ 14:28)    COVID-19 PCR: NotDetec (08-17-21 @ 12:43)          < from: Xray Chest 1 View- PORTABLE-Routine (Xray Chest 1 View- PORTABLE-Routine in AM.) (08.22.21 @ 06:02) >  EXAM:  XR CHEST PORTABLE ROUTINE 1V                          PROCEDURE DATE:  08/22/2021      INTERPRETATION:  TECHNIQUE: Single portable view of the chest.    COMPARISON:  8/21/2021    CLINICAL HISTORY: s/p left pigtail    FINDINGS:    Singlefrontal view of the chest demonstrates left lower lobe pigtail catheter. No large pneumothorax. S-shaped scoliosis of the thoracic spine. Right shoulder arthroplasty. The cardiomediastinal silhouette is normal. No acute osseous abnormalities. Overlying EKG leads and wires are noted    IMPRESSION: No interval change.    --- End of Report ---    < end of copied text >

## 2021-08-23 NOTE — PROGRESS NOTE ADULT - ATTENDING COMMENTS
echoi reviewed.  early signs of constriction may be present. significnat diuresis.   CRP was trending donw abut now going up again. seen by rhueumnatology  Rechallenge colchicine 0.3 mg Q12 since now she is on PPI and feels better.  if she is not able to tolerate it and the CRP is eleavted ,. consider steroids high dose  Dental work up as outpaitent.  Also discuss with ID about WBC or indium scan .   reduce ehtacrynioc acid to 25 mg daily. incentive spuiroimtery. Out of Bed to Chair . ambu;ate  no plan for right heart cath at this time as it will not . she would need to be on antiinflammatory as first line therapy for sub acute or chroinic pericarditis.   If no significant fluid on chest tuibe,  consider removing chest tube vs. pleurex catheter if persistent drainage.  Protein supplement.
did not tolearte colhcicine. significant Gi upset.   stopped colchicine.   PPI.  will review TTE and decide to extend steroid course.  will discuss with UID if there is any infectious process confirmed or going on,.
incentive spirometer. colchicine. repeat CRP on discharge. may evaluate of or steroids again.   ensure.   will review echo. evaluate for constriction. will treat aggressiveley with medicaions and anitinflalmamtory  sagrario said she had suimilar epsiodes of high fevers few years ago when she had dental tooth infection where physiciasn were not able to find the problem    recommedn CT mandible or ID evaluation to see if volodymyr needs Ct mandible .  outpatient dental evaluation.  will evaluate for daily fluid from chest tube. if a lot of fluid still drfianing then decision for pleurex catheter
echoi reviewed.  early signs of constriction may be present. significnat diuresis.   CRP was trending donw abut now going up again. seen by rhueumnatology  Rechallenge colchicine 0.3 mg Q12 since now she is on PPI and feels better.  if she is not able to tolerate it and the CRP is eleavted ,. consider steroids high dose  Dental work up as outpaitent.  Also discuss with ID about WBC or indium scan .   reduce ehtacrynioc acid to 25 mg daily. incentive spuiroimtery. Out of Bed to Chair . ambu;ate  no plan for right heart cath at this time as it will not . she would need to be on antiinflammatory as first line therapy for sub acute or chroinic pericarditis.   If no significant fluid on chest tuibe,  consider removing chest tube vs. pleurex catheter if persistent drainage.  Protein supplement.

## 2021-08-23 NOTE — PROGRESS NOTE ADULT - ASSESSMENT
Assess    Recurrent Effusion  Infectious GONZALEZ unrevealing  Rheumatologic GONZALEZ in progress    Rec    Complete Zosyn course  Rheum FU   Little to add

## 2021-08-23 NOTE — PROGRESS NOTE ADULT - NUTRITIONAL ASSESSMENT
This patient has been assessed with a concern for Malnutrition and has been determined to have a diagnosis/diagnoses of Severe protein-calorie malnutrition.    This patient is being managed with:   Diet Regular-  DASH/TLC {Sodium & Cholesterol Restricted} (DASH)  Supplement Feeding Modality:  Oral  Ensure Enlive Cans or Servings Per Day:  1       Frequency:  Three Times a day  Entered: Aug 18 2021 11:47AM

## 2021-08-23 NOTE — PROGRESS NOTE ADULT - ASSESSMENT
76y  Female with h/o Transient global amnesia, carotid artery atherosclerosis, HLD, recently diagnosed viral pericarditis (6/23/2021), complicated by new onset Afib with RVR requiring [facility] admission in 7/2021, not on AC due to known small pericardial effusion recent admission for PNA, fever with loculated pleural effusion s/p chest tube and pleural fluid cultures are negative was on Zosyn while admitted and was discharged on PO AAugmentin which patient was supposed to complete on 8/24, was discharged on 8/10. Patient returned to the ER on 8/17 with fever to 103F at home, SOB on exertion. Patient was seen by CT surgery in the ER and is s/p CT placement in the ER 8/17. No fever this hospitalization, no leukocytosis. Patient was started on IV Vancomycin and Zosyn.      Loculated pleural effusion  Fever at home       - Blood cultures negative   - Pleural fluid cultures no growth   - RVP/COVID 19 PCR negative   - CXR with pleural effusions   - Procalcitonin level 0.08  - Continue Zosyn  - Rheumatology eval noted  - Plan to complete course of antibiotics 8/24/21  - Follow up cultures  - Trend Fever  - Trend Leukocytosis      Thank you for allowing me to participate in the care of your patient.   Will sign off. Please call PRN.

## 2021-08-24 ENCOUNTER — TRANSCRIPTION ENCOUNTER (OUTPATIENT)
Age: 76
End: 2021-08-24

## 2021-08-24 VITALS — TEMPERATURE: 98 F | HEART RATE: 80 BPM | DIASTOLIC BLOOD PRESSURE: 75 MMHG | SYSTOLIC BLOOD PRESSURE: 120 MMHG

## 2021-08-24 LAB
24R-OH-CALCIDIOL SERPL-MCNC: 64.6 NG/ML — SIGNIFICANT CHANGE UP (ref 30–80)
ANION GAP SERPL CALC-SCNC: 11 MMOL/L — SIGNIFICANT CHANGE UP (ref 5–17)
ANTI-RIBONUCLEAR PROTEIN: <0.2 AI — SIGNIFICANT CHANGE UP
BUN SERPL-MCNC: 11.2 MG/DL — SIGNIFICANT CHANGE UP (ref 8–20)
C3 SERPL-MCNC: 124 MG/DL — SIGNIFICANT CHANGE UP (ref 81–157)
C4 SERPL-MCNC: 23 MG/DL — SIGNIFICANT CHANGE UP (ref 13–39)
CALCIUM SERPL-MCNC: 9.1 MG/DL — SIGNIFICANT CHANGE UP (ref 8.6–10.2)
CENTROMERE AB SER-ACNC: <0.2 AI — SIGNIFICANT CHANGE UP
CHLORIDE SERPL-SCNC: 99 MMOL/L — SIGNIFICANT CHANGE UP (ref 98–107)
CO2 SERPL-SCNC: 30 MMOL/L — HIGH (ref 22–29)
CREAT SERPL-MCNC: 0.78 MG/DL — SIGNIFICANT CHANGE UP (ref 0.5–1.3)
DSDNA AB FLD-ACNC: <0.2 AI — SIGNIFICANT CHANGE UP
ENA SCL70 AB SER-ACNC: <0.2 AI — SIGNIFICANT CHANGE UP
ENA SM AB FLD QL: <0.2 AI — SIGNIFICANT CHANGE UP
ENA SS-A AB FLD IA-ACNC: <0.2 AI — SIGNIFICANT CHANGE UP
GLUCOSE SERPL-MCNC: 87 MG/DL — SIGNIFICANT CHANGE UP (ref 70–99)
POTASSIUM SERPL-MCNC: 3.7 MMOL/L — SIGNIFICANT CHANGE UP (ref 3.5–5.3)
POTASSIUM SERPL-SCNC: 3.7 MMOL/L — SIGNIFICANT CHANGE UP (ref 3.5–5.3)
RHEUMATOID FACT SERPL-ACNC: <10 IU/ML — SIGNIFICANT CHANGE UP (ref 0–13)
SODIUM SERPL-SCNC: 140 MMOL/L — SIGNIFICANT CHANGE UP (ref 135–145)

## 2021-08-24 PROCEDURE — 82306 VITAMIN D 25 HYDROXY: CPT

## 2021-08-24 PROCEDURE — 83880 ASSAY OF NATRIURETIC PEPTIDE: CPT

## 2021-08-24 PROCEDURE — 83986 ASSAY PH BODY FLUID NOS: CPT

## 2021-08-24 PROCEDURE — 86769 SARS-COV-2 COVID-19 ANTIBODY: CPT

## 2021-08-24 PROCEDURE — 86431 RHEUMATOID FACTOR QUANT: CPT

## 2021-08-24 PROCEDURE — 85025 COMPLETE CBC W/AUTO DIFF WBC: CPT

## 2021-08-24 PROCEDURE — 99239 HOSP IP/OBS DSCHRG MGMT >30: CPT

## 2021-08-24 PROCEDURE — 36415 COLL VENOUS BLD VENIPUNCTURE: CPT

## 2021-08-24 PROCEDURE — 86235 NUCLEAR ANTIGEN ANTIBODY: CPT

## 2021-08-24 PROCEDURE — 87102 FUNGUS ISOLATION CULTURE: CPT

## 2021-08-24 PROCEDURE — 96374 THER/PROPH/DIAG INJ IV PUSH: CPT

## 2021-08-24 PROCEDURE — 86255 FLUORESCENT ANTIBODY SCREEN: CPT

## 2021-08-24 PROCEDURE — 88112 CYTOPATH CELL ENHANCE TECH: CPT

## 2021-08-24 PROCEDURE — 80202 ASSAY OF VANCOMYCIN: CPT

## 2021-08-24 PROCEDURE — 86038 ANTINUCLEAR ANTIBODIES: CPT

## 2021-08-24 PROCEDURE — 86900 BLOOD TYPING SEROLOGIC ABO: CPT

## 2021-08-24 PROCEDURE — 71046 X-RAY EXAM CHEST 2 VIEWS: CPT

## 2021-08-24 PROCEDURE — 74177 CT ABD & PELVIS W/CONTRAST: CPT

## 2021-08-24 PROCEDURE — 86901 BLOOD TYPING SEROLOGIC RH(D): CPT

## 2021-08-24 PROCEDURE — 89051 BODY FLUID CELL COUNT: CPT

## 2021-08-24 PROCEDURE — 86160 COMPLEMENT ANTIGEN: CPT

## 2021-08-24 PROCEDURE — 93005 ELECTROCARDIOGRAM TRACING: CPT

## 2021-08-24 PROCEDURE — 85730 THROMBOPLASTIN TIME PARTIAL: CPT

## 2021-08-24 PROCEDURE — 80053 COMPREHEN METABOLIC PANEL: CPT

## 2021-08-24 PROCEDURE — 87205 SMEAR GRAM STAIN: CPT

## 2021-08-24 PROCEDURE — 85027 COMPLETE CBC AUTOMATED: CPT

## 2021-08-24 PROCEDURE — 80162 ASSAY OF DIGOXIN TOTAL: CPT

## 2021-08-24 PROCEDURE — 71045 X-RAY EXAM CHEST 1 VIEW: CPT

## 2021-08-24 PROCEDURE — 86480 TB TEST CELL IMMUN MEASURE: CPT

## 2021-08-24 PROCEDURE — 94640 AIRWAY INHALATION TREATMENT: CPT

## 2021-08-24 PROCEDURE — 86036 ANCA SCREEN EACH ANTIBODY: CPT

## 2021-08-24 PROCEDURE — 99285 EMERGENCY DEPT VISIT HI MDM: CPT | Mod: 25

## 2021-08-24 PROCEDURE — 87040 BLOOD CULTURE FOR BACTERIA: CPT

## 2021-08-24 PROCEDURE — 82042 OTHER SOURCE ALBUMIN QUAN EA: CPT

## 2021-08-24 PROCEDURE — 88305 TISSUE EXAM BY PATHOLOGIST: CPT

## 2021-08-24 PROCEDURE — 86140 C-REACTIVE PROTEIN: CPT

## 2021-08-24 PROCEDURE — 80074 ACUTE HEPATITIS PANEL: CPT

## 2021-08-24 PROCEDURE — 83615 LACTATE (LD) (LDH) ENZYME: CPT

## 2021-08-24 PROCEDURE — 85652 RBC SED RATE AUTOMATED: CPT

## 2021-08-24 PROCEDURE — C8929: CPT

## 2021-08-24 PROCEDURE — C1729: CPT

## 2021-08-24 PROCEDURE — U0003: CPT

## 2021-08-24 PROCEDURE — 96372 THER/PROPH/DIAG INJ SC/IM: CPT | Mod: XU

## 2021-08-24 PROCEDURE — 76700 US EXAM ABDOM COMPLETE: CPT

## 2021-08-24 PROCEDURE — 84157 ASSAY OF PROTEIN OTHER: CPT

## 2021-08-24 PROCEDURE — 80048 BASIC METABOLIC PNL TOTAL CA: CPT

## 2021-08-24 PROCEDURE — 83516 IMMUNOASSAY NONANTIBODY: CPT

## 2021-08-24 PROCEDURE — 82652 VIT D 1 25-DIHYDROXY: CPT

## 2021-08-24 PROCEDURE — 82164 ANGIOTENSIN I ENZYME TEST: CPT

## 2021-08-24 PROCEDURE — 86850 RBC ANTIBODY SCREEN: CPT

## 2021-08-24 PROCEDURE — 71250 CT THORAX DX C-: CPT | Mod: MA

## 2021-08-24 PROCEDURE — 86225 DNA ANTIBODY NATIVE: CPT

## 2021-08-24 PROCEDURE — 87075 CULTR BACTERIA EXCEPT BLOOD: CPT

## 2021-08-24 PROCEDURE — U0005: CPT

## 2021-08-24 PROCEDURE — 84145 PROCALCITONIN (PCT): CPT

## 2021-08-24 PROCEDURE — 82945 GLUCOSE OTHER FLUID: CPT

## 2021-08-24 PROCEDURE — 87070 CULTURE OTHR SPECIMN AEROBIC: CPT

## 2021-08-24 PROCEDURE — 85610 PROTHROMBIN TIME: CPT

## 2021-08-24 PROCEDURE — 84484 ASSAY OF TROPONIN QUANT: CPT

## 2021-08-24 PROCEDURE — 83605 ASSAY OF LACTIC ACID: CPT

## 2021-08-24 RX ORDER — COLCHICINE 0.6 MG
0.5 TABLET ORAL
Qty: 30 | Refills: 0
Start: 2021-08-24 | End: 2021-09-22

## 2021-08-24 RX ORDER — POTASSIUM CHLORIDE 20 MEQ
20 PACKET (EA) ORAL DAILY
Refills: 0 | Status: DISCONTINUED | OUTPATIENT
Start: 2021-08-24 | End: 2021-08-24

## 2021-08-24 RX ORDER — ETHACRYNIC ACID 25 MG/1
1 TABLET ORAL
Qty: 30 | Refills: 0
Start: 2021-08-24 | End: 2021-09-22

## 2021-08-24 RX ADMIN — PANTOPRAZOLE SODIUM 40 MILLIGRAM(S): 20 TABLET, DELAYED RELEASE ORAL at 05:30

## 2021-08-24 RX ADMIN — PIPERACILLIN AND TAZOBACTAM 25 GRAM(S): 4; .5 INJECTION, POWDER, LYOPHILIZED, FOR SOLUTION INTRAVENOUS at 00:16

## 2021-08-24 RX ADMIN — ATORVASTATIN CALCIUM 20 MILLIGRAM(S): 80 TABLET, FILM COATED ORAL at 00:15

## 2021-08-24 RX ADMIN — PIPERACILLIN AND TAZOBACTAM 25 GRAM(S): 4; .5 INJECTION, POWDER, LYOPHILIZED, FOR SOLUTION INTRAVENOUS at 10:27

## 2021-08-24 RX ADMIN — ETHACRYNIC ACID 25 MILLIGRAM(S): 25 TABLET ORAL at 05:30

## 2021-08-24 RX ADMIN — Medication 250 MILLIGRAM(S): at 05:30

## 2021-08-24 RX ADMIN — Medication 125 MICROGRAM(S): at 05:30

## 2021-08-24 RX ADMIN — Medication 75 MILLIGRAM(S): at 05:30

## 2021-08-24 RX ADMIN — Medication 20 MILLIEQUIVALENT(S): at 10:30

## 2021-08-24 RX ADMIN — Medication 0.3 MILLIGRAM(S): at 10:27

## 2021-08-24 NOTE — DISCHARGE NOTE PROVIDER - DETAILS OF MALNUTRITION DIAGNOSIS/DIAGNOSES
This patient has been assessed with a concern for Malnutrition and was treated during this hospitalization for the following Nutrition diagnosis/diagnoses:     -  08/23/2021: Severe protein-calorie malnutrition

## 2021-08-24 NOTE — DISCHARGE NOTE PROVIDER - NSDCPNSUBOBJ_GEN_ALL_CORE
HOSPITALIST PROGRESS NOTE    LOUIS MEJIA  [id number]  76yFemale    Patient is a 76y old  Female who presents with a chief complaint of sob,fever (24 Aug 2021 08:17)      SUBJECTIVE:   Chart reviewed since last visit.  Patient seen and examined at bedside for recurrent pleural effusion and constrictive pericarditis  Denies any chest pain, palpitations, dyspnea, dizziness, fever or chills      OBJECTIVE:  Vital Signs Last 24 Hrs  T(C): 36.8 (24 Aug 2021 13:47), Max: 36.8 (23 Aug 2021 22:24)  T(F): 98.2 (24 Aug 2021 13:47), Max: 98.2 (23 Aug 2021 22:24)  HR: 80 (24 Aug 2021 13:47) (80 - 98)  BP: 120/75 (24 Aug 2021 13:47) (113/74 - 122/78)  BP(mean): 93 (24 Aug 2021 08:15) (93 - 93)  RR: 18 (24 Aug 2021 08:15) (16 - 18)  SpO2: 100% (24 Aug 2021 08:15) (98% - 100%)    PHYSICAL EXAMINATION  BMI - 18  General: lean elderly female, sitting up in bed, No acute distress  HEENT:  EOMI  NECK:  Supple  CVS: Irregular S1 S2  RESP: Fair air entry, decreased at right base   GI:  Soft nondistended, nontender BS+  : No suprapubic tenderness  MSK:  FROM, no Edema  CNS:  AAOX3. CN, Strength and sensation grossly intact  INTEG:  warm dry skin  PSYCH:  Fair mood    MONITOR:  CAPILLARY BLOOD GLUCOSE            I&O's Summary    23 Aug 2021 07:01  -  24 Aug 2021 07:00  --------------------------------------------------------  IN: 415 mL / OUT: 0 mL / NET: 415 mL    24 Aug 2021 07:01  -  24 Aug 2021 14:54  --------------------------------------------------------  IN: 180 mL / OUT: 0 mL / NET: 180 mL                            10.6   6.44  )-----------( 474      ( 23 Aug 2021 05:46 )             33.8       08-24    140  |  99  |  11.2  ----------------------------<  87  3.7   |  30.0<H>  |  0.78    Ca    9.1      24 Aug 2021 05:25              Culture:    TTE:    RADIOLOGY        MEDICATIONS  (STANDING):  atorvastatin 20 milliGRAM(s) Oral at bedtime  colchicine 0.3 milliGRAM(s) Oral two times a day  digoxin     Tablet 125 MICROGram(s) Oral daily  enoxaparin Injectable 40 milliGRAM(s) SubCutaneous daily  ethacrynic acid 25 milliGRAM(s) Oral daily  metoprolol succinate ER 75 milliGRAM(s) Oral daily  pantoprazole    Tablet 40 milliGRAM(s) Oral before breakfast  piperacillin/tazobactam IVPB.. 3.375 Gram(s) IV Intermittent every 8 hours  potassium chloride    Tablet ER 20 milliEquivalent(s) Oral daily  predniSONE   Tablet 30 milliGRAM(s) Oral daily  saccharomyces boulardii 250 milliGRAM(s) Oral two times a day      MEDICATIONS  (PRN):  ALBUTerol    0.083% 2.5 milliGRAM(s) Nebulizer every 6 hours PRN Wheezing  aluminum hydroxide/magnesium hydroxide/simethicone Suspension 30 milliLiter(s) Oral every 6 hours PRN Dyspepsia  oxycodone    5 mG/acetaminophen 325 mG 1 Tablet(s) Oral every 4 hours PRN Moderate Pain (4 - 6)

## 2021-08-24 NOTE — DISCHARGE NOTE PROVIDER - NSDCCPCAREPLAN_GEN_ALL_CORE_FT
PRINCIPAL DISCHARGE DIAGNOSIS  Diagnosis: Pleural effusion, left  Assessment and Plan of Treatment: Continue diuretic  Follow up with Rheumatology      SECONDARY DISCHARGE DIAGNOSES  Diagnosis: Pleural effusion, right  Assessment and Plan of Treatment: Continue diuretic  Follow up with Rheumatology    Diagnosis: Pericarditis, constrictive  Assessment and Plan of Treatment: Continue medications as prescribed  Follow up with Cardiology and Rheumatology    Diagnosis: Severe protein-calorie malnutrition  Assessment and Plan of Treatment: Nutritional supplementa    Diagnosis: Paroxysmal atrial fibrillation  Assessment and Plan of Treatment: Continue medications as prescribed  Follow up with Cardiology    Diagnosis: Hypokalemia  Assessment and Plan of Treatment: Continue suppleements  Follow up with PMD for repeat bloodwork    Diagnosis: Elevated alkaline phosphatase level  Assessment and Plan of Treatment: Follow up with PMF for repeat bloodwork    Diagnosis: Dyslipidemia  Assessment and Plan of Treatment: Continue diet and medications as prescribed    Diagnosis: Chronic GERD  Assessment and Plan of Treatment: Continue medications as prescribed

## 2021-08-24 NOTE — DISCHARGE NOTE PROVIDER - NSDCFUSCHEDAPPT_GEN_ALL_CORE_FT
LOUIS MEJIA ; 08/27/2021 ; [facility] Cardio [address]  LOUIS MEJIA ; 08/30/2021 ; [facility]PP Cardio Electro [address]  LOUIS MEJIA ; 09/13/2021 ; NPP IntMed [address]   LOUIS MEJIA ; 10/01/2021 ; NPP Cardio [address]

## 2021-08-24 NOTE — DISCHARGE NOTE PROVIDER - NSDCFUADDAPPT_GEN_ALL_CORE_FT
Agrees to Meds to beds for any new medications prescribed  Already has an appt. with Dr. Gomez on August 27th.

## 2021-08-24 NOTE — DISCHARGE NOTE PROVIDER - HOSPITAL COURSE
76 year old female with PMH TIA, dyslipidemia, Raynaud's and recently diagnosed viral pericarditis (6/23/2021), complicated by new onset AF with RVR during recent admission for presumed Pneumonia and left Pleural effusion s/p chest tube  was discharged 08/10/21 with po Augmentin was referred  by Cardiology for large left pleural effusion. She had a Left Chest tube placed by CTS with 900ml fluid and was eventually removed 8/22/21 after it stopped draining. Fluid analysis leaning towards an exudate. She was continued on antibiotics as per ID recommendations to complete 2 weeks as recommend on prior admission. Her cultures have however remained sterile and imaging significant for effusion and atelectasis rather than consolidation. It is unlikely that patient has pneumonia.   TTE with intact LVSF but suggestive early constriction. She was placed on diuretic which have been continued and potassium supplemented. Rheumatologic work up has been sent and negative to date, pending some studies. Started on Prednisone  as per Rheumatology recommendations in addition to Colchicine that patient has received since admission. She did have some intolerance to Colchicine which improved with PPI and Maalox. Patient may need pericardial biopsy if investigations remain inconclusive.  She also has AF which has remained rate controlled with Metoprolol and Digoxin. CHADsVASc 3, but anticoagulation not started due to concern for worsening pericardial effusion and will be addressed by Cardiology as outpatient. Statin was continued.  Also noted to have elevated Alkaline Phosphatase - viral hepatitis serologies and US abdomen unrevealing.    Patient is medically stable. Normoxic on room air without any symptoms except for mild chest pain at tube insertion site.  Follow up with Cardiology and Rheumatology. 45 minutes on discharge including coordination of care 76 year old female with PMH TIA, dyslipidemia, Raynaud's and recently diagnosed viral pericarditis (6/23/2021), complicated by new onset AF with RVR during recent admission for presumed Pneumonia and left Pleural effusion s/p chest tube  was discharged 08/10/21 with po Augmentin was referred  by Cardiology for large left pleural effusion. She had a Left Chest tube placed by CTS with 900ml fluid and was eventually removed 8/22/21 after it stopped draining. Fluid analysis leaning towards an exudate. She was continued on antibiotics as per ID recommendations to complete 2 weeks as recommend on prior admission. Her cultures have however remained sterile and imaging significant for effusion and atelectasis rather than consolidation. It is unlikely that patient has pneumonia.   TTE with intact LVSF but suggestive early constriction. She was placed on diuretic which have been continued and potassium supplemented. Rheumatologic work up has been sent and negative to date, pending some studies. Started on Prednisone  as per Rheumatology recommendations in addition to Colchicine that patient has received since admission. She did have some intolerance to Colchicine which improved with PPI and Maalox. Patient may need pericardial biopsy if investigations remain inconclusive.  She also has AF which has remained rate controlled with Metoprolol and Digoxin. CHADsVASc 3, but anticoagulation not started due to concern for worsening pericardial effusion and will be addressed by Cardiology as outpatient. Statin was continued.  Also noted to have elevated Alkaline Phosphatase - viral hepatitis serologies and US abdomen unrevealing.    Patient is medically stable. Normoxic on room air without any symptoms except for mild chest pain at tube insertion site.  Follow up with Cardiology and Rheumatology. 45 minutes on discharge including coordination of care.  Discussed with patient daughter Gil as well as PMD Dr Alcantar

## 2021-08-24 NOTE — DISCHARGE NOTE PROVIDER - CARE PROVIDERS DIRECT ADDRESSES
[email or web address]

## 2021-08-24 NOTE — CHART NOTE - NSCHARTNOTEFT_GEN_A_CORE
Pt to be discharge home   Pt to follow up with Dr. Mcbride as outpatient for possible future pleurex if patient continues to reaccumulate.

## 2021-08-24 NOTE — DISCHARGE NOTE PROVIDER - CARE PROVIDER_API CALL
George Gomez)  Cardiovascular Disease  [address], [address]  [place] [address]  Phone: [phone]  Fax: [phone]  Follow Up Time:     Sterling Alcantar)  Internal Medicine  [address] [facility] [address] [facility], [place] [address]  Phone: [phone]  Fax: [phone]  Follow Up Time:     Bertha Ratliff)  Rheumatology  [address] [place] [address]  Phone: [phone]  Fax: [phone]  Follow Up Time:

## 2021-08-24 NOTE — DISCHARGE NOTE PROVIDER - NSDCMRMEDTOKEN_GEN_ALL_CORE_FT
aluminum hydroxide-magnesium hydroxide 200 mg-200 mg/5 mL oral suspension: 30 milliliter(s) orally every 6 hours, As needed, Dyspepsia  colchicine 0.6 mg oral tablet: 0.5 tab(s) orally 2 times a day  digoxin 125 mcg (0.125 mg) oral tablet: 1 tab(s) orally once a day  ethacrynic acid 25 mg oral tablet: 1 tab(s) orally once a day  omeprazole 40 mg oral delayed release capsule: 1 cap(s) orally once a day   predniSONE 10 mg oral tablet: 3 tab(s) orally once a day  rosuvastatin 5 mg oral tablet: 1 tab(s) orally every other day  saccharomyces boulardii lyo 250 mg oral capsule: 1 cap(s) orally 2 times a day  Toprol-XL 25 mg oral tablet, extended release: 3 tab(s) orally once a day

## 2021-08-24 NOTE — DISCHARGE NOTE PROVIDER - PROVIDER TOKENS
PROVIDER:[TOKEN:[[id number]]],PROVIDER:[TOKEN:[[id number]]],PROVIDER:[TOKEN:[[id number]]]

## 2021-08-25 ENCOUNTER — NON-APPOINTMENT (OUTPATIENT)
Age: 76
End: 2021-08-25

## 2021-08-25 LAB
ACE SERPL-CCNC: 25 U/L — SIGNIFICANT CHANGE UP (ref 14–82)
GAMMA INTERFERON BACKGROUND BLD IA-ACNC: 0.03 IU/ML — SIGNIFICANT CHANGE UP
M TB IFN-G BLD-IMP: NEGATIVE — SIGNIFICANT CHANGE UP
M TB IFN-G CD4+ BCKGRND COR BLD-ACNC: 0 IU/ML — SIGNIFICANT CHANGE UP
M TB IFN-G CD4+CD8+ BCKGRND COR BLD-ACNC: 0 IU/ML — SIGNIFICANT CHANGE UP
QUANT TB PLUS MITOGEN MINUS NIL: 1.95 IU/ML — SIGNIFICANT CHANGE UP
VIT D25+D1,25 OH+D1,25 PNL SERPL-MCNC: 49.5 PG/ML — SIGNIFICANT CHANGE UP (ref 19.9–79.3)

## 2021-08-25 NOTE — HISTORY OF PRESENT ILLNESS
[Home] : at home, [unfilled] , at the time of the visit. [Other Location: e.g. Home (Enter Location, City,State)___] : at [unfilled] [Spouse] : spouse [Verbal consent obtained from patient] : the patient, [unfilled] [Post-hospitalization from ___ Hospital] : Post-hospitalization from [unfilled] Hospital [Admitted on: ___] : The patient was admitted on [unfilled] [Discharged on ___] : discharged on [unfilled] [Discharge Summary] : discharge summary [Pertinent Labs] : pertinent labs [Radiology Findings] : radiology findings [Discharge Med List] : discharge medication list [Med Reconciliation] : medication reconciliation has been completed [Patient Contacted By: ____] : and contacted by [unfilled] [FreeTextEntry2] : Copied from [employer]\par '76 year old female with PMH TIA, dyslipidemia, Raynaud's and recently diagnosed viral pericarditis (6/23/2021), complicated by new onset AF with RVR during recent admission for presumed Pneumonia and left Pleural effusion s/p chest tube  was discharged 08/10/21 with po Augmentin was referred  by Cardiology for large left pleural effusion. She had a Left Chest tube placed by CTS with 900ml fluid and was eventually removed 8/22/21 after it stopped draining. Fluid analysis leaning towards an exudate. She was continued on antibiotics as per ID recommendations to complete 2 weeks as recommend on prior admission. Her cultures have however remained sterile and imaging significant for effusion and atelectasis rather than consolidation. It is unlikely that patient has pneumonia. \par TTE with intact LVSF but suggestive early constriction. She was placed on diuretic which have been continued and potassium supplemented. Rheumatologic work up has been sent and negative to date, pending some studies. Started on Prednisone  as per Rheumatology recommendations in addition to Colchicine that patient has received since admission. She did have some intolerance to Colchicine which improved with PPI and Maalox. Patient may need pericardial biopsy if investigations remain inconclusive.\par She also has AF which has remained rate controlled with Metoprolol and Digoxin. CHADsVASc 3, but anticoagulation not started due to concern for worsening pericardial effusion and will be addressed by Cardiology as outpatient. Statin was continued.\par Also noted to have elevated Alkaline Phosphatase - viral hepatitis serologies and US abdomen unrevealing.'\par \par Pt seen via telehealth visit today for increased cough and mouth pain.  Pt overall feeling good, but frustrated with sudden deterioration of health since pericarditis. Although pt had thoracentesis, intermittent SOB and ACOSTA lingers and now with cough that doesn’t improve with OTC cough medicine. Pt also started having pain around her lips, tongue and gums and unable to tolerate solid food. \par

## 2021-08-25 NOTE — HEALTH RISK ASSESSMENT
[] : No [No] : No [No falls in past year] : Patient reported no falls in the past year [0] : 1) Little interest or pleasure doing things: Not at all (0) [1] : 2) Feeling down, depressed, or hopeless for several days (1) [PHQ-2 Negative - No further assessment needed] : PHQ-2 Negative - No further assessment needed [[id number]] : 1 [Fully functional (bathing, dressing, toileting, transferring, walking, feeding)] : Fully functional (bathing, dressing, toileting, transferring, walking, feeding) [Fully functional (using the telephone, shopping, preparing meals, housekeeping, doing laundry, using] : Fully functional and needs no help or supervision to perform IADLs (using the telephone, shopping, preparing meals, housekeeping, doing laundry, using transportation, managing medications and managing finances)

## 2021-08-25 NOTE — REVIEW OF SYSTEMS
[Fatigue] : fatigue [Earache] : no earache [Hearing Loss] : no hearing loss [Nosebleed] : no nosebleeds [Hoarseness] : no hoarseness [Nasal Discharge] : no nasal discharge [Sore Throat] : no sore throat [Postnasal Drip] : no postnasal drip [Chest Pain] : no chest pain [Palpitations] : no palpitations [Leg Claudication] : no leg claudication [Lower Ext Edema] : no lower extremity edema [Orthopnea] : no orthopnea [Paroxysmal Nocturnal Dyspnea] : no paroxysmal nocturnal dyspnea [Shortness Of Breath] : shortness of breath [Wheezing] : no wheezing [Cough] : cough [Dyspnea on Exertion] : dyspnea on exertion [Negative] : Heme/Lymph

## 2021-08-25 NOTE — ASSESSMENT
[FreeTextEntry1] : [] Bacterial PNA\par - stable, afebrile\par - complete Augmentin\par \par [] SOB/ persistent cough\par - START benzonatate 100mg TID PRN\par - Follow up with PCP tomorrow for possible CXR\par \par \par [] oral thrush\par - START Fluconazole\par - START Nystatin oral mouth wash\par \par Reviewed all medications at length with patient, All questions addressed. Worsening symptoms discussed with pt understanding. No issues or concerns at this time. Pt encouraged to call CCC/CN at [phone] w/any questions, concerns or issues. Will continue to follow up with patient status\par \par \par \par

## 2021-08-25 NOTE — PHYSICAL EXAM
[No Acute Distress] : no acute distress [Well Nourished] : well nourished [Well Developed] : well developed [Well-Appearing] : well-appearing [Normal Voice/Communication] : normal voice/communication [Normal Sclera/Conjunctiva] : normal sclera/conjunctiva [PERRL] : pupils equal round and reactive to light [EOMI] : extraocular movements intact [Normal Outer Ear/Nose] : the outer ears and nose were normal in appearance [Normal Oropharynx] : the oropharynx was normal [No JVD] : no jugular venous distention [Supple] : supple [Thyroid Normal, No Nodules] : the thyroid was normal and there were no nodules present [No Respiratory Distress] : no respiratory distress  [No Accessory Muscle Use] : no accessory muscle use [No Varicosities] : no varicosities [No Edema] : there was no peripheral edema [No Extremity Clubbing/Cyanosis] : no extremity clubbing/cyanosis [No Rash] : no rash [Coordination Grossly Intact] : coordination grossly intact [No Focal Deficits] : no focal deficits [Speech Grossly Normal] : speech grossly normal [Memory Grossly Normal] : memory grossly normal [Normal Affect] : the affect was normal [Alert and Oriented x3] : oriented to person, place, and time [Normal Mood] : the mood was normal [Normal Insight/Judgement] : insight and judgment were intact [de-identified] : oral mucosa dry, with several  legions. [de-identified] : no audible wheezing or visibly increased work of breathing noted. multiple series of cough during the visit

## 2021-08-26 ENCOUNTER — NON-APPOINTMENT (OUTPATIENT)
Age: 76
End: 2021-08-26

## 2021-08-26 LAB
AUTO DIFF PNL BLD: NEGATIVE — SIGNIFICANT CHANGE UP
C-ANCA SER-ACNC: NEGATIVE — SIGNIFICANT CHANGE UP
MPO AB + PR3 PNL SER: SIGNIFICANT CHANGE UP
P-ANCA SER-ACNC: NEGATIVE — SIGNIFICANT CHANGE UP
RNAP III AB SER-ACNC: 6 UNITS — SIGNIFICANT CHANGE UP (ref 0–19)

## 2021-08-27 ENCOUNTER — APPOINTMENT (OUTPATIENT)
Dept: CARDIOLOGY | Facility: CLINIC | Age: 76
End: 2021-08-27
Payer: MEDICARE

## 2021-08-27 ENCOUNTER — NON-APPOINTMENT (OUTPATIENT)
Age: 76
End: 2021-08-27

## 2021-08-27 VITALS
WEIGHT: 107 LBS | BODY MASS INDEX: 17.83 KG/M2 | TEMPERATURE: 98.5 F | HEART RATE: 62 BPM | DIASTOLIC BLOOD PRESSURE: 90 MMHG | RESPIRATION RATE: 14 BRPM | HEIGHT: 65 IN | SYSTOLIC BLOOD PRESSURE: 164 MMHG | OXYGEN SATURATION: 100 %

## 2021-08-27 VITALS — SYSTOLIC BLOOD PRESSURE: 138 MMHG | DIASTOLIC BLOOD PRESSURE: 80 MMHG

## 2021-08-27 DIAGNOSIS — Z87.898 PERSONAL HISTORY OF OTHER SPECIFIED CONDITIONS: ICD-10-CM

## 2021-08-27 DIAGNOSIS — R06.02 SHORTNESS OF BREATH: ICD-10-CM

## 2021-08-27 PROCEDURE — 99214 OFFICE O/P EST MOD 30 MIN: CPT

## 2021-08-27 PROCEDURE — 93000 ELECTROCARDIOGRAM COMPLETE: CPT

## 2021-08-27 RX ORDER — NYSTATIN 100000 [USP'U]/ML
100000 SUSPENSION ORAL 3 TIMES DAILY
Qty: 1 | Refills: 0 | Status: DISCONTINUED | COMMUNITY
Start: 2021-08-16 | End: 2021-08-27

## 2021-08-27 RX ORDER — MUPIROCIN 20 MG/G
2 OINTMENT TOPICAL
Qty: 22 | Refills: 0 | Status: DISCONTINUED | COMMUNITY
Start: 2021-07-21

## 2021-08-27 RX ORDER — AMOXICILLIN AND CLAVULANATE POTASSIUM 875; 125 MG/1; MG/1
875-125 TABLET, COATED ORAL
Refills: 0 | Status: DISCONTINUED | COMMUNITY
End: 2021-08-27

## 2021-08-27 RX ORDER — BENZONATATE 100 MG/1
100 CAPSULE ORAL 3 TIMES DAILY
Qty: 42 | Refills: 0 | Status: DISCONTINUED | COMMUNITY
Start: 2021-08-16 | End: 2021-08-27

## 2021-08-30 ENCOUNTER — APPOINTMENT (OUTPATIENT)
Dept: INTERNAL MEDICINE | Facility: CLINIC | Age: 76
End: 2021-08-30
Payer: MEDICARE

## 2021-08-30 VITALS
WEIGHT: 107 LBS | RESPIRATION RATE: 13 BRPM | HEART RATE: 80 BPM | HEIGHT: 65 IN | TEMPERATURE: 97.4 F | DIASTOLIC BLOOD PRESSURE: 60 MMHG | OXYGEN SATURATION: 98 % | BODY MASS INDEX: 17.83 KG/M2 | SYSTOLIC BLOOD PRESSURE: 110 MMHG

## 2021-08-30 PROCEDURE — 36415 COLL VENOUS BLD VENIPUNCTURE: CPT

## 2021-08-30 PROCEDURE — 99495 TRANSJ CARE MGMT MOD F2F 14D: CPT | Mod: 25

## 2021-08-30 RX ORDER — FLUCONAZOLE 100 MG/1
100 TABLET ORAL
Qty: 7 | Refills: 0 | Status: DISCONTINUED | COMMUNITY
Start: 2021-08-16 | End: 2021-08-30

## 2021-08-30 NOTE — PHYSICAL EXAM
[No Acute Distress] : no acute distress [Well-Appearing] : well-appearing [Normal Sclera/Conjunctiva] : normal sclera/conjunctiva [No JVD] : no jugular venous distention [No Respiratory Distress] : no respiratory distress  [No Accessory Muscle Use] : no accessory muscle use [Clear to Auscultation] : lungs were clear to auscultation bilaterally [Normal Rate] : normal rate  [No Edema] : there was no peripheral edema [Soft] : abdomen soft [Non Tender] : non-tender [Non-distended] : non-distended [No Masses] : no abdominal mass palpated [No HSM] : no HSM [No Focal Deficits] : no focal deficits [Normal Affect] : the affect was normal [de-identified] : Irregularly irregular

## 2021-08-30 NOTE — HISTORY OF PRESENT ILLNESS
[Post-hospitalization from ___ Hospital] : Post-hospitalization from [unfilled] Hospital [Admitted on: ___] : The patient was admitted on [unfilled] [Discharged on ___] : discharged on [unfilled] [Discharge Summary] : discharge summary [Pertinent Labs] : pertinent labs [Radiology Findings] : radiology findings [Discharge Med List] : discharge medication list [Med Reconciliation] : medication reconciliation has been completed [Patient Contacted By: ____] : and contacted by [unfilled] [FreeTextEntry2] : The patient presents post discharge after being admitted to [facility] where she again presenting to the ER on August 17, 2021 with recently diagnosed viral pericarditis on June 23, 2021 complicated by new onset of atrial fibrillation with rapid ventricular response. Also associated pericardial effusion.\par More recently patient admitted with left pleural effusion secondary to presumed pneumonia status post chest tube and discharged on August 10.\par Patient presents to the ER on August 17 with increased shortness of breath and feeling poorly and found to have a large left pleural effusion a chest tube was placed with 900 cc of fluid removed. The chest tube was eventually removed on August 22.\par Analysis of the fluid most compatible with an exudate.\par Continued on antibiotics at the recommendation of infectious disease to complete a two-week course. Cultures have remained negative. Unlikely to be pneumonia.\par \par Echocardiogram suggestive of early constriction.\par Diuretic started with potassium supplementation.\par \par Rheumatology consultation gotten and workup started to assess for possible etiology of pleural effusion.\par \par Atrial fibrillation rate control with metoprolol and digoxin.\par Anticoagulates he continues to be held with concern for worsening pericardial effusion.\par \par Also elevated alkaline phosphatase with viral hepatitis serologies and ultrasound negative.\par \par Improved and stable for discharge on August 24, 2021 with normal oxygen on room air.\par Patient to followup with cardiology and rheumatology. \par \par The patient does state that comparative to when she was admitted she definitely feels better with no further shortness of breath and increased energy level.\par Appetite is fairly good.\par Patient extremely worried about her recent medical issues especially with recurrent hospitalizations and currently no etiology found for her problems.\par

## 2021-08-30 NOTE — ASSESSMENT
[FreeTextEntry1] : 76-year-old female who has been generally healthywith recent illnesses starting initially with pericarditis which was felt to be viral with associated atrial fibrillation with unsuccessful cardioversion.\par Patient remains in atrial fibrillation with a controlled ventricular response.\par No anticoagulation decided by cardiology secondary to pericardial effusion.\par Subsequently developed recurring pleural effusion requiring drainage most recently during this last hospitalization.\par exam shows clear lungs throughout all lung fields therefore likely no recurrence of pleural effusion.\par Likely benefited by prednisone.\par Therefore etiology potentially rheumatological and patient to followup with rheumatology and cardiology.\par \par Patient also with persistently increased alkaline phosphatase of questionable etiology.\par Blood work will include alkaline phosphatase isoenzymes.\par \par Blood work done to assess metabolic status.\par Venipuncture done today in the office.

## 2021-09-02 LAB
ALBUMIN SERPL ELPH-MCNC: 3.9 G/DL
ALP BLD-CCNC: 220 IU/L
ALP BLD-CCNC: 226 U/L
ALP BONE CFR SERPL: 27 %
ALP INTEST CFR SERPL: 7 %
ALP LIVER CFR SERPL: 66 %
ALT SERPL-CCNC: 24 U/L
ANION GAP SERPL CALC-SCNC: 12 MMOL/L
AST SERPL-CCNC: 26 U/L
BASOPHILS # BLD AUTO: 0.04 K/UL
BASOPHILS NFR BLD AUTO: 0.3 %
BILIRUB SERPL-MCNC: 0.2 MG/DL
BUN SERPL-MCNC: 25 MG/DL
CALCIUM SERPL-MCNC: 10.2 MG/DL
CHLORIDE SERPL-SCNC: 98 MMOL/L
CO2 SERPL-SCNC: 28 MMOL/L
CREAT SERPL-MCNC: 0.91 MG/DL
DIGOXIN SERPL-MCNC: 0.6 NG/ML
EOSINOPHIL # BLD AUTO: 0.11 K/UL
EOSINOPHIL NFR BLD AUTO: 0.8 %
GLUCOSE SERPL-MCNC: 92 MG/DL
HCT VFR BLD CALC: 38.4 %
HGB BLD-MCNC: 11.8 G/DL
IMM GRANULOCYTES NFR BLD AUTO: 0.7 %
LYMPHOCYTES # BLD AUTO: 2.02 K/UL
LYMPHOCYTES NFR BLD AUTO: 14.8 %
MAN DIFF?: NORMAL
MCHC RBC-ENTMCNC: 29.4 PG
MCHC RBC-ENTMCNC: 30.7 GM/DL
MCV RBC AUTO: 95.5 FL
MONOCYTES # BLD AUTO: 0.72 K/UL
MONOCYTES NFR BLD AUTO: 5.3 %
NEUTROPHILS # BLD AUTO: 10.63 K/UL
NEUTROPHILS NFR BLD AUTO: 78.1 %
PLATELET # BLD AUTO: 532 K/UL
POTASSIUM SERPL-SCNC: 4.1 MMOL/L
PROT SERPL-MCNC: 6.6 G/DL
RBC # BLD: 4.02 M/UL
RBC # FLD: 15.6 %
SODIUM SERPL-SCNC: 138 MMOL/L
WBC # FLD AUTO: 13.62 K/UL

## 2021-09-08 ENCOUNTER — APPOINTMENT (OUTPATIENT)
Dept: RHEUMATOLOGY | Facility: CLINIC | Age: 76
End: 2021-09-08
Payer: MEDICARE

## 2021-09-08 VITALS
WEIGHT: 107 LBS | HEART RATE: 79 BPM | DIASTOLIC BLOOD PRESSURE: 60 MMHG | BODY MASS INDEX: 17.83 KG/M2 | SYSTOLIC BLOOD PRESSURE: 102 MMHG | TEMPERATURE: 98.7 F | HEIGHT: 65 IN | OXYGEN SATURATION: 97 %

## 2021-09-08 LAB
CULTURE RESULTS: SIGNIFICANT CHANGE UP
SPECIMEN SOURCE: SIGNIFICANT CHANGE UP

## 2021-09-08 PROCEDURE — 99204 OFFICE O/P NEW MOD 45 MIN: CPT

## 2021-09-08 NOTE — ASSESSMENT
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  there is no clear evidence of rheumatic process.  she has elevted ALP of unclear etiology \par \par --will check GGT and sent to GI \par --she is to taper prednisone slowly ( 20 mg for 2 weeks followed by 15mg for 1 week and than 10mg \par --she should try to increase colchicine to 1 tab in AM and 1/2 at bedtime \par --cont with omeprazole\par --cont calcium and vit d \par --to bring in DEXA on next visit\par --will check CCP/WANG/centromere/rna polymerase\par immunofixation, amylase

## 2021-09-08 NOTE — REVIEW OF SYSTEMS
[Fever] : no fever [Eye Pain] : no eye pain [Red Eyes] : eyes not red [Nosebleeds] : no nosebleeds [Chest Pain] : no chest pain [Palpitations] : no palpitations [Cough] : no cough [Diarrhea] : no diarrhea

## 2021-09-08 NOTE — PHYSICAL EXAM
[General Appearance - Well Nourished] : well nourished [General Appearance - Well Developed] : well developed [Sclera] : the sclera and conjunctiva were normal [Hearing Threshold Finger Rub Not Jenkins] : hearing was normal [Respiration, Rhythm And Depth] : normal respiratory rhythm and effort [Exaggerated Use Of Accessory Muscles For Inspiration] : no accessory muscle use [Auscultation Breath Sounds / Voice Sounds] : lungs were clear to auscultation bilaterally [Heart Rate And Rhythm] : heart rate was normal and rhythm regular [Heart Sounds] : normal S1 and S2 [Heart Sounds Gallop] : no gallops [Nail Clubbing] : no clubbing  or cyanosis of the fingernails [Musculoskeletal - Swelling] : no joint swelling seen [Motor Tone] : muscle strength and tone were normal [FreeTextEntry1] : mild heberden nodes, toes with mild cyanosis  [] : no rash [Skin Lesions] : no skin lesions [Affect] : the affect was normal [Mood] : the mood was normal

## 2021-09-08 NOTE — HISTORY OF PRESENT ILLNESS
[FreeTextEntry1] : 75 yo woman with history of osteopenia and abnormal LFTs presents to hospital for fevers, SOB and recurrent pericarditis and pleural effusions.  Patient was recently discharged after presenting with similar symptoms.  on prior admission ( around aug 7)   she had thoracocentesis with noted exudative fluid.  cytology was negative, negative AFB and GBM.  patient was treated with antibiotics with improvement.  she was sent home on augmentin however fever returned and increase SOB and was sent into the hospital again.  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  he had repeat thorocentesis and chest tube placed.  \par \par Patient states that she has been a very heathy person.  she routinely exercises and walks 6 miles daily.  she states that in March 2021 she received her second dose of COVID 19 vaccine and she become very ill.  she had fevers, myalgias, fatigue, syncope and GI Issues.  The GI issues persisted for months causing bloating, gas and discomfort.  IN june she started to experiences fevers and CP and was noted to have pericarditis.  This was complicated by AFIB with RVR.  she was started on colchicine and ASA however she was not able to tolerate this.  she was given steroids which per patient improved her symptoms.  However she was admitted in [place] with sepsis and possible PNA and loculated pleural effusions. Patient endorses wt loss throughout this period about 10 lbs ( initally wts 118 and down 106) she feels weak and fatigue and has not been excercising in some time.\par \par ROS: patient denies any morning stiffness or joint swelling.  she has osteoarthritis but no symptoms to suggest inflammatory arthritis.  she denies any alopecia, oral lesions, red painful eye, dry eye/dry mouth, malar rash, photosensitivity. No history of low plts, anemias, low wbc.  No history of blood per rectum. had recurrent sinus infection when young and thought to be related to allergies and congestion.  denies any recent sinus issues and no nose bleeding.  denies any rashes or hemoptysis.  \par \par she has raynauds but no history of digital ulcers, GERD, dysphagia, cough when drinking or eating solids, no tightness of skin over hands or face.   \par \par Today: pateint was discharge from hospital late aug.  she is on prednisone 30mg daily for now about 2 weeks and feels well.  she is also on colchicine 1/2 tab bid. No cp or SOB.  she is having insomnia and a little bit of anxiety.  she is concern of getting osteoporosis.  I have reviewed hospital blood work and her serologies are unremarkable.  Her alp continued to be elevted for unclear reason \par   \par \par maliganancy screening: \par she is due for mammo.  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,\par former smoker \par no illict drugs \par rare alcohol \par \par labs: \par ESR 50\par CRP fluatates presently 83 \par procalcitonin 0.10\par CMP with TP 5.9, alb 2.8 ,  ( down from 400)  , normal ast/alt \par UA ( aug 4) [id number], tr LE, small blood RBC 6-10, occasional rosibel \par acute hep panel normal \par Lipase normal\par LDH normal\par  lyme negative \par tick panel negative\par quantiferon negative \par tsh normal\par  \par trop negative \par ACE level normal\par negative CARL/RF/Sm/RNP/Ro/LA/ANCA/dsDNA/scl70\par normal c3/4 \par \par pleural fluid\par alb 2.2 (  2)\par glu 133 (92)\par ldh 115 (102)\par Tp 3.5 (3.9)\par ph 8.5 (8)\par \par RBC 1000\par nucletic cells 1236\par granulocyte predominant \par prior cytology of pleural effusion negative and negative afb and gbm \par culture negative\par \par Chest CT LLL thick linear opacity with bronchial dilation. No LN\par US abdomen: right Pleural efusion. abdomen normal \par CT a/p overall normal\par ECHO mod enlartged RA and LA\par RV size normal , midl reduce d RV systolic function\par Grade III diastolic dysfunction\par mild to mod AR \par small pericadial effsuuiom \par : subtle septal bounce with annular paradox with dilated IVC: suggest constriction \par \par

## 2021-09-13 ENCOUNTER — APPOINTMENT (OUTPATIENT)
Dept: INTERNAL MEDICINE | Facility: CLINIC | Age: 76
End: 2021-09-13

## 2021-09-15 LAB
CULTURE RESULTS: SIGNIFICANT CHANGE UP
SPECIMEN SOURCE: SIGNIFICANT CHANGE UP

## 2021-09-22 ENCOUNTER — APPOINTMENT (OUTPATIENT)
Dept: RHEUMATOLOGY | Facility: CLINIC | Age: 76
End: 2021-09-22

## 2021-09-24 ENCOUNTER — NON-APPOINTMENT (OUTPATIENT)
Age: 76
End: 2021-09-24

## 2021-09-24 RX ORDER — DIGOXIN 125 UG/1
125 TABLET ORAL DAILY
Qty: 30 | Refills: 1 | Status: DISCONTINUED | COMMUNITY
Start: 2021-07-16 | End: 2021-09-24

## 2021-09-27 ENCOUNTER — APPOINTMENT (OUTPATIENT)
Dept: ELECTROPHYSIOLOGY | Facility: CLINIC | Age: 76
End: 2021-09-27

## 2021-10-01 ENCOUNTER — APPOINTMENT (OUTPATIENT)
Dept: CARDIOLOGY | Facility: CLINIC | Age: 76
End: 2021-10-01

## 2021-10-06 LAB
ALBUMIN SERPL ELPH-MCNC: 4.2 G/DL
ALP BLD-CCNC: 173 U/L
ALT SERPL-CCNC: 36 U/L
AMYLASE/CREAT SERPL: 130 U/L
ANA SER IF-ACNC: NEGATIVE
ANION GAP SERPL CALC-SCNC: 13 MMOL/L
APPEARANCE: CLEAR
AST SERPL-CCNC: 25 U/L
BILIRUB SERPL-MCNC: 0.4 MG/DL
BILIRUBIN URINE: NEGATIVE
BLOOD URINE: NEGATIVE
BUN SERPL-MCNC: 19 MG/DL
CALCIUM SERPL-MCNC: 9.7 MG/DL
CCP AB SER IA-ACNC: 70 UNITS
CENTROMERE IGG SER-ACNC: <0.2 CD:130001892
CHLORIDE SERPL-SCNC: 101 MMOL/L
CO2 SERPL-SCNC: 29 MMOL/L
COLOR: COLORLESS
CREAT SERPL-MCNC: 0.84 MG/DL
CREAT SPEC-SCNC: 12 MG/DL
CREAT/PROT UR: 0.4 RATIO
CRP SERPL-MCNC: <3 MG/L
ENA JO1 AB SER IA-ACNC: <0.2 AL
ERYTHROCYTE [SEDIMENTATION RATE] IN BLOOD BY WESTERGREN METHOD: 17 MM/HR
G6PD SER-CCNC: 18.9 U/G HGB
GGT SERPL-CCNC: 227 U/L
GLUCOSE QUALITATIVE U: NEGATIVE
GLUCOSE SERPL-MCNC: 82 MG/DL
HAV IGM SER QL: NONREACTIVE
HBV CORE IGM SER QL: NONREACTIVE
HBV SURFACE AG SER QL: NONREACTIVE
HCV AB SER QL: NONREACTIVE
HCV S/CO RATIO: 0.11 S/CO
HISTONE AB SER QL: 0.3 UNITS
KETONES URINE: NEGATIVE
LEUKOCYTE ESTERASE URINE: NEGATIVE
LPL SERPL-CCNC: 66 U/L
M PROTEIN SPEC IFE-MCNC: NORMAL
NITRITE URINE: NEGATIVE
PH URINE: 7
POTASSIUM SERPL-SCNC: 3.9 MMOL/L
PROT SERPL-MCNC: 6.6 G/DL
PROT UR-MCNC: 5 MG/DL
PROTEIN URINE: NEGATIVE
RF+CCP IGG SER-IMP: ABNORMAL
RHEUMATOID FACT SER QL: <10 IU/ML
RNA POLYMERASE III IGG: 5 UNITS
SODIUM SERPL-SCNC: 143 MMOL/L
SPECIFIC GRAVITY URINE: 1.01
UROBILINOGEN URINE: NORMAL

## 2021-10-09 ENCOUNTER — APPOINTMENT (OUTPATIENT)
Dept: DISASTER EMERGENCY | Facility: CLINIC | Age: 76
End: 2021-10-09

## 2021-10-10 ENCOUNTER — RESULT CHARGE (OUTPATIENT)
Age: 76
End: 2021-10-10

## 2021-10-10 LAB — SARS-COV-2 N GENE NPH QL NAA+PROBE: NOT DETECTED

## 2021-10-12 ENCOUNTER — TRANSCRIPTION ENCOUNTER (OUTPATIENT)
Age: 76
End: 2021-10-12

## 2021-10-12 ENCOUNTER — OUTPATIENT (OUTPATIENT)
Dept: OUTPATIENT SERVICES | Facility: HOSPITAL | Age: 76
LOS: 1 days | End: 2021-10-12
Payer: MEDICARE

## 2021-10-12 VITALS
OXYGEN SATURATION: 100 % | TEMPERATURE: 97 F | DIASTOLIC BLOOD PRESSURE: 86 MMHG | HEART RATE: 86 BPM | SYSTOLIC BLOOD PRESSURE: 162 MMHG | RESPIRATION RATE: 14 BRPM

## 2021-10-12 DIAGNOSIS — Z98.890 OTHER SPECIFIED POSTPROCEDURAL STATES: Chronic | ICD-10-CM

## 2021-10-12 DIAGNOSIS — I48.91 UNSPECIFIED ATRIAL FIBRILLATION: ICD-10-CM

## 2021-10-12 LAB
ANION GAP SERPL CALC-SCNC: 15 MMOL/L — SIGNIFICANT CHANGE UP (ref 5–17)
BUN SERPL-MCNC: 18.7 MG/DL — SIGNIFICANT CHANGE UP (ref 8–20)
CALCIUM SERPL-MCNC: 10.1 MG/DL — SIGNIFICANT CHANGE UP (ref 8.6–10.2)
CHLORIDE SERPL-SCNC: 101 MMOL/L — SIGNIFICANT CHANGE UP (ref 98–107)
CO2 SERPL-SCNC: 27 MMOL/L — SIGNIFICANT CHANGE UP (ref 22–29)
CREAT SERPL-MCNC: 0.71 MG/DL — SIGNIFICANT CHANGE UP (ref 0.5–1.3)
GLUCOSE SERPL-MCNC: 87 MG/DL — SIGNIFICANT CHANGE UP (ref 70–99)
HCT VFR BLD CALC: 41.9 % — SIGNIFICANT CHANGE UP (ref 34.5–45)
HGB BLD-MCNC: 13.4 G/DL — SIGNIFICANT CHANGE UP (ref 11.5–15.5)
INR BLD: 1.59 RATIO — HIGH (ref 0.88–1.16)
MAGNESIUM SERPL-MCNC: 2 MG/DL — SIGNIFICANT CHANGE UP (ref 1.6–2.6)
MCHC RBC-ENTMCNC: 31 PG — SIGNIFICANT CHANGE UP (ref 27–34)
MCHC RBC-ENTMCNC: 32 GM/DL — SIGNIFICANT CHANGE UP (ref 32–36)
MCV RBC AUTO: 97 FL — SIGNIFICANT CHANGE UP (ref 80–100)
PLATELET # BLD AUTO: 284 K/UL — SIGNIFICANT CHANGE UP (ref 150–400)
POTASSIUM SERPL-MCNC: 4.5 MMOL/L — SIGNIFICANT CHANGE UP (ref 3.5–5.3)
POTASSIUM SERPL-SCNC: 4.5 MMOL/L — SIGNIFICANT CHANGE UP (ref 3.5–5.3)
PROTHROM AB SERPL-ACNC: 18 SEC — HIGH (ref 10.6–13.6)
RBC # BLD: 4.32 M/UL — SIGNIFICANT CHANGE UP (ref 3.8–5.2)
RBC # FLD: 15.3 % — HIGH (ref 10.3–14.5)
SODIUM SERPL-SCNC: 143 MMOL/L — SIGNIFICANT CHANGE UP (ref 135–145)
WBC # BLD: 6.96 K/UL — SIGNIFICANT CHANGE UP (ref 3.8–10.5)
WBC # FLD AUTO: 6.96 K/UL — SIGNIFICANT CHANGE UP (ref 3.8–10.5)

## 2021-10-12 PROCEDURE — 93005 ELECTROCARDIOGRAM TRACING: CPT

## 2021-10-12 PROCEDURE — 36415 COLL VENOUS BLD VENIPUNCTURE: CPT

## 2021-10-12 PROCEDURE — 93325 DOPPLER ECHO COLOR FLOW MAPG: CPT

## 2021-10-12 PROCEDURE — 83735 ASSAY OF MAGNESIUM: CPT

## 2021-10-12 PROCEDURE — 93010 ELECTROCARDIOGRAM REPORT: CPT

## 2021-10-12 PROCEDURE — 93312 ECHO TRANSESOPHAGEAL: CPT

## 2021-10-12 PROCEDURE — 85027 COMPLETE CBC AUTOMATED: CPT

## 2021-10-12 PROCEDURE — 93320 DOPPLER ECHO COMPLETE: CPT

## 2021-10-12 PROCEDURE — 80048 BASIC METABOLIC PNL TOTAL CA: CPT

## 2021-10-12 PROCEDURE — 85610 PROTHROMBIN TIME: CPT

## 2021-10-12 RX ORDER — APIXABAN 2.5 MG/1
1 TABLET, FILM COATED ORAL
Qty: 0 | Refills: 0 | DISCHARGE

## 2021-10-12 RX ORDER — METOPROLOL TARTRATE 50 MG
1 TABLET ORAL
Qty: 0 | Refills: 0 | DISCHARGE

## 2021-10-12 RX ORDER — DIGOXIN 250 MCG
1 TABLET ORAL
Qty: 0 | Refills: 0 | DISCHARGE

## 2021-10-12 NOTE — CHART NOTE - NSCHARTNOTEFT_GEN_A_CORE
Patient seen today in bed following AVEL/DCCV x 1 at 200J  VSS  EKG reveals SR at 64bpm; [id number]  No pericardial effusion, no NEWTON thrombus. Biatrial enlargement.   Will stop Digoxin and colchicine and remain on uninterrupted anticoagulation with Eliquis  Follow up outpatient with Dr. Ge for ischemic work up possible stress test.

## 2021-10-12 NOTE — PROGRESS NOTE ADULT - ASSESSMENT
Assessment  PAF s/p DCCV, likely longstanding PAF given sig biiatrial enlargement  Mod MR mod AI mod TR EF 45%  pericarditis/pleuritis resolved      Rec  long term AC  cont beta blocker  dc digoxin  FU Dr Ge 2 weeks  Ischemia workup if not already done

## 2021-10-12 NOTE — H&P PST ADULT - ASSESSMENT
Complicated 76 year old female patient with a known history of Raynaud's, HTN, HLD, arthritis, GERD, and paroxysmal atrial fibrillation with previous admission requiring thoracentesis x 2 and pericardial effusion which has now resolved.     - Patient arrived in fasting state  - COVID negative on 10/9/21

## 2021-10-12 NOTE — DISCHARGE NOTE PROVIDER - PROVIDER TOKENS
PROVIDER:[[id number]],FOLLOWUP:[Routine],ESTABLISHEDPATIENT:[T]] PROVIDER:[TOKEN:[[id number]],FOLLOWUP:[Routine],ESTABLISHEDPATIENT:[T]],PROVIDER:[[id number]],FOLLOWUP:[Routine],ESTABLISHEDPATIENT:[T]]

## 2021-10-12 NOTE — DISCHARGE NOTE PROVIDER - NSDCCPTREATMENT_GEN_ALL_CORE_FT
PRINCIPAL PROCEDURE  Procedure: AVEL, with bubble study and cardioversion  Findings and Treatment:

## 2021-10-12 NOTE — DISCHARGE NOTE NURSING/CASE MANAGEMENT/SOCIAL WORK - PATIENT PORTAL LINK FT
You can access the FollowMyHealth Patient Portal offered by [facility] by registering at the following website: [email or web address]. By joining [facility]’s FollowMyHealth portal, you will also be able to view your health information using other applications (apps) compatible with our system.

## 2021-10-12 NOTE — H&P PST ADULT - HISTORY OF PRESENT ILLNESS
76 year old female patient with a known history of Raynaud's, HTN, HLD, arthritis, GERD, and paroxysmal atrial fibrillation. She reports she received the COVID vaccination in March of this year and then experienced an episode of syncope. Several week bah she also had chest apin and dyspnea with presented to the hospital and found to have pericarditis which required a pericardiocentesis. At that time she was found to have new onset atrial fibrillation. She was subsequently started on Eliquis and Colchicine as well a Prednisone taper. She has also worn a Holter monitor.     Cardiac Summary:   Echo 9/22/21: EF 45-50%; Moderate MR, no pericardial effusion   76 year old female patient with a known history of Raynaud's, HTN, HLD, arthritis, GERD, and paroxysmal atrial fibrillation. She reports she received the COVID vaccination in March of this year and then experienced fever or unknown origin and chest pain for 2 days, found to have acute viral pericarditis. She was started on high dose aspirin and colchicine and was discharged home with close outpatient follow up. She states initially she felt better and fever subsided but over the past two days developed overwhelming fatigue and near syncope with recurrent fever. She was seen by her PMD Dr. Alcantar on 7/6/21 and found to have new onset atrial fibrillation with RVR. Repeat TTE today revealed small pericardial effusion, increased in size from prior TTE on 6/23. Additionally she had left effusion during these admissions which required thoracentesis x 2 times. She has remained on steroid taper and is now on her second one. She admits to fever two days ago. She reports she has been on Eliquis without interruption for the last two weeks. Today she feels well. No complaints.     Cardiac Summary:   Echo 9/22/21: EF 45-50%; Moderate MR, no pericardial effusion  TTE 8/18/2021:   Summary:   1. Moderately enlarged left atrium.   2. Left ventricular ejection fraction, by visual estimation, is 50 to 55%.   3. Moderately enlarged right atrium.   4. The right ventricular size is normal. Mildly reduced RV systolic function.   5. Grade III diastolic dysfunction.   6. Mild to moderate aortic regurgitation.   7. Small pericardial effusion.   8. There is subtle septal bounce with annluar paradox and dilated IVC. Compared to study from 2012, the tissue Doppler velocity has increased and is pardox to what is expected. These may suggest early signs of constriction. Please repeat study after further diuresis.

## 2021-10-12 NOTE — DISCHARGE NOTE PROVIDER - CARE PROVIDER_API CALL
FREDY BREWER  Cardiology  [address] [place] [address].  [place] [address]  Phone: [phone]  Fax: [phone]  Established Patient  Follow Up Time: Routine   FREDY BREWER  Cardiology  [address] [place] [address].  [place] [address]  Phone: [phone]  Fax: [phone]  Established Patient  Follow Up Time: Routine    Sterling Alcantar)  Internal Medicine  [address] [place] [address], [place] [address]  Phone: [phone]  Fax: [phone]  Established Patient  Follow Up Time: Routine

## 2021-10-12 NOTE — PROGRESS NOTE ADULT - SUBJECTIVE AND OBJECTIVE BOX
[place] [facility] - [facility], THE HEART [facility]ENTER                                   [address] [place] [address]                                                      PHONE: [phone]                                                         FAX: [phone]  [email or web address]  ---------------------------------------------------------------------------------------------------------------------------------    Overnight events/patient complaints: here for AVEL DCCV      sulfa drugs (Hives)    MEDICATIONS  (STANDING):    MEDICATIONS  (PRN):      Vital Signs Last 24 Hrs  T(C): 36.2 (12 Oct 2021 07:41), Max: 36.2 (12 Oct 2021 07:41)  T(F): 97.1 (12 Oct 2021 07:41), Max: 97.1 (12 Oct 2021 07:41)  HR: 86 (12 Oct 2021 07:41) (86 - 86)  BP: 162/86 (12 Oct 2021 07:41) (162/86 - 162/86)  BP(mean): --  RR: 14 (12 Oct 2021 07:41) (14 - 14)  SpO2: 100% (12 Oct 2021 07:41) (100% - 100%)  Daily     Daily   ICU Vital Signs Last 24 Hrs  LOUIS ARCHERBELKISALO  I&O's Detail    I&O's Summary    Drug Dosing Weight  LOUIS MEJIA      PHYSICAL EXAM:  General: Appears well developed, well nourished alert and cooperative.  HEENT: Head; normocephalic, atraumatic.  Eyes: Pupils reactive, cornea wnl.  Neck: Supple, no nodes adenopathy, no NVD or carotid bruit or thyromegaly.  CARDIOVASCULAR: Normal S1 and S2, No murmur, rub, gallop or lift.   LUNGS: No rales, rhonchi or wheeze. Normal breath sounds bilaterally.  ABDOMEN: Soft, nontender without mass or organomegaly. bowel sounds normoactive.  EXTREMITIES: No clubbing, cyanosis or edema. Distal pulses wnl.   SKIN: warm and dry with normal turgor.  NEURO: Alert/oriented x 3/normal motor exam. No pathologic reflexes.    PSYCH: normal affect.        LABS:                        13.4   6.96  )-----------( 284      ( 12 Oct 2021 08:07 )             41.9     10-12    143  |  101  |  18.7  ----------------------------<  87  4.5   |  27.0  |  0.71    Ca    10.1      12 Oct 2021 08:07  Mg     2.0     10-12      LOUIS MEJIA      PT/INR - ( 12 Oct 2021 08:07 )   PT: 18.0 sec;   INR: 1.59 ratio               AVEL perforemd with anesthesia standby, pt toleretaed well  Results:  LVEF 45%  Mod MR  Aortic aclerosis with mod AI  Mod TR   Severe biatrial enlargement and restrictive physiology on inflow  No pericardial effusion  Successful DCCV to restore SR

## 2021-10-12 NOTE — DISCHARGE NOTE PROVIDER - HOSPITAL COURSE
Complicated 76 year old female patient with a known history of Raynaud's, HTN, HLD, arthritis, GERD, and paroxysmal atrial fibrillation with previous admission requiring thoracentesis x 2 and pericardial effusion which has now resolved. She is now s/p successful AVEL/DCCV x 1 at 200J    Results:  LVEF 45%  Mod MR  Aortic aclerosis with mod AI  Mod TR   Severe biatrial enlargement and restrictive physiology on inflow  No pericardial effusion  Successful DCCV to restore SR

## 2021-10-12 NOTE — DISCHARGE NOTE PROVIDER - CARE PROVIDERS DIRECT ADDRESSES
,DirectAddress_Unknown ,DirectAddress_Unknown,[email or web address]

## 2021-10-13 ENCOUNTER — NON-APPOINTMENT (OUTPATIENT)
Age: 76
End: 2021-10-13

## 2021-11-04 ENCOUNTER — NON-APPOINTMENT (OUTPATIENT)
Age: 76
End: 2021-11-04

## 2021-11-11 ENCOUNTER — APPOINTMENT (OUTPATIENT)
Dept: RHEUMATOLOGY | Facility: CLINIC | Age: 76
End: 2021-11-11
Payer: MEDICARE

## 2021-11-11 VITALS
RESPIRATION RATE: 17 BRPM | TEMPERATURE: 98 F | BODY MASS INDEX: 18.33 KG/M2 | HEART RATE: 90 BPM | HEIGHT: 65 IN | OXYGEN SATURATION: 98 % | DIASTOLIC BLOOD PRESSURE: 70 MMHG | SYSTOLIC BLOOD PRESSURE: 120 MMHG | WEIGHT: 110 LBS

## 2021-11-11 DIAGNOSIS — M54.50 LOW BACK PAIN, UNSPECIFIED: ICD-10-CM

## 2021-11-11 DIAGNOSIS — G89.29 LOW BACK PAIN, UNSPECIFIED: ICD-10-CM

## 2021-11-11 PROCEDURE — 99214 OFFICE O/P EST MOD 30 MIN: CPT

## 2021-11-11 RX ORDER — COLCHICINE 0.6 MG/1
0.6 TABLET ORAL
Qty: 60 | Refills: 2 | Status: DISCONTINUED | COMMUNITY
Start: 2021-08-24 | End: 2021-11-11

## 2021-11-11 RX ORDER — ETHACRYNIC ACID 25 MG/1
25 TABLET ORAL DAILY
Refills: 0 | Status: DISCONTINUED | COMMUNITY
End: 2021-11-11

## 2021-11-12 NOTE — HISTORY OF PRESENT ILLNESS
[FreeTextEntry1] : 77 yo woman with history of osteopenia and abnormal LFTs presents to hospital for fevers, SOB and recurrent pericarditis and pleural effusions.  Patient was recently discharged after presenting with similar symptoms.  on prior admission ( around aug 7)   she had thoracocentesis with noted exudative fluid.  cytology was negative, negative AFB and GBM.  patient was treated with antibiotics with improvement.  she was sent home on Augmentin however fever returned and increase SOB and was sent into the hospital again.  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  he had repeat thoracentesis and chest tube placed.  \par \par Patient states that she has been a very heathy person.  she routinely exercises and walks 6 miles daily.  she states that in March 2021 she received her second dose of COVID 19 vaccine and she become very ill.  she had fevers, myalgias, fatigue, syncope and GI Issues.  The GI issues persisted for months causing bloating, gas and discomfort.  IN june she started to experiences fevers and CP and was noted to have pericarditis.  This was complicated by AFIB with RVR.  she was started on colchicine and ASA however she was not able to tolerate this.  she was given steroids which per patient improved her symptoms.  However she was admitted in [facility] with sepsis and possible PNA and loculated pleural effusions. Patient endorses wt loss throughout this period about 10 lbs ( initally wts 118 and down 106) she feels weak and fatigue and has not been exercising in some time.\par \par ROS: patient denies any morning stiffness or joint swelling.  she has osteoarthritis but no symptoms to suggest inflammatory arthritis.  she denies any alopecia, oral lesions, red painful eye, dry eye/dry mouth, malar rash, photosensitivity. No history of low plts, anemias, low wbc.  No history of blood per rectum. had recurrent sinus infection when young and thought to be related to allergies and congestion.  denies any recent sinus issues and no nose bleeding.  denies any rashes or hemoptysis.  \par \par she has raynauds but no history of digital ulcers, GERD, dysphagia, cough when drinking or eating solids, no tightness of skin over hands or face.   \par \par Today: patient is doing well.  she developed AF and required cardioversion.  she has gone back into AF and they are considering ablation.  Patient is on eliquis.  Her cardiologist stopped her colchicine.  she is currently on a very slow taper of prednisone.  pred 2.5 EOD.  she has no breathing difficulty and no CP.  states that she is doing much better.  she has started to work out and is doing pilates.  she has lost a lot of muscle tone and wt.  he appetite has improved.  Patient is noted to have a positive CCP and a mother with RA.  she herself does not have morning stiffness or joint swelling.  \par   \par \par malignancy screening: \par she is due for mammo.  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,\par former smoker \par no illicit drugs \par rare alcohol \par \par labs: \par +++CCP 70 ( clinically no RA) \par negative CARL/RF/Sm/RNP/Ro/LA/Pauline/dsDNA/scl70/centromere/ANCA/RNA poly/HIstone\par normal c3/4 \par lyme negative \par tick panel negative\par quantiferon negative \par hep serologies neg\par tsh normal\par  \par immunofixation normal\par trop negative \par ACE level normal\par \par \par pleural fluid\par alb 2.2 (  2)\par glu 133 (92)\par LDh 115 (102)\par Tp 3.5 (3.9)\par pH 8.5 (8)\par \par RBC 1000\par nucleic cells 1236\par granulocyte predominant \par prior cytology of pleural effusion negative and negative afb and gbm \par culture negative\par \par Chest CT LLL thick linear opacity with bronchial dilation. No LN\par US abdomen: right Pleural effusion. abdomen normal \par CT a/p overall normal\par ECHO mod enlarged RA and LA\par RV size normal , mild reduce d RV systolic function\par Grade III diastolic dysfunction\par mild to mod AR \par small pericardial effusion \par : subtle septal bounce with annular paradox with dilated IVC: suggest constriction \par \par

## 2021-11-12 NOTE — ASSESSMENT
[FreeTextEntry1] : 75 yo woman with histry of osteopenia, lumbar pain, and abnormal LFts.  patient s/p second covid 19 vaccine and subsequently developed long standing diarrheas followed by fevers and pericardial effusion. the pericardial effusion were recurrent and requiring chest tube.  examination of the pericardial effusion was exudative, cytology negative, negative AFB and GMB.  she had tried colchicine but was intolerant.  after last admission she retried colchicine and steroid taper and had been doing well.  colchicine was d/c by cards after her cardioversion for AF.  she is being tapered off prednisone very slowly.  Patient is noted to be CCP positive with FH of RA in her mother.  pateint herself has no s/s suggestive of RA.  We have seen positive serologies post covid infection but unclear post vaccination.  will repeat CCP in the future and cont to follow patient \par \par patient also with elevted ALP and GGT of unclear etiology.  these fluctuate.  will have GI evaluate patient \par \par

## 2021-11-12 NOTE — PHYSICAL EXAM
[General Appearance - Well Nourished] : well nourished [General Appearance - Well Developed] : well developed [Sclera] : the sclera and conjunctiva were normal [Hearing Threshold Finger Rub Not [place]] : hearing was normal [Nail Clubbing] : no clubbing  or cyanosis of the fingernails [Musculoskeletal - Swelling] : no joint swelling seen [Motor Tone] : muscle strength and tone were normal [] : no rash [Skin Lesions] : no skin lesions [FreeTextEntry1] : some easy bruising  [Affect] : the affect was normal

## 2021-11-12 NOTE — PHYSICAL EXAM
[General Appearance - Well Nourished] : well nourished [General Appearance - Well Developed] : well developed [Sclera] : the sclera and conjunctiva were normal [Hearing Threshold Finger Rub Not [place]] : hearing was normal [Nail Clubbing] : no clubbing  or cyanosis of the fingernails [Musculoskeletal - Swelling] : no joint swelling seen [Motor Tone] : muscle strength and tone were normal [] : no rash [Skin Lesions] : no skin lesions [FreeTextEntry1] : some easy bruising  [Affect] : the affect was normal

## 2021-11-12 NOTE — HISTORY OF PRESENT ILLNESS
[FreeTextEntry1] : 75 yo woman with history of osteopenia and abnormal LFTs presents to hospital for fevers, SOB and recurrent pericarditis and pleural effusions.  Patient was recently discharged after presenting with similar symptoms.  on prior admission ( around aug 7)   she had thoracocentesis with noted exudative fluid.  cytology was negative, negative AFB and GBM.  patient was treated with antibiotics with improvement.  she was sent home on Augmentin however fever returned and increase SOB and was sent into the hospital again.  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  he had repeat thoracentesis and chest tube placed.  \par \par Patient states that she has been a very heathy person.  she routinely exercises and walks 6 miles daily.  she states that in March 2021 she received her second dose of COVID 19 vaccine and she become very ill.  she had fevers, myalgias, fatigue, syncope and GI Issues.  The GI issues persisted for months causing bloating, gas and discomfort.  IN june she started to experiences fevers and CP and was noted to have pericarditis.  This was complicated by AFIB with RVR.  she was started on colchicine and ASA however she was not able to tolerate this.  she was given steroids which per patient improved her symptoms.  However she was admitted in [facility] with sepsis and possible PNA and loculated pleural effusions. Patient endorses wt loss throughout this period about 10 lbs ( initally wts 118 and down 106) she feels weak and fatigue and has not been exercising in some time.\par \par ROS: patient denies any morning stiffness or joint swelling.  she has osteoarthritis but no symptoms to suggest inflammatory arthritis.  she denies any alopecia, oral lesions, red painful eye, dry eye/dry mouth, malar rash, photosensitivity. No history of low plts, anemias, low wbc.  No history of blood per rectum. had recurrent sinus infection when young and thought to be related to allergies and congestion.  denies any recent sinus issues and no nose bleeding.  denies any rashes or hemoptysis.  \par \par she has raynauds but no history of digital ulcers, GERD, dysphagia, cough when drinking or eating solids, no tightness of skin over hands or face.   \par \par Today: patient is doing well.  she developed AF and required cardioversion.  she has gone back into AF and they are considering ablation.  Patient is on eliquis.  Her cardiologist stopped her colchicine.  she is currently on a very slow taper of prednisone.  pred 2.5 EOD.  she has no breathing difficulty and no CP.  states that she is doing much better.  she has started to work out and is doing pilates.  she has lost a lot of muscle tone and wt.  he appetite has improved.  Patient is noted to have a positive CCP and a mother with RA.  she herself does not have morning stiffness or joint swelling.  \par   \par \par malignancy screening: \par she is due for mammo.  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,\par former smoker \par no illicit drugs \par rare alcohol \par \par labs: \par +++CCP 70 ( clinically no RA) \par negative CARL/RF/Sm/RNP/Ro/LA/Pauline/dsDNA/scl70/centromere/ANCA/RNA poly/HIstone\par normal c3/4 \par lyme negative \par tick panel negative\par quantiferon negative \par hep serologies neg\par tsh normal\par  \par immunofixation normal\par trop negative \par ACE level normal\par \par \par pleural fluid\par alb 2.2 (  2)\par glu 133 (92)\par LDh 115 (102)\par Tp 3.5 (3.9)\par pH 8.5 (8)\par \par RBC 1000\par nucleic cells 1236\par granulocyte predominant \par prior cytology of pleural effusion negative and negative afb and gbm \par culture negative\par \par Chest CT LLL thick linear opacity with bronchial dilation. No LN\par US abdomen: right Pleural effusion. abdomen normal \par CT a/p overall normal\par ECHO mod enlarged RA and LA\par RV size normal , mild reduce d RV systolic function\par Grade III diastolic dysfunction\par mild to mod AR \par small pericardial effusion \par : subtle septal bounce with annular paradox with dilated IVC: suggest constriction \par \par

## 2021-11-19 LAB
ALBUMIN SERPL ELPH-MCNC: 3.7 G/DL
ALP BLD-CCNC: 218 U/L
ALP BLD-CCNC: 301 IU/L
ALP BONE CFR SERPL: 31 %
ALP INTEST CFR SERPL: 0 %
ALP LIVER CFR SERPL: 69 %
ALT SERPL-CCNC: 23 U/L
AMYLASE/CREAT SERPL: 93 U/L
ANION GAP SERPL CALC-SCNC: 15 MMOL/L
AST SERPL-CCNC: 36 U/L
BILIRUB SERPL-MCNC: 0.3 MG/DL
BUN SERPL-MCNC: 17 MG/DL
CALCIUM SERPL-MCNC: 9.3 MG/DL
CHLORIDE SERPL-SCNC: 101 MMOL/L
CO2 SERPL-SCNC: 24 MMOL/L
CREAT SERPL-MCNC: 0.87 MG/DL
GGT SERPL-CCNC: 154 U/L
GLUCOSE SERPL-MCNC: 106 MG/DL
LPL SERPL-CCNC: 31 U/L
POTASSIUM SERPL-SCNC: 4.8 MMOL/L
PROT SERPL-MCNC: 6.2 G/DL
SODIUM SERPL-SCNC: 140 MMOL/L

## 2021-12-01 NOTE — ED PROVIDER NOTE - NS ED ATTENDING STATEMENT MOD
Francisco Leonard PCP:  Tee Wylie MD    1956 MRN [id number]      HISTORY OF PRESENT ILLNESS:  Francisco Leonard is a(n) 71year old female here for follow-up  She complains of low back pain  Is across her lower back  Supple signs  She had previ
Attending Only

## 2021-12-12 ENCOUNTER — TRANSCRIPTION ENCOUNTER (OUTPATIENT)
Age: 76
End: 2021-12-12

## 2021-12-13 ENCOUNTER — NON-APPOINTMENT (OUTPATIENT)
Age: 76
End: 2021-12-13

## 2021-12-13 ENCOUNTER — APPOINTMENT (OUTPATIENT)
Dept: INTERNAL MEDICINE | Facility: CLINIC | Age: 76
End: 2021-12-13
Payer: MEDICARE

## 2021-12-13 VITALS
TEMPERATURE: 97.4 F | SYSTOLIC BLOOD PRESSURE: 120 MMHG | BODY MASS INDEX: 18.16 KG/M2 | HEIGHT: 65 IN | RESPIRATION RATE: 12 BRPM | WEIGHT: 109 LBS | HEART RATE: 64 BPM | OXYGEN SATURATION: 96 % | DIASTOLIC BLOOD PRESSURE: 70 MMHG

## 2021-12-13 DIAGNOSIS — J06.9 ACUTE UPPER RESPIRATORY INFECTION, UNSPECIFIED: ICD-10-CM

## 2021-12-13 PROCEDURE — 36415 COLL VENOUS BLD VENIPUNCTURE: CPT

## 2021-12-13 PROCEDURE — 99214 OFFICE O/P EST MOD 30 MIN: CPT | Mod: 25

## 2021-12-13 RX ORDER — PREDNISONE 10 MG/1
10 TABLET ORAL
Qty: 90 | Refills: 1 | Status: DISCONTINUED | COMMUNITY
Start: 2021-08-24 | End: 2021-12-13

## 2021-12-15 ENCOUNTER — NON-APPOINTMENT (OUTPATIENT)
Age: 76
End: 2021-12-15

## 2021-12-15 LAB
ALBUMIN SERPL ELPH-MCNC: 3.9 G/DL
ALP BLD-CCNC: 231 U/L
ALT SERPL-CCNC: 37 U/L
ANION GAP SERPL CALC-SCNC: 13 MMOL/L
APPEARANCE: CLEAR
AST SERPL-CCNC: 53 U/L
BACTERIA UR CULT: NORMAL
BACTERIA: NEGATIVE
BASOPHILS # BLD AUTO: 0.01 K/UL
BASOPHILS NFR BLD AUTO: 0.2 %
BILIRUB SERPL-MCNC: 0.4 MG/DL
BILIRUBIN URINE: NEGATIVE
BLOOD URINE: ABNORMAL
BUN SERPL-MCNC: 19 MG/DL
CALCIUM SERPL-MCNC: 9 MG/DL
CHLORIDE SERPL-SCNC: 98 MMOL/L
CO2 SERPL-SCNC: 25 MMOL/L
COLOR: YELLOW
CREAT SERPL-MCNC: 0.95 MG/DL
EOSINOPHIL # BLD AUTO: 0.07 K/UL
EOSINOPHIL NFR BLD AUTO: 1.3 %
GLUCOSE QUALITATIVE U: NEGATIVE
GLUCOSE SERPL-MCNC: 82 MG/DL
HCT VFR BLD CALC: 40.2 %
HGB BLD-MCNC: 12.2 G/DL
HMPV RNA SPEC QL NAA+PROBE: DETECTED
HYALINE CASTS: 2 /LPF
IMM GRANULOCYTES NFR BLD AUTO: 0.5 %
KETONES URINE: NEGATIVE
LEUKOCYTE ESTERASE URINE: NEGATIVE
LYMPHOCYTES # BLD AUTO: 1.62 K/UL
LYMPHOCYTES NFR BLD AUTO: 29.1 %
MAN DIFF?: NORMAL
MCHC RBC-ENTMCNC: 29.4 PG
MCHC RBC-ENTMCNC: 30.3 GM/DL
MCV RBC AUTO: 96.9 FL
MICROSCOPIC-UA: NORMAL
MONOCYTES # BLD AUTO: 0.47 K/UL
MONOCYTES NFR BLD AUTO: 8.4 %
NEUTROPHILS # BLD AUTO: 3.37 K/UL
NEUTROPHILS NFR BLD AUTO: 60.5 %
NITRITE URINE: NEGATIVE
PH URINE: 6
PLATELET # BLD AUTO: 251 K/UL
POTASSIUM SERPL-SCNC: 4 MMOL/L
PROT SERPL-MCNC: 6.6 G/DL
PROTEIN URINE: NEGATIVE
RAPID RVP RESULT: DETECTED
RBC # BLD: 4.15 M/UL
RBC # FLD: 14.6 %
RED BLOOD CELLS URINE: 10 /HPF
SARS-COV-2 RNA PNL RESP NAA+PROBE: NOT DETECTED
SODIUM SERPL-SCNC: 137 MMOL/L
SPECIFIC GRAVITY URINE: 1.02
SQUAMOUS EPITHELIAL CELLS: 1 /HPF
UROBILINOGEN URINE: NORMAL
WBC # FLD AUTO: 5.57 K/UL
WHITE BLOOD CELLS URINE: 1 /HPF

## 2021-12-15 NOTE — PHYSICAL EXAM
[No Acute Distress] : no acute distress [No Respiratory Distress] : no respiratory distress  [Normal Rate] : normal rate  [Regular Rhythm] : with a regular rhythm [Normal Affect] : the affect was normal [Normal Mood] : the mood was normal [Normal Outer Ear/Nose] : the outer ears and nose were normal in appearance [Normal Oropharynx] : the oropharynx was normal [Normal TMs] : both tympanic membranes were normal [Normal Nasal Mucosa] : the nasal mucosa was normal [Supple] : supple [No Focal Deficits] : no focal deficits [de-identified] : nontoxic appearing [de-identified] : +cough noted,mild wheeze noted

## 2021-12-15 NOTE — HISTORY OF PRESENT ILLNESS
[FreeTextEntry8] : Patient presents complaining of not feeling well for a few days. Patient is complaining of runny nose/cough and headache with associated weakness. Patient reports a temperature of 101 today, decreased to 97.4 with Tylenol. Patient states the cough appears worse at night. Patient was tested at the clinic on 12/12 for covid which was negative and diagnosed with viral illness.

## 2021-12-15 NOTE — ADDENDUM
[FreeTextEntry1] : Labs show\par -AST elevated at 53 with alkaline phosphatase of 231-patient with acute illness and chronically elevated LFTs of which she follows with hepatology, told to followup and discuss\par -UA with rbc= repeat UA\par -RVP positive for HmPV virus, negative covid-supportive therapy

## 2021-12-15 NOTE — ASSESSMENT
[FreeTextEntry1] : Chest x-ray ordered.Venipuncture done in our office, Labs sent out.Urine sent out. RVP ordered.Patient prescribed Doxycycline 100 mg one b.i.d. #20  .Patient advised to rest/increase fluids and use supportive therapy. Patient will call if symptoms persist or worsen and return to the office as scheduled for regular followup.\par \par \par Dr. Alcantar was present in office building while I examined patient\par

## 2021-12-30 ENCOUNTER — NON-APPOINTMENT (OUTPATIENT)
Age: 76
End: 2021-12-30

## 2022-01-05 ENCOUNTER — APPOINTMENT (OUTPATIENT)
Dept: RHEUMATOLOGY | Facility: CLINIC | Age: 77
End: 2022-01-05
Payer: MEDICARE

## 2022-01-05 PROCEDURE — 99214 OFFICE O/P EST MOD 30 MIN: CPT | Mod: 95

## 2022-01-10 NOTE — PHYSICAL EXAM
[General Appearance - Well Nourished] : well nourished [General Appearance - Well Developed] : well developed [Sclera] : the sclera and conjunctiva were normal [Hearing Threshold Finger Rub Not Sharp] : hearing was normal [Affect] : the affect was normal [Mood] : the mood was normal

## 2022-01-10 NOTE — ASSESSMENT
[FreeTextEntry1] : 75 yo womna with history of post covid vaccine pericarditis which has final resolved after months of recurrent effusions, AF and CP.  patient is also noted to have Positive CCP and FH of RA.  she herself does not have s/s of active RA.  \par \par --we have discuss RA symptoms and she is aware that if any of these symptoms atrt to occur she is to call the office \par --she is also to follow up wth GI given Lft abnormalities ( particular ALP)

## 2022-01-10 NOTE — HISTORY OF PRESENT ILLNESS
[Home] : at home, [unfilled] , at the time of the visit. [Medical Office: ([facility])___] : at the medical office located in  [Verbal consent obtained from patient] : the patient, [unfilled] [FreeTextEntry1] : 77 yo woman with history of osteopenia and abnormal LFTs presents to hospital for fevers, SOB and recurrent pericarditis and pleural effusions.  Patient was recently discharged after presenting with similar symptoms.  on prior admission ( around aug 7)   she had thoracocentesis with noted exudative fluid.  cytology was negative, negative AFB and GBM.  patient was treated with antibiotics with improvement.  she was sent home on Augmentin however fever returned and increase SOB and was sent into the hospital again.  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  he had repeat thoracentesis and chest tube placed.  \par \par Patient states that she has been a very heathy person.  she routinely exercises and walks 6 miles daily.  she states that in March 2021 she received her second dose of COVID 19 vaccine and she become very ill.  she had fevers, myalgias, fatigue, syncope and GI Issues.  The GI issues persisted for months causing bloating, gas and discomfort.  IN june she started to experiences fevers and CP and was noted to have pericarditis.  This was complicated by AFIB with RVR.  she was started on colchicine and ASA however she was not able to tolerate this.  she was given steroids which per patient improved her symptoms.  However she was admitted in [facility] with sepsis and possible PNA and loculated pleural effusions. Patient endorses wt loss throughout this period about 10 lbs ( initally wts 118 and down 106) she feels weak and fatigue and has not been exercising in some time.\par \par ROS: patient denies any morning stiffness or joint swelling.  she has osteoarthritis but no symptoms to suggest inflammatory arthritis.  she denies any alopecia, oral lesions, red painful eye, dry eye/dry mouth, malar rash, photosensitivity. No history of low plts, anemias, low wbc.  No history of blood per rectum. had recurrent sinus infection when young and thought to be related to allergies and congestion.  denies any recent sinus issues and no nose bleeding.  denies any rashes or hemoptysis.  \par \par she has raynauds but no history of digital ulcers, GERD, dysphagia, cough when drinking or eating solids, no tightness of skin over hands or face.   \par \par Today: patient is doing well.  Patient denies any more CP or SOB. her AF has resolved. she is exercising again.  she was able to taper off prednisone.  despite have a positive CCP she denies any morning stiffness, joint swelling.  she has raynauds but no history of digit ulcerations or skin changes.  she has chronic right shoulder pain and this shoulder has been replaced.  \par   \par \par malignancy screening: \par she is due for mammo.  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,\par former smoker \par no illicit drugs \par rare alcohol \par \par labs: \par CCP 70 \par negative CARL/RF/Sm/RNP/Ro/LA/Pauline/dsDNA/scl70/centromere/ANCA/RNA poly/HIstone\par normal c3/4 \par lyme negative \par tick panel negative\par quantiferon negative \par hep serologies neg\par tsh normal\par  \par immunofixation normal\par trop negative \par ACE level normal\par \par \par pleural fluid\par alb 2.2 (  2)\par glu 133 (92)\par LDh 115 (102)\par Tp 3.5 (3.9)\par pH 8.5 (8)\par \par RBC 1000\par nucleic cells 1236\par granulocyte predominant \par prior cytology of pleural effusion negative and negative afb and gbm \par culture negative\par \par Chest CT LLL thick linear opacity with bronchial dilation. No LN\par US abdomen: right Pleural effusion. abdomen normal \par CT a/p overall normal\par ECHO mod enlarged RA and LA\par RV size normal , mild reduce d RV systolic function\par Grade III diastolic dysfunction\par mild to mod AR \par small pericardial effusion \par : subtle septal bounce with annular paradox with dilated IVC: suggest constriction \par \par

## 2022-02-02 NOTE — PROCEDURE NOTE - NSSIZEINFR_GEN_A_CORE
[address]  [address] [id number]  Dept: [phone]    February 4, 2022     Patient: Ngozi Aponte   YOB: 1970   Date of Visit: 2/2/2022       To Whom it May Concern:    Nick Opitz is under my professional care  She was seen in the hospital from 2/2/2022   to 02/04/22  She may return to school on 2/10/2022 without limitations  If you have any questions or concerns, please don't hesitate to call           Sincerely,          HARLEY Bal
14

## 2022-02-16 ENCOUNTER — RESULT REVIEW (OUTPATIENT)
Age: 77
End: 2022-02-16

## 2022-02-17 ENCOUNTER — APPOINTMENT (OUTPATIENT)
Dept: DERMATOLOGY | Facility: CLINIC | Age: 77
End: 2022-02-17
Payer: MEDICARE

## 2022-02-17 PROCEDURE — 17000 DESTRUCT PREMALG LESION: CPT | Mod: 59

## 2022-02-17 PROCEDURE — 11102 TANGNTL BX SKIN SINGLE LES: CPT

## 2022-02-17 PROCEDURE — 99213 OFFICE O/P EST LOW 20 MIN: CPT | Mod: 25

## 2022-02-18 ENCOUNTER — APPOINTMENT (OUTPATIENT)
Dept: CARDIOLOGY | Facility: CLINIC | Age: 77
End: 2022-02-18

## 2022-03-09 ENCOUNTER — APPOINTMENT (OUTPATIENT)
Dept: DERMATOLOGY | Facility: CLINIC | Age: 77
End: 2022-03-09
Payer: MEDICARE

## 2022-03-09 DIAGNOSIS — C44.319 BASAL CELL CARCINOMA OF SKIN OF OTHER PARTS OF FACE: ICD-10-CM

## 2022-03-09 PROCEDURE — 17311 MOHS 1 STAGE H/N/HF/G: CPT

## 2022-03-09 PROCEDURE — 12052 INTMD RPR FACE/MM 2.6-5.0 CM: CPT

## 2022-03-10 PROBLEM — C44.319 BASAL CELL CARCINOMA (BCC) OF LEFT LATERAL CHEEK: Status: ACTIVE | Noted: 2022-03-09

## 2022-03-10 NOTE — ASSESSMENT
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 \par \par Thank you for this Mohs surgery referral. \par \par Reason MD Lisset\par Physician, Dermatology & Dermatologic Surgery\par [facility]\par \par \par

## 2022-03-10 NOTE — HISTORY OF PRESENT ILLNESS
[FreeTextEntry1] : BCC Left Mid Pre-Auricular Cheek [de-identified] : Mohs Surgery Consultation\par \par 03/9/2022 \par \par Referred by: Dr. Padron\par \par We had the pleasure of seeing your patient in consultation for Mohs Micrographic Surgery.\par \par Ms. LOUIS MEJIA is a 76 year old F who presents for evaluation of a recently biopsied BCC on the left mid pre-auricular cheek. Had been present as a growing papule x several months. No previous treatment. No prior hx of NMSC.\par \par Pertinent positives noted below \par History of HIV or hepatitis: No\par Blood thinners: none\par Antibiotic Prophylaxis: None \par Medical implants: None\par \par Social History: retired, no tobacco or alcohol\par \par \par The patient's review of systems questionnaire was reviewed. Education needs were identified. There were no barriers to learning.\par \par Mohs surgery consultation for BCC Left Mid Pre-Auricular Cheek\par \par -- I explained the treatment options of topical immunomodulatory or chemotherapy, electrodessication and curettage, radiation therapy, excision and Mohs surgery.  I recommended Mohs surgery due to the tumor type, location, and ill-defined nature of cancer. \par \par Mohs Appropriate Use Criteria (AUC) Score:8\par \par I explained that following extirpation there will be a full thickness defect of the involved area. The reconstructive options will be based on the defect size and surrounding tissue laxity of the involved area. Primary closure is only possible for smaller defects. For larger defects, local tissue rearrangement or skin grafting may be necessary. Risks following layered primary closure or local tissue rearrangement include wound dehiscence, contour irregularity, bleeding, infection, and paresthesia (nerve damage including sensory deficit or motor weakness). Risks following skin grafting include wound dehiscence, skin graft nonadherence (partial or complete), contour irregularity, bleeding, infection, paresthesia, and donor site complications. I explained that the patient will need to abstain from physical activity for 1-2 weeks following the surgery, that they would heal with a scar, and also discussed the chances of infection, bleeding, potential sensory or motor nerve damage, and recurrence.  The patient indicated that s/he understood the risks and wished to proceed today\par -- In particular, for reconstruction we discussed linear closure\par \par Thank you for this Mohs surgery referral. We recommended that Ms. LOUIS ARCHERELKIN follow up with Her referring dermatologist for routine skin exams following surgery. \par \par \par \par \par \par \par

## 2022-03-10 NOTE — PHYSICAL EXAM
[Alert] : alert [Oriented x 3] : ~L oriented x 3 [Well Nourished] : well nourished [No Visual Lymphadenopathy] : no visual  lymphadenopathy [Conjunctiva Non-injected] : conjunctiva non-injected [No Clubbing] : no clubbing [No Edema] : no edema [Hair] : Hair [Scalp] : Scalp [Face] : Face [Nose] : Nose [Ears] : Ears [Eyelids] : Eyelids [Lips] : Lips [Neck] : Neck [Nodes] : Nodes [FreeTextEntry3] : -left mid pre auricular area with 1.0 cm x 0.4 cm pink scar\par -no cervical adenopathy

## 2022-04-07 RX ORDER — DOXYCYCLINE 100 MG/1
100 TABLET, FILM COATED ORAL
Qty: 20 | Refills: 0 | Status: DISCONTINUED | COMMUNITY
Start: 2021-12-13 | End: 2022-04-07

## 2022-04-08 ENCOUNTER — APPOINTMENT (OUTPATIENT)
Dept: INTERNAL MEDICINE | Facility: CLINIC | Age: 77
End: 2022-04-08
Payer: MEDICARE

## 2022-04-08 VITALS
HEIGHT: 65 IN | TEMPERATURE: 97 F | HEART RATE: 74 BPM | SYSTOLIC BLOOD PRESSURE: 130 MMHG | RESPIRATION RATE: 11 BRPM | WEIGHT: 115 LBS | BODY MASS INDEX: 19.16 KG/M2 | OXYGEN SATURATION: 98 % | DIASTOLIC BLOOD PRESSURE: 80 MMHG

## 2022-04-08 PROCEDURE — 99214 OFFICE O/P EST MOD 30 MIN: CPT | Mod: 25

## 2022-04-08 PROCEDURE — 36415 COLL VENOUS BLD VENIPUNCTURE: CPT

## 2022-04-08 RX ORDER — APIXABAN 5 MG/1
5 TABLET, FILM COATED ORAL
Qty: 60 | Refills: 1 | Status: DISCONTINUED | COMMUNITY
Start: 2021-09-24 | End: 2022-04-08

## 2022-04-08 RX ORDER — MUPIROCIN 20 MG/G
2 OINTMENT TOPICAL TWICE DAILY
Qty: 1 | Refills: 2 | Status: DISCONTINUED | COMMUNITY
Start: 2022-03-09 | End: 2022-04-08

## 2022-04-08 RX ORDER — OMEPRAZOLE 40 MG/1
40 CAPSULE, DELAYED RELEASE ORAL
Qty: 90 | Refills: 2 | Status: DISCONTINUED | COMMUNITY
Start: 2021-06-28 | End: 2022-04-08

## 2022-04-08 NOTE — PROCEDURE NOTE - NSCOMPLICATION_GEN_A_CORE
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 Disc and osteophytes as   well as facet and ligamentum flavum hypertrophy contribute to further   stenosis of the thecal sac and narrowing of the neural foramina as discussed   above.          Pt had DRAGAN in 2016     Relevant Medical History:Additional Pertinent Hx: arthritis, CAD, colon CA,HTN, HLD, lida TKR, partial liver resection. Functional Scale/Score: Junior =  shebaal 3/18  37     4/5  20     Pain Scale:3-4/10  Easing factors: rest, sitting  Provocative factors:  Walking weight bearing     RESTRICTIONS/PRECAUTIONS: none noted    SUBJECTIVE: Pain is across left lumbar region, goes down the leg to the lower leg. Is getting better. Pt very verbal and easily distracted, difficulty staying on task.   3/25 -  Pt feels about 50% better - done with steroids - notes difficulty with sitting in high chairs, better in low chairs  3/29 - feels that sitting position is an issue  4/1 - LBP 2/10  Leg pain 4/10 - happens only when he is flat on his back. 4/5   LBP  1-2/10  Left lower leg  3/10   4/8 - feels like he is getting better.          ROM   Comments   Lumbar Flex Min ltd     Lumbar Ext Mod ltd        ROM LEFT RIGHT Comments   Lumbar Side Bend Mod ltd Mod ltd     Lumbar Rotation Min ltd Min ltd     Quadrant         Hip Flexion wfl all wfl all     Hip Abd         Hamstring Flex Mod ltd Mod ltd     Piriformis Mod ltd Mod ltd            Therapeutic Ex (55044)   Min:  20 Resistance/Reps Notes/Cues        SKTC 10 sec x 5    LTR 10 Cues to go slowly        Seated forward flexion X 5 Cues for technique        HSS 20 sec x 3    GSS 20 sec x 3    Piriformis stretch     30 sec x 2    Therapeutic Activity (80842) Min:  8     Review of body mechanics and lifting Review for pt auctions and buying larger items to lift  Give handout        NMR re-education (02286)   Min:  5          TA set 5 sec x 10 review   Glut set 5 sec x 10         Manual Intervention (60570) Min:12     Manual traction   10 min      Self lumbar traction lavender ball, legs 90/90  60 sec on 20 off    10 sec x 10 minimal relief. Relief noted      Difficulty with technique     Modalities  Min:   20          IFC  in chair  Back, buttocks,  Left calf 4 ads LS   X 15 min with two ice packs                Other Therapeutic Activities:  Pt was educated on PT POC, Diagnosis, Prognosis, pathomechanics as well as frequency and duration of scheduling future physical therapy appointments. Time was also taken on this day to answer all patient questions and participation in PT. Reviewed appointment policy in detail with patient and patient verbalized understanding. Home Exercise Program: Patient instructed in the following for HEP:   . Patient verbalized/demonstrated understanding and was issued written handout. Therapeutic Exercise and NMR EXR  [x] (16657) Provided verbal/tactile cueing for activities related to strengthening, flexibility, endurance, ROM  for improvements in proximal hip and core control with self care, mobility, lifting and ambulation. [x] (64708) Provided verbal/tactile cueing for activities related to improving balance, coordination, kinesthetic sense, posture, motor skill, proprioception  to assist with core control in self care, mobility, lifting, and ambulation.      Therapeutic Activities:    [x] (00316 or 49896) Provided verbal/tactile cueing for activities related to improving balance, coordination, kinesthetic sense, posture, motor skill, proprioception and motor activation to allow for proper function  with self care and ADLs  [x] (55049) Provided training and instruction to the patient for proper core and proximal hip recruitment and positioning with ambulation re-education     Home Exercise Program:    [x] (62394) Reviewed/Progressed HEP activities related to strengthening, flexibility, endurance, ROM of core, proximal hip and LE for functional self-care, mobility, lifting and ambulation   [x] (17268) Reviewed/Progressed HEP activities related to improving balance, coordination, kinesthetic sense, posture, motor skill, proprioception of core, proximal hip and LE for self care, mobility, lifting, and ambulation      Manual Treatments:  PROM / STM / Oscillations-Mobs:  G-I, II, III, IV (PA's, Inf., Post.)  [x] (32843) Provided manual therapy to mobilize proximal hip and LS spine soft tissue/joints for the purpose of modulating pain, promoting relaxation,  increasing ROM, reducing/eliminating soft tissue swelling/inflammation/restriction, improving soft tissue extensibility and allowing for proper ROM for normal function with self care, mobility, lifting and ambulation. ASSESSMENT: Pt with sudden onset LLS and left radicular pain after lots of lifting of luggage and [place] the airport. Appears to be a result of overloading a spine with significant degenerative changes.   4/1 -  Doing better with LBP and less leg pain with proper positioning. GOALS:  Patient stated goal: less pain  [x]? Progressing: []? Met: []? Not Met: []? Adjusted  Therapist goals for Patient:   Short Term Goals: To be achieved in: 2 weeks  1. Independent in HEP and progression per patient tolerance, in order to prevent re-injury. [x]? Progressing: []? Met: []? Not Met: []? Adjusted  2. Patient will have a decrease in pain to facilitate improvement in movement, function, and ADLs as indicated by Functional Deficits. [x]? Progressing: []? Met: []? Not Met: []? Adjusted     Long Term Goals: To be achieved in: 8 weeks  1. Disability index score of 20% or less for the [place] to assist with reaching prior level of function. [x]? Progressing: []? Met: []? Not Met: []? Adjusted  2. Patient will demonstrate increased AROM to WNL, good LS mobility, good hip ROM to allow for proper joint functioning as indicated by patients Functional Deficits. [x]? Progressing: []? Met: []? Not Met: []? Adjusted  3.  Patient will demonstrate an increase in Strength to good proximal hip and core activation to allow for proper functional mobility as indicated by patients Functional Deficits. [x]? Progressing: []? Met: []? Not Met: []? Adjusted  4. Patient will return to 80% functional activities without increased symptoms or restriction. [x]? Progressing: []? Met: []? Not Met: []? Adjusted  5. To be able to go back to the [facility]   [x]? Progressing: []? Met: []? Not Met: []? Adjusted             Charges:  Timed Code Treatment Minutes: 38   Total Treatment Minutes: 58     [] EVAL (LOW) 71812 (typically 20 minutes face-to-face)  [] EVAL (MOD) 35334 (typically 30 minutes face-to-face)  [] EVAL (HIGH) 26165 (typically 45 minutes face-to-face)  [] RE-EVAL     [x] RL(25117) x   2  [] Dry needle 1 or 2 Muscles (09696)  [] NMR (32303) x     [] Dry needle 3+ Muscles (42108)  [x] Manual (29097) x   1  [] Ultrasound (40019) x  [] TA (31416) x     [] Mech Traction (80536)  [] ES(attended) (37790)     [x] ES (un) (24163):   [] Vasopump (41354) [] Ionto (67667)   [] Other:      Treatment/Activity Tolerance:  [x] Patient tolerated treatment well [] Patient limited by fatique  [] Patient limited by pain  [] Patient limited by other medical complications  [] Other:     Overall Progression Towards Functional goals/ Treatment Progress Update:  [x] Patient is progressing as expected towards functional goals listed. [] Progression is slowed due to complexities/Impairments listed. [] Progression has been slowed due to co-morbidities.   [] Plan just implemented, too soon to assess goals progression <30days   [] Goals require adjustment due to lack of progress  [] Patient is not progressing as expected and requires additional follow up with physician  [] Other:    Prognosis for POC: [x] Good [] Fair  [] Poor    Patient requires continued skilled intervention: [x] Yes  [] No        PLAN: See eval  [x] Continue per plan of care [] Alter current plan (see comments)  [x] Plan of care initiated [] Hold pending MD visit [] Discharge    Electronically signed by: Marlee Miranda PT DPT, MS  [id number]    Note: If patient does not return for scheduled/recommended follow up visits, this note will serve as a discharge from care along with the most recent update on progress. no complications

## 2022-04-08 NOTE — PHYSICAL EXAM
[No Respiratory Distress] : no respiratory distress  [Clear to Auscultation] : lungs were clear to auscultation bilaterally [Normal Rate] : normal rate  [Regular Rhythm] : with a regular rhythm [Normal Affect] : the affect was normal [Normal Mood] : the mood was normal [No Acute Distress] : no acute distress

## 2022-04-09 LAB
ALBUMIN SERPL ELPH-MCNC: 4.5 G/DL
ALP BLD-CCNC: 126 U/L
ALT SERPL-CCNC: 30 U/L
ANION GAP SERPL CALC-SCNC: 13 MMOL/L
AST SERPL-CCNC: 46 U/L
BASOPHILS # BLD AUTO: 0.04 K/UL
BASOPHILS NFR BLD AUTO: 0.6 %
BILIRUB SERPL-MCNC: 0.5 MG/DL
BUN SERPL-MCNC: 15 MG/DL
CALCIUM SERPL-MCNC: 9.9 MG/DL
CHLORIDE SERPL-SCNC: 102 MMOL/L
CHOLEST SERPL-MCNC: 177 MG/DL
CO2 SERPL-SCNC: 27 MMOL/L
CREAT SERPL-MCNC: 0.83 MG/DL
EGFR: 73 ML/MIN/1.73M2
EOSINOPHIL # BLD AUTO: 0.11 K/UL
EOSINOPHIL NFR BLD AUTO: 1.8 %
GLUCOSE SERPL-MCNC: 85 MG/DL
HCT VFR BLD CALC: 42.4 %
HDLC SERPL-MCNC: 87 MG/DL
HGB BLD-MCNC: 13.8 G/DL
IMM GRANULOCYTES NFR BLD AUTO: 0.2 %
LDLC SERPL CALC-MCNC: 80 MG/DL
LYMPHOCYTES # BLD AUTO: 1.31 K/UL
LYMPHOCYTES NFR BLD AUTO: 21.1 %
MAN DIFF?: NORMAL
MCHC RBC-ENTMCNC: 30.7 PG
MCHC RBC-ENTMCNC: 32.5 GM/DL
MCV RBC AUTO: 94.4 FL
MONOCYTES # BLD AUTO: 0.31 K/UL
MONOCYTES NFR BLD AUTO: 5 %
NEUTROPHILS # BLD AUTO: 4.43 K/UL
NEUTROPHILS NFR BLD AUTO: 71.3 %
NONHDLC SERPL-MCNC: 90 MG/DL
PLATELET # BLD AUTO: 208 K/UL
POTASSIUM SERPL-SCNC: 4.7 MMOL/L
PROT SERPL-MCNC: 6.7 G/DL
RBC # BLD: 4.49 M/UL
RBC # FLD: 13.8 %
SODIUM SERPL-SCNC: 142 MMOL/L
TRIGL SERPL-MCNC: 50 MG/DL
WBC # FLD AUTO: 6.21 K/UL

## 2022-04-09 NOTE — ASSESSMENT
[FreeTextEntry1] : Venipuncture done in our office, Labs sent out.Patient advised to continue present medications with diet/exercise and specialists followup. Patient  will return to the office for CPE in 3-4months\par \par Shingrix d/w pt/declines Pneumovax/flu vaccines, +got covid vaccines X2, declines booster\par Colonoscopy was 3/13 with follow up in 5 years-Dr. Dhillon-declines so FIT test 7/2019=negative, NEW FIT given\par Mammogram is 2/2019- "to do"=rx given\par Bone density was 3/2021\par Carotid sonogram done on 9/2020 showed no stenosis\par Specialists include\par 1. Hepatologist-Dr. Catherine prn\par 2. GYN-Dr. Perez\par 3.Derm-Dr. Padron\par 4. Orthopedic prn-Dr. Bergman/Dr. Underwood\par 5.cardio-Dr. goins\par 6.Rheum-Dr. Briscoe\par CT lung screen=N/A\par CT chest 8/2021\par Declines HIV/hepatitis C screening\par

## 2022-04-09 NOTE — HISTORY OF PRESENT ILLNESS
[FreeTextEntry1] : On carotid atherosclerosis/hyperlipidemia [de-identified] : Patient presents for followup on carotid atherosclerosis/hyperlipidemia and medication check. Patient is currently on aspirin for carotid atherosclerosis, on Crestor hyperlipidemia and feels well\par -on Diovan/toprol for hypertension\par - Continues on aspirin,off eliquis, continues in normal sinus rhythm post cardioversion\par - Patient physicallly finally feels back to her baseline\par

## 2022-04-11 ENCOUNTER — NON-APPOINTMENT (OUTPATIENT)
Age: 77
End: 2022-04-11

## 2022-04-27 ENCOUNTER — NON-APPOINTMENT (OUTPATIENT)
Age: 77
End: 2022-04-27

## 2022-04-27 ENCOUNTER — TRANSCRIPTION ENCOUNTER (OUTPATIENT)
Age: 77
End: 2022-04-27

## 2022-04-27 LAB — HEMOCCULT STL QL IA: NEGATIVE

## 2022-04-29 ENCOUNTER — APPOINTMENT (OUTPATIENT)
Dept: ORTHOPEDIC SURGERY | Facility: CLINIC | Age: 77
End: 2022-04-29

## 2022-04-29 VITALS
WEIGHT: 115 LBS | HEART RATE: 50 BPM | HEIGHT: 65 IN | DIASTOLIC BLOOD PRESSURE: 73 MMHG | BODY MASS INDEX: 19.16 KG/M2 | SYSTOLIC BLOOD PRESSURE: 157 MMHG

## 2022-04-29 PROCEDURE — 99203 OFFICE O/P NEW LOW 30 MIN: CPT

## 2022-04-29 PROCEDURE — 73630 X-RAY EXAM OF FOOT: CPT | Mod: RT

## 2022-04-29 NOTE — HISTORY OF PRESENT ILLNESS
[FreeTextEntry1] : She is a 76-year-old female who presents today for evaluation of both feet. Originally she had corrective surgery for bunions many years ago. He did fairly well. However worse recently she noticed that she's had some discomfort particularly the second toes of the left and right foot. She feels that the bunions have returned and therefore crowding those toes. She was seen by Dr. López in the past. She expresses a concern and was treated conservatively. She was explained that she should use toe separators and that may need surgery for surgical correction. However she decides to continue with conservative management. She states she been doing fairly well but does feel this discomfort about the left and right second toes.

## 2022-04-29 NOTE — PHYSICAL EXAM
[de-identified] : Examination of both lower extremities revealed that the skin is clean and dry. The overall alignment of her hips knees and ankle is normal. There is no erythema and no edema on the lower extremities. When the patient stands the left foot shows a hallux valgus deformity, the second toe is elevated and the third toe is deviated into varus position. Examination of the forefoot revealed a soft corn formation in the middle phalanx medially in the third toe. There is no obvious bony prominence either on the third toe or the midportion of the second toe. The third toe can be realigned to neutral position. The second toe is dorsiflexed at the MTP joint. The hallux is dorsiflexed and in valgus position and can be corrected to neutral. There is no pain or discomfort at first MTP range of motion.

## 2022-04-29 NOTE — DISCUSSION/SUMMARY
[de-identified] : Plan for the patient is to continue her present activities. She was advised to continue with conservative management and proper shoe wear. She was given a prescription for orthotics to use. He may continue with anti-inflammatories as needed. Patient suggested to continue with his conservative management and to return back to the office should the pain persists or worsens.\par \par I saw and evaluated the patient. I discussed and reviewed the case details with the PA and agree with the PA's findings and plan as documented in the PA note.\par \par

## 2022-04-29 NOTE — END OF VISIT
[Time Spent: ___ minutes] : I have spent [unfilled] minutes of time on the encounter. [FreeTextEntry3] : I, Dr. López, personally performed the evaluation and management services of this new patient. That E/M includes conducting the initial examination, assessing all  conditions, and establishing the plan of care. Today, MY ACP was here to observe my evaluation and management services of this patient to be followed going forward.\par

## 2022-05-16 ENCOUNTER — RX RENEWAL (OUTPATIENT)
Age: 77
End: 2022-05-16

## 2022-08-05 ENCOUNTER — APPOINTMENT (OUTPATIENT)
Dept: ORTHOPEDIC SURGERY | Facility: CLINIC | Age: 77
End: 2022-08-05

## 2022-08-24 ENCOUNTER — APPOINTMENT (OUTPATIENT)
Dept: DERMATOLOGY | Facility: CLINIC | Age: 77
End: 2022-08-24

## 2022-08-24 PROCEDURE — 17000 DESTRUCT PREMALG LESION: CPT | Mod: 59

## 2022-08-24 PROCEDURE — 99213 OFFICE O/P EST LOW 20 MIN: CPT | Mod: 25

## 2022-08-24 PROCEDURE — 17110 DESTRUCTION B9 LES UP TO 14: CPT

## 2022-09-08 ENCOUNTER — APPOINTMENT (OUTPATIENT)
Dept: INTERNAL MEDICINE | Facility: CLINIC | Age: 77
End: 2022-09-08

## 2022-09-08 VITALS
SYSTOLIC BLOOD PRESSURE: 120 MMHG | HEIGHT: 65 IN | BODY MASS INDEX: 18.66 KG/M2 | HEART RATE: 55 BPM | OXYGEN SATURATION: 95 % | RESPIRATION RATE: 12 BRPM | WEIGHT: 112 LBS | DIASTOLIC BLOOD PRESSURE: 80 MMHG

## 2022-09-08 DIAGNOSIS — M25.511 PAIN IN RIGHT SHOULDER: ICD-10-CM

## 2022-09-08 DIAGNOSIS — Z87.01 PERSONAL HISTORY OF PNEUMONIA (RECURRENT): ICD-10-CM

## 2022-09-08 DIAGNOSIS — Z09 ENCOUNTER FOR FOLLOW-UP EXAMINATION AFTER COMPLETED TREATMENT FOR CONDITIONS OTHER THAN MALIGNANT NEOPLASM: ICD-10-CM

## 2022-09-08 DIAGNOSIS — I10 ESSENTIAL (PRIMARY) HYPERTENSION: ICD-10-CM

## 2022-09-08 DIAGNOSIS — B37.3 CANDIDIASIS OF VULVA AND VAGINA: ICD-10-CM

## 2022-09-08 DIAGNOSIS — Z87.09 PERSONAL HISTORY OF OTHER DISEASES OF THE RESPIRATORY SYSTEM: ICD-10-CM

## 2022-09-08 DIAGNOSIS — Z11.59 ENCOUNTER FOR SCREENING FOR OTHER VIRAL DISEASES: ICD-10-CM

## 2022-09-08 DIAGNOSIS — Z87.898 PERSONAL HISTORY OF OTHER SPECIFIED CONDITIONS: ICD-10-CM

## 2022-09-08 DIAGNOSIS — Z86.2 PERSONAL HISTORY OF DISEASES OF THE BLOOD AND BLOOD-FORMING ORGANS AND CERTAIN DISORDERS INVOLVING THE IMMUNE MECHANISM: ICD-10-CM

## 2022-09-08 DIAGNOSIS — R22.30 LOCALIZED SWELLING, MASS AND LUMP, UNSPECIFIED UPPER LIMB: ICD-10-CM

## 2022-09-08 DIAGNOSIS — I65.22 OCCLUSION AND STENOSIS OF LEFT CAROTID ARTERY: ICD-10-CM

## 2022-09-08 DIAGNOSIS — Z87.39 PERSONAL HISTORY OF OTHER DISEASES OF THE MUSCULOSKELETAL SYSTEM AND CONNECTIVE TISSUE: ICD-10-CM

## 2022-09-08 DIAGNOSIS — Z86.19 PERSONAL HISTORY OF OTHER INFECTIOUS AND PARASITIC DISEASES: ICD-10-CM

## 2022-09-08 DIAGNOSIS — Z86.79 PERSONAL HISTORY OF OTHER DISEASES OF THE CIRCULATORY SYSTEM: ICD-10-CM

## 2022-09-08 DIAGNOSIS — R50.9 FEVER, UNSPECIFIED: ICD-10-CM

## 2022-09-08 DIAGNOSIS — R74.8 ABNORMAL LEVELS OF OTHER SERUM ENZYMES: ICD-10-CM

## 2022-09-08 DIAGNOSIS — R55 SYNCOPE AND COLLAPSE: ICD-10-CM

## 2022-09-08 DIAGNOSIS — G89.29 PAIN IN RIGHT SHOULDER: ICD-10-CM

## 2022-09-08 DIAGNOSIS — Z01.818 ENCOUNTER FOR OTHER PREPROCEDURAL EXAMINATION: ICD-10-CM

## 2022-09-08 DIAGNOSIS — M54.2 CERVICALGIA: ICD-10-CM

## 2022-09-08 DIAGNOSIS — M79.671 PAIN IN RIGHT FOOT: ICD-10-CM

## 2022-09-08 DIAGNOSIS — I48.19 OTHER PERSISTENT ATRIAL FIBRILLATION: ICD-10-CM

## 2022-09-08 DIAGNOSIS — Z29.9 ENCOUNTER FOR PROPHYLACTIC MEASURES, UNSPECIFIED: ICD-10-CM

## 2022-09-08 DIAGNOSIS — G89.29 CERVICALGIA: ICD-10-CM

## 2022-09-08 DIAGNOSIS — M79.672 PAIN IN RIGHT FOOT: ICD-10-CM

## 2022-09-08 DIAGNOSIS — Z13.31 ENCOUNTER FOR SCREENING FOR DEPRESSION: ICD-10-CM

## 2022-09-08 PROCEDURE — 36415 COLL VENOUS BLD VENIPUNCTURE: CPT

## 2022-09-08 PROCEDURE — G0439: CPT

## 2022-09-08 PROCEDURE — G0444 DEPRESSION SCREEN ANNUAL: CPT

## 2022-09-08 NOTE — PHYSICAL EXAM
[No Acute Distress] : no acute distress [Well Nourished] : well nourished [Well Developed] : well developed [Well-Appearing] : well-appearing [Normal Sclera/Conjunctiva] : normal sclera/conjunctiva [PERRL] : pupils equal round and reactive to light [EOMI] : extraocular movements intact [Normal Outer Ear/Nose] : the outer ears and nose were normal in appearance [Normal Oropharynx] : the oropharynx was normal [No JVD] : no jugular venous distention [No Lymphadenopathy] : no lymphadenopathy [Supple] : supple [Thyroid Normal, No Nodules] : the thyroid was normal and there were no nodules present [No Respiratory Distress] : no respiratory distress  [No Accessory Muscle Use] : no accessory muscle use [Clear to Auscultation] : lungs were clear to auscultation bilaterally [Normal Rate] : normal rate  [Regular Rhythm] : with a regular rhythm [Normal S1, S2] : normal S1 and S2 [No Murmur] : no murmur heard [No Carotid Bruits] : no carotid bruits [No Abdominal Bruit] : a ~M bruit was not heard ~T in the abdomen [No Varicosities] : no varicosities [Pedal Pulses Present] : the pedal pulses are present [No Edema] : there was no peripheral edema [No Palpable Aorta] : no palpable aorta [No Extremity Clubbing/Cyanosis] : no extremity clubbing/cyanosis [Soft] : abdomen soft [Non Tender] : non-tender [Non-distended] : non-distended [No Masses] : no abdominal mass palpated [No HSM] : no HSM [Normal Bowel Sounds] : normal bowel sounds [Normal Posterior Cervical Nodes] : no posterior cervical lymphadenopathy [Normal Anterior Cervical Nodes] : no anterior cervical lymphadenopathy [No CVA Tenderness] : no CVA  tenderness [No Spinal Tenderness] : no spinal tenderness [No Joint Swelling] : no joint swelling [Grossly Normal Strength/Tone] : grossly normal strength/tone [No Rash] : no rash [Coordination Grossly Intact] : coordination grossly intact [No Focal Deficits] : no focal deficits [Normal Gait] : normal gait [Deep Tendon Reflexes (DTR)] : deep tendon reflexes were 2+ and symmetric [Normal Affect] : the affect was normal [Normal Insight/Judgement] : insight and judgment were intact [Normal TMs] : both tympanic membranes were normal [de-identified] : by GYN

## 2022-09-08 NOTE — HISTORY OF PRESENT ILLNESS
[de-identified] : 73-year-old female with good general health habits with strict dietary control and regular exercise presents for her yearly physical.\par \par Significant issue occurred March 2021 when she had issues post COVID vaccine which included paroxysmal atrial fibrillation, chest pain,pleural and her cardial effusions with multiple hospital and ER visits with slow but gradual improvement.\par PAF continue therefore patient had cardioversion October 2021 and has remained in normal sinus rhythm.Anticoagulation stopped with patient change to aspirin.\par Patient was on steroids with gradual resolution of pleural and pericardial effusions.\par most recent followup with cardiology fine the patient stable.\par Cardiac PET scan December 2021 with normal LVEF with coronary calcification in the LADwith no evidence of ischemia or infarct.\par \par Overall the patient is feeling much better and states "just about back to normal"\par she is back to exercising regularly.\par \par October 2020 she had a reverse right shoulder replacement which went well but she couldn't continue with her physical therapy secondary to her illness and continues to do home PT.\par \par Medications include valsartan for hypertension, metoprolol and aspirin for PAF.Also rosuvastatin 5 mg every other day for hyperlipidemia.\par \par Carotid duplex in the past showed moderate left carotid stenosis.\par \par Patient is feeling well without any complaints including no chest pain, palpitations, shortness of breath or edema.\par \par The patient had been seeing hepatology secondary to elevated AST and alkaline phosphatase with testing including serologies, ultrasound and Fibroscan with no specific diagnosis.\par Therefore LFTs are being monitored.\par There was much improvement in her numbers until this significant illness in 2021 with alkaline phosphatase increasing again to 400 but most recent was 125\par \par The patient is  with 3 children and 7 grandchildren. She is retired as a  for hospice.

## 2022-09-08 NOTE — ASSESSMENT
[FreeTextEntry1] : 7-year-old femalewith significant issue March 2021 with severe illness post COVID vaccine including paroxysmal atrial fibrillation, pericardial and pleural effusions with gradual improvement.\par \par Status post cardioversion October 2021 to normal sinus rhythm.Continue on aspirin and metoprolol.\par Followup with cardiology as scheduled.\par \par Hypertension controlled on valsartan\par Hyperlipidemia on rosuvastatin\par \par Continue diet and exercise\par \par Mammography do therefore referral given\par GYN followup overdue and scheduled\par \par \par Venipuncture done today in the office\par Followup in 6 months

## 2022-09-08 NOTE — HEALTH RISK ASSESSMENT
[Very Good] : ~his/her~  mood as very good [Former] : Former [Yes] : Yes [2 - 3 times a week (3 pts)] : 2 - 3  times a week (3 points) [1 or 2 (0 pts)] : 1 or 2 (0 points) [Never (0 pts)] : Never (0 points) [No] : In the past 12 months have you used drugs other than those required for medical reasons? No [No falls in past year] : Patient reported no falls in the past year [0] : 2) Feeling down, depressed, or hopeless: Not at all (0) [PHQ-2 Negative - No further assessment needed] : PHQ-2 Negative - No further assessment needed [None] : None [With Significant Other] : lives with significant other [] :  [# Of Children ___] : has [unfilled] children [Feels Safe at Home] : Feels safe at home [Fully functional (bathing, dressing, toileting, transferring, walking, feeding)] : Fully functional (bathing, dressing, toileting, transferring, walking, feeding) [Fully functional (using the telephone, shopping, preparing meals, housekeeping, doing laundry, using] : Fully functional and needs no help or supervision to perform IADLs (using the telephone, shopping, preparing meals, housekeeping, doing laundry, using transportation, managing medications and managing finances) [Smoke Detector] : smoke detector [Carbon Monoxide Detector] : carbon monoxide detector [Seat Belt] :  uses seat belt [Sunscreen] : uses sunscreen [Reviewed no changes] : Reviewed, no changes [Designated Healthcare Proxy] : Designated healthcare proxy [Name: ___] : Health Care Proxy's Name: [unfilled]  [Relationship: ___] : Relationship: [unfilled] [Audit-CScore] : 3 [de-identified] : exercises [de-identified] : good [[id number]] : 0 [Change in mental status noted] : No change in mental status noted [Reports changes in hearing] : Reports no changes in hearing [Reports changes in vision] : Reports no changes in vision [Reports changes in dental health] : Reports no changes in dental health [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

## 2022-09-09 LAB
25(OH)D3 SERPL-MCNC: 91.2 NG/ML
ALBUMIN SERPL ELPH-MCNC: 4.3 G/DL
ALP BLD-CCNC: 133 U/L
ALT SERPL-CCNC: 28 U/L
ANION GAP SERPL CALC-SCNC: 13 MMOL/L
APPEARANCE: CLEAR
AST SERPL-CCNC: 42 U/L
BACTERIA: NEGATIVE
BASOPHILS # BLD AUTO: 0.03 K/UL
BASOPHILS NFR BLD AUTO: 0.5 %
BILIRUB SERPL-MCNC: 0.5 MG/DL
BILIRUBIN URINE: NEGATIVE
BLOOD URINE: NEGATIVE
BUN SERPL-MCNC: 17 MG/DL
CALCIUM SERPL-MCNC: 9.9 MG/DL
CHLORIDE SERPL-SCNC: 104 MMOL/L
CHOLEST SERPL-MCNC: 197 MG/DL
CO2 SERPL-SCNC: 26 MMOL/L
COLOR: NORMAL
CREAT SERPL-MCNC: 0.92 MG/DL
EGFR: 64 ML/MIN/1.73M2
EOSINOPHIL # BLD AUTO: 0.11 K/UL
EOSINOPHIL NFR BLD AUTO: 1.8 %
GLUCOSE QUALITATIVE U: NEGATIVE
GLUCOSE SERPL-MCNC: 82 MG/DL
HCT VFR BLD CALC: 41 %
HDLC SERPL-MCNC: 87 MG/DL
HGB BLD-MCNC: 13.2 G/DL
HYALINE CASTS: 0 /LPF
IMM GRANULOCYTES NFR BLD AUTO: 0.2 %
KETONES URINE: NORMAL
LDLC SERPL CALC-MCNC: 101 MG/DL
LEUKOCYTE ESTERASE URINE: NEGATIVE
LYMPHOCYTES # BLD AUTO: 1.43 K/UL
LYMPHOCYTES NFR BLD AUTO: 23 %
MAN DIFF?: NORMAL
MCHC RBC-ENTMCNC: 30.5 PG
MCHC RBC-ENTMCNC: 32.2 GM/DL
MCV RBC AUTO: 94.7 FL
MICROSCOPIC-UA: NORMAL
MONOCYTES # BLD AUTO: 0.31 K/UL
MONOCYTES NFR BLD AUTO: 5 %
NEUTROPHILS # BLD AUTO: 4.32 K/UL
NEUTROPHILS NFR BLD AUTO: 69.5 %
NITRITE URINE: NEGATIVE
NONHDLC SERPL-MCNC: 111 MG/DL
PH URINE: 6
PLATELET # BLD AUTO: 204 K/UL
POTASSIUM SERPL-SCNC: 4.8 MMOL/L
PROT SERPL-MCNC: 6.6 G/DL
PROTEIN URINE: NORMAL
RBC # BLD: 4.33 M/UL
RBC # FLD: 12.7 %
RED BLOOD CELLS URINE: 2 /HPF
SODIUM SERPL-SCNC: 143 MMOL/L
SPECIFIC GRAVITY URINE: 1.02
SQUAMOUS EPITHELIAL CELLS: 1 /HPF
TRIGL SERPL-MCNC: 48 MG/DL
UROBILINOGEN URINE: NORMAL
WBC # FLD AUTO: 6.21 K/UL
WHITE BLOOD CELLS URINE: 2 /HPF

## 2022-09-12 ENCOUNTER — NON-APPOINTMENT (OUTPATIENT)
Age: 77
End: 2022-09-12

## 2022-09-27 ENCOUNTER — NON-APPOINTMENT (OUTPATIENT)
Age: 77
End: 2022-09-27

## 2022-09-30 ENCOUNTER — NON-APPOINTMENT (OUTPATIENT)
Age: 77
End: 2022-09-30

## 2022-09-30 RX ORDER — VALSARTAN 80 MG/1
80 TABLET, COATED ORAL DAILY
Qty: 90 | Refills: 1 | Status: DISCONTINUED | COMMUNITY
Start: 2022-04-08 | End: 2022-09-30

## 2022-10-13 PROBLEM — Z13.31 SCREENING FOR DEPRESSION: Status: ACTIVE | Noted: 2022-09-08

## 2022-10-18 ENCOUNTER — NON-APPOINTMENT (OUTPATIENT)
Age: 77
End: 2022-10-18

## 2022-10-18 ENCOUNTER — APPOINTMENT (OUTPATIENT)
Dept: OBGYN | Facility: CLINIC | Age: 77
End: 2022-10-18

## 2022-10-18 VITALS
DIASTOLIC BLOOD PRESSURE: 79 MMHG | HEIGHT: 65 IN | WEIGHT: 116 LBS | BODY MASS INDEX: 19.33 KG/M2 | SYSTOLIC BLOOD PRESSURE: 179 MMHG

## 2022-10-18 DIAGNOSIS — G47.00 INSOMNIA, UNSPECIFIED: ICD-10-CM

## 2022-10-18 DIAGNOSIS — N76.3 SUBACUTE AND CHRONIC VULVITIS: ICD-10-CM

## 2022-10-18 PROCEDURE — 99214 OFFICE O/P EST MOD 30 MIN: CPT

## 2022-10-18 NOTE — HISTORY OF PRESENT ILLNESS
[FreeTextEntry1] :  78 yo pt here for gyn visit. co recurrent vulvar itching/discomfort . \par had reverse shoulder replacemnt. had pericarditis after second covid shot, ended upp in hospital. \par doing better now. \par denies vb, dc, pel pain, bladder probs at present. \par gets vulvar itching intermittently, req topical creams that have helped in past. \par co bothersome insomnia, otc remedies not helping.

## 2022-10-18 NOTE — DISCUSSION/SUMMARY
[FreeTextEntry1] : vulvitis- lotrisone prescribed.\par insomnia- rba trazodone dw pt, prescribed. \par nl breast exam, ref mammo\par spent 35 min in encounter.

## 2022-10-26 ENCOUNTER — NON-APPOINTMENT (OUTPATIENT)
Age: 77
End: 2022-10-26

## 2022-12-01 NOTE — H&P ADULT - NEGATIVE GASTROINTESTINAL SYMPTOMS
[facility] Progress Note                            Date:  12/1/2022  Patient: Lennie Quezada  Age:  67 y. o., 1946      Reason for evaluation:         SUBJECTIVE:    Returns today follow-up assessment overall doing well. Had pacemaker implanted April 2022. Device interrogated today functioning appropriately he feels much improved. Denies dizziness palpitations chest pain or dyspnea. Remains moderately active blood pressure 120/64 at 79. Cardiac catheterization 3/14/2022 nonobstructive coronary disease. Echocardiogram 3/13/2020 2 aortic bioprosthesis mean gradient 22 mild TR RVSP 33. Right atrial enlargement. Review of Systems   Constitutional: Negative. Negative for chills, fever and unexpected weight change. HENT: Negative. Eyes: Negative. Respiratory: Negative. Negative for shortness of breath. Cardiovascular: Negative. Negative for chest pain. Gastrointestinal: Negative. Negative for diarrhea, nausea and vomiting. Endocrine: Negative. Genitourinary: Negative. Musculoskeletal: Negative. Skin: Negative. Neurological: Negative. OBJECTIVE:     /64   Pulse 79   Ht 5' 10\" (1.778 m)   Wt 259 lb (117.5 kg)   BMI 37.16 kg/m²     Labs:   CBC: No results for input(s): WBC, HGB, HCT, PLT in the last 72 hours. BMP:No results for input(s): NA, K, CO2, BUN, CREATININE, LABGLOM, GLUCOSE in the last 72 hours. BNP: No results for input(s): BNP in the last 72 hours. PT/INR: No results for input(s): PROTIME, INR in the last 72 hours. APTT:No results for input(s): APTT in the last 72 hours. CARDIAC ENZYMES:No results for input(s): CKTOTAL, CKMB, CKMBINDEX, TROPONINI in the last 72 hours.   FASTING LIPID PANEL:  Lab Results   Component Value Date/Time    HDL 29 07/21/2015 01:33 AM    LDLDIRECT 38 07/21/2015 01:33 AM    LDLCALC 57.0 08/08/2013 12:00 AM    TRIG 299 07/21/2015 01:33 AM     LIVER PROFILE:No results for input(s): AST, ALT, LABALBU in the last 72 hours. Past Medical History:   Diagnosis Date    Aortic stenosis     moderate    Arthritis     CAD (coronary artery disease) 5/13    stent to Circ    Chest tightness, discomfort, or pressure 3/23/2012    CHF (congestive heart failure) (HCC)     Chronic anemia     RECEIVED PROCRIT    Chronic kidney disease     Stage 4    Diabetes mellitus (HCC)     Family history of early CAD 3/23/2012    GERD (gastroesophageal reflux disease)     Hyperlipemia     Hypertension 3/23/2012    Neuromuscular disorder ([address]     Obesity     Thyroid disease     HYPOTHYROIDISM    Unspecified sleep apnea      Past Surgical History:   Procedure Laterality Date    AORTIC VALVE REPLACEMENT  07/21/15    Tissue Valve, Dr. Toro Shelton  3/26/12    CARDIAC CATHETERIZATION  5/21/2013  JDT    with stent to Circ- EF 50%    CARDIAC CATHETERIZATION  11/4/13  JDT    EF 60%, no intervention    CARDIAC CATHETERIZATION  7/20/15  JDT    severe aortic insufficiency. EF 50%    COLONOSCOPY      EYE SURGERY Bilateral     CATARACT OR    ROTATOR CUFF REPAIR Right     THORACENTESIS Left 08/04/15    VASCULAR SURGERY  11-6-13 [facility] & [address] R superficial femoral artery and psuedoaneurysm. Thrombin injection R SFA pseudoaneurysm. Family History   Problem Relation Age of Onset    Heart Disease Other     High Blood Pressure Other      No Known Allergies  Current Outpatient Medications   Medication Sig Dispense Refill    dapagliflozin (FARXIGA) 10 MG tablet Take 10 mg by mouth every morning      spironolactone (ALDACTONE) 25 MG tablet Take 25 mg by mouth in the morning and at bedtime      tamsulosin (FLOMAX) 0.4 MG capsule Take 0.4 mg by mouth nightly as needed      nitroGLYCERIN (NITROSTAT) 0.4 MG SL tablet Place 0.4 mg under the tongue every 5 minutes as needed for Chest pain up to max of 3 total doses. If no relief after 1 dose, call 911.       Multiple Vitamins-Minerals (PRESERVISION AREDS 2+MULTI VIT) CAPS Take 1 capsule by mouth in the morning and at bedtime      folbee plus (FOLBEE PLUS) TABS Take 1 tablet by mouth daily      amLODIPine (NORVASC) 5 MG tablet Take 5 mg by mouth daily IF /80      levothyroxine (SYNTHROID) 25 MCG tablet Take 1 tablet by mouth Daily 30 tablet 0    allopurinol (ZYLOPRIM) 300 MG tablet Take 300 mg by mouth daily      isosorbide mononitrate (IMDUR) 30 MG extended release tablet Take 30 mg by mouth daily      Coenzyme Q10 (COQ10) 200 MG CAPS Take 200 mg by mouth in the morning and at bedtime       bumetanide (BUMEX) 2 MG tablet Take 1 mg by mouth 2 times daily 1 MG TWICE A DAY      aspirin 81 MG tablet Take 81 mg by mouth nightly PT TAKES AT NIGHT      atorvastatin (LIPITOR) 40 MG tablet Take 1 tablet by mouth daily. (Patient taking differently: Take 40 mg by mouth every other day PT TAKES EVERY OTHER NIGHT) 30 tablet 5    omeprazole (PRILOSEC) 20 MG capsule Take 20 mg by mouth daily. gabapentin (NEURONTIN) 300 MG capsule Take 300 mg by mouth nightly       metoprolol tartrate (LOPRESSOR) 25 MG tablet Take 25 mg by mouth 2 times daily (Patient not taking: Reported on 2022)      doxazosin (CARDURA) 8 MG tablet Take 8 mg by mouth nightly      Niacin (VITAMIN B-3 PO) Take 1 tablet by mouth daily  (Patient not taking: Reported on 2022)       No current facility-administered medications for this visit.      Social History     Socioeconomic History    Marital status:      Spouse name: Not on file    Number of children: Not on file    Years of education: Not on file    Highest education level: Not on file   Occupational History    Not on file   Tobacco Use    Smoking status: Former     Packs/day: 0.00     Years: 0.00     Pack years: 0.00     Types: Cigarettes     Quit date: 1985     Years since quittin.5    Smokeless tobacco: Never   Vaping Use    Vaping Use: Never used   Substance and Sexual Activity    Alcohol use: No    Drug use: No    Sexual activity: Not on file   Other Topics Concern Not on file   Social History Narrative    Not on file     Social Determinants of Health     Financial Resource Strain: Not on file   Food Insecurity: Not on file   Transportation Needs: Not on file   Physical Activity: Not on file   Stress: Not on file   Social Connections: Not on file   Intimate Partner Violence: Not on file   Housing Stability: Not on file       Physical Examination:  /64   Pulse 79   Ht 5' 10\" (1.778 m)   Wt 259 lb (117.5 kg)   BMI 37.16 kg/m²   Physical Exam  Vitals reviewed. Constitutional:       Appearance: He is well-developed. Neck:      Vascular: No carotid bruit or JVD. Cardiovascular:      Rate and Rhythm: Normal rate and regular rhythm. Heart sounds: Normal heart sounds. No murmur heard. No friction rub. No gallop. Pulmonary:      Effort: Pulmonary effort is normal. No respiratory distress. Breath sounds: Normal breath sounds. No wheezing or rales. Abdominal:      General: There is no distension. Tenderness: There is no abdominal tenderness. Lymphadenopathy:      Cervical: No cervical adenopathy. Skin:     General: Skin is warm and dry. ASSESSMENT:     Diagnosis Orders   1. Coronary artery disease involving native coronary artery of native heart without angina pectoris        2. Essential hypertension        3. Valvular heart disease        4. Dyslipidemia        5. SA node dysfunction ([address]        6. Pacemaker        7. H/O aortic valve replacement            PLAN:  No orders of the defined types were placed in this encounter. No orders of the defined types were placed in this encounter. Continue present medications  Recommend follow-up assessment in 6 months    Return in about 6 months (around 6/1/2023) for return to Dr. Archie Page only. Gil Trevizo MD 12/1/2022 9:46 AM CST    [facility]      Thisdictation was generated by voice recognition computer software.   Although all attempts are made to edit the dictation for accuracy, there may be errors in the transcription that are not intended. no nausea/no vomiting/no abdominal pain

## 2022-12-27 ENCOUNTER — RX RENEWAL (OUTPATIENT)
Age: 77
End: 2022-12-27

## 2023-01-14 ENCOUNTER — NON-APPOINTMENT (OUTPATIENT)
Age: 78
End: 2023-01-14

## 2023-01-18 ENCOUNTER — NON-APPOINTMENT (OUTPATIENT)
Age: 78
End: 2023-01-18

## 2023-01-20 ENCOUNTER — APPOINTMENT (OUTPATIENT)
Dept: INTERNAL MEDICINE | Facility: CLINIC | Age: 78
End: 2023-01-20
Payer: MEDICARE

## 2023-01-20 ENCOUNTER — NON-APPOINTMENT (OUTPATIENT)
Age: 78
End: 2023-01-20

## 2023-01-20 VITALS
BODY MASS INDEX: 19.16 KG/M2 | SYSTOLIC BLOOD PRESSURE: 158 MMHG | HEART RATE: 72 BPM | OXYGEN SATURATION: 95 % | RESPIRATION RATE: 14 BRPM | HEIGHT: 65 IN | WEIGHT: 115 LBS | DIASTOLIC BLOOD PRESSURE: 92 MMHG

## 2023-01-20 DIAGNOSIS — J06.9 ACUTE UPPER RESPIRATORY INFECTION, UNSPECIFIED: ICD-10-CM

## 2023-01-20 DIAGNOSIS — H10.9 UNSPECIFIED CONJUNCTIVITIS: ICD-10-CM

## 2023-01-20 PROCEDURE — 99213 OFFICE O/P EST LOW 20 MIN: CPT

## 2023-01-20 NOTE — PHYSICAL EXAM
[No Acute Distress] : no acute distress [Normal Outer Ear/Nose] : the outer ears and nose were normal in appearance [Normal TMs] : both tympanic membranes were normal [No Lymphadenopathy] : no lymphadenopathy [No Respiratory Distress] : no respiratory distress  [No Accessory Muscle Use] : no accessory muscle use [Clear to Auscultation] : lungs were clear to auscultation bilaterally [Normal Rate] : normal rate  [Regular Rhythm] : with a regular rhythm [de-identified] : Mildly ill-appearing [de-identified] : Positive injection right conjunctiva without discharge [de-identified] : Light injection posterior pharynx

## 2023-01-20 NOTE — ASSESSMENT
[FreeTextEntry1] : Signs and symptoms most compatible with viral URI therefore continue to feel patient does not need an antibiotic.\par Symptomatic treatment recommended.\par Also with right conjunctivitis therefore treat with TobraDex eyedrops.

## 2023-01-20 NOTE — HISTORY OF PRESENT ILLNESS
[FreeTextEntry8] : The patient presents for an acute visit not feeling well after being seen at urgent care where she presented yesterday with 3 days of not feeling well including cough, nasal congestion, mild sore throat with negative home COVID test.\par Patient evaluated testing negative for COVID.\par Diagnosed with viral URI with symptomatic treatment. \par \par She presents today with similar symptoms but slightly better.\par New symptom is that she had redness and crusting of her right eye last evening and this morning.  Left eye is okay.

## 2023-01-27 ENCOUNTER — APPOINTMENT (OUTPATIENT)
Dept: ORTHOPEDIC SURGERY | Facility: CLINIC | Age: 78
End: 2023-01-27
Payer: MEDICARE

## 2023-01-27 VITALS
HEIGHT: 65 IN | BODY MASS INDEX: 19.16 KG/M2 | DIASTOLIC BLOOD PRESSURE: 82 MMHG | SYSTOLIC BLOOD PRESSURE: 167 MMHG | HEART RATE: 54 BPM | WEIGHT: 115 LBS

## 2023-01-27 PROCEDURE — 99213 OFFICE O/P EST LOW 20 MIN: CPT

## 2023-01-27 PROCEDURE — 73630 X-RAY EXAM OF FOOT: CPT | Mod: 26,LT

## 2023-01-27 NOTE — PHYSICAL EXAM
[de-identified] : The patient is conversive and in no apparent distress. The patient is alert and oriented to person, place, and time. Affect and mood appear normal. The head is normocephalic and atraumatic. Skin shows normal turgor with no evidence of eczema or psoriasis. No respiratory distress noted. Sensation grossly intact. MUSCULOSKELETAL:   SEE BELOW\par \par Left foot exam demonstrates skin is clean, dry intact.  There is a corn of the left third medial toe.  This has been recently addressed.  No signs of active infection.  Well-healed surgical incision of the left second toe.  No toe tenderness is appreciated.  There is tenderness of the distal left third metatarsus.  There is mild discomfort of the second webspace above the metatarsal head.  No focal soft tissue swelling or masses are noted.  Slight decrease sensation of the toes compared to the mid and proximal left foot.  Mild venous stasis is noted of the left lower extremity.  Varicose and spider veins are appreciated.  No open wounds or ulcerations.  2+ DP pulse.  Good range of motion of the ankle.  Achilles intact and nontender. [de-identified] : Three-view left foot x-ray were reviewed.  No clear fracture is appreciated.  No callus formation of the third metatarsals.  Osteopenic changes are appreciated.  Crossover toe of the third into the second is appreciated.

## 2023-01-27 NOTE — HISTORY OF PRESENT ILLNESS
[FreeTextEntry1] : Patient presents today for evaluation of left foot pain.  She reports of pain at the mid dorsal left foot for a month now.  She does not recall the specific injury or traumatic event.  She had had previous left second toe correction surgery many years ago.  She reports that she is an avid walker.  She tends to walk 6 miles per day 4-5 times a week.  She is known to be osteoporotic.  She reports despite using comfortable shoes and custom inserts, she continues to have pain.  She reports she is not limping.  She denies of any acute sensory changes.  She reports that she continues to have a corn of the left third toe addressed by her podiatrist.  She reports the discomfort associated with the corn is different than the foot pain.\par \par Review of Systems-\par Constitutional: No fever or chills. \par Cardiovascular: No orthopnea or chest pain\par Pulmonary: No shortness of breath. \par GI: No nausea or vomiting or abdominal pain.\par Musculoskeletal: see HPI \par Psychiatric: No anxiety and depression.

## 2023-01-27 NOTE — DISCUSSION/SUMMARY
[de-identified] : 25 minutes was spent reviewing the x-rays as well as discussing with the patient their clinical presentation, diagnosis and providing education.  The x-rays taken today were reviewed.  No clear fractures noted of the third metatarsus.  However, given the patient's history of osteoporosis and pain for the past month, the patient is recommended an MRI to evaluate for stress fracture.  She will complete the MRI and return back to the office in the next 1 to 2 weeks.  The patient will reduce her activities until the MRI is reviewed.  The patient demonstrated understanding of treatment plan.  All questions answered to the patient's satisfaction.

## 2023-02-06 ENCOUNTER — APPOINTMENT (OUTPATIENT)
Dept: MRI IMAGING | Facility: CLINIC | Age: 78
End: 2023-02-06

## 2023-02-17 ENCOUNTER — APPOINTMENT (OUTPATIENT)
Dept: ORTHOPEDIC SURGERY | Facility: CLINIC | Age: 78
End: 2023-02-17
Payer: MEDICARE

## 2023-02-17 VITALS
DIASTOLIC BLOOD PRESSURE: 88 MMHG | BODY MASS INDEX: 19.16 KG/M2 | HEART RATE: 60 BPM | HEIGHT: 65 IN | WEIGHT: 115 LBS | SYSTOLIC BLOOD PRESSURE: 162 MMHG

## 2023-02-17 DIAGNOSIS — M79.672 PAIN IN LEFT FOOT: ICD-10-CM

## 2023-02-17 PROCEDURE — 99212 OFFICE O/P EST SF 10 MIN: CPT

## 2023-02-24 ENCOUNTER — APPOINTMENT (OUTPATIENT)
Dept: ORTHOPEDIC SURGERY | Facility: CLINIC | Age: 78
End: 2023-02-24
Payer: MEDICARE

## 2023-02-24 VITALS — SYSTOLIC BLOOD PRESSURE: 144 MMHG | HEART RATE: 70 BPM | DIASTOLIC BLOOD PRESSURE: 82 MMHG

## 2023-02-24 DIAGNOSIS — L84 CORNS AND CALLOSITIES: ICD-10-CM

## 2023-02-24 DIAGNOSIS — D36.13 BENIGN NEOPLASM OF PERIPHERAL NERVES AND AUTONOMIC NERVOUS SYSTEM OF LOWER LIMB, INCLUDING HIP: ICD-10-CM

## 2023-02-24 PROCEDURE — 99214 OFFICE O/P EST MOD 30 MIN: CPT

## 2023-02-27 ENCOUNTER — APPOINTMENT (OUTPATIENT)
Dept: DERMATOLOGY | Facility: CLINIC | Age: 78
End: 2023-02-27
Payer: MEDICARE

## 2023-02-27 PROCEDURE — 99213 OFFICE O/P EST LOW 20 MIN: CPT | Mod: 25

## 2023-02-27 PROCEDURE — 17000 DESTRUCT PREMALG LESION: CPT

## 2023-02-27 PROCEDURE — 17003 DESTRUCT PREMALG LES 2-14: CPT

## 2023-03-16 ENCOUNTER — APPOINTMENT (OUTPATIENT)
Dept: INTERNAL MEDICINE | Facility: CLINIC | Age: 78
End: 2023-03-16

## 2023-03-17 ENCOUNTER — APPOINTMENT (OUTPATIENT)
Dept: ORTHOPEDIC SURGERY | Facility: CLINIC | Age: 78
End: 2023-03-17

## 2023-04-17 LAB
ALBUMIN SERPL ELPH-MCNC: 4.5 G/DL
ALP BLD-CCNC: 128 U/L
ALT SERPL-CCNC: 36 U/L
ANION GAP SERPL CALC-SCNC: 12 MMOL/L
AST SERPL-CCNC: 60 U/L
BASOPHILS # BLD AUTO: 0.06 K/UL
BASOPHILS NFR BLD AUTO: 1.2 %
BILIRUB SERPL-MCNC: 0.7 MG/DL
BUN SERPL-MCNC: 17 MG/DL
CALCIUM SERPL-MCNC: 10.1 MG/DL
CHLORIDE SERPL-SCNC: 101 MMOL/L
CHOLEST SERPL-MCNC: 220 MG/DL
CO2 SERPL-SCNC: 28 MMOL/L
CREAT SERPL-MCNC: 0.89 MG/DL
EGFR: 66 ML/MIN/1.73M2
EOSINOPHIL # BLD AUTO: 0.18 K/UL
EOSINOPHIL NFR BLD AUTO: 3.5 %
GLUCOSE SERPL-MCNC: 85 MG/DL
HCT VFR BLD CALC: 43.5 %
HDLC SERPL-MCNC: 103 MG/DL
HGB BLD-MCNC: 13.8 G/DL
IMM GRANULOCYTES NFR BLD AUTO: 0.6 %
LDLC SERPL CALC-MCNC: 107 MG/DL
LYMPHOCYTES # BLD AUTO: 1.46 K/UL
LYMPHOCYTES NFR BLD AUTO: 28.7 %
MAN DIFF?: NORMAL
MCHC RBC-ENTMCNC: 30 PG
MCHC RBC-ENTMCNC: 31.7 GM/DL
MCV RBC AUTO: 94.6 FL
MONOCYTES # BLD AUTO: 0.34 K/UL
MONOCYTES NFR BLD AUTO: 6.7 %
NEUTROPHILS # BLD AUTO: 3.02 K/UL
NEUTROPHILS NFR BLD AUTO: 59.3 %
NONHDLC SERPL-MCNC: 117 MG/DL
PLATELET # BLD AUTO: 210 K/UL
POTASSIUM SERPL-SCNC: 4.7 MMOL/L
PROT SERPL-MCNC: 6.8 G/DL
RBC # BLD: 4.6 M/UL
RBC # FLD: 13 %
SODIUM SERPL-SCNC: 142 MMOL/L
TRIGL SERPL-MCNC: 51 MG/DL
WBC # FLD AUTO: 5.09 K/UL

## 2023-04-20 ENCOUNTER — APPOINTMENT (OUTPATIENT)
Dept: INTERNAL MEDICINE | Facility: CLINIC | Age: 78
End: 2023-04-20
Payer: MEDICARE

## 2023-04-20 VITALS
RESPIRATION RATE: 11 BRPM | HEART RATE: 56 BPM | OXYGEN SATURATION: 98 % | SYSTOLIC BLOOD PRESSURE: 122 MMHG | HEIGHT: 65 IN | DIASTOLIC BLOOD PRESSURE: 80 MMHG | WEIGHT: 114 LBS | BODY MASS INDEX: 18.99 KG/M2

## 2023-04-20 PROCEDURE — 99214 OFFICE O/P EST MOD 30 MIN: CPT

## 2023-04-20 RX ORDER — ASPIRIN ENTERIC COATED TABLETS 81 MG 81 MG/1
81 TABLET, DELAYED RELEASE ORAL
Refills: 0 | Status: DISCONTINUED | COMMUNITY
Start: 2022-04-08 | End: 2023-04-20

## 2023-04-20 RX ORDER — TRAZODONE HYDROCHLORIDE 50 MG/1
50 TABLET ORAL
Qty: 30 | Refills: 6 | Status: DISCONTINUED | COMMUNITY
Start: 2022-10-18 | End: 2023-04-20

## 2023-04-20 RX ORDER — TOBRAMYCIN AND DEXAMETHASONE 3; 1 MG/ML; MG/ML
0.3-0.1 SUSPENSION/ DROPS OPHTHALMIC
Qty: 1 | Refills: 0 | Status: DISCONTINUED | COMMUNITY
Start: 2023-01-20 | End: 2023-04-20

## 2023-04-20 NOTE — DATA REVIEWED
[FreeTextEntry1] : Labs show\par -CBC = normal\par -Cholesterol elevated at 220 with excellent HDL\par -AST elevated at 60 with alk phos of 128, chronically elevated for patient post hepatologist evaluation\par

## 2023-04-20 NOTE — ASSESSMENT
[FreeTextEntry1] : Patient advised to continue present medications with diet/exercise and specialists followup. Patient  will return to the office for CPE in sept\par \par Shingrix d/w pt/declines Pneumovax/flu vaccines, +got covid vaccines X2, declines booster\par Colonoscopy was 3/13 with follow up in 5 years-Dr. Dhillon-declines so FIT test 4/2022=negative, NEW FIT given\par Mammogram is 2/2019- "to do"-referral given\par Bone density was 3/2021= referral given\par Carotid sonogram done on 9/2020 showed no stenosis\par Specialists include\par 1. Hepatologist-Dr. Catherine prn\par 2. GYN-Dr. Perez\par 3.Derm-Dr. Padron\par 4. Orthopedic prn-Dr. Bergman/Dr. López\par 5.cardio-Dr. goins\par 6.Rheum-Dr. Brisceo\par CT lung screen=N/A\par CT chest 8/2021= w/atelectasis, no nodules\par Cardiac MRI 10/2022\par

## 2023-04-20 NOTE — HISTORY OF PRESENT ILLNESS
[FreeTextEntry1] : Followup [de-identified] : Patient presents for followup on hypertension/hyperlipidemia and medication check. Patient is currently on metoprolol for hypertension and on Crestor hyperlipidemia and feels well\par -Had blood work done prior to appointment, to be reviewed at today's visit\par

## 2023-05-11 ENCOUNTER — NON-APPOINTMENT (OUTPATIENT)
Age: 78
End: 2023-05-11

## 2023-05-11 LAB — HEMOCCULT STL QL IA: NEGATIVE

## 2023-06-09 PROBLEM — M79.672 LEFT FOOT PAIN: Status: ACTIVE | Noted: 2023-01-27

## 2023-06-13 NOTE — DISCHARGE NOTE PROVIDER - NSDCMRMEDTOKEN_GEN_ALL_CORE_FT
show aluminum hydroxide-magnesium hydroxide 200 mg-200 mg/5 mL oral suspension: 30 milliliter(s) orally every 6 hours, As needed, Dyspepsia  colchicine 0.6 mg oral tablet: 0.5 tab(s) orally 2 times a day  Eliquis 5 mg oral tablet: 1 tab(s) orally 2 times a day  ethacrynic acid 25 mg oral tablet: 1 tab(s) orally once a day  metoprolol tartrate 50 mg oral tablet: 1 tab(s) orally 2 times a day  omeprazole 40 mg oral delayed release capsule: 1 cap(s) orally once a day   predniSONE 5 mg oral tablet: 1 tab(s) orally once a day  rosuvastatin 5 mg oral tablet: 1 tab(s) orally every other day  saccharomyces boulardii lyo 250 mg oral capsule: 1 cap(s) orally 2 times a day   aluminum hydroxide-magnesium hydroxide 200 mg-200 mg/5 mL oral suspension: 30 milliliter(s) orally every 6 hours, As needed, Dyspepsia  Eliquis 5 mg oral tablet: 1 tab(s) orally 2 times a day  ethacrynic acid 25 mg oral tablet: 1 tab(s) orally once a day  metoprolol tartrate 50 mg oral tablet: 1 tab(s) orally 2 times a day  omeprazole 40 mg oral delayed release capsule: 1 cap(s) orally once a day   predniSONE 5 mg oral tablet: 1 tab(s) orally once a day  rosuvastatin 5 mg oral tablet: 1 tab(s) orally every other day  saccharomyces boulardii lyo 250 mg oral capsule: 1 cap(s) orally 2 times a day

## 2023-07-14 ENCOUNTER — NON-APPOINTMENT (OUTPATIENT)
Age: 78
End: 2023-07-14

## 2023-07-14 RX ORDER — METOPROLOL SUCCINATE 25 MG/1
25 TABLET, EXTENDED RELEASE ORAL DAILY
Refills: 0 | Status: DISCONTINUED | COMMUNITY
Start: 2021-07-16 | End: 2023-07-14

## 2023-07-31 ENCOUNTER — OUTPATIENT (OUTPATIENT)
Dept: OUTPATIENT SERVICES | Facility: HOSPITAL | Age: 78
LOS: 1 days | End: 2023-07-31
Payer: MEDICARE

## 2023-07-31 ENCOUNTER — APPOINTMENT (OUTPATIENT)
Dept: RADIOLOGY | Facility: CLINIC | Age: 78
End: 2023-07-31
Payer: MEDICARE

## 2023-07-31 DIAGNOSIS — Z00.00 ENCOUNTER FOR GENERAL ADULT MEDICAL EXAMINATION WITHOUT ABNORMAL FINDINGS: ICD-10-CM

## 2023-07-31 PROCEDURE — 77080 DXA BONE DENSITY AXIAL: CPT

## 2023-07-31 PROCEDURE — 77080 DXA BONE DENSITY AXIAL: CPT | Mod: 26

## 2023-08-04 ENCOUNTER — APPOINTMENT (OUTPATIENT)
Dept: MAMMOGRAPHY | Facility: CLINIC | Age: 78
End: 2023-08-04

## 2023-08-08 ENCOUNTER — NON-APPOINTMENT (OUTPATIENT)
Age: 78
End: 2023-08-08

## 2023-09-19 ENCOUNTER — APPOINTMENT (OUTPATIENT)
Dept: INTERNAL MEDICINE | Facility: CLINIC | Age: 78
End: 2023-09-19
Payer: MEDICARE

## 2023-09-19 VITALS
BODY MASS INDEX: 18.66 KG/M2 | DIASTOLIC BLOOD PRESSURE: 78 MMHG | OXYGEN SATURATION: 98 % | HEART RATE: 66 BPM | HEIGHT: 65 IN | WEIGHT: 112 LBS | RESPIRATION RATE: 13 BRPM | SYSTOLIC BLOOD PRESSURE: 130 MMHG

## 2023-09-19 DIAGNOSIS — I65.23 OCCLUSION AND STENOSIS OF BILATERAL CAROTID ARTERIES: ICD-10-CM

## 2023-09-19 DIAGNOSIS — R09.89 OTHER SPECIFIED SYMPTOMS AND SIGNS INVOLVING THE CIRCULATORY AND RESPIRATORY SYSTEMS: ICD-10-CM

## 2023-09-19 DIAGNOSIS — I35.1 NONRHEUMATIC AORTIC (VALVE) INSUFFICIENCY: ICD-10-CM

## 2023-09-19 DIAGNOSIS — Z00.00 ENCOUNTER FOR GENERAL ADULT MEDICAL EXAMINATION W/OUT ABNORMAL FINDINGS: ICD-10-CM

## 2023-09-19 DIAGNOSIS — Z86.79 PERSONAL HISTORY OF OTHER DISEASES OF THE CIRCULATORY SYSTEM: ICD-10-CM

## 2023-09-19 DIAGNOSIS — I25.10 ATHEROSCLEROTIC HEART DISEASE OF NATIVE CORONARY ARTERY W/OUT ANGINA PECTORIS: ICD-10-CM

## 2023-09-19 DIAGNOSIS — M85.80 OTHER SPECIFIED DISORDERS OF BONE DENSITY AND STRUCTURE, UNSPECIFIED SITE: ICD-10-CM

## 2023-09-19 DIAGNOSIS — R74.8 ABNORMAL LEVELS OF OTHER SERUM ENZYMES: ICD-10-CM

## 2023-09-19 PROCEDURE — G0439: CPT

## 2023-09-19 PROCEDURE — 36415 COLL VENOUS BLD VENIPUNCTURE: CPT

## 2023-09-19 RX ORDER — CLOTRIMAZOLE AND BETAMETHASONE DIPROPIONATE 10; .5 MG/G; MG/G
1-0.05 CREAM TOPICAL TWICE DAILY
Qty: 1 | Refills: 1 | Status: DISCONTINUED | COMMUNITY
Start: 2022-10-18 | End: 2023-09-19

## 2023-09-20 NOTE — DIETITIAN INITIAL EVALUATION ADULT. - MUSCLE MASS (LOSS OF MUSCLE)
Temples.../Clavicles.../Shoulders.../Dorsal hand... Clofazimine Counseling:  I discussed with the patient the risks of clofazimine including but not limited to skin and eye pigmentation, liver damage, nausea/vomiting, gastrointestinal bleeding and allergy.

## 2023-09-21 LAB
25(OH)D3 SERPL-MCNC: 78.1 NG/ML
ALBUMIN SERPL ELPH-MCNC: 4.7 G/DL
ALP BLD-CCNC: 125 U/L
ALT SERPL-CCNC: 31 U/L
ANION GAP SERPL CALC-SCNC: 12 MMOL/L
APO B SERPL-MCNC: 80 MG/DL
APO LP(A) SERPL-MCNC: 157.7 NMOL/L
APPEARANCE: ABNORMAL
AST SERPL-CCNC: 42 U/L
BACTERIA: NEGATIVE /HPF
BASOPHILS # BLD AUTO: 0.06 K/UL
BASOPHILS NFR BLD AUTO: 0.8 %
BILIRUB SERPL-MCNC: 0.6 MG/DL
BILIRUBIN URINE: NEGATIVE
BLOOD URINE: NEGATIVE
BUN SERPL-MCNC: 19 MG/DL
CALCIUM OXALATE CRYSTALS: PRESENT
CALCIUM SERPL-MCNC: 9.9 MG/DL
CAST: 0 /LPF
CHLORIDE SERPL-SCNC: 101 MMOL/L
CHOLEST SERPL-MCNC: 199 MG/DL
CO2 SERPL-SCNC: 28 MMOL/L
COLOR: NORMAL
CREAT SERPL-MCNC: 0.91 MG/DL
EGFR: 64 ML/MIN/1.73M2
EOSINOPHIL # BLD AUTO: 0.2 K/UL
EOSINOPHIL NFR BLD AUTO: 2.8 %
EPITHELIAL CELLS: 2 /HPF
ESTIMATED AVERAGE GLUCOSE: 117 MG/DL
GLUCOSE QUALITATIVE U: NEGATIVE MG/DL
GLUCOSE SERPL-MCNC: 88 MG/DL
HBA1C MFR BLD HPLC: 5.7 %
HCT VFR BLD CALC: 43.8 %
HDLC SERPL-MCNC: 96 MG/DL
HGB BLD-MCNC: 13.7 G/DL
IMM GRANULOCYTES NFR BLD AUTO: 0.1 %
KETONES URINE: ABNORMAL MG/DL
LDLC SERPL CALC-MCNC: 92 MG/DL
LEUKOCYTE ESTERASE URINE: NEGATIVE
LYMPHOCYTES # BLD AUTO: 1.39 K/UL
LYMPHOCYTES NFR BLD AUTO: 19.1 %
MAN DIFF?: NORMAL
MCHC RBC-ENTMCNC: 30 PG
MCHC RBC-ENTMCNC: 31.3 GM/DL
MCV RBC AUTO: 95.8 FL
MICROSCOPIC-UA: NORMAL
MONOCYTES # BLD AUTO: 0.38 K/UL
MONOCYTES NFR BLD AUTO: 5.2 %
NEUTROPHILS # BLD AUTO: 5.22 K/UL
NEUTROPHILS NFR BLD AUTO: 72 %
NITRITE URINE: NEGATIVE
NONHDLC SERPL-MCNC: 103 MG/DL
PH URINE: 5.5
PLATELET # BLD AUTO: 213 K/UL
POTASSIUM SERPL-SCNC: 5 MMOL/L
PROT SERPL-MCNC: 7 G/DL
PROTEIN URINE: 30 MG/DL
RBC # BLD: 4.57 M/UL
RBC # FLD: 12.7 %
RED BLOOD CELLS URINE: 6 /HPF
REVIEW: NORMAL
SODIUM SERPL-SCNC: 141 MMOL/L
SPECIFIC GRAVITY URINE: 1.02
TRIGL SERPL-MCNC: 60 MG/DL
UROBILINOGEN URINE: 0.2 MG/DL
WBC # FLD AUTO: 7.26 K/UL
WHITE BLOOD CELLS URINE: 3 /HPF

## 2023-09-28 ENCOUNTER — OUTPATIENT (OUTPATIENT)
Dept: OUTPATIENT SERVICES | Facility: HOSPITAL | Age: 78
LOS: 1 days | End: 2023-09-28
Payer: MEDICARE

## 2023-09-28 ENCOUNTER — APPOINTMENT (OUTPATIENT)
Dept: ULTRASOUND IMAGING | Facility: CLINIC | Age: 78
End: 2023-09-28
Payer: MEDICARE

## 2023-09-28 DIAGNOSIS — R09.89 OTHER SPECIFIED SYMPTOMS AND SIGNS INVOLVING THE CIRCULATORY AND RESPIRATORY SYSTEMS: ICD-10-CM

## 2023-09-28 DIAGNOSIS — Z98.890 OTHER SPECIFIED POSTPROCEDURAL STATES: Chronic | ICD-10-CM

## 2023-09-28 DIAGNOSIS — I65.23 OCCLUSION AND STENOSIS OF BILATERAL CAROTID ARTERIES: ICD-10-CM

## 2023-09-28 PROCEDURE — 76775 US EXAM ABDO BACK WALL LIM: CPT | Mod: 26

## 2023-09-28 PROCEDURE — 93880 EXTRACRANIAL BILAT STUDY: CPT | Mod: 26

## 2023-09-28 PROCEDURE — 76775 US EXAM ABDO BACK WALL LIM: CPT

## 2023-09-28 PROCEDURE — 93880 EXTRACRANIAL BILAT STUDY: CPT

## 2023-10-03 ENCOUNTER — TRANSCRIPTION ENCOUNTER (OUTPATIENT)
Age: 78
End: 2023-10-03

## 2023-10-23 NOTE — ED PROVIDER NOTE - CRTICAL CARE TIME SPENT (MIN)
Quality 110: Preventive Care And Screening: Influenza Immunization: Influenza Immunization not Administered for Documented Reasons. Detail Level: Detailed Additional Notes: Doesn’t get vaccines 31

## 2023-10-26 DIAGNOSIS — R82.90 UNSPECIFIED ABNORMAL FINDINGS IN URINE: ICD-10-CM

## 2023-10-27 LAB
APPEARANCE: CLEAR
BACTERIA: NEGATIVE /HPF
BILIRUBIN URINE: NEGATIVE
BLOOD URINE: NEGATIVE
CAST: 0 /LPF
COLOR: YELLOW
EPITHELIAL CELLS: 0 /HPF
GLUCOSE QUALITATIVE U: NEGATIVE MG/DL
KETONES URINE: NEGATIVE MG/DL
LEUKOCYTE ESTERASE URINE: NEGATIVE
MICROSCOPIC-UA: NORMAL
NITRITE URINE: NEGATIVE
PH URINE: 6
PROTEIN URINE: NEGATIVE MG/DL
RED BLOOD CELLS URINE: 0 /HPF
SPECIFIC GRAVITY URINE: 1.01
UROBILINOGEN URINE: 0.2 MG/DL
WHITE BLOOD CELLS URINE: 0 /HPF

## 2023-11-27 ENCOUNTER — APPOINTMENT (OUTPATIENT)
Dept: DERMATOLOGY | Facility: CLINIC | Age: 78
End: 2023-11-27
Payer: MEDICARE

## 2023-11-27 PROCEDURE — 17262 DSTRJ MAL LES T/A/L 1.1-2.0: CPT

## 2023-11-27 PROCEDURE — 69100 BIOPSY OF EXTERNAL EAR: CPT | Mod: 59

## 2023-11-27 PROCEDURE — 99214 OFFICE O/P EST MOD 30 MIN: CPT | Mod: 25

## 2023-11-27 PROCEDURE — 17000 DESTRUCT PREMALG LESION: CPT | Mod: 59

## 2023-11-28 NOTE — DISCHARGE NOTE PROVIDER - NSDCFUADDAPPT_GEN_ALL_CORE_FT
Preop department called to notify patient of arrival time for scheduled procedure. Instructions given to   - Arrive at [address] [facility]. - Remain NPO after midnight, unless otherwise indicated, including gum, mints, and ice chips. - Have a responsible adult to drive patient to the hospital, stay during surgery, and patient will need supervision 24 hours after anesthesia. - Use antibacterial soap in shower the night before surgery and on the morning of surgery.        Was patient contacted: yes  Voicemail left: detailed message left  Numbers contacted: [phone]   Arrival time: 8714 Follow up with Dr. Ge as an outpatient.   Continue blood thinners without interruption.

## 2023-12-18 ENCOUNTER — NON-APPOINTMENT (OUTPATIENT)
Age: 78
End: 2023-12-18

## 2024-01-09 ENCOUNTER — APPOINTMENT (OUTPATIENT)
Dept: OBGYN | Facility: CLINIC | Age: 79
End: 2024-01-09

## 2024-01-26 ENCOUNTER — RX RENEWAL (OUTPATIENT)
Age: 79
End: 2024-01-26

## 2024-01-26 DIAGNOSIS — I10 ESSENTIAL (PRIMARY) HYPERTENSION: ICD-10-CM

## 2024-01-26 RX ORDER — ROSUVASTATIN CALCIUM 5 MG/1
5 TABLET, FILM COATED ORAL
Qty: 45 | Refills: 1 | Status: ACTIVE | COMMUNITY
Start: 2018-01-18

## 2024-02-22 ENCOUNTER — APPOINTMENT (OUTPATIENT)
Dept: INTERNAL MEDICINE | Facility: CLINIC | Age: 79
End: 2024-02-22
Payer: MEDICARE

## 2024-02-22 VITALS
DIASTOLIC BLOOD PRESSURE: 80 MMHG | HEIGHT: 65 IN | SYSTOLIC BLOOD PRESSURE: 120 MMHG | BODY MASS INDEX: 19.16 KG/M2 | RESPIRATION RATE: 11 BRPM | WEIGHT: 115 LBS | OXYGEN SATURATION: 97 % | HEART RATE: 80 BPM

## 2024-02-22 DIAGNOSIS — M25.511 PAIN IN RIGHT SHOULDER: ICD-10-CM

## 2024-02-22 PROCEDURE — 99213 OFFICE O/P EST LOW 20 MIN: CPT

## 2024-02-22 PROCEDURE — G2211 COMPLEX E/M VISIT ADD ON: CPT

## 2024-02-22 RX ORDER — ASPIRIN ENTERIC COATED TABLETS 81 MG 81 MG/1
81 TABLET, DELAYED RELEASE ORAL
Refills: 0 | Status: ACTIVE | COMMUNITY
Start: 2024-02-22

## 2024-02-22 RX ORDER — METOPROLOL SUCCINATE 25 MG/1
25 TABLET, EXTENDED RELEASE ORAL
Qty: 45 | Refills: 1 | Status: ACTIVE | COMMUNITY
Start: 2024-02-22

## 2024-02-22 NOTE — ASSESSMENT
[FreeTextEntry1] :  Offered patient Rx i.e. steroids and/or muscle relaxer, defers at this time,  to continue Tylenol, apply heat and use topical pain reliever OTC.  Discussed imaging for worsened or persistent symptoms.  Patient will call with progress and return to the office as scheduled for regular follow-up   Dr. Alcantar was present in office building while I examined patient

## 2024-02-22 NOTE — HISTORY OF PRESENT ILLNESS
[FreeTextEntry8] : Patient presents complaining of right shoulder pain that started yesterday.  Patient took Tylenol which helped minimally.  Patient had no injury/trauma.  Patient has no associated chest pain/palpitations/dyspnea.  Patient states it hurts to lift her arm overhead

## 2024-02-22 NOTE — PHYSICAL EXAM
[No Acute Distress] : no acute distress [No Respiratory Distress] : no respiratory distress  [Clear to Auscultation] : lungs were clear to auscultation bilaterally [Normal Rate] : normal rate  [Regular Rhythm] : with a regular rhythm [Normal Affect] : the affect was normal [Normal Mood] : the mood was normal [de-identified] : Neck with full range of motion, +right trapezius discomfort [de-identified] : + Right shoulder pain with overhead movement, no swelling or bony deformity,+ tender anterior shoulder

## 2024-02-29 ENCOUNTER — APPOINTMENT (OUTPATIENT)
Dept: INTERNAL MEDICINE | Facility: CLINIC | Age: 79
End: 2024-02-29
Payer: MEDICARE

## 2024-02-29 VITALS
HEART RATE: 67 BPM | OXYGEN SATURATION: 98 % | BODY MASS INDEX: 19.16 KG/M2 | WEIGHT: 115 LBS | HEIGHT: 65 IN | DIASTOLIC BLOOD PRESSURE: 80 MMHG | RESPIRATION RATE: 12 BRPM | SYSTOLIC BLOOD PRESSURE: 120 MMHG

## 2024-02-29 DIAGNOSIS — E78.5 HYPERLIPIDEMIA, UNSPECIFIED: ICD-10-CM

## 2024-02-29 DIAGNOSIS — R73.01 IMPAIRED FASTING GLUCOSE: ICD-10-CM

## 2024-02-29 DIAGNOSIS — R53.81 OTHER MALAISE: ICD-10-CM

## 2024-02-29 DIAGNOSIS — R53.83 OTHER MALAISE: ICD-10-CM

## 2024-02-29 DIAGNOSIS — Z11.59 ENCOUNTER FOR SCREENING FOR OTHER VIRAL DISEASES: ICD-10-CM

## 2024-02-29 DIAGNOSIS — Z79.899 OTHER LONG TERM (CURRENT) DRUG THERAPY: ICD-10-CM

## 2024-02-29 PROCEDURE — 99214 OFFICE O/P EST MOD 30 MIN: CPT

## 2024-02-29 PROCEDURE — G2211 COMPLEX E/M VISIT ADD ON: CPT

## 2024-02-29 PROCEDURE — 36415 COLL VENOUS BLD VENIPUNCTURE: CPT

## 2024-02-29 RX ORDER — MAGNESIUM 200 MG
200 TABLET ORAL
Refills: 0 | Status: ACTIVE | COMMUNITY
Start: 2024-02-29

## 2024-02-29 NOTE — HISTORY OF PRESENT ILLNESS
[FreeTextEntry8] : Patient presents stating she had an illness on 2/5 where she felt sick with fever/fatigue etc.  Patient states she took a home COVID test which was negative.  Patient did not seek medical attention at that time.  Patient states she feels significantly better but has residual fatigue.  Patient states fever resolved, she is still minimally congested but discharge is clear.  Patient is eating and drinking normally.  Patient has upcoming appointment for blood work therefore would like blood work done while here Continues on Crestor for hyperlipidemia

## 2024-02-29 NOTE — ASSESSMENT
[FreeTextEntry1] : Venipuncture done in our office, Labs sent out.  Viral swab sent.  Patient will call if symptoms continue or worsen and return to the office for CPE in September.    Dr. Alcantar was present in office building while I examined patient

## 2024-02-29 NOTE — PHYSICAL EXAM
[No Acute Distress] : no acute distress [No Respiratory Distress] : no respiratory distress  [Clear to Auscultation] : lungs were clear to auscultation bilaterally [Normal Rate] : normal rate  [Regular Rhythm] : with a regular rhythm [Normal Affect] : the affect was normal [Normal Mood] : the mood was normal [Normal Outer Ear/Nose] : the outer ears and nose were normal in appearance [Normal Oropharynx] : the oropharynx was normal [Normal TMs] : both tympanic membranes were normal [Normal Nasal Mucosa] : the nasal mucosa was normal [Supple] : supple [No Focal Deficits] : no focal deficits

## 2024-03-01 LAB
ALBUMIN SERPL ELPH-MCNC: 4.3 G/DL
ALP BLD-CCNC: 152 U/L
ALT SERPL-CCNC: 26 U/L
ANION GAP SERPL CALC-SCNC: 15 MMOL/L
AST SERPL-CCNC: 35 U/L
BASOPHILS # BLD AUTO: 0.04 K/UL
BASOPHILS NFR BLD AUTO: 0.6 %
BILIRUB SERPL-MCNC: 0.4 MG/DL
BUN SERPL-MCNC: 16 MG/DL
CALCIUM SERPL-MCNC: 9.7 MG/DL
CHLORIDE SERPL-SCNC: 101 MMOL/L
CHOLEST SERPL-MCNC: 171 MG/DL
CO2 SERPL-SCNC: 24 MMOL/L
CREAT SERPL-MCNC: 0.78 MG/DL
EGFR: 77 ML/MIN/1.73M2
EOSINOPHIL # BLD AUTO: 0.1 K/UL
EOSINOPHIL NFR BLD AUTO: 1.6 %
ESTIMATED AVERAGE GLUCOSE: 117 MG/DL
GLUCOSE SERPL-MCNC: 79 MG/DL
HBA1C MFR BLD HPLC: 5.7 %
HCT VFR BLD CALC: 38.2 %
HDLC SERPL-MCNC: 80 MG/DL
HGB BLD-MCNC: 12.1 G/DL
IMM GRANULOCYTES NFR BLD AUTO: 0.2 %
LDLC SERPL CALC-MCNC: 77 MG/DL
LYMPHOCYTES # BLD AUTO: 1.37 K/UL
LYMPHOCYTES NFR BLD AUTO: 22.1 %
MAN DIFF?: NORMAL
MCHC RBC-ENTMCNC: 30.2 PG
MCHC RBC-ENTMCNC: 31.7 GM/DL
MCV RBC AUTO: 95.3 FL
MONOCYTES # BLD AUTO: 0.41 K/UL
MONOCYTES NFR BLD AUTO: 6.6 %
NEUTROPHILS # BLD AUTO: 4.26 K/UL
NEUTROPHILS NFR BLD AUTO: 68.9 %
NONHDLC SERPL-MCNC: 91 MG/DL
PLATELET # BLD AUTO: 238 K/UL
POTASSIUM SERPL-SCNC: 4.3 MMOL/L
PROT SERPL-MCNC: 6.9 G/DL
RAPID RVP RESULT: NOT DETECTED
RBC # BLD: 4.01 M/UL
RBC # FLD: 13.2 %
SARS-COV-2 RNA PNL RESP NAA+PROBE: NOT DETECTED
SODIUM SERPL-SCNC: 140 MMOL/L
TRIGL SERPL-MCNC: 71 MG/DL
TSH SERPL-ACNC: 2.46 UIU/ML
WBC # FLD AUTO: 6.19 K/UL

## 2024-03-14 ENCOUNTER — APPOINTMENT (OUTPATIENT)
Dept: INTERNAL MEDICINE | Facility: CLINIC | Age: 79
End: 2024-03-14

## 2024-05-01 NOTE — ED ADULT NURSE NOTE - MODE OF DISCHARGE
LMTCB #3. We have made several attempts to contact patient by phone to schedule an appointment. If patient returns call back, please help patient schedule an appointment per message below. Thanks! Unfortunately, our calls have not been returned and we were unable to schedule. At this time, we will no longer make an attempt to schedule this appointment. Completing task.  Letter sent out    Ambulatory

## 2024-05-30 ENCOUNTER — APPOINTMENT (OUTPATIENT)
Dept: DERMATOLOGY | Facility: CLINIC | Age: 79
End: 2024-05-30
Payer: MEDICARE

## 2024-05-30 PROCEDURE — 99213 OFFICE O/P EST LOW 20 MIN: CPT

## 2024-06-05 ENCOUNTER — NON-APPOINTMENT (OUTPATIENT)
Age: 79
End: 2024-06-05

## 2024-08-16 DIAGNOSIS — E78.5 HYPERLIPIDEMIA, UNSPECIFIED: ICD-10-CM

## 2024-08-16 DIAGNOSIS — Z79.899 OTHER LONG TERM (CURRENT) DRUG THERAPY: ICD-10-CM

## 2024-08-16 DIAGNOSIS — R73.01 IMPAIRED FASTING GLUCOSE: ICD-10-CM

## 2024-08-16 DIAGNOSIS — I10 ESSENTIAL (PRIMARY) HYPERTENSION: ICD-10-CM

## 2024-09-16 ENCOUNTER — APPOINTMENT (OUTPATIENT)
Dept: INTERNAL MEDICINE | Facility: CLINIC | Age: 79
End: 2024-09-16
Payer: MEDICARE

## 2024-09-16 VITALS
HEART RATE: 65 BPM | DIASTOLIC BLOOD PRESSURE: 80 MMHG | BODY MASS INDEX: 19.16 KG/M2 | WEIGHT: 115 LBS | HEIGHT: 65 IN | RESPIRATION RATE: 12 BRPM | SYSTOLIC BLOOD PRESSURE: 125 MMHG | OXYGEN SATURATION: 96 %

## 2024-09-16 DIAGNOSIS — Z79.899 OTHER LONG TERM (CURRENT) DRUG THERAPY: ICD-10-CM

## 2024-09-16 DIAGNOSIS — E78.5 HYPERLIPIDEMIA, UNSPECIFIED: ICD-10-CM

## 2024-09-16 DIAGNOSIS — I10 ESSENTIAL (PRIMARY) HYPERTENSION: ICD-10-CM

## 2024-09-16 PROCEDURE — G2211 COMPLEX E/M VISIT ADD ON: CPT

## 2024-09-16 PROCEDURE — 99213 OFFICE O/P EST LOW 20 MIN: CPT

## 2024-09-16 RX ORDER — BIOTIN 10000 MCG
10 CAPSULE ORAL
Refills: 0 | Status: ACTIVE | COMMUNITY
Start: 2024-09-16

## 2024-09-16 NOTE — HISTORY OF PRESENT ILLNESS
[de-identified] : Patient presents for followup on hypertension/hyperlipidemia and medication check. Patient is currently OFF metoprolol for hypertension and is on Crestor hyperlipidemia  -Had blood work done prior to appointment, to be reviewed at today's visit -Would like antibiotic to take with her traveling -Cardiology discontinued metoprolol

## 2024-09-16 NOTE — DATA REVIEWED
[FreeTextEntry1] : Labs show -CBC = normal -CMP = good with improved alk phos of 126 -Cholesterol of 203

## 2024-09-16 NOTE — ASSESSMENT
[FreeTextEntry1] : Doxycycline sent to the pharmacy for travel.  Patient advised to continue present medications with diet/exercise and specialists followup. Patient will return to the office for CPE in 3 to 4 months    Shingrix/TDAP d/w pt/declines Pneumovax/flu vaccines, +got covid vaccines Colonoscopy was 3/13 with follow up in 5 years-Dr. Dhillon-declines so FIT test 5/2023 Mammogram is 8/2023= referral given Bone density was 7/2023 Specialists include 1. Hepatologist-Dr. Catherine prn 2. GYN-Dr. Guillen 3.Derm-Dr. Padron 4. Orthopedic prn-Dr. Bergman/Dr. López 5.cardio-Dr. goins 6.Rheum-Dr. Briscoe CT lung screen=N/A Carotid sonogram 9/2023 showed plaque without stenosis  .

## 2024-09-16 NOTE — HISTORY OF PRESENT ILLNESS
[de-identified] : Patient presents for followup on hypertension/hyperlipidemia and medication check. Patient is currently OFF metoprolol for hypertension and is on Crestor hyperlipidemia  -Had blood work done prior to appointment, to be reviewed at today's visit -Would like antibiotic to take with her traveling -Cardiology discontinued metoprolol

## 2024-10-29 NOTE — DISCHARGE NOTE PROVIDER - HOSPITAL COURSE
admitted with febrile illness, pl effusion  Medically stable and agreeable with discharge and follow up plan. Patient was advised to return to ED if any symptoms occur or worsen.  time 49 mins
no concerns

## 2024-11-22 ENCOUNTER — APPOINTMENT (OUTPATIENT)
Dept: OBGYN | Facility: CLINIC | Age: 79
End: 2024-11-22
Payer: MEDICARE

## 2024-11-22 VITALS — HEIGHT: 65 IN | WEIGHT: 116 LBS | BODY MASS INDEX: 19.33 KG/M2

## 2024-11-22 DIAGNOSIS — M85.80 OTHER SPECIFIED DISORDERS OF BONE DENSITY AND STRUCTURE, UNSPECIFIED SITE: ICD-10-CM

## 2024-11-22 DIAGNOSIS — Z01.419 ENCOUNTER FOR GYNECOLOGICAL EXAMINATION (GENERAL) (ROUTINE) W/OUT ABNORMAL FINDINGS: ICD-10-CM

## 2024-11-22 DIAGNOSIS — N76.3 SUBACUTE AND CHRONIC VULVITIS: ICD-10-CM

## 2024-11-22 DIAGNOSIS — Z78.0 ASYMPTOMATIC MENOPAUSAL STATE: ICD-10-CM

## 2024-11-22 PROCEDURE — 99397 PER PM REEVAL EST PAT 65+ YR: CPT | Mod: GY

## 2024-11-22 PROCEDURE — G0101: CPT

## 2024-11-22 RX ORDER — CLOTRIMAZOLE AND BETAMETHASONE DIPROPIONATE 10; .5 MG/G; MG/G
1-0.05 CREAM TOPICAL
Qty: 1 | Refills: 1 | Status: ACTIVE | COMMUNITY
Start: 2024-11-22 | End: 1900-01-01

## 2025-01-07 LAB
25(OH)D3 SERPL-MCNC: 81.3 NG/ML
ALBUMIN SERPL ELPH-MCNC: 4.4 G/DL
ALP BLD-CCNC: 124 U/L
ALT SERPL-CCNC: 24 U/L
ANION GAP SERPL CALC-SCNC: 17 MMOL/L
APPEARANCE: CLEAR
AST SERPL-CCNC: 40 U/L
BACTERIA: NEGATIVE /HPF
BASOPHILS # BLD AUTO: 0.06 K/UL
BASOPHILS NFR BLD AUTO: 1.1 %
BILIRUB SERPL-MCNC: 0.6 MG/DL
BILIRUBIN URINE: NEGATIVE
BLOOD URINE: NEGATIVE
BUN SERPL-MCNC: 19 MG/DL
CALCIUM SERPL-MCNC: 9.7 MG/DL
CAST: 0 /LPF
CHLORIDE SERPL-SCNC: 102 MMOL/L
CHOLEST SERPL-MCNC: 219 MG/DL
CO2 SERPL-SCNC: 25 MMOL/L
COLOR: YELLOW
CREAT SERPL-MCNC: 0.93 MG/DL
EGFR: 62 ML/MIN/1.73M2
EOSINOPHIL # BLD AUTO: 0.24 K/UL
EOSINOPHIL NFR BLD AUTO: 4.6 %
EPITHELIAL CELLS: 0 /HPF
ESTIMATED AVERAGE GLUCOSE: 117 MG/DL
GLUCOSE QUALITATIVE U: NEGATIVE MG/DL
GLUCOSE SERPL-MCNC: 82 MG/DL
HBA1C MFR BLD HPLC: 5.7 %
HCT VFR BLD CALC: 43.2 %
HDLC SERPL-MCNC: 101 MG/DL
HGB BLD-MCNC: 14.2 G/DL
IMM GRANULOCYTES NFR BLD AUTO: 0.2 %
KETONES URINE: NEGATIVE MG/DL
LDLC SERPL CALC-MCNC: 105 MG/DL
LEUKOCYTE ESTERASE URINE: NEGATIVE
LYMPHOCYTES # BLD AUTO: 1.4 K/UL
LYMPHOCYTES NFR BLD AUTO: 26.7 %
MAN DIFF?: NORMAL
MCHC RBC-ENTMCNC: 30.7 PG
MCHC RBC-ENTMCNC: 32.9 G/DL
MCV RBC AUTO: 93.3 FL
MICROSCOPIC-UA: NORMAL
MONOCYTES # BLD AUTO: 0.32 K/UL
MONOCYTES NFR BLD AUTO: 6.1 %
NEUTROPHILS # BLD AUTO: 3.22 K/UL
NEUTROPHILS NFR BLD AUTO: 61.3 %
NITRITE URINE: NEGATIVE
NONHDLC SERPL-MCNC: 118 MG/DL
PH URINE: 7
PLATELET # BLD AUTO: 236 K/UL
POTASSIUM SERPL-SCNC: 4.5 MMOL/L
PROT SERPL-MCNC: 7 G/DL
PROTEIN URINE: NEGATIVE MG/DL
RBC # BLD: 4.63 M/UL
RBC # FLD: 12.6 %
RED BLOOD CELLS URINE: 2 /HPF
SODIUM SERPL-SCNC: 144 MMOL/L
SPECIFIC GRAVITY URINE: 1.01
TRIGL SERPL-MCNC: 72 MG/DL
UROBILINOGEN URINE: 0.2 MG/DL
WBC # FLD AUTO: 5.25 K/UL
WHITE BLOOD CELLS URINE: 0 /HPF

## 2025-03-19 ENCOUNTER — APPOINTMENT (OUTPATIENT)
Dept: DERMATOLOGY | Facility: CLINIC | Age: 80
End: 2025-03-19

## 2025-06-07 ENCOUNTER — NON-APPOINTMENT (OUTPATIENT)
Age: 80
End: 2025-06-07

## 2025-06-09 ENCOUNTER — APPOINTMENT (OUTPATIENT)
Dept: DERMATOLOGY | Facility: CLINIC | Age: 80
End: 2025-06-09
Payer: MEDICARE

## 2025-06-09 PROCEDURE — 99213 OFFICE O/P EST LOW 20 MIN: CPT | Mod: 25

## 2025-06-09 PROCEDURE — 11102 TANGNTL BX SKIN SINGLE LES: CPT

## 2025-07-10 LAB
25(OH)D3 SERPL-MCNC: 77.2 NG/ML
ALBUMIN SERPL ELPH-MCNC: 4.4 G/DL
ALP BLD-CCNC: 127 U/L
ALT SERPL-CCNC: 38 U/L
ANION GAP SERPL CALC-SCNC: 14 MMOL/L
APPEARANCE: CLEAR
AST SERPL-CCNC: 45 U/L
BACTERIA: NEGATIVE /HPF
BASOPHILS # BLD AUTO: 0.05 K/UL
BASOPHILS NFR BLD AUTO: 1 %
BILIRUB SERPL-MCNC: 0.5 MG/DL
BILIRUBIN URINE: NEGATIVE
BLOOD URINE: NEGATIVE
BUN SERPL-MCNC: 22 MG/DL
CALCIUM SERPL-MCNC: 9.7 MG/DL
CAST: 0 /LPF
CHLORIDE SERPL-SCNC: 103 MMOL/L
CHOLEST SERPL-MCNC: 210 MG/DL
CO2 SERPL-SCNC: 27 MMOL/L
COLOR: YELLOW
CREAT SERPL-MCNC: 0.92 MG/DL
EGFRCR SERPLBLD CKD-EPI 2021: 63 ML/MIN/1.73M2
EOSINOPHIL # BLD AUTO: 0.19 K/UL
EOSINOPHIL NFR BLD AUTO: 3.6 %
EPITHELIAL CELLS: 1 /HPF
ESTIMATED AVERAGE GLUCOSE: 108 MG/DL
GLUCOSE QUALITATIVE U: NEGATIVE MG/DL
GLUCOSE SERPL-MCNC: 85 MG/DL
HBA1C MFR BLD HPLC: 5.4 %
HCT VFR BLD CALC: 44.6 %
HDLC SERPL-MCNC: 88 MG/DL
HGB BLD-MCNC: 13.9 G/DL
IMM GRANULOCYTES NFR BLD AUTO: 0.2 %
KETONES URINE: NEGATIVE MG/DL
LDLC SERPL-MCNC: 111 MG/DL
LEUKOCYTE ESTERASE URINE: NEGATIVE
LYMPHOCYTES # BLD AUTO: 1.41 K/UL
LYMPHOCYTES NFR BLD AUTO: 27 %
MAN DIFF?: NORMAL
MCHC RBC-ENTMCNC: 30.2 PG
MCHC RBC-ENTMCNC: 31.2 G/DL
MCV RBC AUTO: 96.7 FL
MICROSCOPIC-UA: NORMAL
MONOCYTES # BLD AUTO: 0.35 K/UL
MONOCYTES NFR BLD AUTO: 6.7 %
NEUTROPHILS # BLD AUTO: 3.21 K/UL
NEUTROPHILS NFR BLD AUTO: 61.5 %
NITRITE URINE: NEGATIVE
NONHDLC SERPL-MCNC: 122 MG/DL
PH URINE: 7
PLATELET # BLD AUTO: 221 K/UL
POTASSIUM SERPL-SCNC: 4.8 MMOL/L
PROT SERPL-MCNC: 6.7 G/DL
PROTEIN URINE: NEGATIVE MG/DL
RBC # BLD: 4.61 M/UL
RBC # FLD: 12.8 %
RED BLOOD CELLS URINE: 1 /HPF
SODIUM SERPL-SCNC: 144 MMOL/L
SPECIFIC GRAVITY URINE: 1.01
TRIGL SERPL-MCNC: 64 MG/DL
UROBILINOGEN URINE: 0.2 MG/DL
WBC # FLD AUTO: 5.22 K/UL
WHITE BLOOD CELLS URINE: 0 /HPF

## 2025-07-15 ENCOUNTER — APPOINTMENT (OUTPATIENT)
Dept: INTERNAL MEDICINE | Facility: CLINIC | Age: 80
End: 2025-07-15
Payer: MEDICARE

## 2025-07-15 VITALS
BODY MASS INDEX: 19.16 KG/M2 | HEART RATE: 64 BPM | SYSTOLIC BLOOD PRESSURE: 140 MMHG | RESPIRATION RATE: 13 BRPM | OXYGEN SATURATION: 97 % | WEIGHT: 115 LBS | DIASTOLIC BLOOD PRESSURE: 80 MMHG | HEIGHT: 65 IN

## 2025-07-15 PROBLEM — Z12.39 BREAST CANCER SCREENING: Status: ACTIVE | Noted: 2025-07-15

## 2025-07-15 PROCEDURE — G0439: CPT

## 2025-07-16 ENCOUNTER — NON-APPOINTMENT (OUTPATIENT)
Age: 80
End: 2025-07-16

## 2025-07-22 ENCOUNTER — OUTPATIENT (OUTPATIENT)
Dept: OUTPATIENT SERVICES | Facility: HOSPITAL | Age: 80
LOS: 1 days | End: 2025-07-22
Payer: MEDICARE

## 2025-07-22 ENCOUNTER — APPOINTMENT (OUTPATIENT)
Dept: ULTRASOUND IMAGING | Facility: CLINIC | Age: 80
End: 2025-07-22
Payer: MEDICARE

## 2025-07-22 DIAGNOSIS — Z98.890 OTHER SPECIFIED POSTPROCEDURAL STATES: Chronic | ICD-10-CM

## 2025-07-22 DIAGNOSIS — Z12.39 ENCOUNTER FOR OTHER SCREENING FOR MALIGNANT NEOPLASM OF BREAST: ICD-10-CM

## 2025-07-22 DIAGNOSIS — R74.8 ABNORMAL LEVELS OF OTHER SERUM ENZYMES: ICD-10-CM

## 2025-07-22 PROCEDURE — 76700 US EXAM ABDOM COMPLETE: CPT | Mod: 26

## 2025-07-22 PROCEDURE — 76775 US EXAM ABDO BACK WALL LIM: CPT | Mod: 26,XU

## 2025-07-22 PROCEDURE — 76700 US EXAM ABDOM COMPLETE: CPT

## 2025-07-22 PROCEDURE — 76775 US EXAM ABDO BACK WALL LIM: CPT

## 2025-07-27 NOTE — DIETITIAN INITIAL EVALUATION ADULT. - ETIOLOGY
Problem: Adult Inpatient Plan of Care  Goal: Plan of Care Review  Outcome: Progressing  Goal: Patient-Specific Goal (Individualized)  Outcome: Progressing  Goal: Absence of Hospital-Acquired Illness or Injury  Outcome: Progressing  Goal: Optimal Comfort and Wellbeing  Outcome: Progressing  Goal: Readiness for Transition of Care  Outcome: Progressing     Problem: Skin Injury Risk Increased  Goal: Skin Health and Integrity  Outcome: Progressing      related to inability to consume sufficient protein energy intake with persistent poor appetite in setting of pleural effusions

## 2025-08-20 ENCOUNTER — NON-APPOINTMENT (OUTPATIENT)
Age: 80
End: 2025-08-20